# Patient Record
Sex: FEMALE | Race: WHITE | Employment: OTHER | URBAN - METROPOLITAN AREA
[De-identification: names, ages, dates, MRNs, and addresses within clinical notes are randomized per-mention and may not be internally consistent; named-entity substitution may affect disease eponyms.]

---

## 2021-11-15 ENCOUNTER — HOSPITAL ENCOUNTER (INPATIENT)
Age: 86
LOS: 22 days | Discharge: NURSING, REHAB OR LONG-TERM CARE | DRG: 853 | End: 2021-12-07
Attending: EMERGENCY MEDICINE | Admitting: SURGERY
Payer: MEDICARE

## 2021-11-15 ENCOUNTER — APPOINTMENT (OUTPATIENT)
Dept: CT IMAGING | Age: 86
DRG: 853 | End: 2021-11-15
Attending: EMERGENCY MEDICINE
Payer: MEDICARE

## 2021-11-15 ENCOUNTER — ANESTHESIA (OUTPATIENT)
Dept: SURGERY | Age: 86
DRG: 853 | End: 2021-11-15
Payer: MEDICARE

## 2021-11-15 ENCOUNTER — ANESTHESIA EVENT (OUTPATIENT)
Dept: SURGERY | Age: 86
DRG: 853 | End: 2021-11-15
Payer: MEDICARE

## 2021-11-15 DIAGNOSIS — Z71.89 GOALS OF CARE, COUNSELING/DISCUSSION: ICD-10-CM

## 2021-11-15 DIAGNOSIS — R73.9 TRANSIENT HYPERGLYCEMIA POST PROCEDURE: ICD-10-CM

## 2021-11-15 DIAGNOSIS — K63.1 BOWEL PERFORATION (HCC): Primary | ICD-10-CM

## 2021-11-15 DIAGNOSIS — K55.9 SMALL BOWEL ISCHEMIA (HCC): ICD-10-CM

## 2021-11-15 DIAGNOSIS — R53.83 LETHARGY: ICD-10-CM

## 2021-11-15 LAB
ALBUMIN SERPL-MCNC: 1.9 G/DL (ref 3.5–5)
ALBUMIN/GLOB SERPL: 0.5 {RATIO} (ref 1.1–2.2)
ALP SERPL-CCNC: 95 U/L (ref 45–117)
ALT SERPL-CCNC: 8 U/L (ref 12–78)
ANION GAP SERPL CALC-SCNC: 19 MMOL/L (ref 5–15)
AST SERPL-CCNC: 10 U/L (ref 15–37)
BASOPHILS # BLD: 0.1 K/UL (ref 0–0.1)
BASOPHILS NFR BLD: 1 % (ref 0–1)
BILIRUB SERPL-MCNC: 0.6 MG/DL (ref 0.2–1)
BUN SERPL-MCNC: 69 MG/DL (ref 6–20)
BUN/CREAT SERPL: 39 (ref 12–20)
CALCIUM SERPL-MCNC: 8 MG/DL (ref 8.5–10.1)
CHLORIDE SERPL-SCNC: 103 MMOL/L (ref 97–108)
CO2 SERPL-SCNC: 15 MMOL/L (ref 21–32)
COVID-19 RAPID TEST, COVR: NOT DETECTED
CREAT SERPL-MCNC: 1.79 MG/DL (ref 0.55–1.02)
DIFFERENTIAL METHOD BLD: ABNORMAL
EOSINOPHIL # BLD: 0 K/UL (ref 0–0.4)
EOSINOPHIL NFR BLD: 0 % (ref 0–7)
ERYTHROCYTE [DISTWIDTH] IN BLOOD BY AUTOMATED COUNT: 14.3 % (ref 11.5–14.5)
GLOBULIN SER CALC-MCNC: 3.8 G/DL (ref 2–4)
GLUCOSE SERPL-MCNC: 126 MG/DL (ref 65–100)
HCT VFR BLD AUTO: 31.9 % (ref 35–47)
HGB BLD-MCNC: 10.3 G/DL (ref 11.5–16)
IMM GRANULOCYTES # BLD AUTO: 0.3 K/UL
IMM GRANULOCYTES NFR BLD AUTO: 2 %
LACTATE SERPL-SCNC: 2.1 MMOL/L (ref 0.4–2)
LACTATE SERPL-SCNC: 2.3 MMOL/L (ref 0.4–2)
LIPASE SERPL-CCNC: 16 U/L (ref 73–393)
LYMPHOCYTES # BLD: 0.9 K/UL (ref 0.8–3.5)
LYMPHOCYTES NFR BLD: 7 % (ref 12–49)
MCH RBC QN AUTO: 29.3 PG (ref 26–34)
MCHC RBC AUTO-ENTMCNC: 32.3 G/DL (ref 30–36.5)
MCV RBC AUTO: 90.9 FL (ref 80–99)
METAMYELOCYTES NFR BLD MANUAL: 3 %
MONOCYTES # BLD: 0.4 K/UL (ref 0–1)
MONOCYTES NFR BLD: 3 % (ref 5–13)
NEUTS BAND NFR BLD MANUAL: 30 % (ref 0–6)
NEUTS SEG # BLD: 11.3 K/UL (ref 1.8–8)
NEUTS SEG NFR BLD: 54 % (ref 32–75)
NRBC # BLD: 0 K/UL (ref 0–0.01)
NRBC BLD-RTO: 0 PER 100 WBC
PLATELET # BLD AUTO: 618 K/UL (ref 150–400)
PLATELET COMMENTS,PCOM: ABNORMAL
PMV BLD AUTO: 10.1 FL (ref 8.9–12.9)
POTASSIUM SERPL-SCNC: 3.8 MMOL/L (ref 3.5–5.1)
PROT SERPL-MCNC: 5.7 G/DL (ref 6.4–8.2)
RBC # BLD AUTO: 3.51 M/UL (ref 3.8–5.2)
RBC MORPH BLD: ABNORMAL
SODIUM SERPL-SCNC: 137 MMOL/L (ref 136–145)
SOURCE, COVRS: NORMAL
WBC # BLD AUTO: 13.5 K/UL (ref 3.6–11)
WBC MORPH BLD: ABNORMAL

## 2021-11-15 PROCEDURE — 77030008608 HC TRCR ENDOSC SMTH AMR -B: Performed by: SURGERY

## 2021-11-15 PROCEDURE — 0DT80ZZ RESECTION OF SMALL INTESTINE, OPEN APPROACH: ICD-10-PCS | Performed by: SURGERY

## 2021-11-15 PROCEDURE — 77030012770 HC TRCR OPT FX AMR -B: Performed by: SURGERY

## 2021-11-15 PROCEDURE — 77030002966 HC SUT PDS J&J -A: Performed by: SURGERY

## 2021-11-15 PROCEDURE — 77030002895 HC DEV VASC CLOSR COVD -B: Performed by: SURGERY

## 2021-11-15 PROCEDURE — 74011000250 HC RX REV CODE- 250: Performed by: NURSE ANESTHETIST, CERTIFIED REGISTERED

## 2021-11-15 PROCEDURE — 83690 ASSAY OF LIPASE: CPT

## 2021-11-15 PROCEDURE — 77030002933 HC SUT MCRYL J&J -A: Performed by: SURGERY

## 2021-11-15 PROCEDURE — 77030011278 HC ELECTRD LIG IMPT COVD -F: Performed by: SURGERY

## 2021-11-15 PROCEDURE — 80053 COMPREHEN METABOLIC PANEL: CPT

## 2021-11-15 PROCEDURE — 77030016154: Performed by: SURGERY

## 2021-11-15 PROCEDURE — 0DJD4ZZ INSPECTION OF LOWER INTESTINAL TRACT, PERCUTANEOUS ENDOSCOPIC APPROACH: ICD-10-PCS | Performed by: SURGERY

## 2021-11-15 PROCEDURE — 96361 HYDRATE IV INFUSION ADD-ON: CPT

## 2021-11-15 PROCEDURE — 83605 ASSAY OF LACTIC ACID: CPT

## 2021-11-15 PROCEDURE — 77030040504 HC DRN WND MDII -B: Performed by: SURGERY

## 2021-11-15 PROCEDURE — 77030011264 HC ELECTRD BLD EXT COVD -A: Performed by: SURGERY

## 2021-11-15 PROCEDURE — 76010000132 HC OR TIME 2.5 TO 3 HR: Performed by: SURGERY

## 2021-11-15 PROCEDURE — 2709999900 HC NON-CHARGEABLE SUPPLY: Performed by: SURGERY

## 2021-11-15 PROCEDURE — 77030031139 HC SUT VCRL2 J&J -A: Performed by: SURGERY

## 2021-11-15 PROCEDURE — 76060000037 HC ANESTHESIA 3 TO 3.5 HR: Performed by: SURGERY

## 2021-11-15 PROCEDURE — 77030008462 HC STPLR SKN PROX J&J -A: Performed by: SURGERY

## 2021-11-15 PROCEDURE — 77030009965 HC RELD STPLR ENDOS COVD -D: Performed by: SURGERY

## 2021-11-15 PROCEDURE — 77030013079 HC BLNKT BAIR HGGR 3M -A: Performed by: ANESTHESIOLOGY

## 2021-11-15 PROCEDURE — 77030026438 HC STYL ET INTUB CARD -A: Performed by: NURSE ANESTHETIST, CERTIFIED REGISTERED

## 2021-11-15 PROCEDURE — 76010000172 HC OR TIME 2.5 TO 3 HR INTENSV-TIER 1: Performed by: SURGERY

## 2021-11-15 PROCEDURE — 85025 COMPLETE CBC W/AUTO DIFF WBC: CPT

## 2021-11-15 PROCEDURE — 74011250636 HC RX REV CODE- 250/636: Performed by: EMERGENCY MEDICINE

## 2021-11-15 PROCEDURE — 77030040830 HC CATH URETH FOL MDII -A: Performed by: SURGERY

## 2021-11-15 PROCEDURE — 96365 THER/PROPH/DIAG IV INF INIT: CPT

## 2021-11-15 PROCEDURE — 74011250636 HC RX REV CODE- 250/636: Performed by: NURSE ANESTHETIST, CERTIFIED REGISTERED

## 2021-11-15 PROCEDURE — 74176 CT ABD & PELVIS W/O CONTRAST: CPT

## 2021-11-15 PROCEDURE — 77030040361 HC SLV COMPR DVT MDII -B: Performed by: SURGERY

## 2021-11-15 PROCEDURE — 76210000006 HC OR PH I REC 0.5 TO 1 HR: Performed by: SURGERY

## 2021-11-15 PROCEDURE — 36415 COLL VENOUS BLD VENIPUNCTURE: CPT

## 2021-11-15 PROCEDURE — 77030010507 HC ADH SKN DERMBND J&J -B: Performed by: SURGERY

## 2021-11-15 PROCEDURE — 99285 EMERGENCY DEPT VISIT HI MDM: CPT

## 2021-11-15 PROCEDURE — 87635 SARS-COV-2 COVID-19 AMP PRB: CPT

## 2021-11-15 PROCEDURE — 77030020829: Performed by: SURGERY

## 2021-11-15 PROCEDURE — 77030008756 HC TU IRR SUC STRY -B: Performed by: SURGERY

## 2021-11-15 PROCEDURE — 74011000258 HC RX REV CODE- 258: Performed by: EMERGENCY MEDICINE

## 2021-11-15 PROCEDURE — 77030008684 HC TU ET CUF COVD -B: Performed by: NURSE ANESTHETIST, CERTIFIED REGISTERED

## 2021-11-15 PROCEDURE — 77030002996 HC SUT SLK J&J -A: Performed by: SURGERY

## 2021-11-15 PROCEDURE — 0DTF0ZZ RESECTION OF RIGHT LARGE INTESTINE, OPEN APPROACH: ICD-10-PCS | Performed by: SURGERY

## 2021-11-15 PROCEDURE — 0DQB0ZZ REPAIR ILEUM, OPEN APPROACH: ICD-10-PCS | Performed by: SURGERY

## 2021-11-15 PROCEDURE — 93005 ELECTROCARDIOGRAM TRACING: CPT

## 2021-11-15 PROCEDURE — 99221 1ST HOSP IP/OBS SF/LOW 40: CPT | Performed by: SURGERY

## 2021-11-15 PROCEDURE — 77030038157 HC DEV PWR CNTR DISP SIGNIA COVD -C: Performed by: SURGERY

## 2021-11-15 PROCEDURE — 77030040506 HC DRN WND MDII -A: Performed by: SURGERY

## 2021-11-15 PROCEDURE — 96375 TX/PRO/DX INJ NEW DRUG ADDON: CPT

## 2021-11-15 RX ORDER — CEPHALEXIN 500 MG/1
500 CAPSULE ORAL 4 TIMES DAILY
COMMUNITY
End: 2022-01-10

## 2021-11-15 RX ORDER — CEFOXITIN 2 G/1
INJECTION, POWDER, FOR SOLUTION INTRAVENOUS AS NEEDED
Status: DISCONTINUED | OUTPATIENT
Start: 2021-11-15 | End: 2021-11-16 | Stop reason: HOSPADM

## 2021-11-15 RX ORDER — LISINOPRIL 20 MG/1
20 TABLET ORAL DAILY
COMMUNITY
End: 2022-02-06

## 2021-11-15 RX ORDER — MELATONIN
1000 DAILY
COMMUNITY

## 2021-11-15 RX ORDER — CHOLECALCIFEROL (VITAMIN D3) 125 MCG
5 CAPSULE ORAL
COMMUNITY

## 2021-11-15 RX ORDER — ONDANSETRON 2 MG/ML
4 INJECTION INTRAMUSCULAR; INTRAVENOUS
Status: COMPLETED | OUTPATIENT
Start: 2021-11-15 | End: 2021-11-15

## 2021-11-15 RX ORDER — SODIUM CHLORIDE, SODIUM LACTATE, POTASSIUM CHLORIDE, CALCIUM CHLORIDE 600; 310; 30; 20 MG/100ML; MG/100ML; MG/100ML; MG/100ML
INJECTION, SOLUTION INTRAVENOUS
Status: DISCONTINUED | OUTPATIENT
Start: 2021-11-15 | End: 2021-11-16 | Stop reason: HOSPADM

## 2021-11-15 RX ORDER — ACETAMINOPHEN 325 MG/1
325 TABLET ORAL
COMMUNITY
End: 2022-01-10

## 2021-11-15 RX ORDER — L.ACID,CASEI/B.ANIMAL/S.THERMO 16B CELL
1 CAPSULE ORAL DAILY
COMMUNITY
End: 2022-01-10

## 2021-11-15 RX ORDER — POTASSIUM CHLORIDE 1.5 G/1.77G
20 POWDER, FOR SOLUTION ORAL DAILY
COMMUNITY
End: 2022-03-01

## 2021-11-15 RX ORDER — LEVOTHYROXINE SODIUM 25 UG/1
25 TABLET ORAL
COMMUNITY
End: 2022-02-06

## 2021-11-15 RX ORDER — LANOLIN ALCOHOL/MO/W.PET/CERES
400 CREAM (GRAM) TOPICAL DAILY
COMMUNITY
End: 2022-02-06 | Stop reason: DRUGHIGH

## 2021-11-15 RX ORDER — CARVEDILOL 12.5 MG/1
12.5 TABLET ORAL 2 TIMES DAILY WITH MEALS
Status: ON HOLD | COMMUNITY
End: 2022-02-10 | Stop reason: SDUPTHER

## 2021-11-15 RX ORDER — SUCCINYLCHOLINE CHLORIDE 20 MG/ML
INJECTION INTRAMUSCULAR; INTRAVENOUS AS NEEDED
Status: DISCONTINUED | OUTPATIENT
Start: 2021-11-15 | End: 2021-11-16 | Stop reason: HOSPADM

## 2021-11-15 RX ORDER — CEFOXITIN 2 G/1
INJECTION, POWDER, FOR SOLUTION INTRAVENOUS
Status: DISPENSED
Start: 2021-11-15 | End: 2021-11-16

## 2021-11-15 RX ORDER — PROPOFOL 10 MG/ML
INJECTION, EMULSION INTRAVENOUS AS NEEDED
Status: DISCONTINUED | OUTPATIENT
Start: 2021-11-15 | End: 2021-11-16 | Stop reason: HOSPADM

## 2021-11-15 RX ORDER — GUAIFENESIN 100 MG/5ML
81 LIQUID (ML) ORAL DAILY
COMMUNITY

## 2021-11-15 RX ORDER — FUROSEMIDE 40 MG/1
40 TABLET ORAL DAILY
COMMUNITY
End: 2022-01-10

## 2021-11-15 RX ORDER — DEXAMETHASONE SODIUM PHOSPHATE 4 MG/ML
INJECTION, SOLUTION INTRA-ARTICULAR; INTRALESIONAL; INTRAMUSCULAR; INTRAVENOUS; SOFT TISSUE AS NEEDED
Status: DISCONTINUED | OUTPATIENT
Start: 2021-11-15 | End: 2021-11-16 | Stop reason: HOSPADM

## 2021-11-15 RX ORDER — ROCURONIUM BROMIDE 10 MG/ML
INJECTION, SOLUTION INTRAVENOUS AS NEEDED
Status: DISCONTINUED | OUTPATIENT
Start: 2021-11-15 | End: 2021-11-16 | Stop reason: HOSPADM

## 2021-11-15 RX ORDER — MORPHINE SULFATE 2 MG/ML
2 INJECTION, SOLUTION INTRAMUSCULAR; INTRAVENOUS ONCE
Status: COMPLETED | OUTPATIENT
Start: 2021-11-15 | End: 2021-11-15

## 2021-11-15 RX ORDER — PHENYLEPHRINE HCL IN 0.9% NACL 0.4MG/10ML
SYRINGE (ML) INTRAVENOUS AS NEEDED
Status: DISCONTINUED | OUTPATIENT
Start: 2021-11-15 | End: 2021-11-16 | Stop reason: HOSPADM

## 2021-11-15 RX ORDER — ONDANSETRON 2 MG/ML
INJECTION INTRAMUSCULAR; INTRAVENOUS AS NEEDED
Status: DISCONTINUED | OUTPATIENT
Start: 2021-11-15 | End: 2021-11-16 | Stop reason: HOSPADM

## 2021-11-15 RX ORDER — LIDOCAINE HYDROCHLORIDE 20 MG/ML
INJECTION, SOLUTION EPIDURAL; INFILTRATION; INTRACAUDAL; PERINEURAL AS NEEDED
Status: DISCONTINUED | OUTPATIENT
Start: 2021-11-15 | End: 2021-11-16 | Stop reason: HOSPADM

## 2021-11-15 RX ORDER — SERTRALINE HYDROCHLORIDE 50 MG/1
TABLET, FILM COATED ORAL DAILY
COMMUNITY
End: 2022-01-10

## 2021-11-15 RX ORDER — FENTANYL CITRATE 50 UG/ML
INJECTION, SOLUTION INTRAMUSCULAR; INTRAVENOUS AS NEEDED
Status: DISCONTINUED | OUTPATIENT
Start: 2021-11-15 | End: 2021-11-16 | Stop reason: HOSPADM

## 2021-11-15 RX ADMIN — ONDANSETRON HYDROCHLORIDE 4 MG: 2 SOLUTION INTRAMUSCULAR; INTRAVENOUS at 19:54

## 2021-11-15 RX ADMIN — SODIUM CHLORIDE 1000 ML: 9 INJECTION, SOLUTION INTRAVENOUS at 19:15

## 2021-11-15 RX ADMIN — ROCURONIUM BROMIDE 5 MG: 10 SOLUTION INTRAVENOUS at 23:30

## 2021-11-15 RX ADMIN — DEXAMETHASONE SODIUM PHOSPHATE 4 MG: 4 INJECTION, SOLUTION INTRAMUSCULAR; INTRAVENOUS at 23:39

## 2021-11-15 RX ADMIN — MORPHINE SULFATE 2 MG: 2 INJECTION, SOLUTION INTRAMUSCULAR; INTRAVENOUS at 20:24

## 2021-11-15 RX ADMIN — CEFOXITIN SODIUM 2 G: 2 POWDER, FOR SOLUTION INTRAVENOUS at 23:52

## 2021-11-15 RX ADMIN — ONDANSETRON HYDROCHLORIDE 4 MG: 2 INJECTION, SOLUTION INTRAMUSCULAR; INTRAVENOUS at 23:39

## 2021-11-15 RX ADMIN — PIPERACILLIN SODIUM AND TAZOBACTAM SODIUM 3.38 G: 3; .375 INJECTION, POWDER, LYOPHILIZED, FOR SOLUTION INTRAVENOUS at 20:48

## 2021-11-15 RX ADMIN — PROPOFOL 80 MG: 10 INJECTION, EMULSION INTRAVENOUS at 23:30

## 2021-11-15 RX ADMIN — SODIUM CHLORIDE 1000 ML: 9 INJECTION, SOLUTION INTRAVENOUS at 20:25

## 2021-11-15 RX ADMIN — FENTANYL CITRATE 50 MCG: 50 INJECTION, SOLUTION INTRAMUSCULAR; INTRAVENOUS at 23:50

## 2021-11-15 RX ADMIN — SUCCINYLCHOLINE CHLORIDE 100 MG: 20 INJECTION, SOLUTION INTRAMUSCULAR; INTRAVENOUS at 23:30

## 2021-11-15 RX ADMIN — ROCURONIUM BROMIDE 25 MG: 10 SOLUTION INTRAVENOUS at 23:35

## 2021-11-15 RX ADMIN — SODIUM CHLORIDE, POTASSIUM CHLORIDE, SODIUM LACTATE AND CALCIUM CHLORIDE: 600; 310; 30; 20 INJECTION, SOLUTION INTRAVENOUS at 23:10

## 2021-11-15 RX ADMIN — FENTANYL CITRATE 25 MCG: 50 INJECTION, SOLUTION INTRAMUSCULAR; INTRAVENOUS at 23:30

## 2021-11-15 RX ADMIN — Medication 40 MCG: at 23:30

## 2021-11-15 RX ADMIN — PHENYLEPHRINE HYDROCHLORIDE 60 MCG/MIN: 10 INJECTION INTRAVENOUS at 23:30

## 2021-11-15 RX ADMIN — LIDOCAINE HYDROCHLORIDE 20 MG: 20 INJECTION, SOLUTION EPIDURAL; INFILTRATION; INTRACAUDAL; PERINEURAL at 23:30

## 2021-11-15 RX ADMIN — ROCURONIUM BROMIDE 10 MG: 10 SOLUTION INTRAVENOUS at 23:47

## 2021-11-16 PROBLEM — K55.9 SMALL BOWEL ISCHEMIA (HCC): Status: ACTIVE | Noted: 2021-11-16

## 2021-11-16 LAB
ALBUMIN SERPL-MCNC: 1.8 G/DL (ref 3.5–5)
ALBUMIN/GLOB SERPL: 0.6 {RATIO} (ref 1.1–2.2)
ALP SERPL-CCNC: 67 U/L (ref 45–117)
ALT SERPL-CCNC: 15 U/L (ref 12–78)
ANION GAP SERPL CALC-SCNC: 10 MMOL/L (ref 5–15)
ANION GAP SERPL CALC-SCNC: 12 MMOL/L (ref 5–15)
AST SERPL-CCNC: 24 U/L (ref 15–37)
BILIRUB SERPL-MCNC: 0.7 MG/DL (ref 0.2–1)
BUN SERPL-MCNC: 58 MG/DL (ref 6–20)
BUN SERPL-MCNC: 62 MG/DL (ref 6–20)
BUN/CREAT SERPL: 25 (ref 12–20)
BUN/CREAT SERPL: 33 (ref 12–20)
CALCIUM SERPL-MCNC: 7.2 MG/DL (ref 8.5–10.1)
CALCIUM SERPL-MCNC: 7.6 MG/DL (ref 8.5–10.1)
CHLORIDE SERPL-SCNC: 114 MMOL/L (ref 97–108)
CHLORIDE SERPL-SCNC: 115 MMOL/L (ref 97–108)
CO2 SERPL-SCNC: 12 MMOL/L (ref 21–32)
CO2 SERPL-SCNC: 16 MMOL/L (ref 21–32)
CREAT SERPL-MCNC: 1.75 MG/DL (ref 0.55–1.02)
CREAT SERPL-MCNC: 2.51 MG/DL (ref 0.55–1.02)
ERYTHROCYTE [DISTWIDTH] IN BLOOD BY AUTOMATED COUNT: 14.3 % (ref 11.5–14.5)
GLOBULIN SER CALC-MCNC: 3.1 G/DL (ref 2–4)
GLUCOSE SERPL-MCNC: 101 MG/DL (ref 65–100)
GLUCOSE SERPL-MCNC: 95 MG/DL (ref 65–100)
HCT VFR BLD AUTO: 28.5 % (ref 35–47)
HGB BLD-MCNC: 9 G/DL (ref 11.5–16)
LACTATE SERPL-SCNC: 1.2 MMOL/L (ref 0.4–2)
LACTATE SERPL-SCNC: 3.6 MMOL/L (ref 0.4–2)
MAGNESIUM SERPL-MCNC: 1.3 MG/DL (ref 1.6–2.4)
MCH RBC QN AUTO: 29.8 PG (ref 26–34)
MCHC RBC AUTO-ENTMCNC: 31.6 G/DL (ref 30–36.5)
MCV RBC AUTO: 94.4 FL (ref 80–99)
NRBC # BLD: 0 K/UL (ref 0–0.01)
NRBC BLD-RTO: 0 PER 100 WBC
PHOSPHATE SERPL-MCNC: 3.9 MG/DL (ref 2.6–4.7)
PLATELET # BLD AUTO: 417 K/UL (ref 150–400)
PMV BLD AUTO: 10.1 FL (ref 8.9–12.9)
POTASSIUM SERPL-SCNC: 3.9 MMOL/L (ref 3.5–5.1)
POTASSIUM SERPL-SCNC: 4 MMOL/L (ref 3.5–5.1)
PROT SERPL-MCNC: 4.9 G/DL (ref 6.4–8.2)
RBC # BLD AUTO: 3.02 M/UL (ref 3.8–5.2)
SODIUM SERPL-SCNC: 139 MMOL/L (ref 136–145)
SODIUM SERPL-SCNC: 140 MMOL/L (ref 136–145)
WBC # BLD AUTO: 20.1 K/UL (ref 3.6–11)

## 2021-11-16 PROCEDURE — 83605 ASSAY OF LACTIC ACID: CPT

## 2021-11-16 PROCEDURE — 88307 TISSUE EXAM BY PATHOLOGIST: CPT

## 2021-11-16 PROCEDURE — 65270000032 HC RM SEMIPRIVATE

## 2021-11-16 PROCEDURE — 74011000250 HC RX REV CODE- 250: Performed by: SURGERY

## 2021-11-16 PROCEDURE — 74011000250 HC RX REV CODE- 250: Performed by: NURSE ANESTHETIST, CERTIFIED REGISTERED

## 2021-11-16 PROCEDURE — 74011250636 HC RX REV CODE- 250/636: Performed by: SURGERY

## 2021-11-16 PROCEDURE — 80053 COMPREHEN METABOLIC PANEL: CPT

## 2021-11-16 PROCEDURE — 85027 COMPLETE CBC AUTOMATED: CPT

## 2021-11-16 PROCEDURE — P9045 ALBUMIN (HUMAN), 5%, 250 ML: HCPCS | Performed by: NURSE ANESTHETIST, CERTIFIED REGISTERED

## 2021-11-16 PROCEDURE — 44160 REMOVAL OF COLON: CPT | Performed by: SURGERY

## 2021-11-16 PROCEDURE — 74011250636 HC RX REV CODE- 250/636: Performed by: NURSE ANESTHETIST, CERTIFIED REGISTERED

## 2021-11-16 PROCEDURE — 36415 COLL VENOUS BLD VENIPUNCTURE: CPT

## 2021-11-16 PROCEDURE — 84100 ASSAY OF PHOSPHORUS: CPT

## 2021-11-16 PROCEDURE — 74011250636 HC RX REV CODE- 250/636: Performed by: NURSE PRACTITIONER

## 2021-11-16 PROCEDURE — 74011250636 HC RX REV CODE- 250/636: Performed by: ANESTHESIOLOGY

## 2021-11-16 PROCEDURE — 83735 ASSAY OF MAGNESIUM: CPT

## 2021-11-16 RX ORDER — LIDOCAINE HYDROCHLORIDE 10 MG/ML
0.1 INJECTION, SOLUTION EPIDURAL; INFILTRATION; INTRACAUDAL; PERINEURAL AS NEEDED
Status: DISCONTINUED | OUTPATIENT
Start: 2021-11-16 | End: 2021-11-16 | Stop reason: HOSPADM

## 2021-11-16 RX ORDER — HYDROMORPHONE HYDROCHLORIDE 1 MG/ML
0.2 INJECTION, SOLUTION INTRAMUSCULAR; INTRAVENOUS; SUBCUTANEOUS
Status: DISCONTINUED | OUTPATIENT
Start: 2021-11-16 | End: 2021-11-16 | Stop reason: HOSPADM

## 2021-11-16 RX ORDER — SODIUM CHLORIDE 0.9 % (FLUSH) 0.9 %
5-40 SYRINGE (ML) INJECTION AS NEEDED
Status: DISCONTINUED | OUTPATIENT
Start: 2021-11-16 | End: 2021-11-16 | Stop reason: HOSPADM

## 2021-11-16 RX ORDER — ALBUMIN HUMAN 50 G/1000ML
SOLUTION INTRAVENOUS AS NEEDED
Status: DISCONTINUED | OUTPATIENT
Start: 2021-11-16 | End: 2021-11-16 | Stop reason: HOSPADM

## 2021-11-16 RX ORDER — DIPHENHYDRAMINE HYDROCHLORIDE 50 MG/ML
12.5 INJECTION, SOLUTION INTRAMUSCULAR; INTRAVENOUS
Status: ACTIVE | OUTPATIENT
Start: 2021-11-16 | End: 2021-11-17

## 2021-11-16 RX ORDER — MIDAZOLAM HYDROCHLORIDE 1 MG/ML
1 INJECTION, SOLUTION INTRAMUSCULAR; INTRAVENOUS AS NEEDED
Status: DISCONTINUED | OUTPATIENT
Start: 2021-11-16 | End: 2021-11-16 | Stop reason: HOSPADM

## 2021-11-16 RX ORDER — LIDOCAINE HYDROCHLORIDE AND EPINEPHRINE 10; 10 MG/ML; UG/ML
INJECTION, SOLUTION INFILTRATION; PERINEURAL AS NEEDED
Status: DISCONTINUED | OUTPATIENT
Start: 2021-11-16 | End: 2021-11-16 | Stop reason: HOSPADM

## 2021-11-16 RX ORDER — SODIUM CHLORIDE 0.9 % (FLUSH) 0.9 %
5-40 SYRINGE (ML) INJECTION EVERY 8 HOURS
Status: DISCONTINUED | OUTPATIENT
Start: 2021-11-16 | End: 2021-11-16 | Stop reason: HOSPADM

## 2021-11-16 RX ORDER — NALOXONE HYDROCHLORIDE 0.4 MG/ML
0.4 INJECTION, SOLUTION INTRAMUSCULAR; INTRAVENOUS; SUBCUTANEOUS AS NEEDED
Status: DISCONTINUED | OUTPATIENT
Start: 2021-11-16 | End: 2021-12-07 | Stop reason: HOSPADM

## 2021-11-16 RX ORDER — METRONIDAZOLE 500 MG/100ML
500 INJECTION, SOLUTION INTRAVENOUS EVERY 8 HOURS
Status: DISCONTINUED | OUTPATIENT
Start: 2021-11-16 | End: 2021-11-17

## 2021-11-16 RX ORDER — SODIUM CHLORIDE, SODIUM LACTATE, POTASSIUM CHLORIDE, CALCIUM CHLORIDE 600; 310; 30; 20 MG/100ML; MG/100ML; MG/100ML; MG/100ML
100 INJECTION, SOLUTION INTRAVENOUS CONTINUOUS
Status: DISCONTINUED | OUTPATIENT
Start: 2021-11-16 | End: 2021-11-16

## 2021-11-16 RX ORDER — ENOXAPARIN SODIUM 100 MG/ML
30 INJECTION SUBCUTANEOUS EVERY 24 HOURS
Status: DISCONTINUED | OUTPATIENT
Start: 2021-11-16 | End: 2021-11-19

## 2021-11-16 RX ORDER — LEVOFLOXACIN 5 MG/ML
750 INJECTION, SOLUTION INTRAVENOUS ONCE
Status: COMPLETED | OUTPATIENT
Start: 2021-11-16 | End: 2021-11-16

## 2021-11-16 RX ORDER — HYDROMORPHONE HYDROCHLORIDE 1 MG/ML
1 INJECTION, SOLUTION INTRAMUSCULAR; INTRAVENOUS; SUBCUTANEOUS
Status: DISCONTINUED | OUTPATIENT
Start: 2021-11-16 | End: 2021-12-07 | Stop reason: HOSPADM

## 2021-11-16 RX ORDER — ONDANSETRON 2 MG/ML
4 INJECTION INTRAMUSCULAR; INTRAVENOUS
Status: DISCONTINUED | OUTPATIENT
Start: 2021-11-16 | End: 2021-12-07 | Stop reason: HOSPADM

## 2021-11-16 RX ORDER — SODIUM CHLORIDE 0.9 % (FLUSH) 0.9 %
5-40 SYRINGE (ML) INJECTION AS NEEDED
Status: DISCONTINUED | OUTPATIENT
Start: 2021-11-16 | End: 2021-12-07 | Stop reason: HOSPADM

## 2021-11-16 RX ORDER — EPHEDRINE SULFATE/0.9% NACL/PF 50 MG/5 ML
SYRINGE (ML) INTRAVENOUS AS NEEDED
Status: DISCONTINUED | OUTPATIENT
Start: 2021-11-16 | End: 2021-11-16 | Stop reason: HOSPADM

## 2021-11-16 RX ORDER — LEVOFLOXACIN 5 MG/ML
500 INJECTION, SOLUTION INTRAVENOUS
Status: DISCONTINUED | OUTPATIENT
Start: 2021-11-18 | End: 2021-11-17

## 2021-11-16 RX ORDER — MAGNESIUM SULFATE HEPTAHYDRATE 40 MG/ML
2 INJECTION, SOLUTION INTRAVENOUS ONCE
Status: COMPLETED | OUTPATIENT
Start: 2021-11-16 | End: 2021-11-16

## 2021-11-16 RX ORDER — FENTANYL CITRATE 50 UG/ML
25 INJECTION, SOLUTION INTRAMUSCULAR; INTRAVENOUS
Status: COMPLETED | OUTPATIENT
Start: 2021-11-16 | End: 2021-11-16

## 2021-11-16 RX ORDER — ACETAMINOPHEN 325 MG/1
650 TABLET ORAL ONCE
Status: ACTIVE | OUTPATIENT
Start: 2021-11-16 | End: 2021-11-16

## 2021-11-16 RX ORDER — FENTANYL CITRATE 50 UG/ML
50 INJECTION, SOLUTION INTRAMUSCULAR; INTRAVENOUS AS NEEDED
Status: COMPLETED | OUTPATIENT
Start: 2021-11-16 | End: 2021-11-16

## 2021-11-16 RX ORDER — SODIUM CHLORIDE, SODIUM LACTATE, POTASSIUM CHLORIDE, CALCIUM CHLORIDE 600; 310; 30; 20 MG/100ML; MG/100ML; MG/100ML; MG/100ML
125 INJECTION, SOLUTION INTRAVENOUS CONTINUOUS
Status: DISCONTINUED | OUTPATIENT
Start: 2021-11-16 | End: 2021-11-17

## 2021-11-16 RX ORDER — SODIUM CHLORIDE 0.9 % (FLUSH) 0.9 %
5-40 SYRINGE (ML) INJECTION EVERY 8 HOURS
Status: DISCONTINUED | OUTPATIENT
Start: 2021-11-16 | End: 2021-12-07 | Stop reason: HOSPADM

## 2021-11-16 RX ORDER — ONDANSETRON 2 MG/ML
4 INJECTION INTRAMUSCULAR; INTRAVENOUS AS NEEDED
Status: DISCONTINUED | OUTPATIENT
Start: 2021-11-16 | End: 2021-11-16 | Stop reason: HOSPADM

## 2021-11-16 RX ADMIN — FENTANYL CITRATE 25 MCG: 50 INJECTION INTRAMUSCULAR; INTRAVENOUS at 06:00

## 2021-11-16 RX ADMIN — SODIUM CHLORIDE, POTASSIUM CHLORIDE, SODIUM LACTATE AND CALCIUM CHLORIDE: 600; 310; 30; 20 INJECTION, SOLUTION INTRAVENOUS at 01:38

## 2021-11-16 RX ADMIN — Medication 80 MCG: at 01:13

## 2021-11-16 RX ADMIN — FENTANYL CITRATE 50 MCG: 50 INJECTION INTRAMUSCULAR; INTRAVENOUS at 08:32

## 2021-11-16 RX ADMIN — Medication 10 ML: at 22:13

## 2021-11-16 RX ADMIN — HYDROMORPHONE HYDROCHLORIDE 1 MG: 1 INJECTION, SOLUTION INTRAMUSCULAR; INTRAVENOUS; SUBCUTANEOUS at 22:12

## 2021-11-16 RX ADMIN — Medication 5 MG: at 01:20

## 2021-11-16 RX ADMIN — FENTANYL CITRATE 25 MCG: 50 INJECTION INTRAMUSCULAR; INTRAVENOUS at 06:05

## 2021-11-16 RX ADMIN — FENTANYL CITRATE 25 MCG: 50 INJECTION INTRAMUSCULAR; INTRAVENOUS at 06:10

## 2021-11-16 RX ADMIN — ALBUMIN (HUMAN) 250 ML: 12.5 INJECTION, SOLUTION INTRAVENOUS at 01:43

## 2021-11-16 RX ADMIN — SODIUM CHLORIDE, POTASSIUM CHLORIDE, SODIUM LACTATE AND CALCIUM CHLORIDE 125 ML/HR: 600; 310; 30; 20 INJECTION, SOLUTION INTRAVENOUS at 17:40

## 2021-11-16 RX ADMIN — MAGNESIUM SULFATE HEPTAHYDRATE 2 G: 40 INJECTION, SOLUTION INTRAVENOUS at 08:35

## 2021-11-16 RX ADMIN — METRONIDAZOLE 500 MG: 500 INJECTION, SOLUTION INTRAVENOUS at 03:00

## 2021-11-16 RX ADMIN — METRONIDAZOLE 500 MG: 500 INJECTION, SOLUTION INTRAVENOUS at 11:04

## 2021-11-16 RX ADMIN — SODIUM CHLORIDE, POTASSIUM CHLORIDE, SODIUM LACTATE AND CALCIUM CHLORIDE 1000 ML: 600; 310; 30; 20 INJECTION, SOLUTION INTRAVENOUS at 04:25

## 2021-11-16 RX ADMIN — FENTANYL CITRATE 25 MCG: 50 INJECTION INTRAMUSCULAR; INTRAVENOUS at 06:15

## 2021-11-16 RX ADMIN — LEVOFLOXACIN 750 MG: 750 INJECTION, SOLUTION INTRAVENOUS at 04:30

## 2021-11-16 RX ADMIN — SUGAMMADEX 200 MG: 100 INJECTION, SOLUTION INTRAVENOUS at 02:00

## 2021-11-16 RX ADMIN — ENOXAPARIN SODIUM 30 MG: 100 INJECTION SUBCUTANEOUS at 05:00

## 2021-11-16 RX ADMIN — FENTANYL CITRATE 50 MCG: 50 INJECTION INTRAMUSCULAR; INTRAVENOUS at 10:48

## 2021-11-16 RX ADMIN — ALBUMIN (HUMAN) 250 ML: 12.5 INJECTION, SOLUTION INTRAVENOUS at 00:03

## 2021-11-16 RX ADMIN — CEFOXITIN SODIUM 2 G: 2 POWDER, FOR SOLUTION INTRAVENOUS at 01:40

## 2021-11-16 RX ADMIN — SODIUM CHLORIDE, POTASSIUM CHLORIDE, SODIUM LACTATE AND CALCIUM CHLORIDE 100 ML/HR: 600; 310; 30; 20 INJECTION, SOLUTION INTRAVENOUS at 05:54

## 2021-11-16 RX ADMIN — METRONIDAZOLE 500 MG: 500 INJECTION, SOLUTION INTRAVENOUS at 19:00

## 2021-11-16 NOTE — ED TRIAGE NOTES
Pt arrived from [facility] via EMS. Pt reports RLQ abdominal pain that started today and n/v hat started yesterday. EMS denies fevers or vomiting in route.

## 2021-11-16 NOTE — ROUTINE PROCESS
TRANSFER - OUT REPORT:    Verbal report given to [address] on April Reyes  being transferred to OR for ordered procedure       Report consisted of patients Situation, Background, Assessment and   Recommendations(SBAR). Information from the following report(s) SBAR, Kardex, [facility] and Recent Results was reviewed with the receiving nurse. Lines:   Peripheral IV 11/15/21 Right Antecubital (Active)   Site Assessment Clean, dry, & intact 11/15/21 1915   Phlebitis Assessment 0 11/15/21 1915   Infiltration Assessment 0 11/15/21 1915   Dressing Status Clean, dry, & intact 11/15/21 1915   Hub Color/Line Status Pink 11/15/21 1915   Action Taken Blood drawn 11/15/21 1915   Alcohol Cap Used Yes 11/15/21 1915        Opportunity for questions and clarification was provided.       Patient transported with:   Sure Chill

## 2021-11-16 NOTE — ED NOTES
Daughter updated on results by Dr. Belkis Chambers, agreeable to transport to [facility]

## 2021-11-16 NOTE — ED NOTES
TRANSFER - OUT REPORT:    Verbal report given to Meg Esquivel RN (name) on Dimas Cronin  being transferred to  [facility] ER(unit) for urgent transfer       Report consisted of patients Situation, Background, Assessment and   Recommendations(SBAR). Information from the following report(s) SBAR, ED Summary, MAR, Recent Results and Cardiac Rhythm sinus was reviewed with the receiving nurse. Lines:   Peripheral IV 11/15/21 Right Antecubital (Active)   Site Assessment Clean, dry, & intact 11/15/21 1915   Phlebitis Assessment 0 11/15/21 1915   Infiltration Assessment 0 11/15/21 1915   Dressing Status Clean, dry, & intact 11/15/21 1915   Hub Color/Line Status Pink 11/15/21 1915   Action Taken Blood drawn 11/15/21 1915   Alcohol Cap Used Yes 11/15/21 1915        Opportunity for questions and clarification was provided.       Patient transported with:   Monitor

## 2021-11-16 NOTE — ED NOTES
80-year-old female arrives as a transfer from [facility] with pneumoperitoneum. She has been examined by the surgeon and is being taken to the operating room. She states that her pain is a 4 5 out of 10. She arrived to [facility] from [place] [address] with right lower quadrant pain that started today. She has had nausea and vomiting that started yesterday. Review of systems otherwise negative except for as documented in HPI. Physical exam: Ill-appearing, no scleral icterus, tachycardia, abdomen diffusely tender greatest in the right lower quadrant    Assessment/plan: Pneumoperitoneum. Patient will be taken to the operating room by general surgery.

## 2021-11-16 NOTE — PERIOP NOTES
TRANSFER - OUT REPORT:    Verbal report given to [address] [place] [address] RN(name) on Jesusita Montalvo  being transferred to [facility](unit) for routine post - op       Report consisted of patients Situation, Background, Assessment and   Recommendations(SBAR). Time Pre op antibiotic given:see mar  Anesthesia Stop time: 26  Pearson Present on Transfer to floor:na  Order for Pearson on Chart:na  Discharge Prescriptions with Chart:na    Information from the following report(s) SBAR, Kardex, Intake/Output and MAR was reviewed with the receiving nurse. Opportunity for questions and clarification was provided. Is the patient on 02? YES       L/Min 2       Other na    Is the patient on a monitor? no    Is the nurse transporting with the patient? NO    Surgical Waiting Area notified of patient's transfer from PACU? YES         DAUGHTER NOTIFIED VIA PHONE  The following personal items collected during your admission accompanied patient upon transfer:   Dental Appliance: Dental Appliances: None  Vision: Visual Aid: Glasses, At home  Hearing Aid:    Jewelry: Jewelry: None  Clothing: Clothing: Other (comment) (returned in pacu)  Other Valuables:  Other Valuables: None  Valuables sent to safe: Personal Items Sent to Safe: na

## 2021-11-16 NOTE — ED PROVIDER NOTES
Patient is an 51-year-old female with past medical history significant for atrial fibrillation, hypertension, hypothyroidism, UTI on prophylactic antibiotics with Keflex who presents emergency department via EMS from [facility] for evaluation of nausea vomiting abdominal pain. She states that the nausea vomiting started yesterday and then she started to have significant right lower quadrant abdominal pain today. She states that she has been having chills but denies any fever. She states that she has been vomiting extensively but denies any hematemesis. Denies seeing any blood in the stools. She denies any shortness of breath or chest pain. She states she has a cardiologist that follows her. Patient states that she has had a lot of surgeries but cannot remember which. Past Medical History:   Diagnosis Date    Atrial fibrillation (HCC)     Atrial flutter (HCC)     Edema     Hypertension     Hypokalemia     Hypothyroidism     Insomnia     Major depressive disorder     Pain     UTI (urinary tract infection)     Vitamin D deficiency        History reviewed. No pertinent surgical history. History reviewed. No pertinent family history.     Social History     Socioeconomic History    Marital status:      Spouse name: Not on file    Number of children: Not on file    Years of education: Not on file    Highest education level: Not on file   Occupational History    Not on file   Tobacco Use    Smoking status: Never Smoker    Smokeless tobacco: Never Used   Substance and Sexual Activity    Alcohol use: Never    Drug use: Never    Sexual activity: Not on file   Other Topics Concern    Not on file   Social History Narrative    Not on file     Social Determinants of Health     Financial Resource Strain:     Difficulty of Paying Living Expenses: Not on file   Food Insecurity:     Worried About [address] in the Last Year: Not on file    [address] in the Last Year: Not on file   Transportation Needs:     Lack of Transportation (Medical): Not on file    Lack of Transportation (Non-Medical): Not on file   Physical Activity:     Days of Exercise per Week: Not on file    Minutes of Exercise per Session: Not on file   Stress:     Feeling of Stress : Not on file   Social Connections:     Frequency of Communication with Friends and Family: Not on file    Frequency of Social Gatherings with Friends and Family: Not on file    Attends Muslim Services: Not on file    Active Member of [address] [place] [address] or Organizations: Not on file    Attends Club or Organization Meetings: Not on file    Marital Status: Not on file   Intimate Partner Violence:     Fear of Current or Ex-Partner: Not on file    Emotionally Abused: Not on file    Physically Abused: Not on file    Sexually Abused: Not on file   Housing Stability:     Unable to Pay for Housing in the Last Year: Not on file    Number of [place] in the Last Year: Not on file    Unstable Housing in the Last Year: Not on file         ALLERGIES: Codeine, Gluten, and Sulfa (sulfonamide antibiotics)    Review of Systems   Constitutional: Negative for diaphoresis and fever. HENT: Negative for drooling. Eyes: Negative for photophobia. Respiratory: Negative for shortness of breath. Cardiovascular: Negative for chest pain. Gastrointestinal: Positive for abdominal pain, nausea and vomiting. Musculoskeletal: Negative for neck pain. Skin: Negative for rash. Neurological: Negative for seizures and syncope. Psychiatric/Behavioral: Negative. Vitals:    11/15/21 2100 11/15/21 2123 11/15/21 2159 11/15/21 2219   BP: (!) 139/39 (!) 132/49 (!) 132/49 (!) 125/47   Pulse:   90 (!) 113   Resp:   16 23   Temp:   98 °F (36.7 °C)    SpO2: 94% 96% 95% 96%   Weight:       Height:                Physical Exam  Vitals and nursing note reviewed. Constitutional:       Appearance: She is not toxic-appearing or diaphoretic.    HENT: Head: Normocephalic and atraumatic. Cardiovascular:      Rate and Rhythm: Normal rate. Pulmonary:      Effort: Pulmonary effort is normal.      Breath sounds: Normal breath sounds. Abdominal:      Palpations: Abdomen is soft. Tenderness: There is abdominal tenderness. There is guarding and rebound. Skin:     General: Skin is warm and dry. Neurological:      Mental Status: She is alert and oriented to person, place, and time. Psychiatric:         Mood and Affect: Mood normal.         Behavior: Behavior normal.          MDM  Number of Diagnoses or Management Options  Bowel perforation [facility]): new and requires workup  Diagnosis management comments: Patient's daughter called and advised of her CT findings and need for emergent transfer to [facility] for definitive care for bowel perforation. She states that normally she seen at [place] [address] which is apparently where she was was to be taken today however she is okay with transfer to [facility] noting that it would likely take much longer to get her over to [place] [facility] and time certainly of the essence with acute bowel perforation. Amount and/or Complexity of Data Reviewed  Clinical lab tests: reviewed and ordered  Tests in the radiology section of CPT®: reviewed and ordered  Obtain history from someone other than the patient: yes  Discuss the patient with other providers: yes  Independent visualization of images, tracings, or specimens: yes    Risk of Complications, Morbidity, and/or Mortality  Presenting problems: high  Diagnostic procedures: high  Management options: high    Patient Progress  Patient progress: improved    ED Course as of 11/16/21 0021   Mon Nov 15, 2021   1957 ECG obtained at 80 showing sinus tachycardia, rate 101, normal intervals, nonspecific T wave flattening, no prior EKG available for comparison.  [JE]      ED Course User Index  [JE] Leslie Ponce MD       Critical Care  Performed by: Leslie Ponce MD  Authorized by: Hamilton Ricks MD     Critical care provider statement:     Critical care time (minutes):  45    Critical care time was exclusive of:  Separately billable procedures and treating other patients    Critical care was necessary to treat or prevent imminent or life-threatening deterioration of the following conditions:  Dehydration (Perforated viscus)    Critical care was time spent personally by me on the following activities:  Ordering and performing treatments and interventions, ordering and review of laboratory studies, ordering and review of radiographic studies, pulse oximetry, re-evaluation of patient's condition, development of treatment plan with patient or surrogate, discussions with consultants, evaluation of patient's response to treatment, obtaining history from patient or surrogate and examination of patient    I assumed direction of critical care for this patient from another provider in my specialty: no

## 2021-11-16 NOTE — ANESTHESIA PREPROCEDURE EVALUATION
Relevant Problems   No relevant active problems       Anesthetic History   No history of anesthetic complications            Review of Systems / Medical History  Patient summary reviewed, nursing notes reviewed and pertinent labs reviewed    Pulmonary  Within defined limits                 Neuro/Psych         Psychiatric history     Cardiovascular    Hypertension        Dysrhythmias : atrial fibrillation      Exercise tolerance: <4 METS     GI/Hepatic/Renal  Within defined limits              Endo/Other      Hypothyroidism       Other Findings   Comments:  Bowel perforation           Physical Exam    Airway  Mallampati: II  TM Distance: 4 - 6 cm  Neck ROM: normal range of motion   Mouth opening: Normal     Cardiovascular    Rhythm: regular  Rate: normal         Dental    Dentition: Poor dentition     Pulmonary  Breath sounds clear to auscultation               Abdominal  Abdominal exam normal       Other Findings            Anesthetic Plan    ASA: 3, emergent  Anesthesia type: general          Induction: Intravenous and RSI  Anesthetic plan and risks discussed with: Patient

## 2021-11-16 NOTE — ED TRIAGE NOTES
Pt arrives via EMS as a transfer from [place] for a perforated bowel. Pt alert & oriented x4 on arrival in ED.  No nausea or vomiting noted on arrival.

## 2021-11-16 NOTE — PROGRESS NOTES
Brief critical care follow-up:  -Overall patient improving, did not require pressors. Maintaining map above 60.  -Lactic acid trending down, urine output about 30 cc an hour which giving her weight will be 0.5 cc/kg an hour. Associated with improvement and creatinine while on IV fluid.  -It may be reasonable to consider downgrading the patient from the ICU. She remains in PACU due to unavailability of beds.  -I will reach out to her primary surgeon in that regard.

## 2021-11-16 NOTE — BRIEF OP NOTE
Brief Postoperative Note    Patient: Tali Plaza  YOB: 1933  MRN: [id number]    Date of Procedure: 11/15/2021     Pre-Op Diagnosis: Pneumoperitoneum    Post-Op Diagnosis: ischemic small bowel and R colon      Procedure(s):  Laparoscopy Converted to Open, Right Colectomy, Extended Small Bowel Resection 150cm (separate from R colectomy)    Surgeon(s):  Sandoval Cordova MD    Surgical Assistant: Surg Asst-1: Judi Clark    Anesthesia: General     Estimated Blood Loss (mL): less than 50     Complications: None    Specimens:   ID Type Source Tests Collected by Time Destination   1 : Right Wu Colectomy and Extended Small Bowel Resection Fresh   Sandoval Cordova MD 11/16/2021 0148 Pathology        Implants: * No implants in log *    Drains:   Nasogastric Tube 11/16/21 (Active)       Dorann Chimera #1 11/16/21 Anterior; Left Abdomen (Active)   Site Assessment Clean, dry, & intact 11/16/21 0100   Dressing Status Clean, dry, & intact 11/16/21 0100   Drainage Description Sanguinous 11/16/21 0100   Status Patent;  Charged 11/16/21 0100       Findings: 150cm of ischemic ileum with perforation in the distal terminal ileum, partial ischemia of the cecum and R colon, resected 150cm of ileum all the way to the jejunum and R colon, primary anastomosis to the proximal transverse colon, 260cm of small bowel left to the ligament of Trietz, purulent contamination of the abdominal cavity washed out, no holes in the stomach, duodenum or sigmoid colon (all were normal), 19Fr drain in RUQ R gutter    Electronically Signed by Rick Mejia MD on 11/16/2021 at 2:02 AM

## 2021-11-16 NOTE — ANESTHESIA POSTPROCEDURE EVALUATION
Post-Anesthesia Evaluation and Assessment    Patient: Prema Sanders MRN: [id number]  SSN: [id number]    YOB: 1933  Age: 80 y.o. Sex: female      I have evaluated the patient and they are stable and ready for discharge from the PACU. Cardiovascular Function/Vital Signs  Visit Vitals  BP (!) 105/42   Pulse 94   Temp 36.7 °C (98 °F)   Resp 18   Ht 5' 4\" (1.626 m)   Wt 61.7 kg (136 lb 0.4 oz)   SpO2 97%   BMI 23.35 kg/m²       Patient is status post General anesthesia for Procedure(s):  Laparoscopy Converted to Open, Right Colectomy, Extended Small Bowel Resection. Nausea/Vomiting: None    Postoperative hydration reviewed and adequate. Pain:  Pain Scale 1: Numeric (0 - 10) (11/16/21 0228)  Pain Intensity 1: 0 (11/16/21 0228)   Managed    Neurological Status:   Neuro (WDL): Exceptions to WDL (DROWSY) (11/16/21 0228)  Neuro  LUE Motor Response: Purposeful; Weak (11/16/21 0228)  LLE Motor Response: Purposeful; Weak (11/16/21 0228)  RUE Motor Response: Purposeful; Weak (11/16/21 0228)  RLE Motor Response: Purposeful; Weak (11/16/21 0228)   At baseline    Mental Status, Level of Consciousness: Alert and  oriented to person, place, and time    Pulmonary Status:   O2 Device: Nasal cannula (11/16/21 0228)   Adequate oxygenation and airway patent    Complications related to anesthesia: None    Post-anesthesia assessment completed.  No concerns    Signed By: Lady Fide MD     November 16, 2021

## 2021-11-16 NOTE — H&P
[employer] [facility] at [facility] Surgery History and Physical    History of Present Illness:      Yashira Prescott is a 80 y.o. female who started having abdominal pain this morning. The pain was more in the right lower quadrant and the pain has become more diffuse. She is also been having some nausea and vomiting over the last day or so. She has a history about 3 months ago having a hip fracture repaired at her regular [facility] hospital. She has not had pain like this before. She was seen at [facility] ER and found to have free air and then transferred to [facility] emergently. She lives in [facility] but is alert and oriented. Past Medical History:   Diagnosis Date    Atrial fibrillation (HCC)     Atrial flutter (HCC)     Edema     Hypertension     Hypokalemia     Hypothyroidism     Insomnia     Major depressive disorder     Pain     UTI (urinary tract infection)     Vitamin D deficiency        Past surgical history, laparoscopic cholecystectomy, left hip fracture      No current facility-administered medications for this encounter. Current Outpatient Medications:     acetaminophen (TYLENOL) 325 mg tablet, Take 325 mg by mouth two (2) times daily as needed for Pain., Disp: , Rfl:     aspirin 81 mg chewable tablet, Take 81 mg by mouth daily. , Disp: , Rfl:     carvediloL (COREG) 12.5 mg tablet, Take  by mouth two (2) times daily (with meals). , Disp: , Rfl:     cephALEXin (KEFLEX) 500 mg capsule, Take 500 mg by mouth four (4) times daily. , Disp: , Rfl:     furosemide (LASIX) 40 mg tablet, Take 40 mg by mouth daily. , Disp: , Rfl:     melatonin 5 mg tablet, Take  by mouth nightly., Disp: , Rfl:     levothyroxine (SYNTHROID) 25 mcg tablet, Take 25 mcg by mouth Daily (before breakfast). , Disp: , Rfl:     lisinopriL (PRINIVIL, ZESTRIL) 20 mg tablet, Take 20 mg by mouth daily. , Disp: , Rfl:     magnesium oxide (MAG-OX) 400 mg tablet, Take 400 mg by mouth daily. , Disp: , Rfl:   potassium chloride (KLOR-CON) 20 mEq pack, Take 20 mEq by mouth two (2) times daily (with meals). , Disp: , Rfl:     sertraline (ZOLOFT) 50 mg tablet, Take  by mouth daily. , Disp: , Rfl:     L.acidoph & parac-S.therm-Bifido (RisaQuad-2) 16 billion cell cap cap, Take 1 Capsule by mouth daily. , Disp: , Rfl:     cholecalciferol (Vitamin D3) (1000 Units /25 mcg) tablet, Take  by mouth daily. , Disp: , Rfl:     Hydrocolloid Dressing 4 X 5 \" bndg, by Apply Externally route., Disp: , Rfl:     Allergies   Allergen Reactions    Codeine Unknown (comments)    Gluten Unknown (comments)    Sulfa (Sulfonamide Antibiotics) Unknown (comments)       Social History     Socioeconomic History    Marital status:      Spouse name: Not on file    Number of children: Not on file    Years of education: Not on file    Highest education level: Not on file   Occupational History    Not on file   Tobacco Use    Smoking status: Never Smoker    Smokeless tobacco: Never Used   Substance and Sexual Activity    Alcohol use: Never    Drug use: Never    Sexual activity: Not on file   Other Topics Concern    Not on file   Social History Narrative    Not on file     Social Determinants of Health     Financial Resource Strain:     Difficulty of Paying Living Expenses: Not on file   Food Insecurity:     Worried About Running Out of Food in the Last Year: Not on file    Florentino of Food in the Last Year: Not on file   Transportation Needs:     Lack of Transportation (Medical): Not on file    Lack of Transportation (Non-Medical):  Not on file   Physical Activity:     Days of Exercise per Week: Not on file    Minutes of Exercise per Session: Not on file   Stress:     Feeling of Stress : Not on file   Social Connections:     Frequency of Communication with Friends and Family: Not on file    Frequency of Social Gatherings with Friends and Family: Not on file    Attends Gnosticist Services: Not on file   [employer] of Clubs or Organizations: Not on file    Attends Club or Organization Meetings: Not on file    Marital Status: Not on file   Intimate Partner Violence:     Fear of Current or Ex-Partner: Not on file    Emotionally Abused: Not on file    Physically Abused: Not on file    Sexually Abused: Not on file   Housing Stability:     Unable to Pay for Housing in the Last Year: Not on file    Number of [place] in the Last Year: Not on file    Unstable Housing in the Last Year: Not on file       History reviewed. No pertinent family history.     ROS   Constitutional: negative  Ears, Nose, Mouth, Throat, and Face: negative  Respiratory: negative  Cardiovascular: negative  Gastrointestinal: positive for nausea, vomiting and abdominal pain  Genitourinary:negative  Integument/Breast: negative  Hematologic/Lymphatic: negative  Behavioral/Psychiatric: negative  Allergic/Immunologic: negative      Physical Exam:     Visit Vitals  BP (!) 125/47 (BP 1 Location: Left upper arm)   Pulse (!) 113   Temp 98 °F (36.7 °C)   Resp 23   Ht 5' 4\" (1.626 m)   Wt 136 lb 0.4 oz (61.7 kg)   SpO2 96%   BMI 23.35 kg/m²       General - alert and oriented, no apparent distress  HEENT - no jaundice, no hearing imparement  Pulm - CTAB, no C/W/R  CV - RRR, no M/R/G  Abd -soft, nondistended, bowel sounds present, tender to palpation diffusely, a little more tender in the lower abdomen, mild guarding  Ext - pulses intact in UE and LE bilaterally, no edema  Skin - supple, no rashes  Psychiatric - normal affect, good mood    Labs  Recent Results (from the past 12 hour(s))   CBC WITH AUTOMATED DIFF    Collection Time: 11/15/21  7:14 PM   Result Value Ref Range    WBC 13.5 (H) 3.6 - 11.0 K/uL    RBC 3.51 (L) 3.80 - 5.20 M/uL    HGB 10.3 (L) 11.5 - 16.0 g/dL    HCT 31.9 (L) 35.0 - 47.0 %    MCV 90.9 80.0 - 99.0 FL    MCH 29.3 26.0 - 34.0 PG    MCHC 32.3 30.0 - 36.5 g/dL    RDW 14.3 11.5 - 14.5 %    PLATELET 054 (H) 087 - 400 K/uL    MPV 10.1 8.9 - 12.9 FL NRBC 0.0 0  WBC    ABSOLUTE NRBC 0.00 0.00 - 0.01 K/uL    NEUTROPHILS 54 32 - 75 %    BAND NEUTROPHILS 30 (H) 0 - 6 %    LYMPHOCYTES 7 (L) 12 - 49 %    MONOCYTES 3 (L) 5 - 13 %    EOSINOPHILS 0 0 - 7 %    BASOPHILS 1 0 - 1 %    METAMYELOCYTES 3 (H) 0 %    IMMATURE GRANULOCYTES 2 %    ABS. NEUTROPHILS 11.3 (H) 1.8 - 8.0 K/UL    ABS. LYMPHOCYTES 0.9 0.8 - 3.5 K/UL    ABS. MONOCYTES 0.4 0.0 - 1.0 K/UL    ABS. EOSINOPHILS 0.0 0.0 - 0.4 K/UL    ABS. BASOPHILS 0.1 0.0 - 0.1 K/UL    ABS. IMM. GRANS. 0.3 K/UL    DF MANUAL      PLATELET COMMENTS Large Platelets      RBC COMMENTS NORMOCYTIC, NORMOCHROMIC      WBC COMMENTS VACUOLATED POLYS     LIPASE    Collection Time: 11/15/21  7:14 PM   Result Value Ref Range    Lipase 16 (L) 73 - 587 U/L   METABOLIC PANEL, COMPREHENSIVE    Collection Time: 11/15/21  7:14 PM   Result Value Ref Range    Sodium 137 136 - 145 mmol/L    Potassium 3.8 3.5 - 5.1 mmol/L    Chloride 103 97 - 108 mmol/L    CO2 15 (LL) 21 - 32 mmol/L    Anion gap 19 (H) 5 - 15 mmol/L    Glucose 126 (H) 65 - 100 mg/dL    BUN 69 (H) 6 - 20 MG/DL    Creatinine 1.79 (H) 0.55 - 1.02 MG/DL    BUN/Creatinine ratio 39 (H) 12 - 20      GFR est AA 32 (L) >60 ml/min/1.73m2    GFR est non-AA 27 (L) >60 ml/min/1.73m2    Calcium 8.0 (L) 8.5 - 10.1 MG/DL    Bilirubin, total 0.6 0.2 - 1.0 MG/DL    ALT (SGPT) 8 (L) 12 - 78 U/L    AST (SGOT) 10 (L) 15 - 37 U/L    Alk.  phosphatase 95 45 - 117 U/L    Protein, total 5.7 (L) 6.4 - 8.2 g/dL    Albumin 1.9 (L) 3.5 - 5.0 g/dL    Globulin 3.8 2.0 - 4.0 g/dL    A-G Ratio 0.5 (L) 1.1 - 2.2     EKG, 12 LEAD, INITIAL    Collection Time: 11/15/21  7:45 PM   Result Value Ref Range    Ventricular Rate 101 BPM    Atrial Rate 101 BPM    P-R Interval 144 ms    QRS Duration 80 ms    Q-T Interval 308 ms    QTC Calculation (Bezet) 399 ms    Calculated P Axis 34 degrees    Calculated R Axis 0 degrees    Calculated T Axis 88 degrees    Diagnosis       Sinus tachycardia  Nonspecific T wave abnormality  Abnormal ECG  No previous ECGs available     LACTIC ACID    Collection Time: 11/15/21  7:46 PM   Result Value Ref Range    Lactic acid 2.3 (HH) 0.4 - 2.0 MMOL/L   COVID-19 RAPID TEST    Collection Time: 11/15/21  9:14 PM   Result Value Ref Range    Specimen source Nasopharyngeal      COVID-19 rapid test Not detected NOTD     LACTIC ACID    Collection Time: 11/15/21  9:17 PM   Result Value Ref Range    Lactic acid 2.1 (HH) 0.4 - 2.0 MMOL/L         Imaging  CT abd -   FINDINGS:   LUNG BASES: No abnormality. LIVER: No mass or biliary dilatation. GALLBLADDER: Surgically absent. SPLEEN: No enlargement or lesion. PANCREAS: No mass or ductal dilatation. ADRENALS: No mass. KIDNEYS: No nephrolithiasis or hydronephrosis. 1.9 cm exophytic cyst lower pole  left kidney. GI TRACT:  Mildly dilated slightly thickened small bowel loops in the mid and  lower abdomen. Moderate-sized hiatal hernia. Possible wall thickening of the  colon. PERITONEUM: Scattered pneumoperitoneum, surrounding the stomach within the  hiatal hernia as well as in the upper abdomen anteriorly as well as near the  dilated small bowel loops in the mid and lower abdomen. Trace free fluid. APPENDIX: Normal.  RETROPERITONEUM: No aortic aneurysm. LYMPH NODES:  None enlarged. ADDITIONAL COMMENTS: N/A.     URINARY BLADDER: Unremarkable. REPRODUCTIVE ORGANS: Unremarkable. LYMPH NODES:  None enlarged. FREE FLUID:  None. BONES: No destructive bone lesion. Moderate chronic compression deformity T12. Mild to moderate chronic supratentorial deformity L5. Artifact from postsurgical  changes both hips. ADDITIONAL COMMENTS: N/A.     IMPRESSION     1. Pneumoperitoneum, possibly related to perforation of dilated slightly  thickened small bowel loops. 2. Mild colonic wall thickening may be due to underdistention.   3. Moderate-sized hiatal hernia.     I have reviewed and agree with all of the pertinent images    Assessment:     Tali Garcia is a 80 y.o. female with pneumoperitoneum    Recommendations:     1. She does appear to have acute pneumoperitoneum. The cause of the perforation is not quite clear from the CT scan. It could either be stomach small bowel or colon. She does be appear to be a little more tender in the lower abdomen so I suspect more of a colon involvement possibly. I have talked to the patient and her daughter and she is consented for surgery. I will perform a diagnostic laparoscopy possible open laparotomy possible bowel resection possible colostomy. I will take a look in laparoscopically and then go from there and repair what is needed. Fortunately she is not on any anticoagulation. She just had a recent hip surgery and appeared to do well. She appears to be stable for surgery. I have discussed the above procedure with the patient in detail. We reviewed the benefits and possible complications of the surgery which include bleeding, infection, damage to adjacent organs, venous thromboembolism, need for repeat surgery, death and other unforseen complications. The patient agreed to proceed with the surgery. Doyle Burns MD    Greater than half of the time: 45 minutes was used in counciling the patient about diagnosis and treatment plan    Ms. Larissa Machuca has a reminder for a \"due or due soon\" health maintenance. I have asked that she contact her primary care provider for follow-up on this health maintenance.

## 2021-11-16 NOTE — CONSULTS
[facility]    ICU TEAM Progress Note    Name: Chilo Casanova   : 3/13/1933   MRN: [id number]   Date: 2021      Assessment/Plan:     1. Hypotension  a. Likely related to metabolic acidosis, hypovolemia, and sedation  b. Continue IVFs  c. Vasopressor to maintain MAP > 65  d. Consider starting midodrine  e. Continue antibiotics  2. Bowel ischemia  a. General surgery consulted. Patient now s/p small bowel resection and right colectomy  b. Continue antibiotics   3. AGMA due to elevated lactate  a. In the setting of bowel ischemia and CURT  b. Continue volume resuscitation  c. Recheck labs post-op  4. CURT  a. Likely due to hypovolemia  b. Continue volume resuscitation  c. Recheck labs           F - Feeding:  No NPO  A - Analgesia: Fentanyl  S - Sedation: None  T - DVT Prophylaxis: SCD's or Sequential Compression Device   H - Head of Bed: > 30 Degrees  U - Ulcer Prophylaxis: Not at this time   G - Glycemic Control: Insulin  S - Spontaneous Breathing Trial: N/A  B - Bowel Regimen: None needed at this time  I - Indwelling Catheter:   Tubes: None  Lines: Peripheral IV and Arterial Line  Drains: None  D - De-escalation of Antibiotics: Flagyl  Levofloxacin    Subjective:   Progress Note: 2021      Reason for ICU Admission: hypotension    HPI: Patient is a 80year old female with a past medical history of a fib, hypertension, hypothyriodism, depression, chronic UTIs on prophylactic Keflex who presented with abdominal pain. History obtained from chart. Reportedly pain started in RLQ and had become more diffuse. She was also have nausea and vomiting for the day prior to admission. At [place] ED, CT A/P obtained which revealed pneumoperitoneum. Patient transferred to [facility] for surgical evaluation. Patient taken to OR and had 150cm of small bowel resected and R colectomy. Following surgery, patient remained on vasopressors and was transferred to ICU.     Overnight Events:   2021  - admitted to ICU    POD:  1 Day Post-Op    S/P:   Procedure(s):  Laparoscopy Converted to Open, Right Colectomy, Extended Small Bowel Resection      Objective:   Vital Signs:  Visit Vitals  BP (!) 99/44   Pulse 99   Temp 98 °F (36.7 °C)   Resp 17   Ht 5' 4\" (1.626 m)   Wt 61.7 kg (136 lb 0.4 oz)   SpO2 98%   BMI 23.35 kg/m²      O2 Device: Nasal cannula Temp (24hrs), Av.9 °F (36.6 °C), Min:97.8 °F (36.6 °C), Max:98 °F (36.7 °C)           Intake/Output:     Intake/Output Summary (Last 24 hours) at 2021 0236  Last data filed at 2021 0214  Gross per 24 hour   Intake 1300 ml   Output 235 ml   Net 1065 ml       Physical Exam:    General:  Lethargic, NAD   Eyes:  Sclera anicteric. Pupils equally round and reactive to light. Mouth/Throat: Mucous membranes normal, oral pharynx clear   Neck: Supple   Lungs:   Clear to auscultation bilaterally, good effort   CV:  Irregular rhythm, normal rate,no murmur, click, rub or gallop   Abdomen:   Soft, tender to deep palpation, non-distended. URSULA drain with serosanguinous output. Bowel sounds hypoactive. Extremities: No cyanosis or edema   Skin: Skin color, texture, turgor normal. no acute rash or lesions   Lymph nodes: Cervical and supraclavicular normal   Musculoskeletal: No swelling or deformity   Lines/Devices:  Intact, no erythema, drainage or tenderness   Neuro/Psych: Lethargic, oriented, Cindy       LABS AND  DATA: Personally reviewed  Recent Labs     11/15/21  1914   WBC 13.5*   HGB 10.3*   HCT 31.9*   *     Recent Labs     11/15/21  1914      K 3.8      CO2 15*   BUN 69*   CREA 1.79*   *   CA 8.0*     Recent Labs     11/15/21  1914   AP 95   TP 5.7*   ALB 1.9*   GLOB 3.8   LPSE 16*     No results for input(s): INR, PTP, APTT, INREXT in the last 72 hours. No results for input(s): PHI, PCO2I, PO2I, FIO2I in the last 72 hours. No results for input(s): CPK, CKMB, TROIQ, BNPP in the last 72 hours.     MEDS: Reviewed    Chest X-Ray:  CXR Results (Last 48 hours)    None        CT Results  (Last 48 hours)               11/15/21 2013  CT ABD PELV WO CONT Final result    Impression:      1. Pneumoperitoneum, possibly related to perforation of dilated slightly   thickened small bowel loops. 2. Mild colonic wall thickening may be due to underdistention. 3. Moderate-sized hiatal hernia. The findings were called to Dr. Zakiya Mclean on 11/15/2021 at 2100 by myself.       789. Narrative:  INDICATION: rlq pain       COMPARISON: None       TECHNIQUE:    Noncontrast thin axial images were obtained through the abdomen and pelvis. Coronal and sagittal reconstructions were generated. CT dose reduction was   achieved through use of a standardized protocol tailored for this examination   and automatic exposure control for dose modulation. FINDINGS:    LUNG BASES: No abnormality. LIVER: No mass or biliary dilatation. GALLBLADDER: Surgically absent. SPLEEN: No enlargement or lesion. PANCREAS: No mass or ductal dilatation. ADRENALS: No mass. KIDNEYS: No nephrolithiasis or hydronephrosis. 1.9 cm exophytic cyst lower pole   left kidney. GI TRACT:  Mildly dilated slightly thickened small bowel loops in the mid and   lower abdomen. Moderate-sized hiatal hernia. Possible wall thickening of the   colon. PERITONEUM: Scattered pneumoperitoneum, surrounding the stomach within the   hiatal hernia as well as in the upper abdomen anteriorly as well as near the   dilated small bowel loops in the mid and lower abdomen. Trace free fluid. APPENDIX: Normal.   RETROPERITONEUM: No aortic aneurysm. LYMPH NODES:  None enlarged. ADDITIONAL COMMENTS: N/A.       URINARY BLADDER: Unremarkable. REPRODUCTIVE ORGANS: Unremarkable. LYMPH NODES:  None enlarged. FREE FLUID:  None. BONES: No destructive bone lesion. Moderate chronic compression deformity T12. Mild to moderate chronic supratentorial deformity L5.  Artifact from postsurgical   changes both hips. ADDITIONAL COMMENTS: N/A. Multidisciplinary Rounds Completed:  Pending    ABCDEF Bundle/Checklist Completed:  Yes    SPECIAL EQUIPMENT  None    DISPOSITION  Stay in ICU    CRITICAL CARE CONSULTANT NOTE  I had a face to face encounter with the patient, reviewed and interpreted patient data including clinical events, labs, images, vital signs, I/O's, and examined patient. I have discussed the case and the plan and management of the patient's care with the consulting services, the bedside nurses and the respiratory therapist.      NOTE OF PERSONAL INVOLVEMENT IN CARE   This patient has a high probability of imminent, clinically significant deterioration, which requires the highest level of preparedness to intervene urgently. I participated in the decision-making and personally managed or directed the management of the following life and organ supporting interventions that required my frequent assessment to treat or prevent imminent deterioration. I personally spent 30 minutes of critical care time. This is time spent at this critically ill patient's bedside actively involved in patient care as well as the coordination of care and discussions with the patient's family. This does not include any procedural time which has been billed separately.     DINORAH Koo  [facility] Critical Care  11/16/2021

## 2021-11-16 NOTE — OP NOTES
[address] [facility]  OPERATIVE REPORT    Name:  Lynne Cole  MR#:  [id number]  :  1933  ACCOUNT #:  [de-identified]  DATE OF SERVICE:  11/15/2021    PREOPERATIVE DIAGNOSIS:  Pneumoperitoneum. POSTOPERATIVE DIAGNOSIS:  Ischemic small bowel and right colon. PROCEDURE PERFORMED:  Laparoscopy converted to open right colectomy with extended small bowel resection of 150 cm separate from right colectomy. SURGEON:  Serena Carrasquillo MD    ASSISTANT:  Nina Hagen SA    ANESTHESIA:  General.    COMPLICATIONS:  None. SPECIMENS REMOVED:  Right hemicolectomy and extended small bowel resection. IMPLANTS:  None. ESTIMATED BLOOD LOSS:  Less than 50 mL. DRAINS:  19-Upper sorbian Jacky drain and nasogastric tube which was placed into the stomach and palpated. FINDINGS:  150 cm of ischemic ileum with perforation in the distal terminal ileum, partial ischemia of the cecum and right colon, resected 150 cm of the ileum all the way to the jejunum and right colon area, primary anastomosis to the proximal transverse colon, 260 cm of small bowel left to the ligament of Treitz, purulent contamination of the abdominal cavity was washed out. No holes in the stomach, duodenum, or sigmoid colon; all were normal.  19-Upper sorbian round Jacky drain was placed into the right paracolic gutter. INDICATIONS FOR OPERATION:  The patient is an 80-year-old female who comes in with free air coming from [facility] with unknown source and needing exploration in the operating room. PROCEDURE:  The patient was met in the preoperative holding area. The H and P was updated. Consent was signed. All risks and benefits were explained to the patient prior to the start of the operation. She was taken back to the operating room. She was lying in a supine position. The abdomen was prepped and draped in standard sterile fashion. Time-out was called. Antibiotics were given. SCDs were on lower extremities.   Pearson catheter was inserted. We started the operation by making a 5 mm incision to the left upper quadrant, inserting a VisiPort trocar into the intra-abdominal cavity, insufflating to 15 mmHg. We then placed a 5 mm trocar in the left lateral abdomen and another one in the left lower quadrant. All these 5's were placed under direct visualization. We then could see into the abdominal cavity. We could see that there was purulence in the abdominal cavity. There was no succus or stool or bile seen in the abdominal cavity. We could see the stomach and the duodenum. All this appeared to be normal.  There was no sign of any perforation of the stomach or duodenum. We could see that there was some obvious small bowel ischemia. This was likely our source, but we also explored down to the pelvis looking at the sigmoid colon, the left colon, and the right colon as well, finding some of the right colon to be partially ischemic, finding the rest of the sigmoid colon, left colon, and transverse to be completely normal.  We then started running the bowel from the ligament of Treitz down to the area where the distal small bowel appeared to be ischemic all the way to the terminal ileum and the right colon. We then knew that we would need to do a large small bowel resection, so we then converted to an open procedure, made a vertical midline laparotomy incision with the scalpel blade through the skin and subcutaneous tissue, using the Bovie to cut through the subcutaneous tissue and the fascia and then bluntly dissecting into the abdominal cavity, releasing the pneumoperitoneum, placing a wound protector in through the midline and opened that up about 10-15 cm. Once the incision was opened up, we brought up the ischemic small bowel into the operative field. Before measured the small bowel, we inspected the amount of ischemic small bowel.   We could see that almost all of the ileum was completely or partially ischemic all the way down to the terminal ileum where it meets the cecum. The cecum also looked partially ischemic as well, finding a transition point there in that cecum area and finding the area of the hepatic flexure and proximal transverse colon to be more normal appearing. We then inspected the rest of the small bowel. We measured about 150 cm of distal small bowel that we would need to resect all the way up to the jejunum. We then measured the remaining small bowel which would be intact. We measured that to be about 260 cm. There was also a small hole where the perforation site was in the ischemic terminal ileum area which was closed with a 2-0 silk suture. We then divided the proximal end of our resection of the small bowel about 260 cm from the ligament of Treitz with a tan load Signia stapler. We then would divide our right hepatic flexure and sigmoid colon area with a tan load Signia stapler, mobilizing the hepatic flexure and taking down the white line of Toldt, mobilizing the right colon up into the operative field more. Once we had done that, we then started taking the mesentery of this with the LigaSure Impact device taking down all of the mesentery for the 150 cm of small bowel and then also all of the right colectomy specimen. It was all in one specimen and sent off as right hemicolectomy and extended small bowel resection and that was all passed off the field. With that all passed off the field, we then would create our side-to-side stapled anastomosis with a tan load Signia stapler with the end of the jejunum to the proximal terminal ileum. We then created a  side-to-side stapled anastomosis with a tan load 60 mm Signia stapler and closed the common enterotomy between the two with a 4-0 PDS suture in a running fashion, closing the common enterotomy and then using a double layer closure with 3-0 silk Lembert sutures. We then closed the mesenteric defect between the two with a running 2-0 silk suture.   The anastomosis was widely patent to both ends of the small bowel. All appeared to be normal and viable. The rest of the small bowel also looked good as well. We had passed our specimen off the field for pathology and then we washed out the abdominal cavity thoroughly with about 5 liters of saline irrigation and the effluent came back clear. After everything was suctioned out, we then placed a 19-Rwandan round Jacky drain into the left upper quadrant through one of the 5 mm trocar sites and placed that into the operative field near the area of the anastomosis. We also had the omentum laying over top of the anastomosis to cover that area up in the midline as well. We then closed our midline suture with a running #1 single stranded PDS suture in a running fashion with multiple interrupted retention sutures as well in the subcutaneous tissue that were absorbable #1 PDS sutures. We then had the fascia closed. We then closed the skin with staples loosely at all the sites and then used skin nicolette in the midline wound and then a dry sterile dressing over top, the 5 mm two port sites were closed with staples as well. We then had completed the operation. Dr. Radha Mayers was present and scrubbed during the entire operation. The counts were correct.       Bonnie Frederick MD      [id number]  D:  11/16/2021 2:37  T:  11/16/2021 14:00  JOB #:  [id number]

## 2021-11-17 ENCOUNTER — APPOINTMENT (OUTPATIENT)
Dept: GENERAL RADIOLOGY | Age: 86
DRG: 853 | End: 2021-11-17
Attending: SURGERY
Payer: MEDICARE

## 2021-11-17 LAB
ALBUMIN SERPL-MCNC: 1.5 G/DL (ref 3.5–5)
ALBUMIN/GLOB SERPL: 0.5 {RATIO} (ref 1.1–2.2)
ALP SERPL-CCNC: 71 U/L (ref 45–117)
ALT SERPL-CCNC: 12 U/L (ref 12–78)
ANION GAP SERPL CALC-SCNC: 13 MMOL/L (ref 5–15)
ANION GAP SERPL CALC-SCNC: 17 MMOL/L (ref 5–15)
AST SERPL-CCNC: 14 U/L (ref 15–37)
BASOPHILS # BLD: 0 K/UL (ref 0–0.1)
BASOPHILS NFR BLD: 0 % (ref 0–1)
BILIRUB SERPL-MCNC: 0.5 MG/DL (ref 0.2–1)
BUN SERPL-MCNC: 57 MG/DL (ref 6–20)
BUN SERPL-MCNC: 57 MG/DL (ref 6–20)
BUN/CREAT SERPL: 39 (ref 12–20)
BUN/CREAT SERPL: 45 (ref 12–20)
CALCIUM SERPL-MCNC: 8.1 MG/DL (ref 8.5–10.1)
CALCIUM SERPL-MCNC: 8.5 MG/DL (ref 8.5–10.1)
CHLORIDE SERPL-SCNC: 114 MMOL/L (ref 97–108)
CHLORIDE SERPL-SCNC: 116 MMOL/L (ref 97–108)
CO2 SERPL-SCNC: 11 MMOL/L (ref 21–32)
CO2 SERPL-SCNC: 15 MMOL/L (ref 21–32)
COMMENT, HOLDF: NORMAL
CREAT SERPL-MCNC: 1.27 MG/DL (ref 0.55–1.02)
CREAT SERPL-MCNC: 1.48 MG/DL (ref 0.55–1.02)
DIFFERENTIAL METHOD BLD: ABNORMAL
EOSINOPHIL # BLD: 0 K/UL (ref 0–0.4)
EOSINOPHIL NFR BLD: 0 % (ref 0–7)
ERYTHROCYTE [DISTWIDTH] IN BLOOD BY AUTOMATED COUNT: 14.8 % (ref 11.5–14.5)
ERYTHROCYTE [DISTWIDTH] IN BLOOD BY AUTOMATED COUNT: 15 % (ref 11.5–14.5)
GLOBULIN SER CALC-MCNC: 2.9 G/DL (ref 2–4)
GLUCOSE SERPL-MCNC: 106 MG/DL (ref 65–100)
GLUCOSE SERPL-MCNC: 134 MG/DL (ref 65–100)
HCT VFR BLD AUTO: 29.6 % (ref 35–47)
HCT VFR BLD AUTO: 29.8 % (ref 35–47)
HGB BLD-MCNC: 9.2 G/DL (ref 11.5–16)
HGB BLD-MCNC: 9.3 G/DL (ref 11.5–16)
IMM GRANULOCYTES # BLD AUTO: 0 K/UL
IMM GRANULOCYTES NFR BLD AUTO: 0 %
LACTATE SERPL-SCNC: 0.9 MMOL/L (ref 0.4–2)
LYMPHOCYTES # BLD: 0.8 K/UL (ref 0.8–3.5)
LYMPHOCYTES NFR BLD: 3 % (ref 12–49)
MCH RBC QN AUTO: 29.8 PG (ref 26–34)
MCH RBC QN AUTO: 29.9 PG (ref 26–34)
MCHC RBC AUTO-ENTMCNC: 31.1 G/DL (ref 30–36.5)
MCHC RBC AUTO-ENTMCNC: 31.2 G/DL (ref 30–36.5)
MCV RBC AUTO: 95.8 FL (ref 80–99)
MCV RBC AUTO: 95.8 FL (ref 80–99)
METAMYELOCYTES NFR BLD MANUAL: 1 %
MONOCYTES # BLD: 1.1 K/UL (ref 0–1)
MONOCYTES NFR BLD: 4 % (ref 5–13)
MYELOCYTES NFR BLD MANUAL: 2 %
NEUTS SEG # BLD: 25.4 K/UL (ref 1.8–8)
NEUTS SEG NFR BLD: 90 % (ref 32–75)
NRBC # BLD: 0 K/UL (ref 0–0.01)
NRBC # BLD: 0 K/UL (ref 0–0.01)
NRBC BLD-RTO: 0 PER 100 WBC
NRBC BLD-RTO: 0 PER 100 WBC
PLATELET # BLD AUTO: 351 K/UL (ref 150–400)
PLATELET # BLD AUTO: 382 K/UL (ref 150–400)
PMV BLD AUTO: 10.4 FL (ref 8.9–12.9)
PMV BLD AUTO: 10.4 FL (ref 8.9–12.9)
POTASSIUM SERPL-SCNC: 4.1 MMOL/L (ref 3.5–5.1)
POTASSIUM SERPL-SCNC: 4.2 MMOL/L (ref 3.5–5.1)
PROT SERPL-MCNC: 4.4 G/DL (ref 6.4–8.2)
RBC # BLD AUTO: 3.09 M/UL (ref 3.8–5.2)
RBC # BLD AUTO: 3.11 M/UL (ref 3.8–5.2)
RBC MORPH BLD: ABNORMAL
SAMPLES BEING HELD,HOLD: NORMAL
SODIUM SERPL-SCNC: 142 MMOL/L (ref 136–145)
SODIUM SERPL-SCNC: 144 MMOL/L (ref 136–145)
TROPONIN-HIGH SENSITIVITY: 48 NG/L (ref 0–51)
TSH SERPL DL<=0.05 MIU/L-ACNC: 0.42 UIU/ML (ref 0.36–3.74)
WBC # BLD AUTO: 28.2 K/UL (ref 3.6–11)
WBC # BLD AUTO: 30.7 K/UL (ref 3.6–11)

## 2021-11-17 PROCEDURE — 85025 COMPLETE CBC W/AUTO DIFF WBC: CPT

## 2021-11-17 PROCEDURE — 77010033678 HC OXYGEN DAILY

## 2021-11-17 PROCEDURE — 83605 ASSAY OF LACTIC ACID: CPT

## 2021-11-17 PROCEDURE — 74011250636 HC RX REV CODE- 250/636: Performed by: SURGERY

## 2021-11-17 PROCEDURE — 93005 ELECTROCARDIOGRAM TRACING: CPT

## 2021-11-17 PROCEDURE — 80053 COMPREHEN METABOLIC PANEL: CPT

## 2021-11-17 PROCEDURE — 94760 N-INVAS EAR/PLS OXIMETRY 1: CPT

## 2021-11-17 PROCEDURE — 71045 X-RAY EXAM CHEST 1 VIEW: CPT

## 2021-11-17 PROCEDURE — 74011000250 HC RX REV CODE- 250: Performed by: HOSPITALIST

## 2021-11-17 PROCEDURE — 74011250636 HC RX REV CODE- 250/636: Performed by: HOSPITALIST

## 2021-11-17 PROCEDURE — 84484 ASSAY OF TROPONIN QUANT: CPT

## 2021-11-17 PROCEDURE — 74011000258 HC RX REV CODE- 258: Performed by: HOSPITALIST

## 2021-11-17 PROCEDURE — 85027 COMPLETE CBC AUTOMATED: CPT

## 2021-11-17 PROCEDURE — 84443 ASSAY THYROID STIM HORMONE: CPT

## 2021-11-17 PROCEDURE — 36415 COLL VENOUS BLD VENIPUNCTURE: CPT

## 2021-11-17 PROCEDURE — 65660000001 HC RM ICU INTERMED STEPDOWN

## 2021-11-17 RX ORDER — DILTIAZEM HYDROCHLORIDE 5 MG/ML
10 INJECTION INTRAVENOUS ONCE
Status: COMPLETED | OUTPATIENT
Start: 2021-11-17 | End: 2021-11-17

## 2021-11-17 RX ADMIN — DILTIAZEM HYDROCHLORIDE 10 MG: 5 INJECTION INTRAVENOUS at 21:25

## 2021-11-17 RX ADMIN — METRONIDAZOLE 500 MG: 500 INJECTION, SOLUTION INTRAVENOUS at 03:50

## 2021-11-17 RX ADMIN — DILTIAZEM HYDROCHLORIDE 2.5 MG/HR: 5 INJECTION, SOLUTION INTRAVENOUS at 21:36

## 2021-11-17 RX ADMIN — PIPERACILLIN SODIUM AND TAZOBACTAM SODIUM 3.38 G: 3; .375 INJECTION, POWDER, LYOPHILIZED, FOR SOLUTION INTRAVENOUS at 23:06

## 2021-11-17 RX ADMIN — METRONIDAZOLE 500 MG: 500 INJECTION, SOLUTION INTRAVENOUS at 11:31

## 2021-11-17 RX ADMIN — SODIUM BICARBONATE: 84 INJECTION, SOLUTION INTRAVENOUS at 23:27

## 2021-11-17 RX ADMIN — Medication 10 ML: at 22:46

## 2021-11-17 RX ADMIN — HYDROMORPHONE HYDROCHLORIDE 1 MG: 1 INJECTION, SOLUTION INTRAMUSCULAR; INTRAVENOUS; SUBCUTANEOUS at 11:31

## 2021-11-17 RX ADMIN — HYDROMORPHONE HYDROCHLORIDE 1 MG: 1 INJECTION, SOLUTION INTRAMUSCULAR; INTRAVENOUS; SUBCUTANEOUS at 03:50

## 2021-11-17 RX ADMIN — ENOXAPARIN SODIUM 30 MG: 100 INJECTION SUBCUTANEOUS at 03:49

## 2021-11-17 RX ADMIN — Medication 10 ML: at 06:00

## 2021-11-17 NOTE — PROGRESS NOTES
Spiritual Care Partner Volunteer visited patient in [address] [place] [address] on 11/17/2021. Documented by:  Chaplain Amaral MDiv, [address] PRAY ([id number])

## 2021-11-17 NOTE — PROGRESS NOTES
Pt was seen on PM rounds. She has been noted to be tachycardic over the day and has not resolved. She appears to be in some pain and less conversant. She is NPO with NGT in place. The URSULA drain is serous, no signs of leak on the drain. Cr and UOP have been ok today. BP has been stable. I fear she may possibly have more or progressing bowel ischemia. The bowel that was left in place after surgery looked well vascularized so there was no concern for more evolving ischemia at the time. She only has about 250cm of small bowel left. There is no more to take without this being nonsurvivable. I will move her to the stepdown unit to watch closer. I will also bolus 1 L of LR and get labs, EKG, CXR. I will discuss the situation with her daughter. Aleksander Singh    EKG shows afib and will need to be moved. Cardiology and hospitalists consulted.  Likely will need to start on danni Armijo Bolds

## 2021-11-17 NOTE — PROGRESS NOTES
TEREZA PLAN:  RUR-16%  Disposition-TBD. TALIA vs SNF  Transportation-Family vs BLS  F/U with PCP/Specialist    Patient is a resident at the [facility] on [facility]. CM to call MARILY Gray or Asst. MARILY Harris prior to discharge to evaluate if appropriate to return to [facility]  ([phone])    If needs Home Care, [facility] only needs an order. They have their own preferred Home Care. Patient could benefit from PT/OT evaluations to determine appropriate disposition    Medicare pt has received, reviewed, and signed 2nd IM letter informing them of their right to appeal the discharge. Signed copy has been placed on pt bedside chart. Reason for Admission: Pneumoperitoneum, Ischemic small bowel and right colon. S/p Laparoscopy converted to open right colectomy with extended small bowel resection of 150 cm separate from right colectomy                 RUR Score:   16%               PCP: First and Last name:   Madalyn Alexis MD     Name of Practice:    Are you a current patient: Yes/No: Yes   Approximate date of last visit:    Can you participate in a virtual visit if needed: No    Do you (patient/family) have any concerns for transition/discharge? No               Plan for utilizing home health:   TBD    Current Advanced Directive/Advance Care Plan:  No Order. No ACP in the chart      Healthcare Decision Maker: Zoya Cooper-daughter  Click here to [address] including selection of the Healthcare Decision Maker Relationship (ie \"Primary\")              Transition of Care Plan:  CM attempted to meet with patient but was sleepy and difficult to understand, CM called patient's daughter Akilah Saleem. Per daughter, patient lives at the [facility] and requires assistance with all  her ADLs and IADLs. She said patient had been to [address] in the past but was not pleased with them.  Daughter is open to rehab if necessary but feels that patient would need to be convinced that it is in best interest to go. Patient's daughter was in [place] to take her brother home from the hospital when CM called her. CM will continue to follow for discharge needs that may arise. Ridge. YANELY, RN, CRM. Care Management Interventions  PCP Verified by CM: Yes  Palliative Care Criteria Met (RRAT>21 & CHF Dx)?: No  Mode of Transport at Discharge:  Other (see comment)  Transition of Care Consult (CM Consult): Discharge Planning, Assisted Living  MyChart Signup: No  Discharge Durable Medical Equipment: No  Health Maintenance Reviewed: Yes  Physical Therapy Consult: No  Occupational Therapy Consult: No  Speech Therapy Consult: No  Support Systems: Assisted Living, Other Family Member(s)  Discharge Location  Discharge Placement: Unable to determine at this time

## 2021-11-17 NOTE — PROGRESS NOTES
[address] [facility] at [facility]    POD #1    Subjective   A little less conversant today, n.p.o., NG tube with bilious output, no bowel movements or flatus yet      Objective     Patient Vitals for the past 24 hrs:   Temp Pulse Resp BP SpO2   11/17/21 0756 97.3 °F (36.3 °C) (!) 146 16 135/67 95 %   11/17/21 0344 97.9 °F (36.6 °C) (!) 103 17 136/66 94 %   11/17/21 0006 97.5 °F (36.4 °C) (!) 110 17 139/61 94 %   11/16/21 2011 99.1 °F (37.3 °C) (!) 105 17 (!) 140/56 94 %   11/16/21 1904 97.6 °F (36.4 °C) (!) 104 17 (!) 137/55 94 %   11/16/21 1807 -- (!) 104 17 -- 97 %   11/16/21 1737 -- (!) 101 19 -- 96 %   11/16/21 1715 -- (!) 101 19 (!) 130/50 97 %   11/16/21 1707 -- (!) 101 17 -- 96 %   11/16/21 1700 -- (!) 102 20 (!) 119/47 95 %   11/16/21 1645 -- (!) 102 21 (!) 119/56 97 %   11/16/21 1637 -- 99 15 -- 97 %   11/16/21 1630 -- 97 15 (!) 118/43 96 %   11/16/21 1615 -- 98 17 (!) 114/51 96 %   11/16/21 1600 -- 98 16 (!) 115/45 96 %   11/16/21 1545 -- 94 15 (!) 118/49 96 %   11/16/21 1530 -- 97 15 (!) 107/45 96 %   11/16/21 1515 -- 96 14 (!) 117/46 97 %   11/16/21 1500 -- 94 15 (!) 116/47 96 %   11/16/21 1445 -- 97 15 (!) 115/45 96 %   11/16/21 1430 -- 97 19 (!) 110/44 97 %   11/16/21 1415 -- 95 14 (!) 111/41 97 %   11/16/21 1400 -- 95 14 (!) 111/46 97 %   11/16/21 1345 -- 94 18 (!) 113/43 97 %   11/16/21 1330 -- 97 16 (!) 114/45 97 %   11/16/21 1315 -- 94 14 (!) 110/46 97 %   11/16/21 1300 -- 93 14 (!) 108/42 96 %   11/16/21 1245 -- 97 17 (!) 115/44 97 %   11/16/21 1230 -- 94 15 (!) 102/41 97 %   11/16/21 1215 -- 95 17 (!) 100/37 97 %   11/16/21 1200 -- 96 19 (!) 105/34 97 %   11/16/21 1145 -- 97 14 (!) 101/39 97 %   11/16/21 1015 -- 96 16 (!) 107/46 97 %   11/16/21 1000 -- (!) 103 18 (!) 113/47 97 %   11/16/21 0945 -- (!) 102 17 (!) 113/48 97 %   11/16/21 0930 -- 98 15 (!) 109/49 96 %       Date 11/16/21 0700 - 11/17/21 0659 11/17/21 0700 - 11/18/21 0659   Shift 4537-52791859 1900-0659 24 Hour Total 2927-1891 2486-2325 24 Hour Total   INTAKE   Shift Total(mL/kg)         OUTPUT   Urine(mL/kg/hr) 350(0.5)  350(0.2)        Urine Output (mL) (Urinary Catheter 11/15/21 2- way; Pearson) 350  350      Emesis/NG output 275  275        Output (ml) (Nasogastric Tube 11/16/21) 275  275      Drains 110 100 210        Output (ml) ([place] #1 11/16/21 Anterior; Left Abdomen) 110 100 210      Shift Total(mL/kg) 735(11.9) 100(1.6) 835(13.5)      NET -735 -100 -835      Weight (kg) 61.7 61.7 61.7 61.7 61.7 61.7       PE  Pulm - CTAB  CV - RRR  Abd -soft, nondistended, incisions clean dry and intact, URSULA with serous output    Labs  Lab Results   Component Value Date/Time    Sodium 142 11/17/2021 04:08 AM    Potassium 4.1 11/17/2021 04:08 AM    Chloride 114 (H) 11/17/2021 04:08 AM    CO2 15 (LL) 11/17/2021 04:08 AM    Anion gap 13 11/17/2021 04:08 AM    Glucose 106 (H) 11/17/2021 04:08 AM    BUN 57 (H) 11/17/2021 04:08 AM    Creatinine 1.48 (H) 11/17/2021 04:08 AM    BUN/Creatinine ratio 39 (H) 11/17/2021 04:08 AM    GFR est AA 40 (L) 11/17/2021 04:08 AM    GFR est non-AA 33 (L) 11/17/2021 04:08 AM    Calcium 8.1 (L) 11/17/2021 04:08 AM    Bilirubin, total 0.5 11/17/2021 04:08 AM    Alk. phosphatase 71 11/17/2021 04:08 AM    Protein, total 4.4 (L) 11/17/2021 04:08 AM    Albumin 1.5 (L) 11/17/2021 04:08 AM    Globulin 2.9 11/17/2021 04:08 AM    A-G Ratio 0.5 (L) 11/17/2021 04:08 AM    ALT (SGPT) 12 11/17/2021 04:08 AM    AST (SGOT) 14 (L) 11/17/2021 04:08 AM     Lab Results   Component Value Date/Time    WBC 20.1 (H) 11/16/2021 04:41 AM    HGB 9.0 (L) 11/16/2021 04:41 AM    HCT 28.5 (L) 11/16/2021 04:41 AM    PLATELET 304 (H) 24/24/5023 04:41 AM    MCV 94.4 11/16/2021 04:41 AM     CBC for today pending    Assessment     Manjeet Lane is a 80 y. o.yr old female status post right colectomy and extended small bowel resection for small bowel ischemia      Plan     Last 24 hours she had improved considerably  She will remain n.p.o. with NG tube in place  Pearson catheter stay in place  CBC today is pending  Continue IV antibiotics  As needed pain medication  Last recorded heart rate was elevated all other vital signs have been normal previous we will need to keep track of this    Kitty Parks MD

## 2021-11-18 ENCOUNTER — APPOINTMENT (OUTPATIENT)
Dept: NON INVASIVE DIAGNOSTICS | Age: 86
DRG: 853 | End: 2021-11-18
Attending: HOSPITALIST
Payer: MEDICARE

## 2021-11-18 LAB
ANION GAP SERPL CALC-SCNC: 13 MMOL/L (ref 5–15)
ATRIAL RATE: 101 BPM
BUN SERPL-MCNC: 58 MG/DL (ref 6–20)
BUN/CREAT SERPL: 46 (ref 12–20)
CALCIUM SERPL-MCNC: 8.7 MG/DL (ref 8.5–10.1)
CALCULATED P AXIS, ECG09: 34 DEGREES
CALCULATED R AXIS, ECG10: 0 DEGREES
CALCULATED T AXIS, ECG11: 88 DEGREES
CHLORIDE SERPL-SCNC: 116 MMOL/L (ref 97–108)
CO2 SERPL-SCNC: 14 MMOL/L (ref 21–32)
CREAT SERPL-MCNC: 1.27 MG/DL (ref 0.55–1.02)
DIAGNOSIS, 93000: NORMAL
ERYTHROCYTE [DISTWIDTH] IN BLOOD BY AUTOMATED COUNT: 15.1 % (ref 11.5–14.5)
GLUCOSE SERPL-MCNC: 147 MG/DL (ref 65–100)
HCT VFR BLD AUTO: 27 % (ref 35–47)
HGB BLD-MCNC: 8.4 G/DL (ref 11.5–16)
LACTATE SERPL-SCNC: 0.8 MMOL/L (ref 0.4–2)
MAGNESIUM SERPL-MCNC: 1.7 MG/DL (ref 1.6–2.4)
MCH RBC QN AUTO: 29.8 PG (ref 26–34)
MCHC RBC AUTO-ENTMCNC: 31.1 G/DL (ref 30–36.5)
MCV RBC AUTO: 95.7 FL (ref 80–99)
NRBC # BLD: 0 K/UL (ref 0–0.01)
NRBC BLD-RTO: 0 PER 100 WBC
P-R INTERVAL, ECG05: 144 MS
PHOSPHATE SERPL-MCNC: 2.9 MG/DL (ref 2.6–4.7)
PLATELET # BLD AUTO: 308 K/UL (ref 150–400)
PMV BLD AUTO: 10.5 FL (ref 8.9–12.9)
POTASSIUM SERPL-SCNC: 3.9 MMOL/L (ref 3.5–5.1)
Q-T INTERVAL, ECG07: 308 MS
QRS DURATION, ECG06: 80 MS
QTC CALCULATION (BEZET), ECG08: 399 MS
RBC # BLD AUTO: 2.82 M/UL (ref 3.8–5.2)
SODIUM SERPL-SCNC: 143 MMOL/L (ref 136–145)
VENTRICULAR RATE, ECG03: 101 BPM
WBC # BLD AUTO: 24.3 K/UL (ref 3.6–11)

## 2021-11-18 PROCEDURE — 36573 INSJ PICC RS&I 5 YR+: CPT | Performed by: SURGERY

## 2021-11-18 PROCEDURE — 83605 ASSAY OF LACTIC ACID: CPT

## 2021-11-18 PROCEDURE — 74011250636 HC RX REV CODE- 250/636: Performed by: SURGERY

## 2021-11-18 PROCEDURE — 74011000250 HC RX REV CODE- 250: Performed by: HOSPITALIST

## 2021-11-18 PROCEDURE — 83735 ASSAY OF MAGNESIUM: CPT

## 2021-11-18 PROCEDURE — 85027 COMPLETE CBC AUTOMATED: CPT

## 2021-11-18 PROCEDURE — 80048 BASIC METABOLIC PNL TOTAL CA: CPT

## 2021-11-18 PROCEDURE — 87040 BLOOD CULTURE FOR BACTERIA: CPT

## 2021-11-18 PROCEDURE — 36415 COLL VENOUS BLD VENIPUNCTURE: CPT

## 2021-11-18 PROCEDURE — 74011250636 HC RX REV CODE- 250/636: Performed by: HOSPITALIST

## 2021-11-18 PROCEDURE — 84100 ASSAY OF PHOSPHORUS: CPT

## 2021-11-18 PROCEDURE — 65660000001 HC RM ICU INTERMED STEPDOWN

## 2021-11-18 PROCEDURE — 74011000258 HC RX REV CODE- 258: Performed by: HOSPITALIST

## 2021-11-18 RX ADMIN — SODIUM BICARBONATE: 84 INJECTION, SOLUTION INTRAVENOUS at 18:49

## 2021-11-18 RX ADMIN — PIPERACILLIN SODIUM AND TAZOBACTAM SODIUM 3.38 G: 3; .375 INJECTION, POWDER, LYOPHILIZED, FOR SOLUTION INTRAVENOUS at 14:19

## 2021-11-18 RX ADMIN — Medication 10 ML: at 14:19

## 2021-11-18 RX ADMIN — SODIUM BICARBONATE: 84 INJECTION, SOLUTION INTRAVENOUS at 09:38

## 2021-11-18 RX ADMIN — DILTIAZEM HYDROCHLORIDE 15 MG/HR: 5 INJECTION, SOLUTION INTRAVENOUS at 09:38

## 2021-11-18 RX ADMIN — DILTIAZEM HYDROCHLORIDE 12.5 MG/HR: 5 INJECTION, SOLUTION INTRAVENOUS at 18:09

## 2021-11-18 RX ADMIN — HYDROMORPHONE HYDROCHLORIDE 1 MG: 1 INJECTION, SOLUTION INTRAMUSCULAR; INTRAVENOUS; SUBCUTANEOUS at 23:54

## 2021-11-18 RX ADMIN — HYDROMORPHONE HYDROCHLORIDE 1 MG: 1 INJECTION, SOLUTION INTRAMUSCULAR; INTRAVENOUS; SUBCUTANEOUS at 02:19

## 2021-11-18 RX ADMIN — PIPERACILLIN SODIUM AND TAZOBACTAM SODIUM 3.38 G: 3; .375 INJECTION, POWDER, LYOPHILIZED, FOR SOLUTION INTRAVENOUS at 04:54

## 2021-11-18 RX ADMIN — PIPERACILLIN SODIUM AND TAZOBACTAM SODIUM 3.38 G: 3; .375 INJECTION, POWDER, LYOPHILIZED, FOR SOLUTION INTRAVENOUS at 21:00

## 2021-11-18 RX ADMIN — Medication 10 ML: at 05:01

## 2021-11-18 RX ADMIN — ENOXAPARIN SODIUM 30 MG: 100 INJECTION SUBCUTANEOUS at 04:54

## 2021-11-18 NOTE — PROGRESS NOTES
[facility] Surgical Specialists at [facility]    POD #3    Subjective     NPO, NGT, not conversant, febrile last night    Objective     Patient Vitals for the past 24 hrs:   Temp Pulse Resp BP SpO2   11/18/21 1416 -- (!) 111 -- -- --   11/18/21 1301 -- (!) 103 -- -- --   11/18/21 1226 100.1 °F (37.8 °C) (!) 106 13 (!) 124/41 96 %   11/18/21 1210 (!) 100.7 °F (38.2 °C) 100 12 (!) 122/52 97 %   11/18/21 0836 (!) 100.5 °F (38.1 °C) 100 12 (!) 121/47 96 %   11/18/21 0641 -- (!) 104 -- (!) 111/43 --   11/18/21 0618 -- (!) 101 -- -- --   11/18/21 0539 -- (!) 106 -- (!) 118/33 --   11/18/21 0419 -- (!) 107 -- -- --   11/18/21 0410 99.8 °F (37.7 °C) (!) 109 8 (!) 114/59 97 %   11/18/21 0224 -- (!) 111 -- -- --   11/18/21 0220 -- (!) 138 -- -- --   11/18/21 0145 -- (!) 133 -- (!) 125/40 --   11/18/21 0110 -- (!) 125 -- (!) 124/51 --   11/18/21 0023 -- (!) 136 -- -- --   11/18/21 0019 100.3 °F (37.9 °C) (!) 132 18 132/68 97 %   11/17/21 2207 -- (!) 138 -- -- --   11/17/21 2125 -- (!) 146 -- 130/68 --   11/17/21 2116 -- (!) 136 -- -- --   11/17/21 1958 98.6 °F (37 °C) (!) 132 16 121/69 94 %   11/17/21 1517 98.5 °F (36.9 °C) (!) 147 16 134/73 96 %       Date 11/17/21 0700 - 11/18/21 0659 11/18/21 0700 - 11/19/21 0659   Shift 6133-1391 3712-9019 24 Hour Total 9195-7642 5006-7821 24 Hour Total   INTAKE   Shift Total(mL/kg)         OUTPUT   Urine(mL/kg/hr)  1025(1.2) 1025(0.6) 175  175     Urine Output (mL) (Urinary Catheter 11/15/21 2- way; Pearson)  1025 1025 175  175   Emesis/NG output  250 250        Output (ml) (Nasogastric Tube 11/16/21)  250 250      Drains  100 100 110  110     Output (ml) (Dulcy Bucco #1 11/16/21 Anterior;  Left Abdomen)  100 100 110  110   Shift Total(mL/kg)  1375(19.8) 1375(19.8) 285(4.1)  285(4.1)   NET  -1375 -1375 -285  -285   Weight (kg) 61.7 69.6 69.6 69.6 69.6 69.6       PE  Pulm - CTAB  CV - RRR  Abd - soft, mild distention, BS hypo, incision c/d/i, URSULA serous    Labs  Lab Results   Component Value Date/Time    Sodium 143 11/18/2021 02:12 AM    Potassium 3.9 11/18/2021 02:12 AM    Chloride 116 (H) 11/18/2021 02:12 AM    CO2 14 (LL) 11/18/2021 02:12 AM    Anion gap 13 11/18/2021 02:12 AM    Glucose 147 (H) 11/18/2021 02:12 AM    BUN 58 (H) 11/18/2021 02:12 AM    Creatinine 1.27 (H) 11/18/2021 02:12 AM    BUN/Creatinine ratio 46 (H) 11/18/2021 02:12 AM    GFR est AA 48 (L) 11/18/2021 02:12 AM    GFR est non-AA 40 (L) 11/18/2021 02:12 AM    Calcium 8.7 11/18/2021 02:12 AM    Bilirubin, total 0.5 11/17/2021 04:08 AM    Alk. phosphatase 71 11/17/2021 04:08 AM    Protein, total 4.4 (L) 11/17/2021 04:08 AM    Albumin 1.5 (L) 11/17/2021 04:08 AM    Globulin 2.9 11/17/2021 04:08 AM    A-G Ratio 0.5 (L) 11/17/2021 04:08 AM    ALT (SGPT) 12 11/17/2021 04:08 AM    AST (SGOT) 14 (L) 11/17/2021 04:08 AM     Lab Results   Component Value Date/Time    WBC 24.3 (H) 11/18/2021 02:12 AM    HGB 8.4 (L) 11/18/2021 02:12 AM    HCT 27.0 (L) 11/18/2021 02:12 AM    PLATELET 406 45/17/8669 02:12 AM    MCV 95.7 11/18/2021 02:12 AM         Assessment     Vannessa Branham is a 80 y. o.yr old female s/p right colectomy and extended small bowel resection for small bowel ischemia      Plan     Pt moved to [facility] unit, new onset afib  On cardizem drip and continue per hospitalist and cardiology, consults greatly appreciated  On bicarb drip  Tracy Coleman NPO with NGT in place, bilious output still  I will order a PICC line   She will need to start on TPN and will order for tomorrow  Pt still very sick after surgery, I talked to her daughter yesterday and updated her on the move to the tele unit  Not signs of bowel ischemia based on normalized lactic acid and WBC coming down now  Continue IV abx  Continue Lincoln Espinosa MD

## 2021-11-18 NOTE — CONSULTS
Cardiology Consult Note    CC: Tachycardia  Reason for consult:  PAF with RVR  Requesting MD:  Dr. Geeta Benitez     Subjective:      Date of  Admission: 11/15/2021  7:03 PM     Admission type:Emergency    Rizwana Rizvi is a 80 y.o. female admitted for Small bowel ischemia ([facility] [address] [K55.9]. Patient complains of nausea/vomiting on POA and was found to have small bowel ischemia with SBO. Her rhythm on POA was sinus tachycardia and some evidence of dehydration but overnight has developed Afib with RVR. Pt has h/o PAF and HTN. Denies any CP or SOB. HR is better since IV Cardizem started. She is followed by a cardiologist locally but no cardiac diagnosis according to pt's daughter. Patient Active Problem List    Diagnosis Date Noted    Small bowel ischemia ([facility] [address] 11/16/2021      Arvind Matos MD  Past Medical History:   Diagnosis Date    Atrial fibrillation ([facility] [address]     Atrial flutter (HCC)     Edema     Hypertension     Hypokalemia     Hypothyroidism     Insomnia     Major depressive disorder     Pain     UTI (urinary tract infection)     Vitamin D deficiency       History reviewed. No pertinent surgical history. Allergies   Allergen Reactions    Codeine Unknown (comments)    Gluten Unknown (comments)    Sulfa (Sulfonamide Antibiotics) Unknown (comments)      History reviewed. No pertinent family history.    Current Facility-Administered Medications   Medication Dose Route Frequency    dilTIAZem (CARDIZEM) 125 mg in dextrose 5% 125 mL infusion  0-15 mg/hr IntraVENous TITRATE    piperacillin-tazobactam (ZOSYN) 3.375 g in 0.9% sodium chloride (MBP/ADV) 100 mL MBP  3.375 g IntraVENous Q8H    sodium bicarbonate (8.4%) 150 mEq in sterile water 1,000 mL infusion   IntraVENous CONTINUOUS    sodium chloride (NS) flush 5-40 mL  5-40 mL IntraVENous Q8H    sodium chloride (NS) flush 5-40 mL  5-40 mL IntraVENous PRN    HYDROmorphone (DILAUDID) injection 1 mg  1 mg IntraVENous Q4H PRN    naloxone (NARCAN) injection 0.4 mg 0.4 mg IntraVENous PRN    ondansetron (ZOFRAN) injection 4 mg  4 mg IntraVENous Q4H PRN    enoxaparin (LOVENOX) injection 30 mg  30 mg SubCUTAneous Q24H        Prior to Admission Medications:  Prior to Admission medications    Medication Sig Start Date End Date Taking? Authorizing Provider   acetaminophen (TYLENOL) 325 mg tablet Take 325 mg by mouth two (2) times daily as needed for Pain. Yes Other, MD Helene   aspirin 81 mg chewable tablet Take 81 mg by mouth daily. Yes Gio, MD Helene   carvediloL (COREG) 12.5 mg tablet Take  by mouth two (2) times daily (with meals). Yes Gio, MD Helene   cephALEXin (KEFLEX) 500 mg capsule Take 500 mg by mouth four (4) times daily. Yes Helene Powers MD   furosemide (LASIX) 40 mg tablet Take 40 mg by mouth daily. Yes Helene Powers MD   melatonin 5 mg tablet Take  by mouth nightly. Yes Gio, MD Helene   levothyroxine (SYNTHROID) 25 mcg tablet Take 25 mcg by mouth Daily (before breakfast). Yes Gio, MD Helene   lisinopriL (PRINIVIL, ZESTRIL) 20 mg tablet Take 20 mg by mouth daily. Yes Helene Powers MD   magnesium oxide (MAG-OX) 400 mg tablet Take 400 mg by mouth daily. Yes Gio, MD Helene   potassium chloride (KLOR-CON) 20 mEq pack Take 20 mEq by mouth two (2) times daily (with meals). Yes Gio, MD Helene   sertraline (ZOLOFT) 50 mg tablet Take  by mouth daily. Yes Gio, MD ZENOBIA Narayanacidoph & parac-S.therm-Bifido (RisaQuad-2) 16 billion cell cap cap Take 1 Capsule by mouth daily. Yes Gio, MD Helene   cholecalciferol (Vitamin D3) (1000 Units /25 mcg) tablet Take  by mouth daily. Yes Gio, MD Helene   Hydrocolloid Dressing 4 X 5 \" bndg by Apply Externally route.    Yes Other, MD Helene        Review of Symptoms:  Except as noted in HPI, patient denies recent fever or chills, nausea, vomiting, diarrhea, hemoptysis, hematemesis, dysuria, myalgias, focal neurologic symptoms, ecchymosis, angioedema, odynophagia, dysphagia, sore throat, earache,rash, melena, hematochezia, depression, GERD, cold intolerance, petechia, bleeding gums, or significant weight loss. A comprehensive review of systems was negative except for that written in the HPI. Subjective:    24 hr VS reviewed, overall VSSAF  Temp (24hrs), Av.8 °F (37.7 °C), Min:98.5 °F (36.9 °C), Max:100.7 °F (38.2 °C)    Patient Vitals for the past 8 hrs:   Pulse   21 1416 (!) 111   21 1301 (!) 103   21 1226 (!) 106   21 1210 100   21 0836 100   21 0641 (!) 104    Patient Vitals for the past 8 hrs:   Resp   21 1226 13   21 1210 12   21 0836 12    Patient Vitals for the past 8 hrs:   BP   21 1226 (!) 124/41   21 1210 (!) 122/52   21 0836 (!) 121/47   21 0641 (!) 111/43          Intake/Output Summary (Last 24 hours) at 2021 1427  Last data filed at 2021 1215  Gross per 24 hour   Intake --   Output 1465 ml   Net -1465 ml         Physical Exam (complete single organ system exam)      Visit Vitals  BP (!) 124/41 (BP 1 Location: Left upper arm, BP Patient Position: At rest)   Pulse (!) 111   Temp 100.1 °F (37.8 °C)   Resp 13   Ht 5' 4\" (1.626 m)   Wt 153 lb 6.4 oz (69.6 kg)   SpO2 96%   BMI 26.33 kg/m²     General Appearance:  Well developed, well nourished,alert and oriented x 3, and individual in no acute distress. Ears/Nose/Mouth/Throat:   Hearing grossly normal.         Neck: Supple. Chest:   Lungs clear to auscultation bilaterally. Cardiovascular:  irregular rate and rhythm, S1, S2 normal, no murmur. Abdomen:   Soft, non-tender, bowel sounds are active. Extremities: No edema bilaterally. Skin: Warm and dry.                Cardiographics    Telemetry: AFIB  ECG: atrial fibrillation, rate 110  Echocardiogram: Not done    Labs:   Recent Results (from the past 24 hour(s))   EKG, 12 LEAD, INITIAL    Collection Time: 21  6:41 PM   Result Value Ref Range    Ventricular Rate 136 BPM    Atrial Rate 84 BPM    QRS Duration 86 ms    Q-T Interval 308 ms    QTC Calculation (Bezet) 463 ms    Calculated R Axis -14 degrees    Calculated T Axis 114 degrees    Diagnosis       Atrial fibrillation  Nonspecific T wave abnormality , probably digitalis effect  Abnormal ECG  When compared with ECG of 15-NOV-2021 19:45,  Atrial fibrillation has replaced Sinus rhythm     LACTIC ACID    Collection Time: 11/17/21  7:15 PM   Result Value Ref Range    Lactic acid 0.9 0.4 - 2.0 MMOL/L   CBC WITH AUTOMATED DIFF    Collection Time: 11/17/21  7:15 PM   Result Value Ref Range    WBC 28.2 (H) 3.6 - 11.0 K/uL    RBC 3.09 (L) 3.80 - 5.20 M/uL    HGB 9.2 (L) 11.5 - 16.0 g/dL    HCT 29.6 (L) 35.0 - 47.0 %    MCV 95.8 80.0 - 99.0 FL    MCH 29.8 26.0 - 34.0 PG    MCHC 31.1 30.0 - 36.5 g/dL    RDW 15.0 (H) 11.5 - 14.5 %    PLATELET 525 676 - 662 K/uL    MPV 10.4 8.9 - 12.9 FL    NRBC 0.0 0  WBC    ABSOLUTE NRBC 0.00 0.00 - 0.01 K/uL    NEUTROPHILS 90 (H) 32 - 75 %    LYMPHOCYTES 3 (L) 12 - 49 %    MONOCYTES 4 (L) 5 - 13 %    EOSINOPHILS 0 0 - 7 %    BASOPHILS 0 0 - 1 %    METAMYELOCYTES 1 (H) 0 %    MYELOCYTES 2 (H) 0 %    IMMATURE GRANULOCYTES 0 %    ABS. NEUTROPHILS 25.4 (H) 1.8 - 8.0 K/UL    ABS. LYMPHOCYTES 0.8 0.8 - 3.5 K/UL    ABS. MONOCYTES 1.1 (H) 0.0 - 1.0 K/UL    ABS. EOSINOPHILS 0.0 0.0 - 0.4 K/UL    ABS. BASOPHILS 0.0 0.0 - 0.1 K/UL    ABS. IMM.  GRANS. 0.0 K/UL    DF MANUAL      RBC COMMENTS ANISOCYTOSIS  1+       METABOLIC PANEL, BASIC    Collection Time: 11/17/21  7:15 PM   Result Value Ref Range    Sodium 144 136 - 145 mmol/L    Potassium 4.2 3.5 - 5.1 mmol/L    Chloride 116 (H) 97 - 108 mmol/L    CO2 11 (LL) 21 - 32 mmol/L    Anion gap 17 (H) 5 - 15 mmol/L    Glucose 134 (H) 65 - 100 mg/dL    BUN 57 (H) 6 - 20 MG/DL    Creatinine 1.27 (H) 0.55 - 1.02 MG/DL    BUN/Creatinine ratio 45 (H) 12 - 20      GFR est AA 48 (L) >60 ml/min/1.73m2    GFR est non-AA 40 (L) >60 ml/min/1.73m2    Calcium 8.5 8.5 - 10.1 MG/DL   TROPONIN-HIGH SENSITIVITY Collection Time: 11/17/21  7:15 PM   Result Value Ref Range    Troponin-High Sensitivity 48 0 - 51 ng/L   TSH 3RD GENERATION    Collection Time: 11/17/21  7:15 PM   Result Value Ref Range    TSH 0.42 0.36 - 2.03 uIU/mL   METABOLIC PANEL, BASIC    Collection Time: 11/18/21  2:12 AM   Result Value Ref Range    Sodium 143 136 - 145 mmol/L    Potassium 3.9 3.5 - 5.1 mmol/L    Chloride 116 (H) 97 - 108 mmol/L    CO2 14 (LL) 21 - 32 mmol/L    Anion gap 13 5 - 15 mmol/L    Glucose 147 (H) 65 - 100 mg/dL    BUN 58 (H) 6 - 20 MG/DL    Creatinine 1.27 (H) 0.55 - 1.02 MG/DL    BUN/Creatinine ratio 46 (H) 12 - 20      GFR est AA 48 (L) >60 ml/min/1.73m2    GFR est non-AA 40 (L) >60 ml/min/1.73m2    Calcium 8.7 8.5 - 10.1 MG/DL   MAGNESIUM    Collection Time: 11/18/21  2:12 AM   Result Value Ref Range    Magnesium 1.7 1.6 - 2.4 mg/dL   PHOSPHORUS    Collection Time: 11/18/21  2:12 AM   Result Value Ref Range    Phosphorus 2.9 2.6 - 4.7 MG/DL   CBC W/O DIFF    Collection Time: 11/18/21  2:12 AM   Result Value Ref Range    WBC 24.3 (H) 3.6 - 11.0 K/uL    RBC 2.82 (L) 3.80 - 5.20 M/uL    HGB 8.4 (L) 11.5 - 16.0 g/dL    HCT 27.0 (L) 35.0 - 47.0 %    MCV 95.7 80.0 - 99.0 FL    MCH 29.8 26.0 - 34.0 PG    MCHC 31.1 30.0 - 36.5 g/dL    RDW 15.1 (H) 11.5 - 14.5 %    PLATELET 896 616 - 529 K/uL    MPV 10.5 8.9 - 12.9 FL    NRBC 0.0 0  WBC    ABSOLUTE NRBC 0.00 0.00 - 0.01 K/uL   CULTURE, BLOOD, PAIRED    Collection Time: 11/18/21  2:14 AM    Specimen: Blood   Result Value Ref Range    Special Requests: NO SPECIAL REQUESTS      Culture result: NO GROWTH AFTER 2 HOURS     LACTIC ACID    Collection Time: 11/18/21  2:14 AM   Result Value Ref Range    Lactic acid 0.8 0.4 - 2.0 MMOL/L        Assessment:     Assessment:   PAF; recurrent in setting or dehyration  HTN; stable  CURT/CKD; dehydration   Anemia  SBO/small bowel ischemia  Metabolic acidosis; improving      Plan:   Tele  Agree with BRENT Zarate MD

## 2021-11-18 NOTE — PROGRESS NOTES
Bedside and Verbal shift change report given to Bearl Kehr (oncoming nurse) by Jemima Palmer (offgoing nurse). Report included the following information SBAR, Kardex, MAR, Accordion, Recent Results and Cardiac Rhythm A-fib.

## 2021-11-18 NOTE — PROGRESS NOTES
[facility]  Hospitalist [facility]                                                                                          Hospitalist Progress Note  Maria Antonia Alexandra MD  Answering service: [phone] OR [phone] from in house phone        Date of Service:  2021  NAME:  Kala Devlin  :  3/13/1933  MRN:  [id number]      Admission Summary:   80 y.o lady with afib/flutter, HTN, hypothyroidism, who is s.o R colectomy and extended small bowel resection for small bowel ischemia. Interval history / Subjective:     HR much better controlled on cardizem gtt. Pt sleeping and difficult to arouse. Appears comfortable. Assessment & Plan:     Afib w/ rvr: likely driven by her bowel ischemia. Pt has also been off her carvedilol and is unable to take it due to NG with suction  -Continue IV diltiazem   -anticoagulate when safe from a surgical standpoint  -echocardiogram pending   -Cardiology following  -Will need to continue IV medications until able to take PO     Bowel ischemia s/p resection: possible ongoing ischemia per surgery. Leukocytosis  - continue zosyn  -FU blood cultures  - She may need TPN if she has to remain NPO will defer to surgery. - Would also consider palliative consult given age and high risk for decline.      Anion-gap metabolic acidosis: normal serum lactate  -continue bicarb, check BMP in am      Renal insufficiency: no baseline available but this is improving     Hypothyroidism:  -resume levothyroxine  -check [facility]         Hospital Problems  Never Reviewed          Codes Class Noted POA    Small bowel ischemia ([facility] [address] ICD-10-CM: K55.9  ICD-9-CM: 557.9  2021 Unknown                Review of Systems:   A comprehensive review of systems was negative except for that written in the HPI. Vital Signs:    Last 24hrs VS reviewed since prior progress note. Most recent are:  Visit Vitals  BP (!) (P) 143/34 (BP 1 Location: Left upper arm, BP Patient Position:  At rest)   Pulse (!) 120   Temp 99.7 °F (37.6 °C)   Resp 16   Ht 5' 4\" (1.626 m)   Wt 69.6 kg (153 lb 6.4 oz)   SpO2 96%   BMI 26.33 kg/m²         Intake/Output Summary (Last 24 hours) at 11/18/2021 1639  Last data filed at 11/18/2021 1442  Gross per 24 hour   Intake --   Output 1660 ml   Net -1660 ml        Physical Examination:     I had a face to face encounter with this patient and independently examined them on 11/18/2021 as outlined below:          Constitutional:  No acute distress, asleep, elderly    ENT:  Oral mucosa dry, NGT in place with bilious output   Resp:  CTA bilaterally. No wheezing/rhonchi/rales. No accessory muscle use   CV:  irregular, normal rate,  no murmurs, gallops, rubs    GI:  Hypoactive bowel sounds, incisions c/d/i, + mild distention, pain to palpation with groaning, no rebound or guarding     Musculoskeletal:  No edema, warm, 2+ pulses throughout    Neurologic:  Moves all extremities. Asleep, not answering questions for me today             Data Review:    Review and/or order of clinical lab test  Review and/or order of tests in the radiology section of CPT  Review and/or order of tests in the medicine section of CPT      Labs:     Recent Labs     11/18/21 0212 11/17/21 1915   WBC 24.3* 28.2*   HGB 8.4* 9.2*   HCT 27.0* 29.6*    351     Recent Labs     11/18/21 0212 11/17/21 1915 11/17/21  0408 11/16/21  1240 11/16/21  0441    144 142   < > 139   K 3.9 4.2 4.1   < > 4.0   * 116* 114*   < > 115*   CO2 14* 11* 15*   < > 12*   BUN 58* 57* 57*   < > 62*   CREA 1.27* 1.27* 1.48*   < > 2.51*   * 134* 106*   < > 95   CA 8.7 8.5 8.1*   < > 7.6*   MG 1.7  --   --   --  1.3*   PHOS 2.9  --   --   --  3.9    < > = values in this interval not displayed.      Recent Labs     11/17/21  0408 11/16/21  0441 11/15/21  1914   ALT 12 15 8*   AP 71 67 95   TBILI 0.5 0.7 0.6   TP 4.4* 4.9* 5.7*   ALB 1.5* 1.8* 1.9*   GLOB 2.9 3.1 3.8   LPSE  --   --  16*     No results for input(s): INR, PTP, APTT, INREXT in the last 72 hours. No results for input(s): FE, TIBC, PSAT, FERR in the last 72 hours. No results found for: FOL, RBCF   No results for input(s): PH, PCO2, PO2 in the last 72 hours. No results for input(s): CPK, CKNDX, TROIQ in the last 72 hours.     No lab exists for component: CPKMB  No results found for: CHOL, CHOLX, CHLST, CHOLV, HDL, HDLP, LDL, LDLC, DLDLP, TGLX, TRIGL, TRIGP, CHHD, CHHDX  No results found for: GLUCPOC  No results found for: COLOR, APPRN, SPGRU, REFSG, ANA, PROTU, GLUCU, KETU, BILU, UROU, MANUELA, LEUKU, GLUKE, EPSU, BACTU, WBCU, RBCU, CASTS, UCRY      Medications Reviewed:     Current Facility-Administered Medications   Medication Dose Route Frequency    dilTIAZem (CARDIZEM) 125 mg in dextrose 5% 125 mL infusion  0-15 mg/hr IntraVENous TITRATE    piperacillin-tazobactam (ZOSYN) 3.375 g in 0.9% sodium chloride (MBP/ADV) 100 mL MBP  3.375 g IntraVENous Q8H    sodium bicarbonate (8.4%) 150 mEq in sterile water 1,000 mL infusion   IntraVENous CONTINUOUS    sodium chloride (NS) flush 5-40 mL  5-40 mL IntraVENous Q8H    sodium chloride (NS) flush 5-40 mL  5-40 mL IntraVENous PRN    HYDROmorphone (DILAUDID) injection 1 mg  1 mg IntraVENous Q4H PRN    naloxone (NARCAN) injection 0.4 mg  0.4 mg IntraVENous PRN    ondansetron (ZOFRAN) injection 4 mg  4 mg IntraVENous Q4H PRN    enoxaparin (LOVENOX) injection 30 mg  30 mg SubCUTAneous Q24H     ______________________________________________________________________  EXPECTED LENGTH OF STAY: 10d 7h  ACTUAL LENGTH OF STAY:          3                 Rodney Jett MD

## 2021-11-18 NOTE — PROGRESS NOTES
Pt sleeping this am and when awaken, stating her mouth has been very dry. toothete used and pt sucked on the water and wiped gums and lips. Medicated for pain x 2. CBC drawn per orders this am. Vital signs ok except 's. NGT draining dark green liquid. Pearson catheter draining tea color urine. Seen this evening by Dr. Yolanda Stevens. Orders for transfer to Telemetry made. Labs, EKG, pCXR done. Dr. Raza Kevin, hospitalist notified for consult. Pt seen by cardiologist and medication changed. Did not give Flagyl per Cardiologist.   Report called to Lacey Daniel at 7304 and prior to pt transfer to room 439 at 2100, lab called with critical CO2 - 11. Informed Tele nurse when pt taken to 4th floor. Pt taken in bed with this nurse and PCT.

## 2021-11-18 NOTE — CONSULTS
MEDICAL CONSULTATION      Reason for consult: afib with rvr  Consulting provider: Dr. Dimitrios Bass      CC: atrial fibrillation      HPI: 80 y.o lady with afib/flutter, HTN, hypothyroidism, who is s.o R colectomy and extended small bowel resection for small bowel ischemia. She is sleeping and doesn't offer any history when awakened. She has an NGT with active suctioning. She was noted to be tachycardic today and an EGK showed afib with rvr. PMH/PSH:  Past Medical History:   Diagnosis Date    Atrial fibrillation (HCC)     Atrial flutter (HCC)     Edema     Hypertension     Hypokalemia     Hypothyroidism     Insomnia     Major depressive disorder     Pain     UTI (urinary tract infection)     Vitamin D deficiency      History reviewed. No pertinent surgical history. Home meds:   Prior to Admission medications    Medication Sig Start Date End Date Taking? Authorizing Provider   acetaminophen (TYLENOL) 325 mg tablet Take 325 mg by mouth two (2) times daily as needed for Pain. Yes Gio, MD Helene   aspirin 81 mg chewable tablet Take 81 mg by mouth daily. Yes Gio, MD Helene   carvediloL (COREG) 12.5 mg tablet Take  by mouth two (2) times daily (with meals). Yes Helene Powers MD   cephALEXin (KEFLEX) 500 mg capsule Take 500 mg by mouth four (4) times daily. Yes Helene Powers MD   furosemide (LASIX) 40 mg tablet Take 40 mg by mouth daily. Yes Helene Powers MD   melatonin 5 mg tablet Take  by mouth nightly. Yes Helene Powers MD   levothyroxine (SYNTHROID) 25 mcg tablet Take 25 mcg by mouth Daily (before breakfast). Yes Helene Powers MD   lisinopriL (PRINIVIL, ZESTRIL) 20 mg tablet Take 20 mg by mouth daily. Yes Helene Powers MD   magnesium oxide (MAG-OX) 400 mg tablet Take 400 mg by mouth daily. Yes Helene Powers MD   potassium chloride (KLOR-CON) 20 mEq pack Take 20 mEq by mouth two (2) times daily (with meals). Yes Helene Powers MD   sertraline (ZOLOFT) 50 mg tablet Take  by mouth daily.    Yes Other, MD Helene   LCelestinoacidoph & parac-S.therm-Bifido (RisaQuad-2) 16 billion cell cap cap Take 1 Capsule by mouth daily. Yes Other, MD Helene   cholecalciferol (Vitamin D3) (1000 Units /25 mcg) tablet Take  by mouth daily. Yes Other, MD Helene   Hydrocolloid Dressing 4 X 5 \" bndg by Apply Externally route. Yes Other, MD Helene       Allergies: Allergies   Allergen Reactions    Codeine Unknown (comments)    Gluten Unknown (comments)    Sulfa (Sulfonamide Antibiotics) Unknown (comments)       FH:  History reviewed. No pertinent family history. SH:  Social History     Tobacco Use    Smoking status: Never Smoker    Smokeless tobacco: Never Used   Substance Use Topics    Alcohol use: Never       ROS: Review of systems not obtained due to patient factors.       PHYSICAL EXAM:  Visit Vitals  /73   Pulse (!) 147   Temp 98.5 °F (36.9 °C)   Resp 16   Ht 5' 4\" (1.626 m)   Wt 61.7 kg (136 lb 0.4 oz)   SpO2 96%   BMI 23.35 kg/m²       Gen: NAD, non-toxic  HEENT: anicteric sclerae, normal conjunctiva, NGT with bilious output  Neck: supple  Heart: irreg irreg, tachycardic, no MRG, no JVD, no peripheral edema  Lungs: CTA b/l, non-labored respirations  Abd: soft, dressed, ND, BS absent  Extr: warm  Skin: dry, no rash  Neuro: CN II-XII grossly intact, normal speech, moves all extremities  Psych: normal mood, appropriate affect      Labs/Imaging:  Recent Results (from the past 24 hour(s))   METABOLIC PANEL, COMPREHENSIVE    Collection Time: 11/17/21  4:08 AM   Result Value Ref Range    Sodium 142 136 - 145 mmol/L    Potassium 4.1 3.5 - 5.1 mmol/L    Chloride 114 (H) 97 - 108 mmol/L    CO2 15 (LL) 21 - 32 mmol/L    Anion gap 13 5 - 15 mmol/L    Glucose 106 (H) 65 - 100 mg/dL    BUN 57 (H) 6 - 20 MG/DL    Creatinine 1.48 (H) 0.55 - 1.02 MG/DL    BUN/Creatinine ratio 39 (H) 12 - 20      GFR est AA 40 (L) >60 ml/min/1.73m2    GFR est non-AA 33 (L) >60 ml/min/1.73m2    Calcium 8.1 (L) 8.5 - 10.1 MG/DL    Bilirubin, total 0.5 0.2 - 1.0 MG/DL    ALT (SGPT) 12 12 - 78 U/L    AST (SGOT) 14 (L) 15 - 37 U/L    Alk. phosphatase 71 45 - 117 U/L    Protein, total 4.4 (L) 6.4 - 8.2 g/dL    Albumin 1.5 (L) 3.5 - 5.0 g/dL    Globulin 2.9 2.0 - 4.0 g/dL    A-G Ratio 0.5 (L) 1.1 - 2.2     SAMPLES BEING HELD    Collection Time: 11/17/21  4:08 AM   Result Value Ref Range    SAMPLES BEING HELD 1lav     COMMENT        Add-on orders for these samples will be processed based on acceptable specimen integrity and analyte stability, which may vary by analyte.    CBC W/O DIFF    Collection Time: 11/17/21  2:20 PM   Result Value Ref Range    WBC 30.7 (H) 3.6 - 11.0 K/uL    RBC 3.11 (L) 3.80 - 5.20 M/uL    HGB 9.3 (L) 11.5 - 16.0 g/dL    HCT 29.8 (L) 35.0 - 47.0 %    MCV 95.8 80.0 - 99.0 FL    MCH 29.9 26.0 - 34.0 PG    MCHC 31.2 30.0 - 36.5 g/dL    RDW 14.8 (H) 11.5 - 14.5 %    PLATELET 211 220 - 126 K/uL    MPV 10.4 8.9 - 12.9 FL    NRBC 0.0 0  WBC    ABSOLUTE NRBC 0.00 0.00 - 0.01 K/uL   EKG, 12 LEAD, INITIAL    Collection Time: 11/17/21  6:41 PM   Result Value Ref Range    Ventricular Rate 136 BPM    Atrial Rate 84 BPM    QRS Duration 86 ms    Q-T Interval 308 ms    QTC Calculation (Bezet) 463 ms    Calculated R Axis -14 degrees    Calculated T Axis 114 degrees    Diagnosis       Atrial fibrillation  Nonspecific T wave abnormality , probably digitalis effect  Abnormal ECG  When compared with ECG of 15-NOV-2021 19:45,  Atrial fibrillation has replaced Sinus rhythm         Recent Labs     11/17/21  1420 11/16/21  0441   WBC 30.7* 20.1*   HGB 9.3* 9.0*   HCT 29.8* 28.5*    417*     Recent Labs     11/17/21  0408 11/16/21  1240 11/16/21 0441    140 139   K 4.1 3.9 4.0   * 114* 115*   CO2 15* 16* 12*   BUN 57* 58* 62*   CREA 1.48* 1.75* 2.51*   * 101* 95   CA 8.1* 7.2* 7.6*   MG  --   --  1.3*   PHOS  --   --  3.9     Recent Labs     11/17/21  0408 11/16/21  0441 11/15/21  1914   ALT 12 15 8*   AP 71 67 95   TBILI 0.5 0.7 0.6 TP 4.4* 4.9* 5.7*   ALB 1.5* 1.8* 1.9*   GLOB 2.9 3.1 3.8   LPSE  --   --  16*       No results for input(s): CPK, CKNDX, TROIQ in the last 72 hours. No lab exists for component: CPKMB    No results for input(s): INR, PTP, APTT, INREXT in the last 72 hours. No results for input(s): PH, PCO2, PO2 in the last 72 hours. CT ABD PELV WO CONT    Result Date: 11/15/2021  1. Pneumoperitoneum, possibly related to perforation of dilated slightly thickened small bowel loops. 2. Mild colonic wall thickening may be due to underdistention. 3. Moderate-sized hiatal hernia. The findings were called to Dr. Di Kelley on 11/15/2021 at 2100 by myself. 789. XR CHEST PORT    Result Date: 11/17/2021  No acute findings. Assessment & Plan:     Afib w/ rvr: likely driven by her bowel ischemia. Pt has also been off her carvedilol and is unable to take it due to NG with suction  -IV diltiazem push and start drip  -anticoagulate when safe from a surgical standpoint  -echocardiogram tomorrow  -agree with cardiology consultation for further management    Bowel ischemia s/p resection: possible ongoing ischemia per surgery. Due to her worsening leukocytosis I changed her antibiotics to IV Zosyn. Obtain blood cultures. She may need TPN if she has to remain NPO will defer to surgery.     Anion-gap metabolic acidosis: normal serum lactate  -change fluids to bicarbonate infusion    Renal insufficiency: no baseline available but this is improving    Hypothyroidism:  -resume levothyroxine  -check TSH    Signed By: Scar Kendrick MD     November 17, 2021 no

## 2021-11-19 ENCOUNTER — APPOINTMENT (OUTPATIENT)
Dept: GENERAL RADIOLOGY | Age: 86
DRG: 853 | End: 2021-11-19
Attending: SURGERY
Payer: MEDICARE

## 2021-11-19 ENCOUNTER — APPOINTMENT (OUTPATIENT)
Dept: NON INVASIVE DIAGNOSTICS | Age: 86
DRG: 853 | End: 2021-11-19
Attending: HOSPITALIST
Payer: MEDICARE

## 2021-11-19 LAB
ANION GAP SERPL CALC-SCNC: 6 MMOL/L (ref 5–15)
BASOPHILS # BLD: 0.2 K/UL (ref 0–0.1)
BASOPHILS NFR BLD: 1 % (ref 0–1)
BUN SERPL-MCNC: 45 MG/DL (ref 6–20)
BUN/CREAT SERPL: 41 (ref 12–20)
CALCIUM SERPL-MCNC: 8.1 MG/DL (ref 8.5–10.1)
CHLORIDE SERPL-SCNC: 112 MMOL/L (ref 97–108)
CO2 SERPL-SCNC: 28 MMOL/L (ref 21–32)
CREAT SERPL-MCNC: 1.11 MG/DL (ref 0.55–1.02)
DIFFERENTIAL METHOD BLD: ABNORMAL
ECHO AO ROOT DIAM: 2.98 CM
ECHO AV AREA PEAK VELOCITY: 2.36 CM2
ECHO AV AREA/BSA PEAK VELOCITY: 1.3 CM2/M2
ECHO AV PEAK GRADIENT: 11.06 MMHG
ECHO AV PEAK VELOCITY: 166.29 CM/S
ECHO EST RA PRESSURE: 5 MMHG
ECHO LA AREA 4C: 21.06 CM2
ECHO LA VOL 2C: 64.98 ML (ref 22–52)
ECHO LA VOL 4C: 63.14 ML (ref 22–52)
ECHO LA VOL BP: 71.16 ML (ref 22–52)
ECHO LA VOL/BSA BIPLANE: 39.98 ML/M2 (ref 16–28)
ECHO LA VOLUME INDEX A2C: 36.51 ML/M2 (ref 16–28)
ECHO LA VOLUME INDEX A4C: 35.47 ML/M2 (ref 16–28)
ECHO LV INTERNAL DIMENSION DIASTOLIC: 4.43 CM (ref 3.9–5.3)
ECHO LV INTERNAL DIMENSION SYSTOLIC: 2.88 CM
ECHO LV IVSD: 0.8 CM (ref 0.6–0.9)
ECHO LV MASS 2D: 120.9 G (ref 67–162)
ECHO LV MASS INDEX 2D: 67.9 G/M2 (ref 43–95)
ECHO LV POSTERIOR WALL DIASTOLIC: 0.91 CM (ref 0.6–0.9)
ECHO LVOT DIAM: 1.99 CM
ECHO LVOT PEAK GRADIENT: 6.35 MMHG
ECHO LVOT PEAK VELOCITY: 126.03 CM/S
ECHO LVOT SV: 60.9 ML
ECHO LVOT VTI: 19.57 CM
ECHO MV E VELOCITY: 117.35 CM/S
ECHO PV PEAK INSTANTANEOUS GRADIENT SYSTOLIC: 6.19 MMHG
ECHO RIGHT VENTRICULAR SYSTOLIC PRESSURE (RVSP): 42.81 MMHG
ECHO RV TAPSE: 2.1 CM (ref 1.5–2)
ECHO TV REGURGITANT MAX VELOCITY: 307.47 CM/S
ECHO TV REGURGITANT PEAK GRADIENT: 37.81 MMHG
EOSINOPHIL # BLD: 0 K/UL (ref 0–0.4)
EOSINOPHIL NFR BLD: 0 % (ref 0–7)
ERYTHROCYTE [DISTWIDTH] IN BLOOD BY AUTOMATED COUNT: 14.6 % (ref 11.5–14.5)
GLUCOSE SERPL-MCNC: 190 MG/DL (ref 65–100)
HCT VFR BLD AUTO: 25.9 % (ref 35–47)
HGB BLD-MCNC: 8.5 G/DL (ref 11.5–16)
IMM GRANULOCYTES # BLD AUTO: 0 K/UL
IMM GRANULOCYTES NFR BLD AUTO: 0 %
LYMPHOCYTES # BLD: 1.2 K/UL (ref 0.8–3.5)
LYMPHOCYTES NFR BLD: 8 % (ref 12–49)
MAGNESIUM SERPL-MCNC: 1.6 MG/DL (ref 1.6–2.4)
MCH RBC QN AUTO: 30 PG (ref 26–34)
MCHC RBC AUTO-ENTMCNC: 32.8 G/DL (ref 30–36.5)
MCV RBC AUTO: 91.5 FL (ref 80–99)
MONOCYTES # BLD: 1.4 K/UL (ref 0–1)
MONOCYTES NFR BLD: 9 % (ref 5–13)
MYELOCYTES NFR BLD MANUAL: 2 %
NEUTS BAND NFR BLD MANUAL: 10 % (ref 0–6)
NEUTS SEG # BLD: 12.5 K/UL (ref 1.8–8)
NEUTS SEG NFR BLD: 70 % (ref 32–75)
NRBC # BLD: 0 K/UL (ref 0–0.01)
NRBC BLD-RTO: 0 PER 100 WBC
PHOSPHATE SERPL-MCNC: 2.3 MG/DL (ref 2.6–4.7)
PLATELET # BLD AUTO: 241 K/UL (ref 150–400)
PMV BLD AUTO: 10.7 FL (ref 8.9–12.9)
POTASSIUM SERPL-SCNC: 3.4 MMOL/L (ref 3.5–5.1)
RBC # BLD AUTO: 2.83 M/UL (ref 3.8–5.2)
RBC MORPH BLD: ABNORMAL
SODIUM SERPL-SCNC: 146 MMOL/L (ref 136–145)
WBC # BLD AUTO: 15.6 K/UL (ref 3.6–11)

## 2021-11-19 PROCEDURE — 74011000258 HC RX REV CODE- 258: Performed by: HOSPITALIST

## 2021-11-19 PROCEDURE — 74011250636 HC RX REV CODE- 250/636: Performed by: HOSPITALIST

## 2021-11-19 PROCEDURE — 84100 ASSAY OF PHOSPHORUS: CPT

## 2021-11-19 PROCEDURE — 99222 1ST HOSP IP/OBS MODERATE 55: CPT | Performed by: PHYSICAL MEDICINE & REHABILITATION

## 2021-11-19 PROCEDURE — 76937 US GUIDE VASCULAR ACCESS: CPT

## 2021-11-19 PROCEDURE — 93306 TTE W/DOPPLER COMPLETE: CPT

## 2021-11-19 PROCEDURE — 77030020365 HC SOL INJ SOD CL 0.9% 50ML

## 2021-11-19 PROCEDURE — 65660000001 HC RM ICU INTERMED STEPDOWN

## 2021-11-19 PROCEDURE — 93306 TTE W/DOPPLER COMPLETE: CPT | Performed by: SPECIALIST

## 2021-11-19 PROCEDURE — 36415 COLL VENOUS BLD VENIPUNCTURE: CPT

## 2021-11-19 PROCEDURE — 71045 X-RAY EXAM CHEST 1 VIEW: CPT

## 2021-11-19 PROCEDURE — 85025 COMPLETE CBC W/AUTO DIFF WBC: CPT

## 2021-11-19 PROCEDURE — 74011000250 HC RX REV CODE- 250: Performed by: INTERNAL MEDICINE

## 2021-11-19 PROCEDURE — 74011000250 HC RX REV CODE- 250: Performed by: HOSPITALIST

## 2021-11-19 PROCEDURE — 80048 BASIC METABOLIC PNL TOTAL CA: CPT

## 2021-11-19 PROCEDURE — C1751 CATH, INF, PER/CENT/MIDLINE: HCPCS

## 2021-11-19 PROCEDURE — 74011000250 HC RX REV CODE- 250: Performed by: SURGERY

## 2021-11-19 PROCEDURE — 3E0336Z INTRODUCTION OF NUTRITIONAL SUBSTANCE INTO PERIPHERAL VEIN, PERCUTANEOUS APPROACH: ICD-10-PCS | Performed by: SURGERY

## 2021-11-19 PROCEDURE — 83735 ASSAY OF MAGNESIUM: CPT

## 2021-11-19 PROCEDURE — 74011250636 HC RX REV CODE- 250/636: Performed by: SURGERY

## 2021-11-19 PROCEDURE — 74011250636 HC RX REV CODE- 250/636: Performed by: INTERNAL MEDICINE

## 2021-11-19 PROCEDURE — 02HV33Z INSERTION OF INFUSION DEVICE INTO SUPERIOR VENA CAVA, PERCUTANEOUS APPROACH: ICD-10-PCS | Performed by: SURGERY

## 2021-11-19 PROCEDURE — 74011000258 HC RX REV CODE- 258: Performed by: SURGERY

## 2021-11-19 RX ORDER — DEXTROSE MONOHYDRATE AND SODIUM CHLORIDE 5; .45 G/100ML; G/100ML
100 INJECTION, SOLUTION INTRAVENOUS CONTINUOUS
Status: DISCONTINUED | OUTPATIENT
Start: 2021-11-19 | End: 2021-11-20

## 2021-11-19 RX ORDER — MAGNESIUM SULFATE HEPTAHYDRATE 40 MG/ML
2 INJECTION, SOLUTION INTRAVENOUS ONCE
Status: COMPLETED | OUTPATIENT
Start: 2021-11-19 | End: 2021-11-19

## 2021-11-19 RX ORDER — METOPROLOL TARTRATE 5 MG/5ML
5 INJECTION INTRAVENOUS 3 TIMES DAILY
Status: DISCONTINUED | OUTPATIENT
Start: 2021-11-19 | End: 2021-11-29

## 2021-11-19 RX ORDER — ENOXAPARIN SODIUM 100 MG/ML
40 INJECTION SUBCUTANEOUS EVERY 24 HOURS
Status: DISCONTINUED | OUTPATIENT
Start: 2021-11-20 | End: 2021-11-22

## 2021-11-19 RX ORDER — ACETAMINOPHEN 650 MG/1
650 SUPPOSITORY RECTAL
Status: DISCONTINUED | OUTPATIENT
Start: 2021-11-19 | End: 2021-12-07 | Stop reason: HOSPADM

## 2021-11-19 RX ADMIN — DEXTROSE AND SODIUM CHLORIDE 100 ML/HR: 5; 450 INJECTION, SOLUTION INTRAVENOUS at 21:48

## 2021-11-19 RX ADMIN — HYDROMORPHONE HYDROCHLORIDE 1 MG: 1 INJECTION, SOLUTION INTRAMUSCULAR; INTRAVENOUS; SUBCUTANEOUS at 23:59

## 2021-11-19 RX ADMIN — DEXTROSE AND SODIUM CHLORIDE 100 ML/HR: 5; 450 INJECTION, SOLUTION INTRAVENOUS at 08:35

## 2021-11-19 RX ADMIN — PIPERACILLIN SODIUM AND TAZOBACTAM SODIUM 3.38 G: 3; .375 INJECTION, POWDER, LYOPHILIZED, FOR SOLUTION INTRAVENOUS at 04:40

## 2021-11-19 RX ADMIN — POTASSIUM PHOSPHATE, MONOBASIC POTASSIUM PHOSPHATE, DIBASIC: 224; 236 INJECTION, SOLUTION, CONCENTRATE INTRAVENOUS at 08:36

## 2021-11-19 RX ADMIN — Medication 10 ML: at 14:52

## 2021-11-19 RX ADMIN — Medication 10 ML: at 21:50

## 2021-11-19 RX ADMIN — SODIUM BICARBONATE: 84 INJECTION, SOLUTION INTRAVENOUS at 04:40

## 2021-11-19 RX ADMIN — METOPROLOL TARTRATE 5 MG: 5 INJECTION INTRAVENOUS at 13:52

## 2021-11-19 RX ADMIN — MAGNESIUM SULFATE HEPTAHYDRATE 2 G: 40 INJECTION, SOLUTION INTRAVENOUS at 08:35

## 2021-11-19 RX ADMIN — METOPROLOL TARTRATE 5 MG: 5 INJECTION INTRAVENOUS at 16:48

## 2021-11-19 RX ADMIN — DILTIAZEM HYDROCHLORIDE 12.5 MG/HR: 5 INJECTION, SOLUTION INTRAVENOUS at 17:44

## 2021-11-19 RX ADMIN — METOPROLOL TARTRATE 5 MG: 5 INJECTION INTRAVENOUS at 21:47

## 2021-11-19 RX ADMIN — PIPERACILLIN SODIUM AND TAZOBACTAM SODIUM 3.38 G: 3; .375 INJECTION, POWDER, LYOPHILIZED, FOR SOLUTION INTRAVENOUS at 13:52

## 2021-11-19 RX ADMIN — PIPERACILLIN SODIUM AND TAZOBACTAM SODIUM 3.38 G: 3; .375 INJECTION, POWDER, LYOPHILIZED, FOR SOLUTION INTRAVENOUS at 21:48

## 2021-11-19 RX ADMIN — HYDROMORPHONE HYDROCHLORIDE 1 MG: 1 INJECTION, SOLUTION INTRAMUSCULAR; INTRAVENOUS; SUBCUTANEOUS at 10:08

## 2021-11-19 RX ADMIN — DILTIAZEM HYDROCHLORIDE 10 MG/HR: 5 INJECTION, SOLUTION INTRAVENOUS at 03:33

## 2021-11-19 RX ADMIN — CALCIUM GLUCONATE: 94 INJECTION, SOLUTION INTRAVENOUS at 19:48

## 2021-11-19 RX ADMIN — ENOXAPARIN SODIUM 30 MG: 100 INJECTION SUBCUTANEOUS at 04:04

## 2021-11-19 NOTE — PROGRESS NOTES
[facility]  Hospitalist Group                                                                                          Hospitalist Progress Note  Lubna Cosme MD  Answering service: [phone] OR [phone] from in house phone        Date of Service:  2021  NAME:  Jesusita Montalvo  :  3/13/1933  MRN:  [id number]      Admission Summary:   80 y.o lady with afib/flutter, HTN, hypothyroidism, who is s.o R colectomy and extended small bowel resection for small bowel ischemia. Interval history / Subjective:   HR remains stable. Pt still not responding much. Assessment & Plan:     Afib w/ rvr: likely driven by her bowel ischemia. Pt has also been off her carvedilol and is unable to take it due to NG with suction  -Continue IV diltiazem, add IV metoprolol per Dr. Mindi Olszewski   -anticoagulate when safe from a surgical standpoint  -echocardiogram pending     -Cardiology following  -Will need to continue IV medications until able to take PO     Bowel ischemia s/p resection: possible ongoing ischemia per surgery. Leukocytosis  - continue zosyn  -FU blood cultures  - She may need TPN if she has to remain NPO will defer to surgery. - Would also consider palliative consult given age and high risk for decline.      Anion-gap metabolic acidosis: normal serum lactate  -DC bicarb gtt, and switch to D5 1/2 normal     CURT stage 1 - Cr on admit 1.7, now improved to 1.1. Pre-renal and sepsis likely. Continue IVF fow now      Hypothyroidism:  -resume levothyroxine  -check TSH    Internal medicine hospitalist team will now sign off. Obtain daily BMP, and adjust fluids as needed. Please do not hesitate to call us back if needed. Hospital Problems  Never Reviewed          Codes Class Noted POA    Small bowel ischemia [facility]) ICD-10-CM: K55.9  ICD-9-CM: 557.9  2021 Unknown                Review of Systems:   A comprehensive review of systems was negative except for that written in the HPI. Vital Signs:    Last 24hrs VS reviewed since prior progress note. Most recent are:  Visit Vitals  BP (!) 142/55 (BP 1 Location: Left upper arm, BP Patient Position: At rest)   Pulse (!) 107   Temp 98.1 °F (36.7 °C)   Resp 9   Ht 5' 4\" (1.626 m)   Wt 72.6 kg (160 lb)   SpO2 98%   BMI 27.46 kg/m²         Intake/Output Summary (Last 24 hours) at 11/19/2021 1441  Last data filed at 11/19/2021 0908  Gross per 24 hour   Intake --   Output 1820 ml   Net -1820 ml        Physical Examination:     I had a face to face encounter with this patient and independently examined them on 11/19/2021 as outlined below:          Constitutional:  No acute distress, asleep, elderly    ENT:  Oral mucosa dry, NGT in place with bilious output   Resp:  CTA bilaterally. No wheezing/rhonchi/rales. No accessory muscle use   CV:  irregular, normal rate,  no murmurs, gallops, rubs    GI:  Hypoactive bowel sounds, incisions c/d/i, + mild distention, pain to palpation with groaning, no rebound or guarding     Musculoskeletal:  No edema, warm, 2+ pulses throughout    Neurologic:  Moves all extremities. Opens eyes, not answering questions for me today             Data Review:    Review and/or order of clinical lab test  Review and/or order of tests in the radiology section of CPT  Review and/or order of tests in the medicine section of CPT      Labs:     Recent Labs     11/19/21  0403 11/18/21  0212   WBC 15.6* 24.3*   HGB 8.5* 8.4*   HCT 25.9* 27.0*    308     Recent Labs     11/19/21  0403 11/18/21 0212 11/17/21  1915   * 143 144   K 3.4* 3.9 4.2   * 116* 116*   CO2 28 14* 11*   BUN 45* 58* 57*   CREA 1.11* 1.27* 1.27*   * 147* 134*   CA 8.1* 8.7 8.5   MG 1.6 1.7  --    PHOS 2.3* 2.9  --      Recent Labs     11/17/21  0408   ALT 12   AP 71   TBILI 0.5   TP 4.4*   ALB 1.5*   GLOB 2.9     No results for input(s): INR, PTP, APTT, INREXT, INREXT in the last 72 hours.    No results for input(s): FE, TIBC, PSAT, FERR in the last 72 hours. No results found for: FOL, RBCF   No results for input(s): PH, PCO2, PO2 in the last 72 hours. No results for input(s): CPK, CKNDX, TROIQ in the last 72 hours.     No lab exists for component: CPKMB  No results found for: CHOL, CHOLX, CHLST, CHOLV, HDL, HDLP, LDL, LDLC, DLDLP, TGLX, TRIGL, TRIGP, CHHD, CHHDX  No results found for: GLUCPOC  No results found for: COLOR, APPRN, SPGRU, REFSG, ANA, PROTU, GLUCU, KETU, BILU, UROU, MANUELA, LEUKU, GLUKE, EPSU, BACTU, WBCU, RBCU, CASTS, UCRY      Medications Reviewed:     Current Facility-Administered Medications   Medication Dose Route Frequency    dextrose 5 % - 0.45% NaCl infusion  100 mL/hr IntraVENous CONTINUOUS    metoprolol (LOPRESSOR) injection 5 mg  5 mg IntraVENous TID    TPN ADULT - CENTRAL   IntraVENous CONTINUOUS    dilTIAZem (CARDIZEM) 125 mg in dextrose 5% 125 mL infusion  0-15 mg/hr IntraVENous TITRATE    piperacillin-tazobactam (ZOSYN) 3.375 g in 0.9% sodium chloride (MBP/ADV) 100 mL MBP  3.375 g IntraVENous Q8H    sodium chloride (NS) flush 5-40 mL  5-40 mL IntraVENous Q8H    sodium chloride (NS) flush 5-40 mL  5-40 mL IntraVENous PRN    HYDROmorphone (DILAUDID) injection 1 mg  1 mg IntraVENous Q4H PRN    naloxone (NARCAN) injection 0.4 mg  0.4 mg IntraVENous PRN    ondansetron (ZOFRAN) injection 4 mg  4 mg IntraVENous Q4H PRN    enoxaparin (LOVENOX) injection 30 mg  30 mg SubCUTAneous Q24H     ______________________________________________________________________  EXPECTED LENGTH OF STAY: 10d 7h  ACTUAL LENGTH OF STAY:          4                 Stephy Raphael MD

## 2021-11-19 NOTE — PROGRESS NOTES
Lovenox Monitoring  Indication: DVT Prophylaxis  Recent Labs     11/19/21  0403 11/18/21  0212 11/17/21  1915   HGB 8.5* 8.4* 9.2*    308 351   CREA 1.11* 1.27* 1.27*     Current Weight: 72.6 kg  Est. CrCl = 34 ml/min  Current Dose: 30 mg subcutaneously every 24 hours. Plan: Change to 40 mg SC q24 hrs for crcl > 30 per protocol.

## 2021-11-19 NOTE — PROCEDURES
PICC Placement Note    PRE-PROCEDURE VERIFICATION  Correct Procedure: yes  Correct Site:  yes  Temperature: Temp: 98.1 °F (36.7 °C), Temperature Source: Temp Source: Oral  Recent Labs     11/19/21  0403   BUN 45*   CREA 1.11*      WBC 15.6*       Allergies: Codeine, Gluten, and Sulfa (sulfonamide antibiotics)    Education materials, including PICC Booklet and written information regarding central catheter related bloodstream infection and prevention given to patient. See Patient Education activity for further details. PROCEDURE DETAIL  A triple lumen power injectable PICC line was started for TPN. The following documentation is in addition to the PICC properties in the lines/airways flowsheet :  Lot #: [id number]  Xylocaine 1% used intradermally: yes  Total Catheter Length:  34 (cm)  External Catheter Length: 0 (cm)  Circumference: 27 (cm)  Vein Selection for PICC: right basilic  Central Line Bundle followed: yes  Complication Related to Insertion: none    The placement was verified by X-ray. The PICC is on the right side and the tip overlies the \"cavoatrial junction. \"    Simeon Alvarado is okay to use. Report to Abril.     Gillian Washington, EILEENN, RN, VA-BC   Vascular Access Team

## 2021-11-19 NOTE — PROGRESS NOTES
Participated in Palliative Care IDT for this pt in 439/2. CH will remain available to provide pastoral support through compassionate presence in the hope to gain rapport and assess any emotional or spiritual support needs that may arise for this pt and/or pt's family. Daniella Schmid MDiv.  Staff   Request  Support/Spiritual Care Services via [facility]

## 2021-11-19 NOTE — CONSULTS
Palliative Medicine Consult  [place]: [phone] ([phone])    Patient Name: Aric Hickman  YOB: 1933    Date of Initial Consult: 11/19/21  Reason for Consult: Care decisions   Requesting Provider: Renny Eisenmenger  Primary Care Physician: Silvia Evangelista MD     SUMMARY:   Aric Hickman is a 80 y.o. with a past history of a fib, HTN, hip fracture 3 months ago s/p repair who was admitted on 11/15/2021 from [address] [place] [address]  with RLQ pain. CT A/P showing pneumoperitoneum. Underwent R colectomy and small bowel resection on 11/15/21 w/ Dr Leandra Matthew after finding ischemic colon and small bowel. Initially on pressors, NGT in place, on Cardizem for a fib w/ RVR. Upon admission alert and oriented, however today sleepy and difficult to arouse, agitated. To be started on TPN. Current medical issues leading to Palliative Medicine involvement include: care decisions. Social: Pt in correction, was alert and oriented upon admission. Has 3 children in chart, no AMD on file. PALLIATIVE DIAGNOSES:   1. Lethargy, arousable but has been confused and not interacting much   2. Feeding difficulties to start TPN  3. Goals of care       PLAN:   1. Along w/ Geena Rouse LCSW meet w/ pt who will say yes/no to nursing but very lethargic. 2. In demographics, used the phone numbers there to call dtr Zoya twice (left message, voicemail says her name) and the sons (one is a [address] phone, the other has a voicemailbox not set up). 3. I will let team know if I hear more, but as it stands pt remains full code/full measures until we can talk to family. 4. Initial consult note routed to primary continuity provider and/or primary health care team members  5. Communicated plan of care with: Palliative Ivan [facility] Team incl Judy MUNOZ     GOALS OF CARE / TREATMENT PREFERENCES:     GOALS OF CARE:  Patient/Health Care Proxy Stated Goals:  Other (comment) (To be determined)    TREATMENT PREFERENCES:   Code Status: No Order      Advance Care Planning:  [x] [facility] Interdisciplinary Team has updated the ACP Navigator with Health Care Decision Maker and Patient Capacity      Primary Decision Maker: Danielle Ervin - Daughter - [phone]    Secondary Decision Maker: Sancho Lester - Son - [phone]    Supplemental (Other) Decision Maker: Santos Brice - Son - [phone]  No flowsheet data found. Medical Interventions: Full interventions       Other:    As far as possible, the palliative care team has discussed with patient / health care proxy about goals of care / treatment preferences for patient. HISTORY:     History obtained from: chart, staff    CHIEF COMPLAINT: Cannot obtain due to patient factors      HPI/SUBJECTIVE:    The patient is:   [] Verbal and participatory  [x] Non-participatory due to: medical condition     Opens eyes, not talking to me. Clinical Pain Assessment (nonverbal scale for severity on nonverbal patients):   Clinical Pain Assessment  Severity: 0          Duration: for how long has pt been experiencing pain (e.g., 2 days, 1 month, years)  Frequency: how often pain is an issue (e.g., several times per day, once every few days, constant)     FUNCTIONAL ASSESSMENT:     Palliative Performance Scale (PPS):  PPS: 30       PSYCHOSOCIAL/SPIRITUAL SCREENING:     Palliative IDT has assessed this patient for cultural preferences / practices and a referral made as appropriate to needs (Cultural Services, Patient Advocacy, Ethics, etc.)    Any spiritual / Episcopal concerns:  [] Yes /  [x] No    Caregiver Burnout:  [] Yes /  [x] No /  [] No Caregiver Present      Anticipatory grief assessment:   [x] Normal  / [] Maladaptive       ESAS Anxiety:      ESAS Depression:       Cannot obtain due to patient factors     REVIEW OF SYSTEMS:     Positive and pertinent negative findings in ROS are noted above in HPI.   The following systems were [x] reviewed / [] unable to be reviewed as noted in HPI  Other findings are noted below. Systems: constitutional, ears/nose/mouth/throat, respiratory, gastrointestinal, genitourinary, musculoskeletal, integumentary, neurologic, psychiatric, endocrine. Positive findings noted below. Modified ESAS Completed by: provider   Fatigue: 10 Drowsiness: 8     Pain: 0           Dyspnea: 0           Stool Occurrence(s): 1        PHYSICAL EXAM:     From RN flowsheet:  Wt Readings from Last 3 Encounters:   11/19/21 160 lb (72.6 kg)     Blood pressure (!) 142/55, pulse (!) 107, temperature 98.1 °F (36.7 °C), resp. rate 9, height 5' 4\" (1.626 m), weight 160 lb (72.6 kg), SpO2 98 %. Pain Scale 1: Visual  Pain Intensity 1: 6  Pain Onset 1: post op  Pain Location 1: Abdomen  Pain Orientation 1: Right, Lower  Pain Description 1: Sore  Pain Intervention(s) 1: Medication (see MAR)  Last bowel movement, if known:     Constitutional: lethargic, thin, ill appearing   Eyes: pupils equal, anicteric  ENMT: no nasal discharge, moist mucous membranes  Respiratory: breathing not labored  Skin: warm, dry         HISTORY:     Active Problems:    Small bowel ischemia (HCC) (11/16/2021)      Past Medical History:   Diagnosis Date    Atrial fibrillation (HCC)     Atrial flutter (HCC)     Edema     Hypertension     Hypokalemia     Hypothyroidism     Insomnia     Major depressive disorder     Pain     UTI (urinary tract infection)     Vitamin D deficiency       History reviewed. No pertinent surgical history. History reviewed. No pertinent family history. History reviewed, no pertinent family history.   Social History     Tobacco Use    Smoking status: Never Smoker    Smokeless tobacco: Never Used   Substance Use Topics    Alcohol use: Never     Allergies   Allergen Reactions    Codeine Unknown (comments)    Gluten Unknown (comments)    Sulfa (Sulfonamide Antibiotics) Unknown (comments)      Current Facility-Administered Medications   Medication Dose Route Frequency    dextrose 5 % - 0.45% NaCl infusion  100 mL/hr IntraVENous CONTINUOUS    metoprolol (LOPRESSOR) injection 5 mg  5 mg IntraVENous TID    TPN ADULT - CENTRAL   IntraVENous CONTINUOUS    [START ON 11/20/2021] enoxaparin (LOVENOX) injection 40 mg  40 mg SubCUTAneous Q24H    acetaminophen (TYLENOL) suppository 650 mg  650 mg Rectal Q6H PRN    dilTIAZem (CARDIZEM) 125 mg in dextrose 5% 125 mL infusion  0-15 mg/hr IntraVENous TITRATE    piperacillin-tazobactam (ZOSYN) 3.375 g in 0.9% sodium chloride (MBP/ADV) 100 mL MBP  3.375 g IntraVENous Q8H    sodium chloride (NS) flush 5-40 mL  5-40 mL IntraVENous Q8H    sodium chloride (NS) flush 5-40 mL  5-40 mL IntraVENous PRN    HYDROmorphone (DILAUDID) injection 1 mg  1 mg IntraVENous Q4H PRN    naloxone (NARCAN) injection 0.4 mg  0.4 mg IntraVENous PRN    ondansetron (ZOFRAN) injection 4 mg  4 mg IntraVENous Q4H PRN          LAB AND IMAGING FINDINGS:     Lab Results   Component Value Date/Time    WBC 15.6 (H) 11/19/2021 04:03 AM    HGB 8.5 (L) 11/19/2021 04:03 AM    PLATELET 879 63/43/9429 04:03 AM     Lab Results   Component Value Date/Time    Sodium 146 (H) 11/19/2021 04:03 AM    Potassium 3.4 (L) 11/19/2021 04:03 AM    Chloride 112 (H) 11/19/2021 04:03 AM    CO2 28 11/19/2021 04:03 AM    BUN 45 (H) 11/19/2021 04:03 AM    Creatinine 1.11 (H) 11/19/2021 04:03 AM    Calcium 8.1 (L) 11/19/2021 04:03 AM    Magnesium 1.6 11/19/2021 04:03 AM    Phosphorus 2.3 (L) 11/19/2021 04:03 AM      Lab Results   Component Value Date/Time    Alk.  phosphatase 71 11/17/2021 04:08 AM    Protein, total 4.4 (L) 11/17/2021 04:08 AM    Albumin 1.5 (L) 11/17/2021 04:08 AM    Globulin 2.9 11/17/2021 04:08 AM     No results found for: INR, PTMR, PTP, PT1, PT2, APTT, INREXT, INREXT   No results found for: IRON, FE, TIBC, IBCT, PSAT, FERR   No results found for: PH, PCO2, PO2  No components found for: GLPOC   No results found for: CPK, CKMB             Total time: 50 min   Counseling / coordination time, spent as noted above: 35 min   > 50% counseling / coordination?: yes    Prolonged service was provided for  []30 min   []75 min in face to face time in the presence of the patient, spent as noted above. Time Start:   Time End:   Note: this can only be billed with 87653 (initial) or 23515 (follow up). If multiple start / stop times, list each separately.

## 2021-11-19 NOTE — PROGRESS NOTES
Cardiology Progress Note                                        Admit Date: 11/15/2021    Assessment/Plan:     PAF; rate is acceptable but still in Afib; will add IV BB to attempt to convert  Small bowel ischemia    Tali Garcia is a 80 y.o. female with     PROBLEM LIST:  Patient Active Problem List    Diagnosis Date Noted    Small bowel ischemia ([address] 11/16/2021         Subjective:     Tali Garcia reports none. Visit Vitals  /81   Pulse (!) 113   Temp 99.9 °F (37.7 °C)   Resp 17   Ht 5' 4\" (1.626 m)   Wt 160 lb (72.6 kg)   SpO2 95%   BMI 27.46 kg/m²       Intake/Output Summary (Last 24 hours) at 11/19/2021 0912  Last data filed at 11/19/2021 0418  Gross per 24 hour   Intake --   Output 1915 ml   Net -1915 ml       Objective:      Physical Exam:  HEENT: Perrla, EOMI, mucosa is still dry  Neck: No JVD,  No thyroidmegaly  Resp: CTA bilaterally;  No wheezes or rales  CV: irregular s1s2 No murmur no s3  Abd:Soft, Nontender  Ext; upper extremities with edema  Neuro: Alert and oriented; Nonfocal  Skin: Warm, Dry, Intact  Pulses: 2+ DP/PT/Rad      Telemetry: Afib    Current Facility-Administered Medications   Medication Dose Route Frequency    dextrose 5 % - 0.45% NaCl infusion  100 mL/hr IntraVENous CONTINUOUS    magnesium sulfate 2 g/50 ml IVPB (premix or compounded)  2 g IntraVENous ONCE    potassium phosphate 30 mmol in 0.9% sodium chloride 250 mL infusion   IntraVENous ONCE    dilTIAZem (CARDIZEM) 125 mg in dextrose 5% 125 mL infusion  0-15 mg/hr IntraVENous TITRATE    piperacillin-tazobactam (ZOSYN) 3.375 g in 0.9% sodium chloride (MBP/ADV) 100 mL MBP  3.375 g IntraVENous Q8H    sodium chloride (NS) flush 5-40 mL  5-40 mL IntraVENous Q8H    sodium chloride (NS) flush 5-40 mL  5-40 mL IntraVENous PRN    HYDROmorphone (DILAUDID) injection 1 mg  1 mg IntraVENous Q4H PRN    naloxone (NARCAN) injection 0.4 mg  0.4 mg IntraVENous PRN    ondansetron (ZOFRAN) injection 4 mg  4 mg IntraVENous Q4H PRN    enoxaparin (LOVENOX) injection 30 mg  30 mg SubCUTAneous Q24H         Data Review:   Labs:    Recent Results (from the past 24 hour(s))   METABOLIC PANEL, BASIC    Collection Time: 11/19/21  4:03 AM   Result Value Ref Range    Sodium 146 (H) 136 - 145 mmol/L    Potassium 3.4 (L) 3.5 - 5.1 mmol/L    Chloride 112 (H) 97 - 108 mmol/L    CO2 28 21 - 32 mmol/L    Anion gap 6 5 - 15 mmol/L    Glucose 190 (H) 65 - 100 mg/dL    BUN 45 (H) 6 - 20 MG/DL    Creatinine 1.11 (H) 0.55 - 1.02 MG/DL    BUN/Creatinine ratio 41 (H) 12 - 20      GFR est AA 56 (L) >60 ml/min/1.73m2    GFR est non-AA 46 (L) >60 ml/min/1.73m2    Calcium 8.1 (L) 8.5 - 10.1 MG/DL   MAGNESIUM    Collection Time: 11/19/21  4:03 AM   Result Value Ref Range    Magnesium 1.6 1.6 - 2.4 mg/dL   PHOSPHORUS    Collection Time: 11/19/21  4:03 AM   Result Value Ref Range    Phosphorus 2.3 (L) 2.6 - 4.7 MG/DL   CBC WITH AUTOMATED DIFF    Collection Time: 11/19/21  4:03 AM   Result Value Ref Range    WBC 15.6 (H) 3.6 - 11.0 K/uL    RBC 2.83 (L) 3.80 - 5.20 M/uL    HGB 8.5 (L) 11.5 - 16.0 g/dL    HCT 25.9 (L) 35.0 - 47.0 %    MCV 91.5 80.0 - 99.0 FL    MCH 30.0 26.0 - 34.0 PG    MCHC 32.8 30.0 - 36.5 g/dL    RDW 14.6 (H) 11.5 - 14.5 %    PLATELET 605 673 - 491 K/uL    MPV 10.7 8.9 - 12.9 FL    NRBC 0.0 0  WBC    ABSOLUTE NRBC 0.00 0.00 - 0.01 K/uL    NEUTROPHILS 70 32 - 75 %    BAND NEUTROPHILS 10 (H) 0 - 6 %    LYMPHOCYTES 8 (L) 12 - 49 %    MONOCYTES 9 5 - 13 %    EOSINOPHILS 0 0 - 7 %    BASOPHILS 1 0 - 1 %    MYELOCYTES 2 (H) 0 %    IMMATURE GRANULOCYTES 0 %    ABS. NEUTROPHILS 12.5 (H) 1.8 - 8.0 K/UL    ABS. LYMPHOCYTES 1.2 0.8 - 3.5 K/UL    ABS. MONOCYTES 1.4 (H) 0.0 - 1.0 K/UL    ABS. EOSINOPHILS 0.0 0.0 - 0.4 K/UL    ABS. BASOPHILS 0.2 (H) 0.0 - 0.1 K/UL    ABS. IMM.  GRANS. 0.0 K/UL    DF MANUAL      RBC COMMENTS ANISOCYTOSIS  1+       ECHO ADULT COMPLETE    Collection Time: 11/19/21  9:09 AM   Result Value Ref Range    IVSd 0.80 0.6 - 0.9 cm LVIDd 4.43 3.9 - 5.3 cm    LVIDs 2.88 cm    LVOT d 1.99 cm    LVPWd 0.91 (A) 0.6 - 0.9 cm    LVOT Peak Gradient 6.35 mmHg    LVOT SV 60.9 mL    LVOT Peak Velocity 126.03 cm/s    LVOT VTI 19.57 cm    RVSP 42.81 mmHg    LA Volume 71.16 22 - 52 mL    LA Area 4C 21.06 cm2    LA Vol 2C 64.98 (A) 22 - 52 mL    LA Vol 4C 63.14 (A) 22 - 52 mL    Est. RA Pressure 5.00 mmHg    Aortic Valve Area by Continuity of Peak Velocity 2.36 cm2    AoV PG 11.06 mmHg    Aortic Valve Systolic Peak Velocity 139.14 cm/s    MV E Satish 117.35 cm/s    Pulmonic Valve Systolic Peak Instantaneous Gradient 6.19 mmHg    Tapse 2.10 (A) 1.5 - 2.0 cm    Triscuspid Valve Regurgitation Peak Gradient 37.81 mmHg    TR Max Velocity 307.47 cm/s    Ao Root D 2.98 cm    LV Mass .9 67 - 162 g    LV Mass AL Index 67.9 43 - 95 g/m2    LA Vol Index 39.98 16 - 28 ml/m2    LA Vol Index 36.51 16 - 28 ml/m2    LA Vol Index 35.47 16 - 28 ml/m2    AARON/BSA Pk Satish 1.3 cm2/m2

## 2021-11-19 NOTE — ROUTINE PROCESS
During morning rounds, the patient was sleepy and somewhat difficult to arouse. I performed my assessment, hung all her meds and took her vitals. Then next time I walked into the room, the patient had taken off all her leads, her gown, and pulled out her URSULA drain. Surgery has been notified.

## 2021-11-19 NOTE — PROGRESS NOTES
TEREZA: Anticipate discharge back to [facility] vs SNF pending medical progress. Transportation likely BLS. RUR: 15%    Disposition: Patient admitted on 11/15 for small bowel ischemia. Had colectomy and small bowel resection. Palliative and cardiovascular surgery consulted. TPN to start. Palliative attempting to reach family. CM will continue to follow for discharge needs.     [facility] contact: Kosta Solares, Director of Nursing, [address] [place] [address], [address] [place] [address],[address]  [phone]

## 2021-11-19 NOTE — PROGRESS NOTES
[address] [facility] at [facility]    POD #4    Subjective     NPO, NGT, scant out of NGT, no BM, not responding much, pulled out URSULA drain    Objective     Patient Vitals for the past 24 hrs:   Temp Pulse Resp BP SpO2   11/19/21 1007 -- (!) 112 -- (!) 165/65 --   11/19/21 0908 -- -- -- 103/81 --   11/19/21 0833 99.9 °F (37.7 °C) (!) 113 17 103/81 95 %   11/19/21 0603 -- 99 -- -- --   11/19/21 0531 -- (!) 110 -- (!) 140/53 --   11/19/21 0413 -- (!) 109 -- -- --   11/19/21 0334 99.5 °F (37.5 °C) (!) 103 14 (!) 150/52 96 %   11/19/21 0245 -- -- -- 138/74 --   11/19/21 0209 -- 100 -- -- --   11/19/21 0130 -- -- -- (!) 137/46 --   11/19/21 0102 -- -- -- (!) 124/39 --   11/19/21 0042 -- -- -- (!) 133/35 --   11/18/21 2322 99.7 °F (37.6 °C) (!) 115 20 (!) 155/57 96 %   11/18/21 2159 -- (!) 117 -- -- --   11/18/21 2029 99.3 °F (37.4 °C) (!) 103 19 (!) 140/49 96 %   11/18/21 2018 -- (!) 109 -- -- --   11/18/21 1814 -- (!) 103 -- -- --   11/18/21 1633 99.7 °F (37.6 °C) (!) 120 16 (!) 143/34 96 %   11/18/21 1416 -- (!) 111 -- -- --   11/18/21 1301 -- (!) 103 -- -- --   11/18/21 1226 100.1 °F (37.8 °C) (!) 106 13 (!) 124/41 96 %   11/18/21 1210 (!) 100.7 °F (38.2 °C) 100 12 (!) 122/52 97 %       Date 11/18/21 0700 - 11/19/21 0659 11/19/21 0700 - 11/20/21 0659   Shift 1952-6747 0736-2765 24 Hour Total 7883-1428 5579-6949 24 Hour Total   INTAKE   Shift Total(mL/kg)         OUTPUT   Urine(mL/kg/hr) 175(0.2) 1500(1.7) 1675(1)        Urine Voided  800 800        Urine Output (mL) (Urinary Catheter 11/15/21 2- way; [facility]) 175 700 875      Emesis/NG output  50 50        Output (ml) (Nasogastric Tube 11/16/21)  50 50      Drains 110 130 240        Output (ml) (Viva Life #1 11/16/21 Anterior;  Left Abdomen) 110 130 240      Stool           Stool Occurrence(s) 1 x  1 x      Shift Total(mL/kg) 285(4.1) 1680(23.1) 0688(54)      NET -331 -6230 -3367      Weight (kg) 69.6 72.7 72.7 72.6 72.6 72.6       PE  Pulm - CTAB  CV - RRR  Abd - soft, ND, BS present, incision clean    Labs  Recent Results (from the past 12 hour(s))   METABOLIC PANEL, BASIC    Collection Time: 11/19/21  4:03 AM   Result Value Ref Range    Sodium 146 (H) 136 - 145 mmol/L    Potassium 3.4 (L) 3.5 - 5.1 mmol/L    Chloride 112 (H) 97 - 108 mmol/L    CO2 28 21 - 32 mmol/L    Anion gap 6 5 - 15 mmol/L    Glucose 190 (H) 65 - 100 mg/dL    BUN 45 (H) 6 - 20 MG/DL    Creatinine 1.11 (H) 0.55 - 1.02 MG/DL    BUN/Creatinine ratio 41 (H) 12 - 20      GFR est AA 56 (L) >60 ml/min/1.73m2    GFR est non-AA 46 (L) >60 ml/min/1.73m2    Calcium 8.1 (L) 8.5 - 10.1 MG/DL   MAGNESIUM    Collection Time: 11/19/21  4:03 AM   Result Value Ref Range    Magnesium 1.6 1.6 - 2.4 mg/dL   PHOSPHORUS    Collection Time: 11/19/21  4:03 AM   Result Value Ref Range    Phosphorus 2.3 (L) 2.6 - 4.7 MG/DL   CBC WITH AUTOMATED DIFF    Collection Time: 11/19/21  4:03 AM   Result Value Ref Range    WBC 15.6 (H) 3.6 - 11.0 K/uL    RBC 2.83 (L) 3.80 - 5.20 M/uL    HGB 8.5 (L) 11.5 - 16.0 g/dL    HCT 25.9 (L) 35.0 - 47.0 %    MCV 91.5 80.0 - 99.0 FL    MCH 30.0 26.0 - 34.0 PG    MCHC 32.8 30.0 - 36.5 g/dL    RDW 14.6 (H) 11.5 - 14.5 %    PLATELET 651 814 - 210 K/uL    MPV 10.7 8.9 - 12.9 FL    NRBC 0.0 0  WBC    ABSOLUTE NRBC 0.00 0.00 - 0.01 K/uL    NEUTROPHILS 70 32 - 75 %    BAND NEUTROPHILS 10 (H) 0 - 6 %    LYMPHOCYTES 8 (L) 12 - 49 %    MONOCYTES 9 5 - 13 %    EOSINOPHILS 0 0 - 7 %    BASOPHILS 1 0 - 1 %    MYELOCYTES 2 (H) 0 %    IMMATURE GRANULOCYTES 0 %    ABS. NEUTROPHILS 12.5 (H) 1.8 - 8.0 K/UL    ABS. LYMPHOCYTES 1.2 0.8 - 3.5 K/UL    ABS. MONOCYTES 1.4 (H) 0.0 - 1.0 K/UL    ABS. EOSINOPHILS 0.0 0.0 - 0.4 K/UL    ABS. BASOPHILS 0.2 (H) 0.0 - 0.1 K/UL    ABS. IMM.  GRANS. 0.0 K/UL    DF MANUAL      RBC COMMENTS ANISOCYTOSIS  1+       ECHO ADULT COMPLETE    Collection Time: 11/19/21  9:09 AM   Result Value Ref Range    IVSd 0.80 0.6 - 0.9 cm    LVIDd 4.43 3.9 - 5.3 cm    LVIDs 2.88 cm    LVOT d 1.99 cm    LVPWd 0.91 (A) 0.6 - 0.9 cm    LVOT Peak Gradient 6.35 mmHg    LVOT SV 60.9 mL    LVOT Peak Velocity 126.03 cm/s    LVOT VTI 19.57 cm    RVSP 42.81 mmHg    LA Volume 71.16 22 - 52 mL    LA Area 4C 21.06 cm2    LA Vol 2C 64.98 (A) 22 - 52 mL    LA Vol 4C 63.14 (A) 22 - 52 mL    Est. RA Pressure 5.00 mmHg    Aortic Valve Area by Continuity of Peak Velocity 2.36 cm2    AoV PG 11.06 mmHg    Aortic Valve Systolic Peak Velocity 624.53 cm/s    MV E Satish 117.35 cm/s    Pulmonic Valve Systolic Peak Instantaneous Gradient 6.19 mmHg    Tapse 2.10 (A) 1.5 - 2.0 cm    Triscuspid Valve Regurgitation Peak Gradient 37.81 mmHg    TR Max Velocity 307.47 cm/s    Ao Root D 2.98 cm    LV Mass .9 67 - 162 g    LV Mass AL Index 67.9 43 - 95 g/m2    LA Vol Index 39.98 16 - 28 ml/m2    LA Vol Index 36.51 16 - 28 ml/m2    LA Vol Index 35.47 16 - 28 ml/m2    AARON/BSA Pk Satish 1.3 cm2/m2         Assessment     Yashira Prescott is a 80 y. o.yr old female s/p open R colectomy and extended small bowel resection    Plan     PT still not responding much, she has improved some post op   I will order a palliative care consult  PICC line to be placed today, ordering TPN  Appreciate cardiology and hospitalists consults  Continue cardizem drip  Wound packing to be removed today  Keeping vasquez catheter  Overall outlook not seeming great due to minimal mental improvement    German Gómez MD

## 2021-11-19 NOTE — ROUTINE PROCESS
Bedside and Verbal shift change report given to Jose Nieves (oncoming nurse) by Alina Aldrich (offgoing nurse). Report included the following information SBAR, Kardex, ED Summary, Intake/Output, MAR, Recent Results and Cardiac Rhythm Afib.

## 2021-11-19 NOTE — PROGRESS NOTES
Comprehensive Nutrition Assessment    Type and Reason for Visit: Initial    Nutrition Recommendations/Plan:    1. Monitor electrolytes (K, Phos trending low this AM) and replete as indicated. 2. Adjust TPN to better meet pt nutrition needs:   D18%, 5% AA's @ 63 ml/hr + 250 ml 20% lipids 3x week. 3. Wean and discontinue D5 with initiation of goal PN. Nutrition Assessment:    Past Medical History:   Diagnosis Date    Atrial fibrillation (HCC)     Atrial flutter (HCC)     Edema     Hypertension     Hypokalemia     Hypothyroidism     Insomnia     Major depressive disorder     Pain     UTI (urinary tract infection)     Vitamin D deficiency    Pt admitted with pneumoperitoneum, now s/p R open colectomy and extended small bowel resection. Pt lethargic post-op and has started TPN given lethargy and post-op complication. Palliative care has been consulted as well. Nutrition review completed d/t pt NPO x 3 days, noted to be starting on TPN. PICC line placed today. As presently ordered, TPN providing 716 kcal, 50 g protein -meeting 43% kcal needs, and 70% low-end protein needs. D5 contributing additional 400 dextrose kcals. Unable to complete full NFPE with inability to ascertain wt loss hx/UBW, given muscle/fat wasting and anasarca - meets criteria for severe malnutrition. TPN as recommended above to provide average of 1440 kcal, 76 g protein, with GIR 2.6 mg/kg/min and 272g CHO, total volume 0911-1389 ml.     Malnutrition Assessment:  Malnutrition Status:  Severe malnutrition    Context:  Acute illness     Findings of the 6 clinical characteristics of malnutrition:   Energy Intake:  7 - 50% or less of est energy requirements for 5 or more days  Weight Loss:  Unable to assess     Body Fat Loss:  7 - Moderate body fat loss, Buccal region   Muscle Mass Loss:  7 - Moderate muscle mass loss, Clavicles (pectoralis & deltoids), Temples (temporalis)  Fluid Accumulation:  7 - Moderate to severe, Generalized   Strength:  Not performed     Nutritionally Significant Medications: KPhos, Mg Sulfate, IV Zosyn, Na bicarb; IVFs - D5 + 1/2 NaCl @ 100 ml/hr, IV Dilaudid     Estimated Daily Nutrient Needs:  Energy (kcal): 4999-5675 (MSJ x 1.1 x 1.2); Weight Used for Energy Requirements: Current  Protein (g): 70-80 (1-1.1 g/kg); Weight Used for Protein Requirements: Current  Fluid (ml/day): ~1700; Method Used for Fluid Requirements: 1 ml/kcal    Nutrition Related Findings:       BM: +BM 11/18  Edema: Bilateral UE and LE 2+ edema, +anasarca  Wounds:  Surgical incision     Recent Labs     11/19/21  0403 11/18/21  0212 11/17/21  1915 11/17/21  0408   * 147* 134* 106*   BUN 45* 58* 57* 57*   CREA 1.11* 1.27* 1.27* 1.48*   * 143 144 142   K 3.4* 3.9 4.2 4.1   * 116* 116* 114*   CO2 28 14* 11* 15*   CA 8.1* 8.7 8.5 8.1*   PHOS 2.3* 2.9  --   --    MG 1.6 1.7  --   --        Current Nutrition Therapies:   Diet: NPO  Nutrition Support: TPN - D15%, 5% AA's @ 42 ml/hr, no lipids ordered  Supplements: None ordered  Additional Caloric Sources: D5    Meal intake: No data found. Supplement Intake: No data found. Anthropometric Measures:  · Height:  5' 4\" (162.6 cm)  · Current Body Wt:  72.6 kg (160 lb 0.9 oz)   · Ideal Body Wt:  120:  133.4 %   · BMI Categories:  Overweight (BMI 25.0-29. 9)     Wt Readings from Last 10 Encounters:   11/19/21 72.6 kg (160 lb)       Nutrition Diagnosis:   · Inadequate oral intake related to altered GI structure, cognitive or neurological impairment as evidenced by NPO or clear liquid status due to medical condition      Nutrition Interventions:   Food and/or Nutrient Delivery: Continue NPO, Modify parenteral nutrition  Nutrition Education and Counseling: No recommendations at this time  Coordination of Nutrition Care: No recommendation at this time    Goals:  PN to meet at least 90% kcal/protein needs over next 5-7 days       Nutrition Monitoring and Evaluation: Behavioral-Environmental Outcomes: None identified  Food/Nutrient Intake Outcomes: Parenteral nutrition intake/tolerance  Physical Signs/Symptoms Outcomes: Biochemical data, GI status    Discharge Planning:     Too soon to determine     Chiraa Angulo RD     Contact via [facility]

## 2021-11-19 NOTE — PROGRESS NOTES
Bedside shift change report given to Lorenza Espinoza RN (oncoming nurse) by TAMMY Bermudez (offgoing nurse). Report included the following information SBAR, Kardex, Intake/Output, MAR, Accordion and Cardiac Rhythm AFIB.

## 2021-11-20 LAB
ANION GAP SERPL CALC-SCNC: 3 MMOL/L (ref 5–15)
ATRIAL RATE: 84 BPM
BUN SERPL-MCNC: 37 MG/DL (ref 6–20)
BUN/CREAT SERPL: 37 (ref 12–20)
CALCIUM SERPL-MCNC: 7.9 MG/DL (ref 8.5–10.1)
CALCULATED R AXIS, ECG10: -14 DEGREES
CALCULATED T AXIS, ECG11: 114 DEGREES
CHLORIDE SERPL-SCNC: 111 MMOL/L (ref 97–108)
CO2 SERPL-SCNC: 33 MMOL/L (ref 21–32)
CREAT SERPL-MCNC: 1 MG/DL (ref 0.55–1.02)
DIAGNOSIS, 93000: NORMAL
ERYTHROCYTE [DISTWIDTH] IN BLOOD BY AUTOMATED COUNT: 14.4 % (ref 11.5–14.5)
EST. AVERAGE GLUCOSE BLD GHB EST-MCNC: 111 MG/DL
GLUCOSE BLD STRIP.AUTO-MCNC: 273 MG/DL (ref 65–117)
GLUCOSE BLD STRIP.AUTO-MCNC: 290 MG/DL (ref 65–117)
GLUCOSE BLD STRIP.AUTO-MCNC: 314 MG/DL (ref 65–117)
GLUCOSE BLD STRIP.AUTO-MCNC: 348 MG/DL (ref 65–117)
GLUCOSE SERPL-MCNC: 328 MG/DL (ref 65–100)
HBA1C MFR BLD: 5.5 % (ref 4–5.6)
HCT VFR BLD AUTO: 26.3 % (ref 35–47)
HGB BLD-MCNC: 8.3 G/DL (ref 11.5–16)
MAGNESIUM SERPL-MCNC: 1.9 MG/DL (ref 1.6–2.4)
MCH RBC QN AUTO: 29.9 PG (ref 26–34)
MCHC RBC AUTO-ENTMCNC: 31.6 G/DL (ref 30–36.5)
MCV RBC AUTO: 94.6 FL (ref 80–99)
NRBC # BLD: 0 K/UL (ref 0–0.01)
NRBC BLD-RTO: 0 PER 100 WBC
PHOSPHATE SERPL-MCNC: 2.4 MG/DL (ref 2.6–4.7)
PLATELET # BLD AUTO: 209 K/UL (ref 150–400)
PMV BLD AUTO: 10.7 FL (ref 8.9–12.9)
POTASSIUM SERPL-SCNC: 3 MMOL/L (ref 3.5–5.1)
Q-T INTERVAL, ECG07: 308 MS
QRS DURATION, ECG06: 86 MS
QTC CALCULATION (BEZET), ECG08: 463 MS
RBC # BLD AUTO: 2.78 M/UL (ref 3.8–5.2)
SERVICE CMNT-IMP: ABNORMAL
SODIUM SERPL-SCNC: 147 MMOL/L (ref 136–145)
VENTRICULAR RATE, ECG03: 136 BPM
WBC # BLD AUTO: 17 K/UL (ref 3.6–11)

## 2021-11-20 PROCEDURE — 82962 GLUCOSE BLOOD TEST: CPT

## 2021-11-20 PROCEDURE — 74011000258 HC RX REV CODE- 258: Performed by: HOSPITALIST

## 2021-11-20 PROCEDURE — 74011000250 HC RX REV CODE- 250: Performed by: INTERNAL MEDICINE

## 2021-11-20 PROCEDURE — 74011000258 HC RX REV CODE- 258: Performed by: SURGERY

## 2021-11-20 PROCEDURE — 36415 COLL VENOUS BLD VENIPUNCTURE: CPT

## 2021-11-20 PROCEDURE — 77010033678 HC OXYGEN DAILY

## 2021-11-20 PROCEDURE — 74011250636 HC RX REV CODE- 250/636: Performed by: HOSPITALIST

## 2021-11-20 PROCEDURE — 65660000001 HC RM ICU INTERMED STEPDOWN

## 2021-11-20 PROCEDURE — 74011000250 HC RX REV CODE- 250: Performed by: SURGERY

## 2021-11-20 PROCEDURE — 74011250636 HC RX REV CODE- 250/636: Performed by: SURGERY

## 2021-11-20 PROCEDURE — 83036 HEMOGLOBIN GLYCOSYLATED A1C: CPT

## 2021-11-20 PROCEDURE — 85027 COMPLETE CBC AUTOMATED: CPT

## 2021-11-20 PROCEDURE — 80048 BASIC METABOLIC PNL TOTAL CA: CPT

## 2021-11-20 PROCEDURE — 84100 ASSAY OF PHOSPHORUS: CPT

## 2021-11-20 PROCEDURE — 99024 POSTOP FOLLOW-UP VISIT: CPT | Performed by: SURGERY

## 2021-11-20 PROCEDURE — 74011636637 HC RX REV CODE- 636/637: Performed by: SURGERY

## 2021-11-20 PROCEDURE — 83735 ASSAY OF MAGNESIUM: CPT

## 2021-11-20 PROCEDURE — 74011000250 HC RX REV CODE- 250: Performed by: HOSPITALIST

## 2021-11-20 RX ORDER — DEXTROSE 50 % IN WATER (D50W) INTRAVENOUS SYRINGE
25-50 AS NEEDED
Status: DISCONTINUED | OUTPATIENT
Start: 2021-11-20 | End: 2021-12-07 | Stop reason: HOSPADM

## 2021-11-20 RX ORDER — SODIUM CHLORIDE, SODIUM LACTATE, POTASSIUM CHLORIDE, CALCIUM CHLORIDE 600; 310; 30; 20 MG/100ML; MG/100ML; MG/100ML; MG/100ML
25 INJECTION, SOLUTION INTRAVENOUS CONTINUOUS
Status: DISCONTINUED | OUTPATIENT
Start: 2021-11-20 | End: 2021-11-30

## 2021-11-20 RX ORDER — DEXTROSE 50 % IN WATER (D50W) INTRAVENOUS SYRINGE
25-50 AS NEEDED
Status: DISCONTINUED | OUTPATIENT
Start: 2021-11-20 | End: 2021-11-20 | Stop reason: SDUPTHER

## 2021-11-20 RX ORDER — POTASSIUM CHLORIDE 7.45 MG/ML
10 INJECTION INTRAVENOUS
Status: COMPLETED | OUTPATIENT
Start: 2021-11-20 | End: 2021-11-20

## 2021-11-20 RX ORDER — INSULIN LISPRO 100 [IU]/ML
INJECTION, SOLUTION INTRAVENOUS; SUBCUTANEOUS EVERY 6 HOURS
Status: DISCONTINUED | OUTPATIENT
Start: 2021-11-20 | End: 2021-11-20

## 2021-11-20 RX ORDER — MAGNESIUM SULFATE 100 %
4 CRYSTALS MISCELLANEOUS AS NEEDED
Status: DISCONTINUED | OUTPATIENT
Start: 2021-11-20 | End: 2021-12-07 | Stop reason: HOSPADM

## 2021-11-20 RX ORDER — MAGNESIUM SULFATE 100 %
4 CRYSTALS MISCELLANEOUS AS NEEDED
Status: DISCONTINUED | OUTPATIENT
Start: 2021-11-20 | End: 2021-11-20 | Stop reason: SDUPTHER

## 2021-11-20 RX ORDER — INSULIN LISPRO 100 [IU]/ML
INJECTION, SOLUTION INTRAVENOUS; SUBCUTANEOUS EVERY 6 HOURS
Status: DISCONTINUED | OUTPATIENT
Start: 2021-11-20 | End: 2021-11-22

## 2021-11-20 RX ADMIN — CALCIUM GLUCONATE: 94 INJECTION, SOLUTION INTRAVENOUS at 20:29

## 2021-11-20 RX ADMIN — DILTIAZEM HYDROCHLORIDE 12.5 MG/HR: 5 INJECTION, SOLUTION INTRAVENOUS at 13:19

## 2021-11-20 RX ADMIN — HUMAN INSULIN 4 UNITS: 100 INJECTION, SOLUTION SUBCUTANEOUS at 07:42

## 2021-11-20 RX ADMIN — INSULIN LISPRO 5 UNITS: 100 INJECTION, SOLUTION INTRAVENOUS; SUBCUTANEOUS at 17:40

## 2021-11-20 RX ADMIN — METOPROLOL TARTRATE 5 MG: 5 INJECTION INTRAVENOUS at 22:36

## 2021-11-20 RX ADMIN — METOPROLOL TARTRATE 5 MG: 5 INJECTION INTRAVENOUS at 17:41

## 2021-11-20 RX ADMIN — DILTIAZEM HYDROCHLORIDE 12.5 MG/HR: 5 INJECTION, SOLUTION INTRAVENOUS at 02:15

## 2021-11-20 RX ADMIN — CALCIUM GLUCONATE: 94 INJECTION, SOLUTION INTRAVENOUS at 19:07

## 2021-11-20 RX ADMIN — PIPERACILLIN SODIUM AND TAZOBACTAM SODIUM 3.38 G: 3; .375 INJECTION, POWDER, LYOPHILIZED, FOR SOLUTION INTRAVENOUS at 22:36

## 2021-11-20 RX ADMIN — HYDROMORPHONE HYDROCHLORIDE 1 MG: 1 INJECTION, SOLUTION INTRAMUSCULAR; INTRAVENOUS; SUBCUTANEOUS at 14:32

## 2021-11-20 RX ADMIN — INSULIN LISPRO 5 UNITS: 100 INJECTION, SOLUTION INTRAVENOUS; SUBCUTANEOUS at 12:34

## 2021-11-20 RX ADMIN — ENOXAPARIN SODIUM 40 MG: 100 INJECTION SUBCUTANEOUS at 06:00

## 2021-11-20 RX ADMIN — POTASSIUM CHLORIDE 10 MEQ: 7.46 INJECTION, SOLUTION INTRAVENOUS at 12:00

## 2021-11-20 RX ADMIN — SODIUM CHLORIDE, POTASSIUM CHLORIDE, SODIUM LACTATE AND CALCIUM CHLORIDE 75 ML/HR: 600; 310; 30; 20 INJECTION, SOLUTION INTRAVENOUS at 10:40

## 2021-11-20 RX ADMIN — Medication 10 ML: at 06:03

## 2021-11-20 RX ADMIN — I.V. FAT EMULSION 250 ML: 20 EMULSION INTRAVENOUS at 19:46

## 2021-11-20 RX ADMIN — Medication 10 ML: at 22:36

## 2021-11-20 RX ADMIN — POTASSIUM CHLORIDE 10 MEQ: 7.46 INJECTION, SOLUTION INTRAVENOUS at 12:23

## 2021-11-20 RX ADMIN — PIPERACILLIN SODIUM AND TAZOBACTAM SODIUM 3.38 G: 3; .375 INJECTION, POWDER, LYOPHILIZED, FOR SOLUTION INTRAVENOUS at 14:45

## 2021-11-20 RX ADMIN — POTASSIUM CHLORIDE 10 MEQ: 7.46 INJECTION, SOLUTION INTRAVENOUS at 10:40

## 2021-11-20 RX ADMIN — PIPERACILLIN SODIUM AND TAZOBACTAM SODIUM 3.38 G: 3; .375 INJECTION, POWDER, LYOPHILIZED, FOR SOLUTION INTRAVENOUS at 06:00

## 2021-11-20 RX ADMIN — Medication 10 ML: at 17:42

## 2021-11-20 RX ADMIN — METOPROLOL TARTRATE 5 MG: 5 INJECTION INTRAVENOUS at 08:18

## 2021-11-20 RX ADMIN — INSULIN LISPRO 7 UNITS: 100 INJECTION, SOLUTION INTRAVENOUS; SUBCUTANEOUS at 22:51

## 2021-11-20 RX ADMIN — POTASSIUM CHLORIDE 10 MEQ: 7.46 INJECTION, SOLUTION INTRAVENOUS at 13:42

## 2021-11-20 RX ADMIN — DILTIAZEM HYDROCHLORIDE 12.5 MG/HR: 5 INJECTION, SOLUTION INTRAVENOUS at 23:16

## 2021-11-20 NOTE — PROGRESS NOTES
[facility] Cardiology Progress Note    Date of service: 11/20/2021    Subjective:   Ms Ama Anderson remains quite lethargic. Not very interactive. Objective:    Visit Vitals  BP (!) 138/58 (BP 1 Location: Left upper arm, BP Patient Position: At rest)   Pulse (!) 104   Temp 98.8 °F (37.1 °C)   Resp 12   Ht 5' 4\" (1.626 m)   Wt 72.3 kg (159 lb 4.8 oz)   SpO2 97%   BMI 27.34 kg/m²       Physical Exam  Constitutional:       Appearance: She is normal weight. HENT:      Head: Normocephalic and atraumatic. Eyes:      General: No scleral icterus. Conjunctiva/sclera: Conjunctivae normal.   Neck:      Vascular: No JVD. Cardiovascular:      Rate and Rhythm: Normal rate. Rhythm irregular. Heart sounds: Normal heart sounds. No murmur heard. No gallop. Pulmonary:      Effort: Pulmonary effort is normal. No respiratory distress. Breath sounds: Normal breath sounds. No stridor. No wheezing or rales. Abdominal:      General: There is no distension. Palpations: Abdomen is soft. Musculoskeletal:         General: No deformity. Skin:     General: Skin is warm and dry. Neurological:      Mental Status: She is oriented to person, place, and time. She is lethargic.    Psychiatric:         Behavior: Behavior normal.         Data reviewed:  Recent Results (from the past 12 hour(s))   METABOLIC PANEL, BASIC    Collection Time: 11/20/21  4:05 AM   Result Value Ref Range    Sodium 147 (H) 136 - 145 mmol/L    Potassium 3.0 (L) 3.5 - 5.1 mmol/L    Chloride 111 (H) 97 - 108 mmol/L    CO2 33 (H) 21 - 32 mmol/L    Anion gap 3 (L) 5 - 15 mmol/L    Glucose 328 (H) 65 - 100 mg/dL    BUN 37 (H) 6 - 20 MG/DL    Creatinine 1.00 0.55 - 1.02 MG/DL    BUN/Creatinine ratio 37 (H) 12 - 20      GFR est AA >60 >60 ml/min/1.73m2    GFR est non-AA 52 (L) >60 ml/min/1.73m2    Calcium 7.9 (L) 8.5 - 10.1 MG/DL   MAGNESIUM    Collection Time: 11/20/21  4:05 AM   Result Value Ref Range    Magnesium 1.9 1.6 - 2.4 mg/dL   PHOSPHORUS Collection Time: 11/20/21  4:05 AM   Result Value Ref Range    Phosphorus 2.4 (L) 2.6 - 4.7 MG/DL   CBC W/O DIFF    Collection Time: 11/20/21  4:05 AM   Result Value Ref Range    WBC 17.0 (H) 3.6 - 11.0 K/uL    RBC 2.78 (L) 3.80 - 5.20 M/uL    HGB 8.3 (L) 11.5 - 16.0 g/dL    HCT 26.3 (L) 35.0 - 47.0 %    MCV 94.6 80.0 - 99.0 FL    MCH 29.9 26.0 - 34.0 PG    MCHC 31.6 30.0 - 36.5 g/dL    RDW 14.4 11.5 - 14.5 %    PLATELET 749 177 - 256 K/uL    MPV 10.7 8.9 - 12.9 FL    NRBC 0.0 0  WBC    ABSOLUTE NRBC 0.00 0.00 - 0.01 K/uL   GLUCOSE, POC    Collection Time: 11/20/21  6:50 AM   Result Value Ref Range    Glucose (POC) 348 (H) 65 - 117 mg/dL    Performed by Igor Son      Telemetry: a.fib, rate  at rest    11/15/21    ECHO ADULT COMPLETE 11/19/2021 11/19/2021    Interpretation Summary  · LV: Estimated LVEF is 55 - 60%. Normal cavity size, wall thickness and systolic function (ejection fraction normal). Wall motion: normal.  · MV: Mitral valve non-specific thickening. Mild mitral annular calcification. Mild mitral valve regurgitation is present. · TV: Mild tricuspid valve regurgitation is present. Signed by: Garrett Farnsworth MD on 11/19/2021 11:42 AM        Assessment:    1. Small bowel ischemia / obstruction  S/p colectomy and small bowel resection with findings of ischemic small bowel and colon. 2. Acute renal failure, likely from volume depletion  Improving  3. Other persistent atrial fibrillation - duration unclear  Rate acceptable currently    Plan:    Continue IV diltiazem for now for rate control  Note that she is on TPN and palliative care involved for goals of care discussion. Not on anticoagulation, which seems reasonable with all that is going on and high risk for bleeding. Rate control strategy may be best for her a.fib. Signed:  Rickey Sanz MD  Interventional Cardiology  11/20/2021

## 2021-11-20 NOTE — PROGRESS NOTES
Spiritual Care Assessment/Progress Note  [facility]      NAME: Jesusita Montalvo      MRN: [id number]  AGE: 80 y.o. SEX: female  Yazdanism Affiliation: Unknown   Language: Unknown     11/20/2021     Total Time (in minutes): 5     [facility] through conversation with:         []Patient        [] Family    [] Friend(s)        Reason for Consult: Palliative Care, Initial/Spiritual Assessment     Spiritual beliefs: (Please include comment if needed)     [] Identifies with a roderick tradition:         [] Supported by a roderick community:            [] Claims no spiritual orientation:           [] Seeking spiritual identity:                [] Adheres to an individual form of spirituality:           [x] Not able to assess:                           Identified resources for coping:      [] Prayer                               [] Music                  [] Guided Imagery     [] Family/friends                 [] Pet visits     [] Devotional reading                         [x] Unknown     [] Other:                                               Interventions offered during this visit: (See comments for more details)    Patient Interventions: Spiritual care volunteer support           Plan of Care:     [] Support spiritual and/or cultural needs    [] Support AMD and/or advance care planning process      [] Support grieving process   [] Coordinate Rites and/or Rituals    [] Coordination with community clergy   [] No spiritual needs identified at this time   [] Detailed Plan of Care below (See Comments)  [] Make referral to Music Therapy  [] Make referral to Pet Therapy     [] Make referral to Addiction services  [] Make referral to [facility]  [] Make referral to Spiritual Care Partner  [] No future visits requested        [] Contact Spiritual Care for further referrals     Comments: Attempted Palliative Initial Spiritual Assessment for this pt in [facility] 439/2.  Reviewed pt's chart prior to this visit to augment any observed or expressed support needs this pt may have. Pt was sleeping soundly and therefore was unable to be assessed at this time. No family/friends present at time of visit. Contact Spiritual Care Services for any spiritual or emotional support needs. Carmen White MDiv.  Staff   Request  Support/Spiritual Care Services via [address] [place] [address]

## 2021-11-20 NOTE — PROGRESS NOTES
Progress Note    Patient: Leigh Salgado MRN: [id number]  SSN: [id number]    YOB: 1933  Age: 80 y.o. Sex: female      Admit Date: 11/15/2021    5 Days Post-Op    Procedure:  Procedure(s):  Laparoscopy Converted to Open, Right Colectomy, Extended Small Bowel Resection    Subjective:     Patient arousable    Objective:     Visit Vitals  BP (!) 146/63 (BP 1 Location: Left upper arm, BP Patient Position: At rest;Lying)   Pulse 97   Temp 97.5 °F (36.4 °C)   Resp 15   Ht 5' 4\" (1.626 m)   Wt 159 lb 4.8 oz (72.3 kg)   SpO2 96%   BMI 27.34 kg/m²       Temp (24hrs), Av.9 °F (37.2 °C), Min:97.5 °F (36.4 °C), Max:101.2 °F (38.4 °C)      Physical Exam:    General-lethargic arousable  Lungs- CTA  H-Irregular  Abd- Soft incision intact. Data Review: images and reports reviewed    Lab Review: All lab results for the last 24 hours reviewed.   Recent Results (from the past 24 hour(s))   METABOLIC PANEL, BASIC    Collection Time: 21  4:05 AM   Result Value Ref Range    Sodium 147 (H) 136 - 145 mmol/L    Potassium 3.0 (L) 3.5 - 5.1 mmol/L    Chloride 111 (H) 97 - 108 mmol/L    CO2 33 (H) 21 - 32 mmol/L    Anion gap 3 (L) 5 - 15 mmol/L    Glucose 328 (H) 65 - 100 mg/dL    BUN 37 (H) 6 - 20 MG/DL    Creatinine 1.00 0.55 - 1.02 MG/DL    BUN/Creatinine ratio 37 (H) 12 - 20      GFR est AA >60 >60 ml/min/1.73m2    GFR est non-AA 52 (L) >60 ml/min/1.73m2    Calcium 7.9 (L) 8.5 - 10.1 MG/DL   MAGNESIUM    Collection Time: 21  4:05 AM   Result Value Ref Range    Magnesium 1.9 1.6 - 2.4 mg/dL   PHOSPHORUS    Collection Time: 21  4:05 AM   Result Value Ref Range    Phosphorus 2.4 (L) 2.6 - 4.7 MG/DL   CBC W/O DIFF    Collection Time: 21  4:05 AM   Result Value Ref Range    WBC 17.0 (H) 3.6 - 11.0 K/uL    RBC 2.78 (L) 3.80 - 5.20 M/uL    HGB 8.3 (L) 11.5 - 16.0 g/dL    HCT 26.3 (L) 35.0 - 47.0 %    MCV 94.6 80.0 - 99.0 FL    MCH 29.9 26.0 - 34.0 PG    MCHC 31.6 30.0 - 36.5 g/dL    RDW 14.4 11.5 - 14.5 %    PLATELET 712 693 - 301 K/uL    MPV 10.7 8.9 - 12.9 FL    NRBC 0.0 0  WBC    ABSOLUTE NRBC 0.00 0.00 - 0.01 K/uL   GLUCOSE, POC    Collection Time: 11/20/21  6:50 AM   Result Value Ref Range    Glucose (POC) 348 (H) 65 - 117 mg/dL    Performed by Saranya Patel        Assessment:     Hospital Problems  Never Reviewed          Codes Class Noted POA    Small bowel ischemia [facility]) ICD-10-CM: K55.9  ICD-9-CM: 557.9  11/16/2021 Unknown              Plan/Recommendations/Medical Decision Making:   Prognosis guarded  Appreciate cardiology input - continue IV meds  Continue TPN   Does not seem alert enough yet to start po. WBC up a little- Will monitor.    Continue Pearson   Palliative

## 2021-11-21 LAB
ANION GAP SERPL CALC-SCNC: 6 MMOL/L (ref 5–15)
BUN SERPL-MCNC: 36 MG/DL (ref 6–20)
BUN/CREAT SERPL: 40 (ref 12–20)
CALCIUM SERPL-MCNC: 8 MG/DL (ref 8.5–10.1)
CHLORIDE SERPL-SCNC: 111 MMOL/L (ref 97–108)
CO2 SERPL-SCNC: 31 MMOL/L (ref 21–32)
CREAT SERPL-MCNC: 0.91 MG/DL (ref 0.55–1.02)
ERYTHROCYTE [DISTWIDTH] IN BLOOD BY AUTOMATED COUNT: 14.3 % (ref 11.5–14.5)
GLUCOSE BLD STRIP.AUTO-MCNC: 311 MG/DL (ref 65–117)
GLUCOSE BLD STRIP.AUTO-MCNC: 356 MG/DL (ref 65–117)
GLUCOSE BLD STRIP.AUTO-MCNC: 377 MG/DL (ref 65–117)
GLUCOSE BLD STRIP.AUTO-MCNC: 379 MG/DL (ref 65–117)
GLUCOSE SERPL-MCNC: 337 MG/DL (ref 65–100)
HCT VFR BLD AUTO: 26.4 % (ref 35–47)
HGB BLD-MCNC: 8.2 G/DL (ref 11.5–16)
MAGNESIUM SERPL-MCNC: 1.6 MG/DL (ref 1.6–2.4)
MCH RBC QN AUTO: 29.9 PG (ref 26–34)
MCHC RBC AUTO-ENTMCNC: 31.1 G/DL (ref 30–36.5)
MCV RBC AUTO: 96.4 FL (ref 80–99)
NRBC # BLD: 0 K/UL (ref 0–0.01)
NRBC BLD-RTO: 0 PER 100 WBC
PHOSPHATE SERPL-MCNC: 2 MG/DL (ref 2.6–4.7)
PLATELET # BLD AUTO: 159 K/UL (ref 150–400)
PMV BLD AUTO: 11.1 FL (ref 8.9–12.9)
POTASSIUM SERPL-SCNC: 3.1 MMOL/L (ref 3.5–5.1)
RBC # BLD AUTO: 2.74 M/UL (ref 3.8–5.2)
SERVICE CMNT-IMP: ABNORMAL
SODIUM SERPL-SCNC: 148 MMOL/L (ref 136–145)
WBC # BLD AUTO: 14.2 K/UL (ref 3.6–11)

## 2021-11-21 PROCEDURE — 74011000250 HC RX REV CODE- 250: Performed by: HOSPITALIST

## 2021-11-21 PROCEDURE — 74011000258 HC RX REV CODE- 258: Performed by: HOSPITALIST

## 2021-11-21 PROCEDURE — 74011636637 HC RX REV CODE- 636/637: Performed by: INTERNAL MEDICINE

## 2021-11-21 PROCEDURE — 83735 ASSAY OF MAGNESIUM: CPT

## 2021-11-21 PROCEDURE — 84100 ASSAY OF PHOSPHORUS: CPT

## 2021-11-21 PROCEDURE — 74011000250 HC RX REV CODE- 250: Performed by: SURGERY

## 2021-11-21 PROCEDURE — 74011250636 HC RX REV CODE- 250/636: Performed by: HOSPITALIST

## 2021-11-21 PROCEDURE — 85027 COMPLETE CBC AUTOMATED: CPT

## 2021-11-21 PROCEDURE — 74011000250 HC RX REV CODE- 250: Performed by: INTERNAL MEDICINE

## 2021-11-21 PROCEDURE — 36415 COLL VENOUS BLD VENIPUNCTURE: CPT

## 2021-11-21 PROCEDURE — 82962 GLUCOSE BLOOD TEST: CPT

## 2021-11-21 PROCEDURE — 74011250636 HC RX REV CODE- 250/636: Performed by: SURGERY

## 2021-11-21 PROCEDURE — 74011000258 HC RX REV CODE- 258: Performed by: SURGERY

## 2021-11-21 PROCEDURE — 94760 N-INVAS EAR/PLS OXIMETRY 1: CPT

## 2021-11-21 PROCEDURE — 99024 POSTOP FOLLOW-UP VISIT: CPT | Performed by: SURGERY

## 2021-11-21 PROCEDURE — 80048 BASIC METABOLIC PNL TOTAL CA: CPT

## 2021-11-21 PROCEDURE — 65660000001 HC RM ICU INTERMED STEPDOWN

## 2021-11-21 PROCEDURE — 74011636637 HC RX REV CODE- 636/637: Performed by: SURGERY

## 2021-11-21 RX ORDER — INSULIN GLARGINE 100 [IU]/ML
15 INJECTION, SOLUTION SUBCUTANEOUS ONCE
Status: DISCONTINUED | OUTPATIENT
Start: 2021-11-21 | End: 2021-11-21 | Stop reason: SDUPTHER

## 2021-11-21 RX ORDER — INSULIN GLARGINE 100 [IU]/ML
30 INJECTION, SOLUTION SUBCUTANEOUS EVERY 24 HOURS
Status: DISCONTINUED | OUTPATIENT
Start: 2021-11-21 | End: 2021-11-21

## 2021-11-21 RX ORDER — INSULIN GLARGINE 100 [IU]/ML
15 INJECTION, SOLUTION SUBCUTANEOUS EVERY 24 HOURS
Status: DISCONTINUED | OUTPATIENT
Start: 2021-11-22 | End: 2021-11-21

## 2021-11-21 RX ORDER — POTASSIUM CHLORIDE 7.45 MG/ML
10 INJECTION INTRAVENOUS
Status: COMPLETED | OUTPATIENT
Start: 2021-11-21 | End: 2021-11-21

## 2021-11-21 RX ORDER — INSULIN LISPRO 100 [IU]/ML
8 INJECTION, SOLUTION INTRAVENOUS; SUBCUTANEOUS ONCE
Status: COMPLETED | OUTPATIENT
Start: 2021-11-21 | End: 2021-11-21

## 2021-11-21 RX ORDER — INSULIN GLARGINE 100 [IU]/ML
15 INJECTION, SOLUTION SUBCUTANEOUS ONCE
Status: COMPLETED | OUTPATIENT
Start: 2021-11-21 | End: 2021-11-21

## 2021-11-21 RX ORDER — INSULIN GLARGINE 100 [IU]/ML
25 INJECTION, SOLUTION SUBCUTANEOUS EVERY 24 HOURS
Status: DISCONTINUED | OUTPATIENT
Start: 2021-11-22 | End: 2021-11-22

## 2021-11-21 RX ORDER — INSULIN GLARGINE 100 [IU]/ML
10 INJECTION, SOLUTION SUBCUTANEOUS EVERY 24 HOURS
Status: DISCONTINUED | OUTPATIENT
Start: 2021-11-21 | End: 2021-11-21

## 2021-11-21 RX ORDER — INSULIN LISPRO 100 [IU]/ML
9 INJECTION, SOLUTION INTRAVENOUS; SUBCUTANEOUS ONCE
Status: COMPLETED | OUTPATIENT
Start: 2021-11-21 | End: 2021-11-21

## 2021-11-21 RX ADMIN — INSULIN LISPRO 8 UNITS: 100 INJECTION, SOLUTION INTRAVENOUS; SUBCUTANEOUS at 12:10

## 2021-11-21 RX ADMIN — PIPERACILLIN SODIUM AND TAZOBACTAM SODIUM 3.38 G: 3; .375 INJECTION, POWDER, LYOPHILIZED, FOR SOLUTION INTRAVENOUS at 05:52

## 2021-11-21 RX ADMIN — Medication 10 ML: at 21:36

## 2021-11-21 RX ADMIN — SODIUM CHLORIDE, POTASSIUM CHLORIDE, SODIUM LACTATE AND CALCIUM CHLORIDE 75 ML/HR: 600; 310; 30; 20 INJECTION, SOLUTION INTRAVENOUS at 10:57

## 2021-11-21 RX ADMIN — POTASSIUM CHLORIDE 10 MEQ: 7.46 INJECTION, SOLUTION INTRAVENOUS at 13:10

## 2021-11-21 RX ADMIN — CALCIUM GLUCONATE: 94 INJECTION, SOLUTION INTRAVENOUS at 18:00

## 2021-11-21 RX ADMIN — DILTIAZEM HYDROCHLORIDE 12.5 MG/HR: 5 INJECTION, SOLUTION INTRAVENOUS at 08:14

## 2021-11-21 RX ADMIN — INSULIN LISPRO 9 UNITS: 100 INJECTION, SOLUTION INTRAVENOUS; SUBCUTANEOUS at 16:55

## 2021-11-21 RX ADMIN — PIPERACILLIN SODIUM AND TAZOBACTAM SODIUM 3.38 G: 3; .375 INJECTION, POWDER, LYOPHILIZED, FOR SOLUTION INTRAVENOUS at 21:29

## 2021-11-21 RX ADMIN — INSULIN LISPRO 4 UNITS: 100 INJECTION, SOLUTION INTRAVENOUS; SUBCUTANEOUS at 23:54

## 2021-11-21 RX ADMIN — METOPROLOL TARTRATE 5 MG: 5 INJECTION INTRAVENOUS at 08:16

## 2021-11-21 RX ADMIN — INSULIN GLARGINE 10 UNITS: 100 INJECTION, SOLUTION SUBCUTANEOUS at 09:57

## 2021-11-21 RX ADMIN — POTASSIUM CHLORIDE 10 MEQ: 7.46 INJECTION, SOLUTION INTRAVENOUS at 09:57

## 2021-11-21 RX ADMIN — METOPROLOL TARTRATE 5 MG: 5 INJECTION INTRAVENOUS at 16:00

## 2021-11-21 RX ADMIN — INSULIN LISPRO 8 UNITS: 100 INJECTION, SOLUTION INTRAVENOUS; SUBCUTANEOUS at 07:08

## 2021-11-21 RX ADMIN — CALCIUM GLUCONATE: 94 INJECTION, SOLUTION INTRAVENOUS at 19:00

## 2021-11-21 RX ADMIN — DILTIAZEM HYDROCHLORIDE 12.5 MG/HR: 5 INJECTION, SOLUTION INTRAVENOUS at 08:09

## 2021-11-21 RX ADMIN — POTASSIUM CHLORIDE 10 MEQ: 7.46 INJECTION, SOLUTION INTRAVENOUS at 12:10

## 2021-11-21 RX ADMIN — DILTIAZEM HYDROCHLORIDE 12.5 MG/HR: 5 INJECTION, SOLUTION INTRAVENOUS at 17:54

## 2021-11-21 RX ADMIN — METOPROLOL TARTRATE 5 MG: 5 INJECTION INTRAVENOUS at 21:29

## 2021-11-21 RX ADMIN — INSULIN GLARGINE 15 UNITS: 100 INJECTION, SOLUTION SUBCUTANEOUS at 16:56

## 2021-11-21 RX ADMIN — CALCIUM GLUCONATE: 94 INJECTION, SOLUTION INTRAVENOUS at 08:09

## 2021-11-21 RX ADMIN — PIPERACILLIN SODIUM AND TAZOBACTAM SODIUM 3.38 G: 3; .375 INJECTION, POWDER, LYOPHILIZED, FOR SOLUTION INTRAVENOUS at 12:10

## 2021-11-21 RX ADMIN — POTASSIUM CHLORIDE 10 MEQ: 7.46 INJECTION, SOLUTION INTRAVENOUS at 11:00

## 2021-11-21 RX ADMIN — ENOXAPARIN SODIUM 40 MG: 100 INJECTION SUBCUTANEOUS at 06:08

## 2021-11-21 NOTE — PROGRESS NOTES
Progress Note    Patient: Christell Ahumada MRN: [id number]  SSN: [id number]    YOB: 1933  Age: 80 y.o. Sex: female      Admit Date: 11/15/2021    6 Days Post-Op    Procedure:  Procedure(s):  Laparoscopy Converted to Open, Right Colectomy, Extended Small Bowel Resection    Subjective:     Patient much more alert today asking for water. Objective:     Visit Vitals  BP (!) 141/57 (BP 1 Location: Right upper arm, BP Patient Position: At rest)   Pulse 93   Temp 98 °F (36.7 °C)   Resp 20   Ht 5' 4\" (1.626 m)   Wt 159 lb 4.8 oz (72.3 kg)   SpO2 100%   BMI 27.34 kg/m²       Temp (24hrs), Av.5 °F (36.9 °C), Min:97.5 °F (36.4 °C), Max:99.5 °F (37.5 °C)      Physical Exam:    General alert in no acute distress answering questions  Lungs clear bilaterally  Heart regular rate and rhythm  Abdomen soft nontender nondistended  Incisions healing well    Data Review: images and reports reviewed    Lab Review: All lab results for the last 24 hours reviewed. Recent Results (from the past 24 hour(s))   GLUCOSE, POC    Collection Time: 21  5:04 PM   Result Value Ref Range    Glucose (POC) 273 (H) 65 - 117 mg/dL    [facility] POC    Collection Time: 21 10:43 PM   Result Value Ref Range    Glucose (POC) 314 (H) 65 - 117 mg/dL    Performed by Dwaine Kelly (CON)    METABOLIC PANEL, BASIC    Collection Time: 21  1:28 AM   Result Value Ref Range    Sodium 148 (H) 136 - 145 mmol/L    Potassium 3.1 (L) 3.5 - 5.1 mmol/L    Chloride 111 (H) 97 - 108 mmol/L    CO2 31 21 - 32 mmol/L    Anion gap 6 5 - 15 mmol/L    Glucose 337 (H) 65 - 100 mg/dL    BUN 36 (H) 6 - 20 MG/DL    Creatinine 0.91 0.55 - 1.02 MG/DL    BUN/Creatinine ratio 40 (H) 12 - 20      GFR est AA >60 >60 ml/min/1.73m2    GFR est non-AA 58 (L) >60 ml/min/1.73m2    Calcium 8.0 (L) 8.5 - 10.1 MG/DL   MAGNESIUM    Collection Time: 21  1:28 AM   Result Value Ref Range    Magnesium 1.6 1.6 - 2.4 mg/dL PHOSPHORUS    Collection Time: 11/21/21  1:28 AM   Result Value Ref Range    Phosphorus 2.0 (L) 2.6 - 4.7 MG/DL   CBC W/O DIFF    Collection Time: 11/21/21  1:28 AM   Result Value Ref Range    WBC 14.2 (H) 3.6 - 11.0 K/uL    RBC 2.74 (L) 3.80 - 5.20 M/uL    HGB 8.2 (L) 11.5 - 16.0 g/dL    HCT 26.4 (L) 35.0 - 47.0 %    MCV 96.4 80.0 - 99.0 FL    MCH 29.9 26.0 - 34.0 PG    MCHC 31.1 30.0 - 36.5 g/dL    RDW 14.3 11.5 - 14.5 %    PLATELET 074 256 - 769 K/uL    MPV 11.1 8.9 - 12.9 FL    NRBC 0.0 0  WBC    ABSOLUTE NRBC 0.00 0.00 - 0.01 K/uL   GLUCOSE, POC    Collection Time: 11/21/21  6:11 AM   Result Value Ref Range    Glucose (POC) 379 (H) 65 - 117 mg/dL    Performed by Nathaniel Burnett. Robin (CON)    GLUCOSE, POC    Collection Time: 11/21/21 11:02 AM   Result Value Ref Range    Glucose (POC) 377 (H) 65 - 117 mg/dL    Performed by Dwaine Pulse        Assessment:     Hospital Problems  Never Reviewed          Codes Class Noted POA    Small bowel ischemia [facility]) ICD-10-CM: K55.9  ICD-9-CM: 557.9  11/16/2021 Unknown              Plan/Recommendations/Medical Decision Making:   Seems to be improving overall. Will need to wait until cleared by speech before starting her on any p.o. intake. May take swabs.   Has been hyperglycemic likely due to the TPN have asked medicine to come back and make recommendations for her diabetes  WBC is improving  Continue TPN

## 2021-11-21 NOTE — CONSULTS
[facility] Adult  Hospitalist Group    Hospitalist Consult  Primary Care Provider: Jeralene Aase, MD  Consult requested by: Dr. Iván Thomas     Subjective:     Bonny Pimentel is a 80 y.o. female with pmh of Afib/flutter, HTN, hypothyroidism who presents with bowel ischemia now s/p resection by Dr. Sonia Grubbs. Patient is well known to me, as I was previously following for Afib with RVR, CURT, metabolic acidosis. Since she was last seen on 11/19, she does appear to be more awake and alert today. She is able to answer simple questions, and tell me her full name. She was started on TPN due to inability to tolerate PO secondary to somnolence. Since started TPN patient's blood sugars have been elevated in the 300 range. A1c checked yesterday was 5.5%. Patient denies a diagnosis of diabetes. Review of Systems:    A comprehensive review of systems was negative except for that written in the History of Present Illness. Past Medical History:   Diagnosis Date    Atrial fibrillation (HCC)     Atrial flutter (HCC)     Edema     Hypertension     Hypokalemia     Hypothyroidism     Insomnia     Major depressive disorder     Pain     UTI (urinary tract infection)     Vitamin D deficiency       History reviewed. No pertinent surgical history. Prior to Admission medications    Medication Sig Start Date End Date Taking? Authorizing Provider   acetaminophen (TYLENOL) 325 mg tablet Take 325 mg by mouth two (2) times daily as needed for Pain. Yes Other, MD Helene   aspirin 81 mg chewable tablet Take 81 mg by mouth daily. Yes Other, MD Helene   carvediloL (COREG) 12.5 mg tablet Take  by mouth two (2) times daily (with meals). Yes Gio, MD Helene   cephALEXin (KEFLEX) 500 mg capsule Take 500 mg by mouth four (4) times daily. Yes Gio, MD Helene   furosemide (LASIX) 40 mg tablet Take 40 mg by mouth daily. Yes Helene Powers MD   melatonin 5 mg tablet Take  by mouth nightly.    Yes Helene Powers MD   levothyroxine (SYNTHROID) 25 mcg tablet Take 25 mcg by mouth Daily (before breakfast). Yes Other, MD Helene   lisinopriL (PRINIVIL, ZESTRIL) 20 mg tablet Take 20 mg by mouth daily. Yes Other, MD Helene   magnesium oxide (MAG-OX) 400 mg tablet Take 400 mg by mouth daily. Yes Other, MD Helene   potassium chloride (KLOR-CON) 20 mEq pack Take 20 mEq by mouth two (2) times daily (with meals). Yes Other, MD Helene   sertraline (ZOLOFT) 50 mg tablet Take  by mouth daily. Yes Other, MD ZENOBIA Narayanacidoph & parac-S.therm-Bifido (RisaQuad-2) 16 billion cell cap cap Take 1 Capsule by mouth daily. Yes Other, MD Helene   cholecalciferol (Vitamin D3) (1000 Units /25 mcg) tablet Take  by mouth daily. Yes Other, MD Helene   Hydrocolloid Dressing 4 X 5 \" bndg by Apply Externally route. Yes Other, MD Helene     Allergies   Allergen Reactions    Codeine Unknown (comments)    Gluten Unknown (comments)    Sulfa (Sulfonamide Antibiotics) Unknown (comments)      FH: unable to obtain due to mental status    SOCIAL HISTORY:  Patient resides at [facility] Dr. Ruy Sa[facility]  Smoking history: unknown, unable to obtain due to mental status  Alcohol history: unknown, unable to obtain due to mental status    Objective:       Physical Exam:   Visit Vitals  BP (!) 141/57 (BP 1 Location: Right upper arm, BP Patient Position: At rest)   Pulse 85   Temp 98 °F (36.7 °C)   Resp 20   Ht 5' 4\" (1.626 m)   Wt 72.3 kg (159 lb 4.8 oz)   SpO2 100%   BMI 27.34 kg/m²     General appearance: alert, cooperative, no distress, appears stated age  Head: Normocephalic, without obvious abnormality, atraumatic  Eyes: conjunctivae/corneas clear. PERRL, EOM's intact. Throat: Dry mucus membranes  Lungs: clear to auscultation bilaterally  Heart: regular rate and rhythm, S1, S2 normal, no murmur, click, rub or gallop  Abdomen: soft, non-tender.  Bowel sounds normal. Colostomy incision c/d/i   Extremities: extremities normal, atraumatic, no cyanosis or edema  Pulses: 2+ and symmetric  Skin: Skin color, texture, turgor normal. No rashes or lesions  Neurologic: Oriented x 1, no focal neurologic deficits. Data Review: All diagnostic labs and studies have been reviewed. CT abdomen 11/15  IMPRESSION     1. Pneumoperitoneum, possibly related to perforation of dilated slightly  thickened small bowel loops. 2. Mild colonic wall thickening may be due to underdistention. 3. Moderate-sized hiatal hernia. Assessment:   Alee Guillen is a 80 y.o. female who presents with perforation secondary to ischemic colitis now s/p resection. We are asked to see the patient in consult to assist in managing TPN induced hyperglycemia     Active Problems:    Small bowel ischemia ([address] (11/16/2021)        Plan:     TPN induced hyperglycemia   - Has used 17U of insulin in last 24 hours, will add 15U glargine for now  - Likely need to add insulin to TPN bag, but will first determine total insulin dose. - Patient does not have diabetes, and BS will normalize when off TPN, and infection resolves. - Contineu SSI, POCT glucose checks and hypoglycemia protocol per hospital policy  - Wean TPN as tolerated, per primary team     Afib with RVR - HR much improved. - Remains on IV diltiazem and IV metoprolol  - Cardiology following    Ischemic colitis with perforation s/p resection  - Seems to be improved since piror visit  - Management per general surgery     No code status on file. Would suggest discussion with mPOA, palliative care consult is pending. Thank you for allowing us to participate in the care of this patient, we will continue to follow.        Signed By: Antwan Colon MD     Date of Service:  11/21/2021

## 2021-11-21 NOTE — PROGRESS NOTES
[facility] Cardiology Progress Note    Date of service: 2021    Subjective:   Ms Sergey Lee remains quite lethargic, but is a bit more interactive this morning. Denies any active symptoms - no abdominal pain. Objective:    Visit Vitals  BP (!) 153/45   Pulse 86   Temp 99.5 °F (37.5 °C)   Resp 20   Ht 5' 4\" (1.626 m)   Wt 72.3 kg (159 lb 4.8 oz)   SpO2 100%   BMI 27.34 kg/m²       Physical Exam  Constitutional:       Appearance: She is normal weight. HENT:      Head: Normocephalic and atraumatic. Eyes:      General: No scleral icterus. Conjunctiva/sclera: Conjunctivae normal.   Neck:      Vascular: No JVD. Cardiovascular:      Rate and Rhythm: Normal rate. Rhythm irregular. Heart sounds: Normal heart sounds. No murmur heard. No gallop. Pulmonary:      Effort: Pulmonary effort is normal. No respiratory distress. Breath sounds: Normal breath sounds. No stridor. No wheezing or rales. Abdominal:      General: There is no distension. Palpations: Abdomen is soft. Musculoskeletal:         General: No deformity. Skin:     General: Skin is warm and dry. Neurological:      Mental Status: She is oriented to person, place, and time. She is lethargic. Psychiatric:         Behavior: Behavior normal.         Data reviewed:  Recent Results (from the past 12 hour(s))   GLUCOSE, POC    Collection Time: 21 10:43 PM   Result Value Ref Range    Glucose (POC) 314 (H) 65 - 117 mg/dL    Performed by Katharina Kelly (KAILASH)    METABOLIC PANEL, BASIC    Collection Time: 21  1:28 AM   Result Value Ref Range    Sodium 148 (H) 136 - 145 mmol/L    Potassium 3.1 (L) 3.5 - 5.1 mmol/L    Chloride 111 (H) 97 - 108 mmol/L    CO2 31 21 - 32 mmol/L    Anion gap 6 5 - 15 mmol/L    Glucose 337 (H) 65 - 100 mg/dL    BUN 36 (H) 6 - 20 MG/DL    Creatinine 0.91 0.55 - 1.02 MG/DL    BUN/Creatinine ratio 40 (H) 12 - 20      GFR est AA >60 >60 ml/min/1.73m2    GFR est non-AA 58 (L) >60 ml/min/1.73m2    Calcium 8.0 (L) 8.5 - 10.1 MG/DL   MAGNESIUM    Collection Time: 11/21/21  1:28 AM   Result Value Ref Range    Magnesium 1.6 1.6 - 2.4 mg/dL   PHOSPHORUS    Collection Time: 11/21/21  1:28 AM   Result Value Ref Range    Phosphorus 2.0 (L) 2.6 - 4.7 MG/DL   CBC W/O DIFF    Collection Time: 11/21/21  1:28 AM   Result Value Ref Range    WBC 14.2 (H) 3.6 - 11.0 K/uL    RBC 2.74 (L) 3.80 - 5.20 M/uL    HGB 8.2 (L) 11.5 - 16.0 g/dL    HCT 26.4 (L) 35.0 - 47.0 %    MCV 96.4 80.0 - 99.0 FL    MCH 29.9 26.0 - 34.0 PG    MCHC 31.1 30.0 - 36.5 g/dL    RDW 14.3 11.5 - 14.5 %    PLATELET 082 731 - 817 K/uL    MPV 11.1 8.9 - 12.9 FL    NRBC 0.0 0  WBC    ABSOLUTE NRBC 0.00 0.00 - 0.01 K/uL   GLUCOSE, POC    Collection Time: 11/21/21  6:11 AM   Result Value Ref Range    Glucose (POC) 379 (H) 65 - 117 mg/dL    Performed by Meg Kelly (CON)      Telemetry: a.fib, rate 90s    11/15/21    ECHO ADULT COMPLETE 11/19/2021 11/19/2021    Interpretation Summary  · LV: Estimated LVEF is 55 - 60%. Normal cavity size, wall thickness and systolic function (ejection fraction normal). Wall motion: normal.  · MV: Mitral valve non-specific thickening. Mild mitral annular calcification. Mild mitral valve regurgitation is present. · TV: Mild tricuspid valve regurgitation is present. Signed by: Fitz Borges MD on 11/19/2021 11:42 AM        Assessment:    1. Small bowel ischemia / obstruction  S/p colectomy and small bowel resection with findings of ischemic small bowel and colon. 2. Acute renal failure, likely from volume depletion  Improving  3. Other persistent atrial fibrillation - duration unclear  Rate acceptable currently    Plan:    Continue IV diltiazem for now for rate control  Note that she is on TPN and palliative care involved for goals of care discussion. Not on anticoagulation, which seems reasonable with all that is going on and high risk for bleeding. Rate control strategy may be best for her a.fib.     Signed:  Connor Irizarry Lily Aldana MD  Interventional Cardiology  11/21/2021

## 2021-11-21 NOTE — PROGRESS NOTES
11:27 RN notified of Pt BG of 377. Per [facility] RN is to notify MD of BG >350. MD notified. Awaiting orders. 11:53 Awaiting orders from MD for BG. RN reached out to general surgery MD for orders.

## 2021-11-22 LAB
ADMINISTERED INITIALS, ADMINIT: NORMAL
ANION GAP SERPL CALC-SCNC: 5 MMOL/L (ref 5–15)
BUN SERPL-MCNC: 35 MG/DL (ref 6–20)
BUN/CREAT SERPL: 41 (ref 12–20)
CALCIUM SERPL-MCNC: 8.2 MG/DL (ref 8.5–10.1)
CHLORIDE SERPL-SCNC: 112 MMOL/L (ref 97–108)
CO2 SERPL-SCNC: 27 MMOL/L (ref 21–32)
CREAT SERPL-MCNC: 0.86 MG/DL (ref 0.55–1.02)
D50 ADMINISTERED, D50ADM: 0 ML
D50 ORDER, D50ORD: 0 ML
ERYTHROCYTE [DISTWIDTH] IN BLOOD BY AUTOMATED COUNT: 14.2 % (ref 11.5–14.5)
GLSCOM COMMENTS: NORMAL
GLUCOSE BLD STRIP.AUTO-MCNC: 334 MG/DL (ref 65–117)
GLUCOSE BLD STRIP.AUTO-MCNC: 336 MG/DL (ref 65–117)
GLUCOSE BLD STRIP.AUTO-MCNC: 368 MG/DL (ref 65–117)
GLUCOSE BLD STRIP.AUTO-MCNC: 373 MG/DL (ref 65–117)
GLUCOSE BLD STRIP.AUTO-MCNC: 390 MG/DL (ref 65–117)
GLUCOSE BLD STRIP.AUTO-MCNC: 402 MG/DL (ref 65–117)
GLUCOSE BLD STRIP.AUTO-MCNC: 442 MG/DL (ref 65–117)
GLUCOSE BLD STRIP.AUTO-MCNC: 468 MG/DL (ref 65–117)
GLUCOSE SERPL-MCNC: 279 MG/DL (ref 65–100)
GLUCOSE, GLC: 368 MG/DL
GLUCOSE, GLC: 373 MG/DL
GLUCOSE, GLC: 390 MG/DL
GLUCOSE, GLC: 442 MG/DL
GLUCOSE, GLC: 468 MG/DL
HCT VFR BLD AUTO: 29.6 % (ref 35–47)
HGB BLD-MCNC: 9.5 G/DL (ref 11.5–16)
HIGH TARGET, HITG: 180 MG/DL
INSULIN ADMINSTERED, INSADM: 12.2 UNITS/HOUR
INSULIN ADMINSTERED, INSADM: 13.2 UNITS/HOUR
INSULIN ADMINSTERED, INSADM: 15.4 UNITS/HOUR
INSULIN ADMINSTERED, INSADM: 18.8 UNITS/HOUR
INSULIN ADMINSTERED, INSADM: 7.6 UNITS/HOUR
INSULIN ORDER, INSORD: 12.2 UNITS/HOUR
INSULIN ORDER, INSORD: 13.2 UNITS/HOUR
INSULIN ORDER, INSORD: 15.4 UNITS/HOUR
INSULIN ORDER, INSORD: 18.8 UNITS/HOUR
INSULIN ORDER, INSORD: 7.6 UNITS/HOUR
LOW TARGET, LOT: 130 MG/DL
MAGNESIUM SERPL-MCNC: 1.4 MG/DL (ref 1.6–2.4)
MCH RBC QN AUTO: 30.1 PG (ref 26–34)
MCHC RBC AUTO-ENTMCNC: 32.1 G/DL (ref 30–36.5)
MCV RBC AUTO: 93.7 FL (ref 80–99)
MINUTES UNTIL NEXT BG, NBG: 60 MIN
MULTIPLIER, MUL: 0.02
MULTIPLIER, MUL: 0.03
MULTIPLIER, MUL: 0.04
MULTIPLIER, MUL: 0.05
MULTIPLIER, MUL: 0.06
NRBC # BLD: 0 K/UL (ref 0–0.01)
NRBC BLD-RTO: 0 PER 100 WBC
ORDER INITIALS, ORDINIT: NORMAL
PHOSPHATE SERPL-MCNC: 2.2 MG/DL (ref 2.6–4.7)
PLATELET # BLD AUTO: 160 K/UL (ref 150–400)
PMV BLD AUTO: 12.2 FL (ref 8.9–12.9)
POTASSIUM SERPL-SCNC: 3.3 MMOL/L (ref 3.5–5.1)
RBC # BLD AUTO: 3.16 M/UL (ref 3.8–5.2)
SERVICE CMNT-IMP: ABNORMAL
SODIUM SERPL-SCNC: 144 MMOL/L (ref 136–145)
WBC # BLD AUTO: 17.6 K/UL (ref 3.6–11)

## 2021-11-22 PROCEDURE — 84100 ASSAY OF PHOSPHORUS: CPT

## 2021-11-22 PROCEDURE — 74011250636 HC RX REV CODE- 250/636: Performed by: INTERNAL MEDICINE

## 2021-11-22 PROCEDURE — 92612 ENDOSCOPY SWALLOW (FEES) VID: CPT

## 2021-11-22 PROCEDURE — 65660000001 HC RM ICU INTERMED STEPDOWN

## 2021-11-22 PROCEDURE — 36415 COLL VENOUS BLD VENIPUNCTURE: CPT

## 2021-11-22 PROCEDURE — 74011250636 HC RX REV CODE- 250/636: Performed by: SURGERY

## 2021-11-22 PROCEDURE — 74011000258 HC RX REV CODE- 258: Performed by: INTERNAL MEDICINE

## 2021-11-22 PROCEDURE — 74011636637 HC RX REV CODE- 636/637: Performed by: INTERNAL MEDICINE

## 2021-11-22 PROCEDURE — 83735 ASSAY OF MAGNESIUM: CPT

## 2021-11-22 PROCEDURE — 85027 COMPLETE CBC AUTOMATED: CPT

## 2021-11-22 PROCEDURE — 74011000250 HC RX REV CODE- 250: Performed by: SURGERY

## 2021-11-22 PROCEDURE — 92610 EVALUATE SWALLOWING FUNCTION: CPT

## 2021-11-22 PROCEDURE — 74011000258 HC RX REV CODE- 258: Performed by: HOSPITALIST

## 2021-11-22 PROCEDURE — 74011000258 HC RX REV CODE- 258: Performed by: SURGERY

## 2021-11-22 PROCEDURE — 99024 POSTOP FOLLOW-UP VISIT: CPT | Performed by: NURSE PRACTITIONER

## 2021-11-22 PROCEDURE — 74011000250 HC RX REV CODE- 250: Performed by: HOSPITALIST

## 2021-11-22 PROCEDURE — 82962 GLUCOSE BLOOD TEST: CPT

## 2021-11-22 PROCEDURE — 80048 BASIC METABOLIC PNL TOTAL CA: CPT

## 2021-11-22 PROCEDURE — 74011250636 HC RX REV CODE- 250/636: Performed by: HOSPITALIST

## 2021-11-22 PROCEDURE — 74011000250 HC RX REV CODE- 250: Performed by: INTERNAL MEDICINE

## 2021-11-22 RX ORDER — INSULIN LISPRO 100 [IU]/ML
INJECTION, SOLUTION INTRAVENOUS; SUBCUTANEOUS
Status: DISCONTINUED | OUTPATIENT
Start: 2021-11-22 | End: 2021-11-22

## 2021-11-22 RX ORDER — ENOXAPARIN SODIUM 100 MG/ML
40 INJECTION SUBCUTANEOUS
Status: COMPLETED | OUTPATIENT
Start: 2021-11-22 | End: 2021-11-22

## 2021-11-22 RX ORDER — INSULIN GLARGINE 100 [IU]/ML
35 INJECTION, SOLUTION SUBCUTANEOUS EVERY 24 HOURS
Status: DISCONTINUED | OUTPATIENT
Start: 2021-11-23 | End: 2021-11-22

## 2021-11-22 RX ORDER — DEXTROSE 50 % IN WATER (D50W) INTRAVENOUS SYRINGE
25-50 AS NEEDED
Status: DISCONTINUED | OUTPATIENT
Start: 2021-11-22 | End: 2021-11-22 | Stop reason: SDUPTHER

## 2021-11-22 RX ORDER — MAGNESIUM SULFATE 100 %
4 CRYSTALS MISCELLANEOUS AS NEEDED
Status: DISCONTINUED | OUTPATIENT
Start: 2021-11-22 | End: 2021-11-22 | Stop reason: SDUPTHER

## 2021-11-22 RX ORDER — ENOXAPARIN SODIUM 100 MG/ML
80 INJECTION SUBCUTANEOUS EVERY 12 HOURS
Status: DISCONTINUED | OUTPATIENT
Start: 2021-11-22 | End: 2021-11-25

## 2021-11-22 RX ORDER — ENOXAPARIN SODIUM 100 MG/ML
1 INJECTION SUBCUTANEOUS EVERY 24 HOURS
Status: DISCONTINUED | OUTPATIENT
Start: 2021-11-23 | End: 2021-11-22

## 2021-11-22 RX ADMIN — INSULIN LISPRO 7 UNITS: 100 INJECTION, SOLUTION INTRAVENOUS; SUBCUTANEOUS at 06:42

## 2021-11-22 RX ADMIN — ASCORBIC ACID: 500 INJECTION, SOLUTION INTRAMUSCULAR; INTRAVENOUS; SUBCUTANEOUS at 19:11

## 2021-11-22 RX ADMIN — I.V. FAT EMULSION 250 ML: 20 EMULSION INTRAVENOUS at 19:09

## 2021-11-22 RX ADMIN — DILTIAZEM HYDROCHLORIDE 12.5 MG/HR: 5 INJECTION, SOLUTION INTRAVENOUS at 15:00

## 2021-11-22 RX ADMIN — SODIUM CHLORIDE 7.6 UNITS/HR: 9 INJECTION, SOLUTION INTRAVENOUS at 19:22

## 2021-11-22 RX ADMIN — ENOXAPARIN SODIUM 80 MG: 80 INJECTION SUBCUTANEOUS at 19:09

## 2021-11-22 RX ADMIN — Medication 10 ML: at 23:33

## 2021-11-22 RX ADMIN — ENOXAPARIN SODIUM 40 MG: 100 INJECTION SUBCUTANEOUS at 08:56

## 2021-11-22 RX ADMIN — INSULIN GLARGINE 25 UNITS: 100 INJECTION, SOLUTION SUBCUTANEOUS at 09:01

## 2021-11-22 RX ADMIN — INSULIN LISPRO 7 UNITS: 100 INJECTION, SOLUTION INTRAVENOUS; SUBCUTANEOUS at 13:14

## 2021-11-22 RX ADMIN — PIPERACILLIN SODIUM AND TAZOBACTAM SODIUM 3.38 G: 3; .375 INJECTION, POWDER, LYOPHILIZED, FOR SOLUTION INTRAVENOUS at 21:20

## 2021-11-22 RX ADMIN — METOPROLOL TARTRATE 5 MG: 5 INJECTION INTRAVENOUS at 08:57

## 2021-11-22 RX ADMIN — ENOXAPARIN SODIUM 40 MG: 100 INJECTION SUBCUTANEOUS at 06:42

## 2021-11-22 RX ADMIN — DILTIAZEM HYDROCHLORIDE 10 MG/HR: 5 INJECTION, SOLUTION INTRAVENOUS at 23:30

## 2021-11-22 RX ADMIN — DILTIAZEM HYDROCHLORIDE 12.5 MG/HR: 5 INJECTION, SOLUTION INTRAVENOUS at 04:20

## 2021-11-22 RX ADMIN — METOPROLOL TARTRATE 5 MG: 5 INJECTION INTRAVENOUS at 21:20

## 2021-11-22 RX ADMIN — PIPERACILLIN SODIUM AND TAZOBACTAM SODIUM 3.38 G: 3; .375 INJECTION, POWDER, LYOPHILIZED, FOR SOLUTION INTRAVENOUS at 04:27

## 2021-11-22 RX ADMIN — PIPERACILLIN SODIUM AND TAZOBACTAM SODIUM 3.38 G: 3; .375 INJECTION, POWDER, LYOPHILIZED, FOR SOLUTION INTRAVENOUS at 13:14

## 2021-11-22 NOTE — PROGRESS NOTES
Cardiology Progress Note                                        Admit Date: 11/15/2021    Assessment/Plan:     PAF; still in Afib but rate is much improved; will try to fully anticoagulate as her condition is improving for possible DCCV or IV Corvert to attempt for conversion to sinus; continue current regimen      Lorrie Hardin is a 80 y.o. female with     PROBLEM LIST:  Patient Active Problem List    Diagnosis Date Noted    Small bowel ischemia ([address] 11/16/2021         Subjective:     Lorrie Hardin reports none. Visit Vitals  BP (!) 146/61 (BP 1 Location: Left upper arm, BP Patient Position: At rest)   Pulse 81   Temp 97.7 °F (36.5 °C)   Resp 22   Ht 5' 4\" (1.626 m)   Wt 167 lb 11.2 oz (76.1 kg)   SpO2 98%   BMI 28.79 kg/m²       Intake/Output Summary (Last 24 hours) at 11/22/2021 0705  Last data filed at 11/21/2021 1705  Gross per 24 hour   Intake --   Output 1000 ml   Net -1000 ml       Objective:      Physical Exam:  HEENT: Perrla, EOMI  Neck: No JVD,  No thyroidmegaly  Resp: CTA bilaterally;  No wheezes or rales  CV: irregular s1s2 No murmur no s3  Abd:Soft, Nontender  Ext: No edema  Neuro: Alert and oriented; Nonfocal  Skin: Warm, Dry, Intact  Pulses: 2+ DP/PT/Rad      Telemetry: AFIB    Current Facility-Administered Medications   Medication Dose Route Frequency    TPN ADULT - CENTRAL   IntraVENous CONTINUOUS    insulin glargine (LANTUS) injection 25 Units  25 Units SubCUTAneous Q24H    glucose chewable tablet 16 g  4 Tablet Oral PRN    dextrose (D50W) injection syrg 12.5-25 g  25-50 mL IntraVENous PRN    glucagon (GLUCAGEN) injection 1 mg  1 mg IntraMUSCular PRN    insulin lispro (HUMALOG) injection   SubCUTAneous Q6H    lactated Ringers infusion  25 mL/hr IntraVENous CONTINUOUS    fat emulsion 20% (LIPOSYN, INTRAlipid) infusion 250 mL  250 mL IntraVENous Q MON, THU & SAT    metoprolol (LOPRESSOR) injection 5 mg  5 mg IntraVENous TID    enoxaparin (LOVENOX) injection 40 mg  40 mg SubCUTAneous Q24H    acetaminophen (TYLENOL) suppository 650 mg  650 mg Rectal Q6H PRN    dilTIAZem (CARDIZEM) 125 mg in dextrose 5% 125 mL infusion  0-15 mg/hr IntraVENous TITRATE    piperacillin-tazobactam (ZOSYN) 3.375 g in 0.9% sodium chloride (MBP/ADV) 100 mL MBP  3.375 g IntraVENous Q8H    sodium chloride (NS) flush 5-40 mL  5-40 mL IntraVENous Q8H    sodium chloride (NS) flush 5-40 mL  5-40 mL IntraVENous PRN    HYDROmorphone (DILAUDID) injection 1 mg  1 mg IntraVENous Q4H PRN    naloxone (NARCAN) injection 0.4 mg  0.4 mg IntraVENous PRN    ondansetron (ZOFRAN) injection 4 mg  4 mg IntraVENous Q4H PRN         Data Review:   Labs:    Recent Results (from the past 24 hour(s))   GLUCOSE, POC    Collection Time: 11/21/21 11:02 AM   Result Value Ref Range    Glucose (POC) 377 (H) 65 - 117 mg/dL    Performed by Mechelara Galindo    GLUCOSE, POC    Collection Time: 11/21/21  4:14 PM   Result Value Ref Range    Glucose (POC) 356 (H) 65 - 117 mg/dL    Performed by Nogacomer    GLUCOSE, POC    Collection Time: 11/21/21 10:12 PM   Result Value Ref Range    Glucose (POC) 311 (H) 65 - 117 mg/dL    Performed by Adriane Velez    MAGNESIUM    Collection Time: 11/22/21  5:54 AM   Result Value Ref Range    Magnesium 1.4 (L) 1.6 - 2.4 mg/dL   PHOSPHORUS    Collection Time: 11/22/21  5:54 AM   Result Value Ref Range    Phosphorus 2.2 (L) 2.6 - 4.7 MG/DL   CBC W/O DIFF    Collection Time: 11/22/21  5:54 AM   Result Value Ref Range    WBC 17.6 (H) 3.6 - 11.0 K/uL    RBC 3.16 (L) 3.80 - 5.20 M/uL    HGB 9.5 (L) 11.5 - 16.0 g/dL    HCT 29.6 (L) 35.0 - 47.0 %    MCV 93.7 80.0 - 99.0 FL    MCH 30.1 26.0 - 34.0 PG    MCHC 32.1 30.0 - 36.5 g/dL    RDW 14.2 11.5 - 14.5 %    PLATELET 770 791 - 622 K/uL    MPV 12.2 8.9 - 12.9 FL    NRBC 0.0 0  WBC    ABSOLUTE NRBC 0.00 0.00 - 4.52 K/uL   METABOLIC PANEL, BASIC    Collection Time: 11/22/21  5:54 AM   Result Value Ref Range    Sodium 144 136 - 145 mmol/L    Potassium 3.3 (L) 3.5 - 5.1 mmol/L    Chloride 112 (H) 97 - 108 mmol/L    CO2 27 21 - 32 mmol/L    Anion gap 5 5 - 15 mmol/L    Glucose 279 (H) 65 - 100 mg/dL    BUN 35 (H) 6 - 20 MG/DL    Creatinine 0.86 0.55 - 1.02 MG/DL    BUN/Creatinine ratio 41 (H) 12 - 20      GFR est AA >60 >60 ml/min/1.73m2    GFR est non-AA >60 >60 ml/min/1.73m2    Calcium 8.2 (L) 8.5 - 10.1 MG/DL   GLUCOSE, POC    Collection Time: 11/22/21  6:35 AM   Result Value Ref Range    Glucose (POC) 336 (H) 65 - 117 mg/dL    Performed by [facility]

## 2021-11-22 NOTE — PROGRESS NOTES
Problem: Dysphagia (Adult)  Goal: *Acute Goals and Plan of Care (Insert Text)  Description: Speech Therapy Goals  Initiated 11/22/2021    1. Patient will participate in FEES to objectively assess safety of swallow within 7 days. Outcome: Progressing Towards Goal     SPEECH LANGUAGE PATHOLOGY BEDSIDE SWALLOW EVALUATION  Patient: Dana Thomson (14 y.o. female)  Date: 11/22/2021  Primary Diagnosis: Small bowel ischemia (HCC) [K55.9]  Procedure(s) (LRB):  Laparoscopy Converted to Open, Right Colectomy, Extended Small Bowel Resection (N/A) 7 Days Post-Op   Precautions:        ASSESSMENT :  Based on the objective data described below, the patient presents with dubious bedside presentation given intermittent cough noted with PO trials on this date, usually in conjunction with multiple swallows. Given dubious bedside presentation, will plan to complete a Fiberoptic Endoscopic Evaluation of Swallow (FEES) to objectively assess safety of swallow physiology. Recommendations to follow. Please allow ice chips and teaspoons of water in the interim. Patient will benefit from skilled intervention to address the above impairments. Patients rehabilitation potential is considered to be Fair     PLAN :  Recommendations and Planned Interventions:  --FEES this afternoon to assess swallow  --Ice chips and water teaspoons after oral care    Frequency/Duration: Patient will be followed by speech-language pathology 3 times a week to address goals. Discharge Recommendations: To Be Determined     SUBJECTIVE:   Patient stated, \"That sounds fine. OBJECTIVE:     Past Medical History:   Diagnosis Date    Atrial fibrillation ([address]     Atrial flutter (HCC)     Edema     Hypertension     Hypokalemia     Hypothyroidism     Insomnia     Major depressive disorder     Pain     UTI (urinary tract infection)     Vitamin D deficiency    History reviewed. No pertinent surgical history.   Prior Level of Function/Home Situation:   Home Situation  Support Systems: Assisted Living, Other Family Member(s)  Diet prior to admission: regular diet/thin liquids  Current Diet:  NPO  Cognitive and Communication Status:  Neurologic State: Alert  Orientation Level: Oriented to place, Oriented to person, Disoriented to situation, Disoriented to time  Cognition: Follows commands           Oral Assessment:  Oral Assessment  Labial: No impairment  Dentition: Limited; Natural  Oral Hygiene: oral mucosa dry  Lingual: No impairment  Velum: No impairment  Mandible: No impairment  P.O. Trials:  Patient Position: upright in bed  Vocal quality prior to P.O.: Hoarse  Consistency Presented: Thin liquid; Puree  How Presented: SLP-fed/presented; Self-fed/presented; Cup/sip; Spoon; Straw     Bolus Acceptance: No impairment  Bolus Formation/Control: No impairment     Propulsion: No impairment  Oral Residue: None  Initiation of Swallow: No impairment  Laryngeal Elevation: Functional  Aspiration Signs/Symptoms: Weak cough  Pharyngeal Phase Characteristics: Multiple swallows             Oral Phase Severity: No impairment  Pharyngeal Phase Severity :  (dubious presentation)    NOMS:   The NOMS functional outcome measure was used to quantify this patient's level of swallowing impairment. Based on the NOMS, the patient was determined to be at level 2 for swallow function       NOMS Swallowing Levels:  Level 1 (CN): NPO  Level 2 (CM): NPO but takes consistency in therapy  Level 3 (CL): Takes less than 50% of nutrition p.o. and continues with nonoral feedings; and/or safe with mod cues; and/or max diet restriction  Level 4 (CK): Safe swallow but needs mod cues; and/or mod diet restriction; and/or still requires some nonoral feeding/supplements  Level 5 (CJ): Safe swallow with min diet restriction; and/or needs min cues  Level 6 (CI): Independent with p.o.; rare cues; usually self cues; may need to avoid some foods or needs extra time  Level 7 [facility]): Independent for all p.o.  GARDENIA. (2003). National Outcomes Measurement System (NOMS): Adult Speech-Language Pathology User's Guide. Pain:  Pain Scale 1: Numeric (0 - 10)  Pain Intensity 1: 0       After treatment:   Call bell within reach and Nursing notified    COMMUNICATION/EDUCATION:     The patient's plan of care including recommendations, planned interventions, and recommended diet changes were discussed with: Registered nurse. Patient/family have participated as able in goal setting and plan of care.     Thank you for this referral.  KATJA Bland  Time Calculation: 15 mins

## 2021-11-22 NOTE — PROGRESS NOTES
Bedside shift change report given to Nico Pickens (oncoming nurse) by Shara Whiting (offgoing nurse). Report included the following information SBAR, Intake/Output, MAR, Recent Results and Cardiac Rhythm afib.

## 2021-11-22 NOTE — PROGRESS NOTES
Speech Language Pathology  Fiberoptic Endoscopic Evaluation of Swallowing-FEES with Discharge  Patient: Zelda Smith (67 y.o. female)  Date: 11/22/2021  Primary Diagnosis: Small bowel ischemia (HCC) [K55.9]  Procedure(s) (LRB):  Laparoscopy Converted to Open, Right Colectomy, Extended Small Bowel Resection (N/A) 7 Days Post-Op   Precautions:        ASSESSMENT :  Based on the objective data described below, the [facility] with timely initiation at the vallecula, good pharyngeal squeeze, and no pharyngeal residue. Patient with backflow from esophagus with thin liquids after the swallow resulting in aspiration that patient cleared with spontaneous cough and additional swallows. No backflow and aspiration observed with mildly thick liquids, however patient reported she would prefer thin liquids. Given she clears aspiration events, this seems reasonable. Further skilled acute therapy provided by a speech-language pathologist is not indicated at this time. PLAN :  Recommendations and Planned Interventions:  -Clear liquid diet per surgery. Dorothy Cason from an oral/pharyngeal swallow standpoint to advance as tolerated, therefore defer to surgery for diet advancement  -Small/single sips, straws ok  -Patient coughs while drinking due to backflow and aspiration which she clears independently  -Defer to surgery for additional workup of esophageal swallow and backflow if desired  Discharge Recommendations: None     SUBJECTIVE:   Patient stated That was so good re: drinking. OBJECTIVE:     Past Medical History:   Diagnosis Date    Atrial fibrillation ([address]     Atrial flutter (HCC)     Edema     Hypertension     Hypokalemia     Hypothyroidism     Insomnia     Major depressive disorder     Pain     UTI (urinary tract infection)     Vitamin D deficiency    History reviewed. No pertinent surgical history.   Prior Level of Function/Home Situation:   Home Situation  Support Systems: Assisted Living, Other Family Member(s)  Diet prior to admission: suspect regular/thin  Current Diet:  NPO     Patient Position: upright in bed  Scope used: Ambu disposable scope  Respiratory status: 1L nasal cannula     Part I:   Anatomic-Physiologic Assessment:  Oral Assessment  Labial: No impairment  Dentition: Limited, Natural  Oral Hygiene: oral mucosa dry  Lingual: No impairment  Velum: No impairment  Mandible: No impairment     Movement:  WFL  Base of Tongue Movement:  WFL  Secretions:  WFL  Appearance of Secretions:  n/a  Effectiveness of Swallow in Clearing Secretions:  n/a  VF Symmetry and Appearance: WFL  Phonation:  WFL  Other Structural Remarks:  n/a    Trial 1:   Consistency Presented: Ice chips; Thin liquid; Puree; Solid   How Presented: Other (comment); Straw; Successive swallows; Spoon (RN-fed)       Bolus Acceptance: No impairment   Bolus Formation/Control: No impairment:     Propulsion: No impairment   Oral Residue: None   Initiation of Swallow: Triggered at vallecula   Timing: No impairment   Penetration: After swallow; Other (comment) (from esophageal backflow)   Aspiration/Timing: After; Other (comment) (from esophageal backflow)   Pharyngeal Clearance: No residue   Attempted Modifications: Double swallow; Small sips and bites   Effective Modifications: Double swallow   Cues for Modifications: None           Decreased Tongue Base Retraction?: No  Laryngeal Elevation: WFL (within functional limits)  Aspiration/Penetration Score: 6 (Aspiration-Contrast passes below the cords/glottis, and is cleared)   Pharyngeal Symmetry: Symmetrical  Pharyngeal-Esophageal Segment: Esophageal reflux; Suspected esophageal dysphagia  Pharyngeal Dysfunction: None    Oral Phase Severity: No impairment  Pharyngeal Phase Severity: N/A  NOMS:   The NOMS functional outcome measure was used to quantify this patient's level of swallowing impairment.   Based on the NOMS, the patient was determined to be at level 7 for swallow function         NOMS Swallowing Levels:  Level 1 (CN): NPO  Level 2 (CM): NPO but takes consistency in therapy  Level 3 (CL): Takes less than 50% of nutrition p.o. and continues with nonoral feedings; and/or safe with mod cues; and/or max diet restriction  Level 4 (CK): Safe swallow but needs mod cues; and/or mod diet restriction; and/or still requires some nonoral feeding/supplements  Level 5 (CJ): Safe swallow with min diet restriction; and/or needs min cues  Level 6 (CI): Independent with p.o.; rare cues; usually self cues; may need to avoid some foods or needs extra time  Level 7 ([address] [place] [address]): Independent for all p.o.  GARDENIA. (2003). National Outcomes Measurement System (NOMS): Adult Speech-Language Pathology User's Guide. COMMUNICATION/EDUCATION:   Patient was educated regarding Her deficit(s) of WFL swallow as this relates to Her diagnosis. She demonstrated Good understanding as evidenced by verbalizing understanding. The patient's plan of care including findings from FEES, recommendations, planned interventions, and recommended diet changes were discussed with: Registered nurse. Patient/family have participated as able in goal setting and plan of care. Patient/family agree to work toward stated goals and plan of care. Thank you for this referral.  KATJA Cheung  Time Calculation: 20 mins    Was present with SLP for the entirety of FEES in supervisory capacity. Agree with assessment and recommendations as stated above.     Thank you,  FIDEL AbarcaEd, [id number] [facility]  Speech-Language Pathologist

## 2021-11-22 NOTE — PROGRESS NOTES
NUTRITION brief  Recommendations:   1. Consider adjusting TPN to include insulin. 20 units/Liter based on current total dextrose of 272 g CHO in D18%, 5% AA's @ 63 ml/hr = 1510 ml (1.5 L)    2. Continue K/Phos repletion. 3. Diet per SLP recommendations; hopeful for diet initiation and transition to oral feeding. Diet: NPO  Nutrition Support: D18%, 5% AA's @ 63 ml/hr + 250 ml 20% lipids 3x/week  Nutrition-related meds: Lantus daily, Humalog, IV Zosyn, KCl repletion    Received msg regarding dextrose adjustment in TPN. Pt BG levels ranging upper 200's-300's, with pt remaining NPO post-op. She is pending FEES with SLP, slow improvements noted by surgery team.  TPN providing 1440 kcal, 76 g protein, with GIR 2.6 mg/kg/min and 272g CHO, total volume 1615-0152 ml. See full RD assessment from  for additional details, goals, and monitoring/evaluation.    Estimated Nutrition Needs:   Energy: 8694-2590 (MSJ x 1.1 x 1.2)  Wt used: Current  Protein: 70-80 (1-1.1 g/kg)  Wt used: Current   Fluid: ~1700     Boaz Altamirano RD      Contact via [employer]

## 2021-11-22 NOTE — CONSULTS
[facility]  Hospitalist Group    Hospitalist Consult  Primary Care Provider: Rudy Mathias MD  Consult requested by: Dr. Velia Alcantara     Subjective:   Manish Barrett is a 80 y.o. female with pmh of Afib/flutter, HTN, hypothyroidism who presents with bowel ischemia now s/p resection by Dr. Micah Lloyd. Patient is well known to me, as I was previously following for Afib with RVR, CURT, metabolic acidosis. Since she was last seen on 11/19, she does appear to be more awake and alert today. She is able to answer simple questions, and tell me her full name. She was started on TPN due to inability to tolerate PO secondary to somnolence. Since started TPN patient's blood sugars have been elevated in the 300 range. A1c checked yesterday was 5.5%. Patient denies a diagnosis of diabetes. \"    Review of Systems:    A comprehensive review of systems was negative except for that written in the History of Present Illness. Past Medical History:   Diagnosis Date    Atrial fibrillation (HCC)     Atrial flutter (HCC)     Edema     Hypertension     Hypokalemia     Hypothyroidism     Insomnia     Major depressive disorder     Pain     UTI (urinary tract infection)     Vitamin D deficiency       History reviewed. No pertinent surgical history. Prior to Admission medications    Medication Sig Start Date End Date Taking? Authorizing Provider   acetaminophen (TYLENOL) 325 mg tablet Take 325 mg by mouth two (2) times daily as needed for Pain. Yes Other, MD Helene   aspirin 81 mg chewable tablet Take 81 mg by mouth daily. Yes Gio, MD Helene   carvediloL (COREG) 12.5 mg tablet Take  by mouth two (2) times daily (with meals). Yes Gio, MD Helene   cephALEXin (KEFLEX) 500 mg capsule Take 500 mg by mouth four (4) times daily. Yes Gio, MD Helene   furosemide (LASIX) 40 mg tablet Take 40 mg by mouth daily. Yes Helene Powers MD   melatonin 5 mg tablet Take  by mouth nightly.    Yes Helene Powers MD   levothyroxine (SYNTHROID) 25 mcg tablet Take 25 mcg by mouth Daily (before breakfast). Yes Other, MD Helene   lisinopriL (PRINIVIL, ZESTRIL) 20 mg tablet Take 20 mg by mouth daily. Yes Other, MD Helene   magnesium oxide (MAG-OX) 400 mg tablet Take 400 mg by mouth daily. Yes Other, MD Helene   potassium chloride (KLOR-CON) 20 mEq pack Take 20 mEq by mouth two (2) times daily (with meals). Yes Other, MD Helene   sertraline (ZOLOFT) 50 mg tablet Take  by mouth daily. Yes Other, MD ZENOBIA Narayanacidoph & parac-S.therm-Bifido (RisaQuad-2) 16 billion cell cap cap Take 1 Capsule by mouth daily. Yes Other, MD Helene   cholecalciferol (Vitamin D3) (1000 Units /25 mcg) tablet Take  by mouth daily. Yes Other, MD Helene   Hydrocolloid Dressing 4 X 5 \" bndg by Apply Externally route. Yes Other, MD Helene     Allergies   Allergen Reactions    Codeine Unknown (comments)    Gluten Unknown (comments)    Sulfa (Sulfonamide Antibiotics) Unknown (comments)      FH: unable to obtain due to mental status    SOCIAL HISTORY:  Patient resides at [facility]  Smoking history: unknown, unable to obtain due to mental status  Alcohol history: unknown, unable to obtain due to mental status    Objective:       Physical Exam:   Visit Vitals  BP (!) 159/50 (BP 1 Location: Left upper arm, BP Patient Position: Lying)   Pulse 84   Temp 98.2 °F (36.8 °C)   Resp 20   Ht 5' 4\" (1.626 m)   Wt 76.1 kg (167 lb 11.2 oz)   SpO2 100%   BMI 28.79 kg/m²     General appearance: alert, cooperative, no distress, appears stated age  Head: Normocephalic, without obvious abnormality, atraumatic  Eyes: conjunctivae/corneas clear. PERRL, EOM's intact. Throat: Dry mucus membranes  Lungs: clear to auscultation bilaterally  Heart: regular rate and rhythm, S1, S2 normal, no murmur, click, rub or gallop  Abdomen: soft, non-tender.  Bowel sounds normal. Colostomy incision c/d/i   Extremities: extremities normal, atraumatic, no cyanosis or edema  Pulses: 2+ and symmetric  Skin: Skin color, texture, turgor normal. No rashes or lesions  Neurologic: Oriented x 1, no focal neurologic deficits. Data Review: All diagnostic labs and studies have been reviewed. CT abdomen 11/15  IMPRESSION     1. Pneumoperitoneum, possibly related to perforation of dilated slightly  thickened small bowel loops. 2. Mild colonic wall thickening may be due to underdistention. 3. Moderate-sized hiatal hernia. Assessment:   Alee Guillen is a 80 y.o. female who presents with perforation secondary to ischemic colitis now s/p resection. We are asked to see the patient in consult to assist in managing TPN induced hyperglycemia     11/22/2021. No acute events overnight, saw patient this morning, awake alert and oriented, conversive patient still very confused and does not know where she is or the reason why she is in the hospital, does not appear to be in any apparent distress, she is currently on TPN and managed by surgical team.  Hospitalist been consulted for hyperglycemia. Plan:     Hyperglycemia:   TPN induced hyperglycemia comlicated by underlying acute illness. Not optimised, BGL still running between 200-300. No recorded history of diabetes, this is likely due to TPN and acute illness, will likely normalize once acute resolves. Will get hemoglobin A1c to rule out diabetes. Continue basal abs sliding scale insulin, hypoglycemic protocol frequent Accu-Chek. Wean TPN as tolerated, per primary team     Afib with RVR:  Rate controlled. Normotensive. Remains on IV diltiazem and IV metoprolol  Cardiology following    Ischemic colitis with perforation s/p resection:  Seems to be improved since piror visit  Management per general surgery     No code status on file. Would suggest discussion with mPOA, palliative care consult is pending. Thank you for allowing us to participate in the care of this patient, we will continue to follow.        Signed By: Yolanda Dale MD Date of Service:  11/22/2021

## 2021-11-22 NOTE — PROGRESS NOTES
General Surgery Daily Progress Note    Admit Date: 11/15/2021  Post-Operative Day: 7 Days Post-Op from Procedure(s):  Laparoscopy Converted to Open, Right Colectomy, Extended Small Bowel Resection     Subjective:     Last 24 hrs: pt is awake and alert; says she is thirsty. Has vasquez. On tpn, npo w/  Ice chips  BS still up. On cardizem drip (w/ D5) for afib. WBC up today    Objective:     Blood pressure (!) 148/70, pulse 90, temperature 97.7 °F (36.5 °C), resp. rate 21, height 5' 4\" (1.626 m), weight 167 lb 11.2 oz (76.1 kg), SpO2 97 %. Temp (24hrs), Av °F (36.7 °C), Min:97.6 °F (36.4 °C), Max:98.9 °F (37.2 °C)      _____________________  Physical Exam:     Alert, in no acute distress. Cardiovascular: irreg. Rate controlled afib,  peripheral edema  Abdomen: soft, +BS, incision is c/d/i w/ staples      Assessment:   Active Problems:    Small bowel ischemia ([address] (2021)            Plan:     Speech to assess swallow today  D/c vasquez and place a pure wick  Cont zosyn  Will ask dietician about cutting D5 back in tpn. A1c is 5.5.   cardizem per cardiology  Daily labs    Data Review:    Recent Labs     21  0554 21  0128 21  0405   WBC 17.6* 14.2* 17.0*   HGB 9.5* 8.2* 8.3*   HCT 29.6* 26.4* 26.3*    159 209     Recent Labs     21  0554 21  0128 21  0405    148* 147*   K 3.3* 3.1* 3.0*   * 111* 111*   CO2 27 31 33*   * 337* 328*   BUN 35* 36* 37*   CREA 0.86 0.91 1.00   CA 8.2* 8.0* 7.9*   MG 1.4* 1.6 1.9   PHOS 2.2* 2.0* 2.4*     No results for input(s): AML, LPSE in the last 72 hours.         ______________________  Medications:    Current Facility-Administered Medications   Medication Dose Route Frequency    enoxaparin (LOVENOX) injection 80 mg  80 mg SubCUTAneous Q12H    TPN ADULT - CENTRAL   IntraVENous CONTINUOUS    insulin glargine (LANTUS) injection 25 Units  25 Units SubCUTAneous Q24H    glucose chewable tablet 16 g  4 Tablet Oral PRN  dextrose (D50W) injection syrg 12.5-25 g  25-50 mL IntraVENous PRN    glucagon (GLUCAGEN) injection 1 mg  1 mg IntraMUSCular PRN    insulin lispro (HUMALOG) injection   SubCUTAneous Q6H    lactated Ringers infusion  25 mL/hr IntraVENous CONTINUOUS    fat emulsion 20% (LIPOSYN, INTRAlipid) infusion 250 mL  250 mL IntraVENous Q MON, THU & SAT    metoprolol (LOPRESSOR) injection 5 mg  5 mg IntraVENous TID    acetaminophen (TYLENOL) suppository 650 mg  650 mg Rectal Q6H PRN    dilTIAZem (CARDIZEM) 125 mg in dextrose 5% 125 mL infusion  0-15 mg/hr IntraVENous TITRATE    piperacillin-tazobactam (ZOSYN) 3.375 g in 0.9% sodium chloride (MBP/ADV) 100 mL MBP  3.375 g IntraVENous Q8H    sodium chloride (NS) flush 5-40 mL  5-40 mL IntraVENous Q8H    sodium chloride (NS) flush 5-40 mL  5-40 mL IntraVENous PRN    HYDROmorphone (DILAUDID) injection 1 mg  1 mg IntraVENous Q4H PRN    naloxone (NARCAN) injection 0.4 mg  0.4 mg IntraVENous PRN    ondansetron (ZOFRAN) injection 4 mg  4 mg IntraVENous Q4H PRN       Christofer Hightower NP  11/22/2021    I have independently examined the patient and have reviewed the chart. I agree with the above plan. Doing much better, awake and responsive, no N/V, had a BM, pain minimal.  Awaiting speech assessment to start PO. I will start clears when she is cleared. TPN renewed. Incision looks good.   Making slow improvements    Bart Horner MD

## 2021-11-22 NOTE — PROGRESS NOTES
Physician Progress Note      PATIENT:               Suellen Grubbs  CSN #:                  [id number]  :                       3/13/1933  ADMIT DATE:       11/15/2021 7:03 PM  [address] [place] DATE:  RESPONDING  PROVIDER #:        Alvaro Christopher MD          QUERY TEXT:    Dear attending physician,    Noted documentation of severe malnutrition per dietary on 21. If possible, please document in progress notes and discharge summary if you are evaluating and/or treating any of the following: The medical record reflects the following:  Risk Factors: Pt admitted with pneumoperitoneum, now s/p R open colectomy and extended small bowel resection  Clinical indicators:  -Per dietary- Malnutrition Status:  Severe malnutrition  Context:  Acute illness  Findings of the 6 clinical characteristics of malnutrition:  Energy Intake:  7 - 50% or less of est energy requirements for 5 or more days  Weight Loss:  Unable to assess  Body Fat Loss:  7 - Moderate body fat loss, Buccal region  Muscle Mass Loss:  7 - Moderate muscle mass loss, Clavicles (pectoralis & deltoids), Temples (temporalis)  Fluid Accumulation:  7 - Moderate to severe, Generalized   Strength:  Not performed  Nutrition Diagnosis:  Inadequate oral intake related to altered GI structure, cognitive or neurological impairment as evidenced by NPO or clear liquid status due to medical condition  Treatment: Dietary consult, I&O, monitor weight, labwork, TPN    Thank you,    Hitesh Cole RN, BSN  Clinical documentation Improvement  [phone]  Options provided:  -- severe malnutrition  -- Malnutrition, please specify degree.   -- Other - I will add my own diagnosis  -- Disagree - Not applicable / Not valid  -- Disagree - Clinically unable to determine / Unknown  -- Refer to Clinical Documentation Reviewer    PROVIDER RESPONSE TEXT:    This patient has malnutrition    Query created by: Cesilia Joe on 2021 1:11 PM      Electronically signed by:  Alycia Mo Blnaca Cavazos MD 11/22/2021 2:53 PM

## 2021-11-22 NOTE — PROGRESS NOTES
SLP Contact Note    SLP evaluation complete. Pt presents with dubious bedside presentation with intermittent throat clear/cough with PO. Unclear etiology and thus will plan to complete a Fiberoptic Endoscopic Evaluation of Swallow (FEES) at bedside to objectively assess safety of swallow physiology. Full note to follow. Please allow patient ice chips and sips of water in the interim.       Thank you,  ADAM Mckeon.Ed, [address] [facility]  Speech-Language Pathologist

## 2021-11-23 ENCOUNTER — APPOINTMENT (OUTPATIENT)
Dept: CT IMAGING | Age: 86
DRG: 853 | End: 2021-11-23
Attending: SURGERY
Payer: MEDICARE

## 2021-11-23 LAB
ADMINISTERED INITIALS, ADMINIT: NORMAL
ANION GAP SERPL CALC-SCNC: 5 MMOL/L (ref 5–15)
ANION GAP SERPL CALC-SCNC: 7 MMOL/L (ref 5–15)
APPEARANCE UR: CLEAR
BACTERIA SPEC CULT: NORMAL
BACTERIA URNS QL MICRO: NEGATIVE /HPF
BILIRUB UR QL: NEGATIVE
BUN SERPL-MCNC: 32 MG/DL (ref 6–20)
BUN SERPL-MCNC: 34 MG/DL (ref 6–20)
BUN/CREAT SERPL: 44 (ref 12–20)
BUN/CREAT SERPL: 44 (ref 12–20)
CALCIUM SERPL-MCNC: 7.9 MG/DL (ref 8.5–10.1)
CALCIUM SERPL-MCNC: 8 MG/DL (ref 8.5–10.1)
CHLORIDE SERPL-SCNC: 111 MMOL/L (ref 97–108)
CHLORIDE SERPL-SCNC: 113 MMOL/L (ref 97–108)
CO2 SERPL-SCNC: 21 MMOL/L (ref 21–32)
CO2 SERPL-SCNC: 26 MMOL/L (ref 21–32)
COLOR UR: ABNORMAL
CREAT SERPL-MCNC: 0.73 MG/DL (ref 0.55–1.02)
CREAT SERPL-MCNC: 0.77 MG/DL (ref 0.55–1.02)
D50 ADMINISTERED, D50ADM: 0 ML
D50 ORDER, D50ORD: 0 ML
EPITH CASTS URNS QL MICRO: ABNORMAL /LPF
ERYTHROCYTE [DISTWIDTH] IN BLOOD BY AUTOMATED COUNT: 14.6 % (ref 11.5–14.5)
GLSCOM COMMENTS: NORMAL
GLUCOSE BLD STRIP.AUTO-MCNC: 107 MG/DL (ref 65–117)
GLUCOSE BLD STRIP.AUTO-MCNC: 123 MG/DL (ref 65–117)
GLUCOSE BLD STRIP.AUTO-MCNC: 127 MG/DL (ref 65–117)
GLUCOSE BLD STRIP.AUTO-MCNC: 137 MG/DL (ref 65–117)
GLUCOSE BLD STRIP.AUTO-MCNC: 137 MG/DL (ref 65–117)
GLUCOSE BLD STRIP.AUTO-MCNC: 147 MG/DL (ref 65–117)
GLUCOSE BLD STRIP.AUTO-MCNC: 147 MG/DL (ref 65–117)
GLUCOSE BLD STRIP.AUTO-MCNC: 158 MG/DL (ref 65–117)
GLUCOSE BLD STRIP.AUTO-MCNC: 169 MG/DL (ref 65–117)
GLUCOSE BLD STRIP.AUTO-MCNC: 176 MG/DL (ref 65–117)
GLUCOSE BLD STRIP.AUTO-MCNC: 177 MG/DL (ref 65–117)
GLUCOSE BLD STRIP.AUTO-MCNC: 178 MG/DL (ref 65–117)
GLUCOSE BLD STRIP.AUTO-MCNC: 178 MG/DL (ref 65–117)
GLUCOSE BLD STRIP.AUTO-MCNC: 192 MG/DL (ref 65–117)
GLUCOSE BLD STRIP.AUTO-MCNC: 208 MG/DL (ref 65–117)
GLUCOSE BLD STRIP.AUTO-MCNC: 248 MG/DL (ref 65–117)
GLUCOSE BLD STRIP.AUTO-MCNC: 300 MG/DL (ref 65–117)
GLUCOSE BLD STRIP.AUTO-MCNC: 324 MG/DL (ref 65–117)
GLUCOSE SERPL-MCNC: 128 MG/DL (ref 65–100)
GLUCOSE SERPL-MCNC: 145 MG/DL (ref 65–100)
GLUCOSE UR STRIP.AUTO-MCNC: 250 MG/DL
GLUCOSE, GLC: 107 MG/DL
GLUCOSE, GLC: 123 MG/DL
GLUCOSE, GLC: 137 MG/DL
GLUCOSE, GLC: 137 MG/DL
GLUCOSE, GLC: 147 MG/DL
GLUCOSE, GLC: 147 MG/DL
GLUCOSE, GLC: 158 MG/DL
GLUCOSE, GLC: 169 MG/DL
GLUCOSE, GLC: 176 MG/DL
GLUCOSE, GLC: 177 MG/DL
GLUCOSE, GLC: 178 MG/DL
GLUCOSE, GLC: 178 MG/DL
GLUCOSE, GLC: 192 MG/DL
GLUCOSE, GLC: 208 MG/DL
GLUCOSE, GLC: 248 MG/DL
GLUCOSE, GLC: 300 MG/DL
GLUCOSE, GLC: 324 MG/DL
HCT VFR BLD AUTO: 29.2 % (ref 35–47)
HGB BLD-MCNC: 8.9 G/DL (ref 11.5–16)
HGB UR QL STRIP: NEGATIVE
HIGH TARGET, HITG: 180 MG/DL
INSULIN ADMINSTERED, INSADM: 0.6 UNITS/HOUR
INSULIN ADMINSTERED, INSADM: 0.8 UNITS/HOUR
INSULIN ADMINSTERED, INSADM: 0.9 UNITS/HOUR
INSULIN ADMINSTERED, INSADM: 1 UNITS/HOUR
INSULIN ADMINSTERED, INSADM: 1.1 UNITS/HOUR
INSULIN ADMINSTERED, INSADM: 1.7 UNITS/HOUR
INSULIN ADMINSTERED, INSADM: 1.7 UNITS/HOUR
INSULIN ADMINSTERED, INSADM: 18.5 UNITS/HOUR
INSULIN ADMINSTERED, INSADM: 19.2 UNITS/HOUR
INSULIN ADMINSTERED, INSADM: 2.3 UNITS/HOUR
INSULIN ADMINSTERED, INSADM: 2.6 UNITS/HOUR
INSULIN ADMINSTERED, INSADM: 3.5 UNITS/HOUR
INSULIN ADMINSTERED, INSADM: 4.4 UNITS/HOUR
INSULIN ADMINSTERED, INSADM: 5.6 UNITS/HOUR
INSULIN ORDER, INSORD: 0.6 UNITS/HOUR
INSULIN ORDER, INSORD: 0.8 UNITS/HOUR
INSULIN ORDER, INSORD: 0.9 UNITS/HOUR
INSULIN ORDER, INSORD: 1 UNITS/HOUR
INSULIN ORDER, INSORD: 1.1 UNITS/HOUR
INSULIN ORDER, INSORD: 1.7 UNITS/HOUR
INSULIN ORDER, INSORD: 1.7 UNITS/HOUR
INSULIN ORDER, INSORD: 18.5 UNITS/HOUR
INSULIN ORDER, INSORD: 19.2 UNITS/HOUR
INSULIN ORDER, INSORD: 2.3 UNITS/HOUR
INSULIN ORDER, INSORD: 2.6 UNITS/HOUR
INSULIN ORDER, INSORD: 3.5 UNITS/HOUR
INSULIN ORDER, INSORD: 4.4 UNITS/HOUR
INSULIN ORDER, INSORD: 5.6 UNITS/HOUR
KETONES UR QL STRIP.AUTO: NEGATIVE MG/DL
LEUKOCYTE ESTERASE UR QL STRIP.AUTO: NEGATIVE
LOW TARGET, LOT: 130 MG/DL
MAGNESIUM SERPL-MCNC: 1.2 MG/DL (ref 1.6–2.4)
MAGNESIUM SERPL-MCNC: 1.4 MG/DL (ref 1.6–2.4)
MCH RBC QN AUTO: 29.9 PG (ref 26–34)
MCHC RBC AUTO-ENTMCNC: 30.5 G/DL (ref 30–36.5)
MCV RBC AUTO: 98 FL (ref 80–99)
MINUTES UNTIL NEXT BG, NBG: 60 MIN
MULTIPLIER, MUL: 0.01
MULTIPLIER, MUL: 0.02
MULTIPLIER, MUL: 0.03
MULTIPLIER, MUL: 0.07
MULTIPLIER, MUL: 0.08
NITRITE UR QL STRIP.AUTO: NEGATIVE
NRBC # BLD: 0 K/UL (ref 0–0.01)
NRBC BLD-RTO: 0 PER 100 WBC
ORDER INITIALS, ORDINIT: NORMAL
PH UR STRIP: 7 [PH] (ref 5–8)
PHOSPHATE SERPL-MCNC: 2.9 MG/DL (ref 2.6–4.7)
PLATELET # BLD AUTO: 209 K/UL (ref 150–400)
PMV BLD AUTO: 12.7 FL (ref 8.9–12.9)
POTASSIUM SERPL-SCNC: 3.5 MMOL/L (ref 3.5–5.1)
POTASSIUM SERPL-SCNC: 4 MMOL/L (ref 3.5–5.1)
PROT UR STRIP-MCNC: 30 MG/DL
RBC # BLD AUTO: 2.98 M/UL (ref 3.8–5.2)
RBC #/AREA URNS HPF: ABNORMAL /HPF (ref 0–5)
SERVICE CMNT-IMP: ABNORMAL
SERVICE CMNT-IMP: NORMAL
SERVICE CMNT-IMP: NORMAL
SODIUM SERPL-SCNC: 141 MMOL/L (ref 136–145)
SODIUM SERPL-SCNC: 142 MMOL/L (ref 136–145)
SP GR UR REFRACTOMETRY: 1.01 (ref 1–1.03)
UROBILINOGEN UR QL STRIP.AUTO: 0.2 EU/DL (ref 0.2–1)
WBC # BLD AUTO: 26.3 K/UL (ref 3.6–11)
WBC URNS QL MICRO: ABNORMAL /HPF (ref 0–4)

## 2021-11-23 PROCEDURE — 74011250636 HC RX REV CODE- 250/636: Performed by: HOSPITALIST

## 2021-11-23 PROCEDURE — 99024 POSTOP FOLLOW-UP VISIT: CPT | Performed by: NURSE PRACTITIONER

## 2021-11-23 PROCEDURE — 74177 CT ABD & PELVIS W/CONTRAST: CPT

## 2021-11-23 PROCEDURE — 74011000636 HC RX REV CODE- 636: Performed by: STUDENT IN AN ORGANIZED HEALTH CARE EDUCATION/TRAINING PROGRAM

## 2021-11-23 PROCEDURE — 74011000258 HC RX REV CODE- 258: Performed by: NURSE PRACTITIONER

## 2021-11-23 PROCEDURE — 83735 ASSAY OF MAGNESIUM: CPT

## 2021-11-23 PROCEDURE — 74011636637 HC RX REV CODE- 636/637: Performed by: INTERNAL MEDICINE

## 2021-11-23 PROCEDURE — 99233 SBSQ HOSP IP/OBS HIGH 50: CPT | Performed by: CLINICAL NURSE SPECIALIST

## 2021-11-23 PROCEDURE — 74011000258 HC RX REV CODE- 258: Performed by: HOSPITALIST

## 2021-11-23 PROCEDURE — 74011000250 HC RX REV CODE- 250: Performed by: HOSPITALIST

## 2021-11-23 PROCEDURE — 81001 URINALYSIS AUTO W/SCOPE: CPT

## 2021-11-23 PROCEDURE — 74011250636 HC RX REV CODE- 250/636: Performed by: INTERNAL MEDICINE

## 2021-11-23 PROCEDURE — 65660000001 HC RM ICU INTERMED STEPDOWN

## 2021-11-23 PROCEDURE — 74011250636 HC RX REV CODE- 250/636: Performed by: NURSE PRACTITIONER

## 2021-11-23 PROCEDURE — 74011000250 HC RX REV CODE- 250: Performed by: NURSE PRACTITIONER

## 2021-11-23 PROCEDURE — 85027 COMPLETE CBC AUTOMATED: CPT

## 2021-11-23 PROCEDURE — 74011636637 HC RX REV CODE- 636/637: Performed by: NURSE PRACTITIONER

## 2021-11-23 PROCEDURE — 80048 BASIC METABOLIC PNL TOTAL CA: CPT

## 2021-11-23 PROCEDURE — 74011000258 HC RX REV CODE- 258: Performed by: INTERNAL MEDICINE

## 2021-11-23 PROCEDURE — 82962 GLUCOSE BLOOD TEST: CPT

## 2021-11-23 PROCEDURE — 74011000250 HC RX REV CODE- 250: Performed by: INTERNAL MEDICINE

## 2021-11-23 PROCEDURE — 84100 ASSAY OF PHOSPHORUS: CPT

## 2021-11-23 PROCEDURE — 36415 COLL VENOUS BLD VENIPUNCTURE: CPT

## 2021-11-23 RX ADMIN — Medication 10 ML: at 21:48

## 2021-11-23 RX ADMIN — SODIUM CHLORIDE 3.5 UNITS/HR: 9 INJECTION, SOLUTION INTRAVENOUS at 17:17

## 2021-11-23 RX ADMIN — ASCORBIC ACID: 500 INJECTION, SOLUTION INTRAMUSCULAR; INTRAVENOUS; SUBCUTANEOUS at 19:05

## 2021-11-23 RX ADMIN — SODIUM CHLORIDE 1 UNITS/HR: 9 INJECTION, SOLUTION INTRAVENOUS at 12:17

## 2021-11-23 RX ADMIN — ENOXAPARIN SODIUM 80 MG: 80 INJECTION SUBCUTANEOUS at 06:34

## 2021-11-23 RX ADMIN — PIPERACILLIN SODIUM AND TAZOBACTAM SODIUM 3.38 G: 3; .375 INJECTION, POWDER, LYOPHILIZED, FOR SOLUTION INTRAVENOUS at 21:45

## 2021-11-23 RX ADMIN — METOPROLOL TARTRATE 5 MG: 5 INJECTION INTRAVENOUS at 21:45

## 2021-11-23 RX ADMIN — DILTIAZEM HYDROCHLORIDE 10 MG/HR: 5 INJECTION, SOLUTION INTRAVENOUS at 13:49

## 2021-11-23 RX ADMIN — SODIUM CHLORIDE 4.4 UNITS/HR: 9 INJECTION, SOLUTION INTRAVENOUS at 16:02

## 2021-11-23 RX ADMIN — SODIUM CHLORIDE 2.6 UNITS/HR: 9 INJECTION, SOLUTION INTRAVENOUS at 14:49

## 2021-11-23 RX ADMIN — SODIUM CHLORIDE 0.8 UNITS/HR: 9 INJECTION, SOLUTION INTRAVENOUS at 10:59

## 2021-11-23 RX ADMIN — METOPROLOL TARTRATE 5 MG: 5 INJECTION INTRAVENOUS at 10:03

## 2021-11-23 RX ADMIN — IOPAMIDOL 100 ML: 755 INJECTION, SOLUTION INTRAVENOUS at 10:00

## 2021-11-23 RX ADMIN — SODIUM CHLORIDE 2.3 UNITS/HR: 9 INJECTION, SOLUTION INTRAVENOUS at 05:34

## 2021-11-23 RX ADMIN — PIPERACILLIN SODIUM AND TAZOBACTAM SODIUM 3.38 G: 3; .375 INJECTION, POWDER, LYOPHILIZED, FOR SOLUTION INTRAVENOUS at 13:38

## 2021-11-23 RX ADMIN — DILTIAZEM HYDROCHLORIDE 5 MG/HR: 5 INJECTION, SOLUTION INTRAVENOUS at 18:25

## 2021-11-23 RX ADMIN — Medication 10 ML: at 13:39

## 2021-11-23 RX ADMIN — PIPERACILLIN SODIUM AND TAZOBACTAM SODIUM 3.38 G: 3; .375 INJECTION, POWDER, LYOPHILIZED, FOR SOLUTION INTRAVENOUS at 05:39

## 2021-11-23 RX ADMIN — SODIUM CHLORIDE 1.1 UNITS/HR: 9 INJECTION, SOLUTION INTRAVENOUS at 13:37

## 2021-11-23 RX ADMIN — Medication 10 ML: at 05:40

## 2021-11-23 RX ADMIN — ENOXAPARIN SODIUM 80 MG: 80 INJECTION SUBCUTANEOUS at 19:09

## 2021-11-23 RX ADMIN — METOPROLOL TARTRATE 5 MG: 5 INJECTION INTRAVENOUS at 17:25

## 2021-11-23 NOTE — PROGRESS NOTES
Cardiology Progress Note                                        Admit Date: 11/15/2021    Assessment/Plan:     PAF; still in Afib; if she can take po and then will switch to po regimen but right now continue current regimen; in my absence, call my team    Lorrie Hardin is a 80 y.o. female with     PROBLEM LIST:  Patient Active Problem List    Diagnosis Date Noted    Small bowel ischemia ([address] 11/16/2021         Subjective:     Lorrie Hardin reports none. Visit Vitals  BP (!) 132/48 (BP 1 Location: Left lower arm, BP Patient Position: Supine)   Pulse 84   Temp 98 °F (36.7 °C)   Resp 18   Ht 5' 4\" (1.626 m)   Wt 164 lb 6.4 oz (74.6 kg)   SpO2 96%   BMI 28.22 kg/m²       Intake/Output Summary (Last 24 hours) at 11/23/2021 1416  Last data filed at 11/23/2021 0330  Gross per 24 hour   Intake 3969.26 ml   Output --   Net 3969.26 ml       Objective:      Physical Exam:  HEENT: Perrla, EOMI  Neck: No JVD,  No thyroidmegaly  Resp: CTA bilaterally;  No wheezes or rales  CV: irregular s1s2 No murmur no s3  Abd:Soft, Nontender  Ext: No edema  Neuro: Alert and oriented; Nonfocal  Skin: Warm, Dry, Intact  Pulses: 2+ DP/PT/Rad      Telemetry: AFIB    Current Facility-Administered Medications   Medication Dose Route Frequency    TPN ADULT - CENTRAL   IntraVENous CONTINUOUS    enoxaparin (LOVENOX) injection 80 mg  80 mg SubCUTAneous Q12H    TPN ADULT - CENTRAL   IntraVENous CONTINUOUS    insulin regular (NOVOLIN R, HUMULIN R) 100 Units in 0.9% sodium chloride 100 mL infusion  1-10 Units/hr IntraVENous TITRATE    alteplase (CATHFLO) 2 mg in sterile water (preservative free) 2 mL injection  2 mg InterCATHeter PRN    glucose chewable tablet 16 g  4 Tablet Oral PRN    dextrose (D50W) injection syrg 12.5-25 g  25-50 mL IntraVENous PRN    glucagon (GLUCAGEN) injection 1 mg  1 mg IntraMUSCular PRN    lactated Ringers infusion  25 mL/hr IntraVENous CONTINUOUS    fat emulsion 20% (LIPOSYN, INTRAlipid) infusion 250 mL  250 mL IntraVENous Q MON, THU & SAT    metoprolol (LOPRESSOR) injection 5 mg  5 mg IntraVENous TID    acetaminophen (TYLENOL) suppository 650 mg  650 mg Rectal Q6H PRN    dilTIAZem (CARDIZEM) 125 mg in dextrose 5% 125 mL infusion  0-15 mg/hr IntraVENous TITRATE    piperacillin-tazobactam (ZOSYN) 3.375 g in 0.9% sodium chloride (MBP/ADV) 100 mL MBP  3.375 g IntraVENous Q8H    sodium chloride (NS) flush 5-40 mL  5-40 mL IntraVENous Q8H    sodium chloride (NS) flush 5-40 mL  5-40 mL IntraVENous PRN    HYDROmorphone (DILAUDID) injection 1 mg  1 mg IntraVENous Q4H PRN    naloxone (NARCAN) injection 0.4 mg  0.4 mg IntraVENous PRN    ondansetron (ZOFRAN) injection 4 mg  4 mg IntraVENous Q4H PRN         Data Review:   Labs:    Recent Results (from the past 24 hour(s))   GLUCOSE, POC    Collection Time: 11/22/21  4:31 PM   Result Value Ref Range    Glucose (POC) 402 (H) 65 - 117 mg/dL    Performed by [facility] POC    Collection Time: 11/22/21  7:15 PM   Result Value Ref Range    Glucose (POC) 442 (H) 65 - 117 mg/dL    Performed by [facility]    Collection Time: 11/22/21  7:20 PM   Result Value Ref Range    Glucose 442 mg/dL    Insulin order 7.6 units/hour    Insulin adminstered 7.6 units/hour    Multiplier 0.020     Low target 130 mg/dL    High target 180 mg/dL    D50 order 0.0 ml    D50 administered 0.00 ml    Minutes until next BG 60 min    Order initials KRM     Administered initials AN     GLSCOM Comments     GLUCOSE, POC    Collection Time: 11/22/21  8:24 PM   Result Value Ref Range    Glucose (POC) 468 (H) 65 - 117 mg/dL    Performed by [facility].  Robin (CON)    GLUCOSTABILIZER    Collection Time: 11/22/21  8:25 PM   Result Value Ref Range    Glucose 468 mg/dL    Insulin order 12.2 units/hour    Insulin adminstered 12.2 units/hour    Multiplier 0.030     Low target 130 mg/dL    High target 180 mg/dL    D50 order 0.0 ml    D50 administered 0.00 ml    Minutes until next BG 60 min    Order initials rm     Administered initials rm     GLSCOM Comments     GLUCOSE, POC    Collection Time: 11/22/21  9:21 PM   Result Value Ref Range    Glucose (POC) 390 (H) 65 - 117 mg/dL    Performed by Hannah Ascencio    Collection Time: 11/22/21  9:22 PM   Result Value Ref Range    Glucose 390 mg/dL    Insulin order 13.2 units/hour    Insulin adminstered 13.2 units/hour    Multiplier 0.040     Low target 130 mg/dL    High target 180 mg/dL    D50 order 0.0 ml    D50 administered 0.00 ml    Minutes until next BG 60 min    Order initials rm     Administered initials rm     GLSCOM Comments     GLUCOSE, POC    Collection Time: 11/22/21 10:24 PM   Result Value Ref Range    Glucose (POC) 368 (H) 65 - 117 mg/dL    Performed by Colt Kelly (CON)    GLUCOSTABILIZER    Collection Time: 11/22/21 10:24 PM   Result Value Ref Range    Glucose 368 mg/dL    Insulin order 15.4 units/hour    Insulin adminstered 15.4 units/hour    Multiplier 0.050     Low target 130 mg/dL    High target 180 mg/dL    D50 order 0.0 ml    D50 administered 0.00 ml    Minutes until next BG 60 min    Order initials rm     Administered initials rm     GLSCOM Comments     GLUCOSE, POC    Collection Time: 11/22/21 11:18 PM   Result Value Ref Range    Glucose (POC) 373 (H) 65 - 117 mg/dL    Performed by Colt Kelly (CON)    GLUCOSTABILIZER    Collection Time: 11/22/21 11:23 PM   Result Value Ref Range    Glucose 373 mg/dL    Insulin order 18.8 units/hour    Insulin adminstered 18.8 units/hour    Multiplier 0.060     Low target 130 mg/dL    High target 180 mg/dL    D50 order 0.0 ml    D50 administered 0.00 ml    Minutes until next BG 60 min    Order initials rm     Administered initials rm     GLSCOM Comments     GLUCOSE, POC    Collection Time: 11/23/21 12:21 AM   Result Value Ref Range    Glucose (POC) 324 (H) 65 - 117 mg/dL    Performed by Hannah Ascencio    Collection Time: 11/23/21 12:22 AM Result Value Ref Range    Glucose 324 mg/dL    Insulin order 18.5 units/hour    Insulin adminstered 18.5 units/hour    Multiplier 0.070     Low target 130 mg/dL    High target 180 mg/dL    D50 order 0.0 ml    D50 administered 0.00 ml    Minutes until next BG 60 min    Order initials rm     Administered initials rm     GLSCOM Comments     GLUCOSE, POC    Collection Time: 11/23/21  1:15 AM   Result Value Ref Range    Glucose (POC) 300 (H) 65 - 117 mg/dL    Performed by [employer] N Pravin     Collection Time: 11/23/21  1:15 AM   Result Value Ref Range    Glucose 300 mg/dL    Insulin order 19.2 units/hour    Insulin adminstered 19.2 units/hour    Multiplier 0.080     Low target 130 mg/dL    High target 180 mg/dL    D50 order 0.0 ml    D50 administered 0.00 ml    Minutes until next BG 60 min    Order initials rm     Administered initials rm     GLSCOM Comments     GLUCOSE, POC    Collection Time: 11/23/21  2:20 AM   Result Value Ref Range    Glucose (POC) 248 (H) 65 - 117 mg/dL    Performed by Rachel MCGOVERN Pravin     Collection Time: 11/23/21  2:20 AM   Result Value Ref Range    Glucose 248 mg/dL    Insulin order 5.6 units/hour    Insulin adminstered 5.6 units/hour    Multiplier 0.030     Low target 130 mg/dL    High target 180 mg/dL    D50 order 0.0 ml    D50 administered 0.00 ml    Minutes until next BG 60 min    Order initials rm     Administered initials rm     GLSCOM Comments     GLUCOSE, POC    Collection Time: 11/23/21  3:23 AM   Result Value Ref Range    Glucose (POC) 178 (H) 65 - 117 mg/dL    Performed by Welenmanuel HastingschildCelestino Kelly (CON)    GLUCOSTABILIZER    Collection Time: 11/23/21  3:23 AM   Result Value Ref Range    Glucose 178 mg/dL    Insulin order 3.5 units/hour    Insulin adminstered 3.5 units/hour    Multiplier 0.030     Low target 130 mg/dL    High target 180 mg/dL    D50 order 0.0 ml    D50 administered 0.00 ml    Minutes until next BG 60 min    Order initials rm     Administered initials rm     GLSCOM Comments     MAGNESIUM    Collection Time: 11/23/21  3:36 AM   Result Value Ref Range    Magnesium 1.2 (L) 1.6 - 2.4 mg/dL   PHOSPHORUS    Collection Time: 11/23/21  3:36 AM   Result Value Ref Range    Phosphorus 2.9 2.6 - 4.7 MG/DL   CBC W/O DIFF    Collection Time: 11/23/21  3:36 AM   Result Value Ref Range    WBC 26.3 (H) 3.6 - 11.0 K/uL    RBC 2.98 (L) 3.80 - 5.20 M/uL    HGB 8.9 (L) 11.5 - 16.0 g/dL    HCT 29.2 (L) 35.0 - 47.0 %    MCV 98.0 80.0 - 99.0 FL    MCH 29.9 26.0 - 34.0 PG    MCHC 30.5 30.0 - 36.5 g/dL    RDW 14.6 (H) 11.5 - 14.5 %    PLATELET 597 838 - 476 K/uL    MPV 12.7 8.9 - 12.9 FL    NRBC 0.0 0  WBC    ABSOLUTE NRBC 0.00 0.00 - 3.92 K/uL   METABOLIC PANEL, BASIC    Collection Time: 11/23/21  3:36 AM   Result Value Ref Range    Sodium 141 136 - 145 mmol/L    Potassium 4.0 3.5 - 5.1 mmol/L    Chloride 113 (H) 97 - 108 mmol/L    CO2 21 21 - 32 mmol/L    Anion gap 7 5 - 15 mmol/L    Glucose 128 (H) 65 - 100 mg/dL    BUN 32 (H) 6 - 20 MG/DL    Creatinine 0.73 0.55 - 1.02 MG/DL    BUN/Creatinine ratio 44 (H) 12 - 20      GFR est AA >60 >60 ml/min/1.73m2    GFR est non-AA >60 >60 ml/min/1.73m2    Calcium 8.0 (L) 8.5 - 10.1 MG/DL   GLUCOSE, POC    Collection Time: 11/23/21  4:26 AM   Result Value Ref Range    Glucose (POC) 177 (H) 65 - 117 mg/dL    Performed by Cholo Dyer    GLUCOSTABILIZER    Collection Time: 11/23/21  4:28 AM   Result Value Ref Range    Glucose 177 mg/dL    Insulin order 3.5 units/hour    Insulin adminstered 3.5 units/hour    Multiplier 0.030     Low target 130 mg/dL    High target 180 mg/dL    D50 order 0.0 ml    D50 administered 0.00 ml    Minutes until next BG 60 min    Order initials rm     Administered initials rm     GLSCOM Comments     GLUCOSE, POC    Collection Time: 11/23/21  5:31 AM   Result Value Ref Range    Glucose (POC) 137 (H) 65 - 117 mg/dL    Performed by Kiara Martin    Collection Time: 11/23/21  5:32 AM   Result Value Ref Range    Glucose 137 mg/dL    Insulin order 2.3 units/hour    Insulin adminstered 2.3 units/hour    Multiplier 0.030     Low target 130 mg/dL    High target 180 mg/dL    D50 order 0.0 ml    D50 administered 0.00 ml    Minutes until next BG 60 min    Order initials LF     Administered initials LF     GLSCOM Comments     GLUCOSE, POC    Collection Time: 11/23/21  6:27 AM   Result Value Ref Range    Glucose (POC) 107 65 - 117 mg/dL    Performed by Cristi Kelly (CON)    GLUCOSTABILIZER    Collection Time: 11/23/21  6:28 AM   Result Value Ref Range    Glucose 107 mg/dL    Insulin order 0.9 units/hour    Insulin adminstered 0.9 units/hour    Multiplier 0.020     Low target 130 mg/dL    High target 180 mg/dL    D50 order 0.0 ml    D50 administered 0.00 ml    Minutes until next BG 60 min    Order initials rm     Administered initials rm     GLSCOM Comments     GLUCOSE, POC    Collection Time: 11/23/21  7:24 AM   Result Value Ref Range    Glucose (POC) 147 (H) 65 - 117 mg/dL    Performed by Rory Perez    GLUCOSTABILIZER    Collection Time: 11/23/21  7:27 AM   Result Value Ref Range    Glucose 147 mg/dL    Insulin order 1.7 units/hour    Insulin adminstered 1.7 units/hour    Multiplier 0.020     Low target 130 mg/dL    High target 180 mg/dL    D50 order 0.0 ml    D50 administered 0.00 ml    Minutes until next BG 60 min    Order initials rm     Administered initials rm     GLSCOM Comments     GLUCOSE, POC    Collection Time: 11/23/21  8:31 AM   Result Value Ref Range    Glucose (POC) 147 (H) 65 - 117 mg/dL    Performed by Cristi Kelly (CON)    GLUCOSTABILIZER    Collection Time: 11/23/21  8:35 AM   Result Value Ref Range    Glucose 147 mg/dL    Insulin order 1.7 units/hour    Insulin adminstered 1.7 units/hour    Multiplier 0.020     Low target 130 mg/dL    High target 180 mg/dL    D50 order 0.0 ml    D50 administered 0.00 ml    Minutes until next BG 60 min    Order initials kj     Administered initials kj     GLSCOM Comments     GLUCOSE, POC    Collection Time: 11/23/21  9:43 AM   Result Value Ref Range    Glucose (POC) 123 (H) 65 - 117 mg/dL    Performed by Irving Almaraz    Collection Time: 11/23/21  9:45 AM   Result Value Ref Range    Glucose 123 mg/dL    Insulin order 0.6 units/hour    Insulin adminstered 0.6 units/hour    Multiplier 0.010     Low target 130 mg/dL    High target 180 mg/dL    D50 order 0.0 ml    D50 administered 0.00 ml    Minutes until next BG 60 min    Order initials kj     Administered initials kj     GLSCOM Comments     GLUCOSE, POC    Collection Time: 11/23/21 10:52 AM   Result Value Ref Range    Glucose (POC) 137 (H) 65 - 117 mg/dL    Performed by Ankita Cheney (TAMMY)    GLUCOSTABILIZER    Collection Time: 11/23/21 10:57 AM   Result Value Ref Range    Glucose 137 mg/dL    Insulin order 0.8 units/hour    Insulin adminstered 0.8 units/hour    Multiplier 0.010     Low target 130 mg/dL    High target 180 mg/dL    D50 order 0.0 ml    D50 administered 0.00 ml    Minutes until next BG 60 min    Order initials gmb     Administered initials gmb     GLSCOM Comments     GLUCOSE, POC    Collection Time: 11/23/21 12:12 PM   Result Value Ref Range    Glucose (POC) 158 (H) 65 - 117 mg/dL    Performed by Malena MARTINEZ)    GLUCOSTABILIZER    Collection Time: 11/23/21 12:16 PM   Result Value Ref Range    Glucose 158 mg/dL    Insulin order 1.0 units/hour    Insulin adminstered 1.0 units/hour    Multiplier 0.010     Low target 130 mg/dL    High target 180 mg/dL    D50 order 0.0 ml    D50 administered 0.00 ml    Minutes until next BG 60 min    Order initials TB     Administered initials TB     GLSCOM Comments     GLUCOSE, POC    Collection Time: 11/23/21  1:32 PM   Result Value Ref Range    Glucose (POC) 169 (H) 65 - 117 mg/dL    Performed by Ankita Cheney (TAMMY)    GLUCOSTABILIZER    Collection Time: 11/23/21  1:35 PM   Result Value Ref Range    Glucose 169 mg/dL    Insulin order 1.1 units/hour    Insulin adminstered 1.1 units/hour    Multiplier 0.010     Low target 130 mg/dL    High target 180 mg/dL    D50 order 0.0 ml    D50 administered 0.00 ml    Minutes until next BG 60 min    Order initials gmb     Administered initials gmb     GLSCOM Comments

## 2021-11-23 NOTE — PROGRESS NOTES
2000 Report received from [facility], [facility]. Patient alert and oriented to self, resting in bed and no needs expressed. Insulin gtt and TPN verified. Patient repositioned in the bed and call bell left within reach. Patient becoming agitated and more confused yelling and trying to get out of bed so she can go to the kitchen to make dinner. Patient reoriented several times by staff but patient still determined to get out bed. Problem: Pressure Injury - Risk of  Goal: *Prevention of pressure injury  Description: Document Brian Scale and appropriate interventions in the flowsheet. Outcome: Progressing Towards Goal  Note: Pressure Injury Interventions:  Sensory Interventions: Assess changes in LOC, Monitor skin under medical devices    Moisture Interventions: Assess need for specialty bed, Minimize layers    Activity Interventions: Increase time out of bed, Pressure redistribution bed/mattress(bed type)    Mobility Interventions: HOB 30 degrees or less, Turn and reposition approx. every two hours(pillow and wedges)    Nutrition Interventions: Document food/fluid/supplement intake    Friction and Shear Interventions: Apply protective barrier, creams and emollients, Minimize layers    Problem: Falls - Risk of  Goal: *Absence of Falls  Description: Document Carson Fall Risk and appropriate interventions in the flowsheet. Outcome: Progressing Towards Goal  Note: Fall Risk Interventions:       Mentation Interventions: Adequate sleep, hydration, pain control, Room close to nurse's station    Medication Interventions: Evaluate medications/consider consulting pharmacy, Patient to call before getting OOB    Elimination Interventions: Bed/chair exit alarm, Patient to call for help with toileting needs    History of Falls Interventions: Bed/chair exit alarm, Vital signs minimum Q4HRs X 24 hrs (comment for end date)       9328 Bedside shift change report given to Kelsie Chase RN and Meng Kaur LPN by [employer].  Report included the following information GUI, Miller, MAR, Accordion, Recent Results and Cardiac Rhythm Afib.

## 2021-11-23 NOTE — DIABETES MGMT
[address] [facility]    CLINICAL NURSE SPECIALIST CONSULT     INITIAL NOTE    Initial Presentation   Alee Guillen is a 80 y.o. female admitted 11/15/21  with abdominal pain. + N/V at admission. S/p hip fracture repair- Resides in [place] - consult received for management of hospital BG while on TPN. HX:   Past Medical History:   Diagnosis Date    Atrial fibrillation ([address]     Atrial flutter (HCC)     Edema     Hypertension     Hypokalemia     Hypothyroidism     Insomnia     Major depressive disorder     Pain     UTI (urinary tract infection)     Vitamin D deficiency         INITIAL DX:   Small bowel ischemia ([address] [K55.9]     Current Treatment     TX: 11/16 Laparoscopy Converted to Open, Right Colectomy, Extended Small Bowel Resection     Consulted by Provider for advanced diabetes nursing assessment and care for:   [x] Inpatient management strategy  Hospital Course   Clinical progress has been complicated by metabolic acidosis/ hypovolemia- required ICU level of care . 11/16: patient improved in ICU- Lactic trending down-   11/17: NGT to suction; per notes continued to improve; on PM rounds less conversant/ tachycardic/ BP stable-afib with rvr-cardiology consulted  11/18: febrile overnight- 100.7F/not conversant/ cardiazem infusion for uncontrolled afib/ECHO ordered/ PICC line to be placed for TPN  11/19: URSULA drain pulled out/ NGT in  Place -scant drainage/ noted palliative consult/ TPN started  11/20: patient arousable/ guarded prognosis per surgery/ BG rising per notes  11/21: BG still high per notes while on TPN  11/22: insulin infusion noted - BG improved overnight- will transition all insulin into patient's TPN to better manage hyperglycemia -     Diabetes History   NO DM2 history -A1C 5.5%. Diabetes Medication History  Key Antihyperglycemic Medications     Patient is on no antihyperglycemic meds.            Subjective   ANEESH in CT scan     Objective Physical exam  General Normal weight  female in no acute distress/ill-appearing. Conversant and cooperative  Neuro  Alert, oriented   Vital Signs   Visit Vitals  BP (!) 139/54   Pulse 92   Temp 98.4 °F (36.9 °C)   Resp 20   Ht 5' 4\" (1.626 m)   Wt 74.6 kg (164 lb 6.4 oz)   SpO2 96%   BMI 28.22 kg/m²     Skin  Warm and dry. Heart   Regular rate and rhythm. No murmurs, rubs or gallops  Lungs  Clear to auscultation without rales or rhonchi  Extremities No foot wounds      Laboratory  Recent Labs     11/23/21  0336 11/22/21  0554 11/21/21  0128   * 279* 337*   AGAP 7 5 6   WBC 26.3* 17.6* 14.2*   CREA 0.73 0.86 0.91   GFRNA >60 >60 58*       Factors impacting BG management  Factor Dose Comments   Nutrition:  TPN   324g dextrose/ bag      Drugs:  Vasopressor load         Required in ICU-discontinued  *     Affects insulin delivery     Pain Post op    Infection Small bowel ischemia    Other:   Kidney function  Liver function     WNL  WNL      Blood glucose pattern      Significant diabetes-related events over the past 24-72 hours  Started on TPN post op 11/19- BG started to rise response 270-450s- started on insulin infusion in effort to control hyperglycemia assoc. With TPN.    BG improved today (11/23)- will transition off infusion as not appropriate to keep her on since no diabetes  Will put insulin in TPN that will appropriately cover for dextrose in TPN  NPO    Assessment and Plan   Nursing Diagnosis Risk for unstable blood glucose pattern   Nursing Intervention Domain 5250 Decision-making Support   Nursing Interventions Examined current inpatient diabetes control   Explored factors facilitating and impeding inpatient management  Identified self-management practices impeding diabetes control  Explored corrective strategies with patient and responsible inpatient provider   Informed patient of rational for insulin strategy while hospitalized     Evaluation   This  female, NO diabetes is s/p small bowel resection and right colectomy for ischemic small bowel on 11/16/21. Progress has been complicated by post op fever/ onset of afib with rvr requiring cardiazem/ marked hyperglycemia with TPN/ cognitive decline while hospitalized. Currently on step down unit- in order to manage marked hyperglycemia in setting of TPN, [facility] was consulted to assist.  After conferring with hospitalist,  Dr. Khushboo Kevin, will stop insulin infusion today and add insulin to TPN to better manage hyperglycemia. This will also be safer as well. During this hospitalization, the patient has notachieved inpatient blood glucose target of 100-180mg/dl. Several factors have played a role in blood glucose management including:  [x]  Changing nutritive sources & needs     Blood glucose management would be improved bg  [x]  Adding insulin to override the dextrose within TPN  Recommendations   1. Total dextrose /bag= 324g; Will add insulin to TPN 1:8 dextrose coverage=40units/L  2. IF FBG >200, increase 1:7 = 46 units/L;   3. IF FBG <100, reduce insulin in TPN to 1:15= 21 units/ L    [x] Use of Subcutaneous Insulin Order set (1935)  Insulin Dosing Specific recommendation   CONTINUE Corrective                                       (Useful in adjusting insulin dosing) [x] Normal sensitivity          Billing Code(s)   70397    Before making these care recommendations, I personally reviewed the hospitalization record, including notes, laboratory & diagnostic data and current medications, and examined the patient at the bedside (circumstances permitting) before making care recommendations.      Total minutes: [address] PEREZ Norman  Diabetes Clinical Nurse Specialist  [facility]  Access via [address] [place] [address]

## 2021-11-23 NOTE — PROGRESS NOTES
Bedside shift change report given to Shereen MUNOZ (oncoming nurse) by Alicia Gallardo RN (offgoing nurse). Report included the following information SBAR, Intake/Output, MAR, Recent Results and Cardiac Rhythm afib.

## 2021-11-23 NOTE — PROGRESS NOTES
General Surgery Daily Progress Note    Admit Date: 11/15/2021  Post-Operative Day: 8 Days Post-Op from Procedure(s):  Laparoscopy Converted to Open, Right Colectomy, Extended Small Bowel Resection     Subjective:     Last 24 hrs: pt placed on insulin gtt last night for high BG. Now WnL. She is going to CT - WBC 26K, no fevers   She denies pain. Objective:     Blood pressure (!) 149/52, pulse (!) 102, temperature 98 °F (36.7 °C), resp. rate 22, height 5' 4\" (1.626 m), weight 164 lb 6.4 oz (74.6 kg), SpO2 96 %. Temp (24hrs), Av.2 °F (36.8 °C), Min:97.9 °F (36.6 °C), Max:98.5 °F (36.9 °C)      _____________________  Physical Exam:     Alert,  in no acute distress. Cardiovascular: RRR, no peripheral edema  Lungs:CTAB anteriorally  Abdomen: flat, soft, +BS, staples/incision c/d/i       Assessment:   Active Problems:    Small bowel ischemia (HCC) (2021)            Plan:     Renew tpn w/ insulin 40U  D/c insulin drip when tpn is hung this evening  Await CT results  Monitor labs daily as doing  dvt proph  Cont zosyn  Re-consult palliative - order placed previously    Data Review:    Recent Labs     21  0336 21  0554 21  0128   WBC 26.3* 17.6* 14.2*   HGB 8.9* 9.5* 8.2*   HCT 29.2* 29.6* 26.4*    160 159     Recent Labs     21  0336 21  0554 21  0128    144 148*   K 4.0 3.3* 3.1*   * 112* 111*   CO2 21 27 31   * 279* 337*   BUN 32* 35* 36*   CREA 0.73 0.86 0.91   CA 8.0* 8.2* 8.0*   MG 1.2* 1.4* 1.6   PHOS 2.9 2.2* 2.0*     No results for input(s): AML, LPSE in the last 72 hours.         ______________________  Medications:    Current Facility-Administered Medications   Medication Dose Route Frequency    iopamidoL (ISOVUE-370) 76 % injection 100 mL  100 mL IntraVENous RAD ONCE    enoxaparin (LOVENOX) injection 80 mg  80 mg SubCUTAneous Q12H    TPN ADULT - CENTRAL   IntraVENous CONTINUOUS    insulin regular (NOVOLIN R, HUMULIN R) 100 Units in 0.9% sodium chloride 100 mL infusion  1-10 Units/hr IntraVENous TITRATE    alteplase (CATHFLO) 2 mg in sterile water (preservative free) 2 mL injection  2 mg InterCATHeter PRN    glucose chewable tablet 16 g  4 Tablet Oral PRN    dextrose (D50W) injection syrg 12.5-25 g  25-50 mL IntraVENous PRN    glucagon (GLUCAGEN) injection 1 mg  1 mg IntraMUSCular PRN    lactated Ringers infusion  25 mL/hr IntraVENous CONTINUOUS    fat emulsion 20% (LIPOSYN, INTRAlipid) infusion 250 mL  250 mL IntraVENous Q MON, THU & SAT    metoprolol (LOPRESSOR) injection 5 mg  5 mg IntraVENous TID    acetaminophen (TYLENOL) suppository 650 mg  650 mg Rectal Q6H PRN    dilTIAZem (CARDIZEM) 125 mg in dextrose 5% 125 mL infusion  0-15 mg/hr IntraVENous TITRATE    piperacillin-tazobactam (ZOSYN) 3.375 g in 0.9% sodium chloride (MBP/ADV) 100 mL MBP  3.375 g IntraVENous Q8H    sodium chloride (NS) flush 5-40 mL  5-40 mL IntraVENous Q8H    sodium chloride (NS) flush 5-40 mL  5-40 mL IntraVENous PRN    HYDROmorphone (DILAUDID) injection 1 mg  1 mg IntraVENous Q4H PRN    naloxone (NARCAN) injection 0.4 mg  0.4 mg IntraVENous PRN    ondansetron (ZOFRAN) injection 4 mg  4 mg IntraVENous Q4H PRN       Sari Primrose, NP  11/23/2021        I have independently examined the patient and have reviewed the chart. I agree with the above plan. Patient's white blood cell count is up today, CT scan is done and shows no sign of any leak or abscess. Everything appears to be fine from the surgery. Her bowels are decompressed no signs of bowel obstruction. She will remain on TPN. Sent off a urinalysis to see if any urinary tract infection she did have an indwelling Pearson for a while. Continue antibiotics. No sign of pneumonia. Having palliative care come by and see her. Currently she is stable but if she had any major decline in status would want to have limits of care established. Restart her back on clear liquids today.     Blanca Kim MD

## 2021-11-23 NOTE — PROGRESS NOTES
[facility]  Hospitalist Group                                                                                          Hospitalist Progress Note  Ambreen Guerra MD  Answering service: [phone] from in house phone        Date of Service:  2021  NAME:  Aric Hickman  :  3/13/1933  MRN:  [id number]      Admission Summary:   Aric Hickman is a 80 y.o. female with pmh of Afib/flutter, HTN, hypothyroidism who presents with bowel ischemia now s/p resection by Dr. Leandra Matthew. Course complicated by Afib with RVR, and hyperglycemia     Interval history / Subjective: Insulin gtt added yesterday. Pt does not have h/o diabetes. We will plan to add insulin to TPN. Patient is feeling well. Going for CT today      Assessment & Plan:     TPN induced hyperglycemia   - DC insulin gtt at 7pm when new bag of TPN hangs  - Please add 40U of regular insulin to TPN bag. Pt does not have diabetes, and does not need tight sugar control. High risk for hypoglycemia when off TPN. Discussed with PHILIPPE Woodall   - Patient does not have diabetes, and BS will normalize when off TPN, and infection resolves. - Contineu SSI, POCT glucose checks and hypoglycemia protocol per hospital policy  - Wean TPN as tolerated, per primary team. Please ensure all long acting insulin is on hold if patient is taken off TPN.   - Noted speech has cleared, and patient on clear liquid diet.      Afib with RVR - HR much improved. - Remains on IV diltiazem and IV metoprolol  - Cardiology following     Ischemic colitis with perforation s/p resection  - Management per general surgery   - CT abdomen today        Hospital Problems  Never Reviewed          Codes Class Noted POA    Small bowel ischemia [facility]) ICD-10-CM: K55.9  ICD-9-CM: 557.9  2021 Unknown                Review of Systems:   A comprehensive review of systems was negative except for that written in the HPI.        Vital Signs:    Last 24hrs VS reviewed since prior progress note. Most recent are:  Visit Vitals  BP (!) 139/54   Pulse 92   Temp 98.4 °F (36.9 °C)   Resp 20   Ht 5' 4\" (1.626 m)   Wt 74.6 kg (164 lb 6.4 oz)   SpO2 96%   BMI 28.22 kg/m²         Intake/Output Summary (Last 24 hours) at 11/23/2021 1013  Last data filed at 11/23/2021 0330  Gross per 24 hour   Intake 3969.26 ml   Output --   Net 3969.26 ml        Physical Examination:     I had a face to face encounter with this patient and independently examined them on 11/23/2021 as outlined below:          Constitutional:  No acute distress, cooperative, pleasant, elderly female    ENT:  Oral mucosa moist, oropharynx benign. Resp:  CTA bilaterally. No wheezing/rhonchi/rales. No accessory muscle use   CV:  Regular rhythm, normal rate, no murmurs, gallops, rubs    GI:  Soft, non distended, hypoactive. no hepatosplenomegaly, incision c/d/i     Musculoskeletal:  No edema, warm, 2+ pulses throughout    Neurologic:  Moves all extremities. Answering questions appropriately, CN II-XII reviewed            Data Review:    Review and/or order of clinical lab test  Review and/or order of tests in the radiology section of CPT  Review and/or order of tests in the medicine section of CPT      Labs:     Recent Labs     11/23/21  0336 11/22/21  0554   WBC 26.3* 17.6*   HGB 8.9* 9.5*   HCT 29.2* 29.6*    160     Recent Labs     11/23/21  0336 11/22/21  0554 11/21/21  0128    144 148*   K 4.0 3.3* 3.1*   * 112* 111*   CO2 21 27 31   BUN 32* 35* 36*   CREA 0.73 0.86 0.91   * 279* 337*   CA 8.0* 8.2* 8.0*   MG 1.2* 1.4* 1.6   PHOS 2.9 2.2* 2.0*     No results for input(s): ALT, AP, TBIL, TBILI, TP, ALB, GLOB, GGT, AML, LPSE in the last 72 hours. No lab exists for component: SGOT, GPT, AMYP, HLPSE  No results for input(s): INR, PTP, APTT, INREXT in the last 72 hours. No results for input(s): FE, TIBC, PSAT, FERR in the last 72 hours.    No results found for: FOL, RBCF   No results for input(s): PH, PCO2, PO2 in the last 72 hours. No results for input(s): CPK, CKNDX, TROIQ in the last 72 hours.     No lab exists for component: CPKMB  No results found for: CHOL, CHOLX, CHLST, CHOLV, HDL, HDLP, LDL, LDLC, DLDLP, TGLX, TRIGL, TRIGP, CHHD, CHHDX  Lab Results   Component Value Date/Time    Glucose (POC) 123 (H) 11/23/2021 09:43 AM    Glucose (POC) 147 (H) 11/23/2021 08:31 AM    Glucose (POC) 147 (H) 11/23/2021 07:24 AM    Glucose (POC) 107 11/23/2021 06:27 AM    Glucose (POC) 137 (H) 11/23/2021 05:31 AM     No results found for: COLOR, APPRN, SPGRU, REFSG, ANA, PROTU, GLUCU, KETU, BILU, UROU, MANUELA, LEUKU, GLUKE, EPSU, BACTU, WBCU, RBCU, CASTS, UCRY      Medications Reviewed:     Current Facility-Administered Medications   Medication Dose Route Frequency    iopamidoL (ISOVUE-370) 76 % injection 100 mL  100 mL IntraVENous RAD ONCE    enoxaparin (LOVENOX) injection 80 mg  80 mg SubCUTAneous Q12H    TPN ADULT - CENTRAL   IntraVENous CONTINUOUS    insulin regular (NOVOLIN R, HUMULIN R) 100 Units in 0.9% sodium chloride 100 mL infusion  1-10 Units/hr IntraVENous TITRATE    alteplase (CATHFLO) 2 mg in sterile water (preservative free) 2 mL injection  2 mg InterCATHeter PRN    glucose chewable tablet 16 g  4 Tablet Oral PRN    dextrose (D50W) injection syrg 12.5-25 g  25-50 mL IntraVENous PRN    glucagon (GLUCAGEN) injection 1 mg  1 mg IntraMUSCular PRN    lactated Ringers infusion  25 mL/hr IntraVENous CONTINUOUS    fat emulsion 20% (LIPOSYN, INTRAlipid) infusion 250 mL  250 mL IntraVENous Q MON, THU & SAT    metoprolol (LOPRESSOR) injection 5 mg  5 mg IntraVENous TID    acetaminophen (TYLENOL) suppository 650 mg  650 mg Rectal Q6H PRN    dilTIAZem (CARDIZEM) 125 mg in dextrose 5% 125 mL infusion  0-15 mg/hr IntraVENous TITRATE    piperacillin-tazobactam (ZOSYN) 3.375 g in 0.9% sodium chloride (MBP/ADV) 100 mL MBP  3.375 g IntraVENous Q8H    sodium chloride (NS) flush 5-40 mL  5-40 mL IntraVENous Q8H    sodium chloride (NS) flush 5-40 mL  5-40 mL IntraVENous PRN    HYDROmorphone (DILAUDID) injection 1 mg  1 mg IntraVENous Q4H PRN    naloxone (NARCAN) injection 0.4 mg  0.4 mg IntraVENous PRN    ondansetron (ZOFRAN) injection 4 mg  4 mg IntraVENous Q4H PRN     ______________________________________________________________________  EXPECTED LENGTH OF STAY: 10d 7h  ACTUAL LENGTH OF STAY:          8                 Lucio Prince MD

## 2021-11-23 NOTE — PROGRESS NOTES
This rn took a message from lab about critical values/possible contamination. Glucose came back as 1000, and k+ 7.1 - likely contaminated and will redraw.

## 2021-11-23 NOTE — PROGRESS NOTES
Palliative Medicine  [place]: [phone]  [facility]: [phone])      Palliative Medicine following along with you- attempts previously made to reach family have been unsuccessful. KECIA spoke with bedside RN- patient is more awake/alert, however, still having periods of intermittent confusion- was taken to CT today, WBC rising. SW attempted to meet with patient at bedside- she was sleeping soundly, did not awake to voice, no family at bedside. SW called the patient's daughter Sridhar Ross, unable to reach- SW left voicemail with contact information requesting a call back. KECIA also attempted other emergency contacts- Pamela Card and [employer] [facility], unable to reach either- [employer] not set-up yet, and [employer] phone number listed is a [employer]- unable to reach. 17:10 p.m.- SW received call back from [facility], the patient's daughter- KECIA introduced the role of Palliative Medicine as an added layer of support, help in putting all of the pieces together in the patient's care, etc.     Javier Bran provided additional history- the patient has been living at [address] [place] [address] for approximately 3-4 months, s/p hip and femur fracture- Irisluan shared prior to her hip fracture, the patient was living independently, has been to rehab facilities in the past and Javier Bran shares this was a horrible experience for her mother. The patient is , her  passed away approximately 19 years ago- the patient has three children, Edu Barton in [facility], and Pamela G[place]me[place] in [facility]. Latasha shared that Pamela Gómez just recently underwent major CABG surgery, and is recovering at the [facility]- her other brother Edu Barton had to drive in from [facility] to assist in caring for her brother. Javier Bran has been juggling making trips to and from [facility] and [place], helping assist her brother as well as mother. KECIA provided support to Javier Bran.      KECIA inquired about [employer] Medical Directive- Javier Bran shares that she does have an AMD, SW asked Latasha to bring to hospital tomorrow-     Palliative Medicine made plan to meet with the patient's daughter at 11:30 a.m. to further discuss the patient's care. Zoya \"Irisy\" will bring AMD tomorrow to meeting. Thank you for including Palliative Medicine in the care of Josefa Gunderson.        Yolie Rios LCSW  [phone]

## 2021-11-24 LAB
ANION GAP SERPL CALC-SCNC: 5 MMOL/L (ref 5–15)
BASOPHILS # BLD: 0 K/UL (ref 0–0.1)
BASOPHILS NFR BLD: 0 % (ref 0–1)
BUN SERPL-MCNC: 35 MG/DL (ref 6–20)
BUN/CREAT SERPL: 47 (ref 12–20)
CALCIUM SERPL-MCNC: 7.8 MG/DL (ref 8.5–10.1)
CHLORIDE SERPL-SCNC: 112 MMOL/L (ref 97–108)
CO2 SERPL-SCNC: 25 MMOL/L (ref 21–32)
CREAT SERPL-MCNC: 0.75 MG/DL (ref 0.55–1.02)
DIFFERENTIAL METHOD BLD: ABNORMAL
EOSINOPHIL # BLD: 0.4 K/UL (ref 0–0.4)
EOSINOPHIL NFR BLD: 2 % (ref 0–7)
ERYTHROCYTE [DISTWIDTH] IN BLOOD BY AUTOMATED COUNT: 14.9 % (ref 11.5–14.5)
GLUCOSE BLD STRIP.AUTO-MCNC: 106 MG/DL (ref 65–117)
GLUCOSE BLD STRIP.AUTO-MCNC: 107 MG/DL (ref 65–117)
GLUCOSE BLD STRIP.AUTO-MCNC: 158 MG/DL (ref 65–117)
GLUCOSE SERPL-MCNC: 109 MG/DL (ref 65–100)
HCT VFR BLD AUTO: 25.2 % (ref 35–47)
HGB BLD-MCNC: 7.9 G/DL (ref 11.5–16)
IMM GRANULOCYTES # BLD AUTO: 0 K/UL
IMM GRANULOCYTES NFR BLD AUTO: 0 %
LYMPHOCYTES # BLD: 0.7 K/UL (ref 0.8–3.5)
LYMPHOCYTES NFR BLD: 4 % (ref 12–49)
MAGNESIUM SERPL-MCNC: 1.5 MG/DL (ref 1.6–2.4)
MCH RBC QN AUTO: 29.9 PG (ref 26–34)
MCHC RBC AUTO-ENTMCNC: 31.3 G/DL (ref 30–36.5)
MCV RBC AUTO: 95.5 FL (ref 80–99)
METAMYELOCYTES NFR BLD MANUAL: 2 %
MONOCYTES # BLD: 0.2 K/UL (ref 0–1)
MONOCYTES NFR BLD: 1 % (ref 5–13)
NEUTS SEG # BLD: 16.2 K/UL (ref 1.8–8)
NEUTS SEG NFR BLD: 91 % (ref 32–75)
NRBC # BLD: 0 K/UL (ref 0–0.01)
NRBC BLD-RTO: 0 PER 100 WBC
PHOSPHATE SERPL-MCNC: 5 MG/DL (ref 2.6–4.7)
PLATELET # BLD AUTO: 261 K/UL (ref 150–400)
PLATELET COMMENTS,PCOM: ABNORMAL
PMV BLD AUTO: 13 FL (ref 8.9–12.9)
POTASSIUM SERPL-SCNC: 3.5 MMOL/L (ref 3.5–5.1)
RBC # BLD AUTO: 2.64 M/UL (ref 3.8–5.2)
RBC MORPH BLD: ABNORMAL
SERVICE CMNT-IMP: ABNORMAL
SERVICE CMNT-IMP: NORMAL
SERVICE CMNT-IMP: NORMAL
SODIUM SERPL-SCNC: 142 MMOL/L (ref 136–145)
WBC # BLD AUTO: 17.8 K/UL (ref 3.6–11)

## 2021-11-24 PROCEDURE — 84100 ASSAY OF PHOSPHORUS: CPT

## 2021-11-24 PROCEDURE — 74011636637 HC RX REV CODE- 636/637: Performed by: SURGERY

## 2021-11-24 PROCEDURE — 74011250637 HC RX REV CODE- 250/637: Performed by: SURGERY

## 2021-11-24 PROCEDURE — 74011000250 HC RX REV CODE- 250: Performed by: SURGERY

## 2021-11-24 PROCEDURE — 74011000250 HC RX REV CODE- 250: Performed by: HOSPITALIST

## 2021-11-24 PROCEDURE — 80048 BASIC METABOLIC PNL TOTAL CA: CPT

## 2021-11-24 PROCEDURE — 51798 US URINE CAPACITY MEASURE: CPT

## 2021-11-24 PROCEDURE — 74011250636 HC RX REV CODE- 250/636: Performed by: SURGERY

## 2021-11-24 PROCEDURE — 74011000258 HC RX REV CODE- 258: Performed by: HOSPITALIST

## 2021-11-24 PROCEDURE — 74011250636 HC RX REV CODE- 250/636: Performed by: HOSPITALIST

## 2021-11-24 PROCEDURE — 99233 SBSQ HOSP IP/OBS HIGH 50: CPT | Performed by: NURSE PRACTITIONER

## 2021-11-24 PROCEDURE — 82962 GLUCOSE BLOOD TEST: CPT

## 2021-11-24 PROCEDURE — 74011250636 HC RX REV CODE- 250/636: Performed by: INTERNAL MEDICINE

## 2021-11-24 PROCEDURE — 74011000250 HC RX REV CODE- 250: Performed by: INTERNAL MEDICINE

## 2021-11-24 PROCEDURE — 83735 ASSAY OF MAGNESIUM: CPT

## 2021-11-24 PROCEDURE — 94760 N-INVAS EAR/PLS OXIMETRY 1: CPT

## 2021-11-24 PROCEDURE — 65660000001 HC RM ICU INTERMED STEPDOWN

## 2021-11-24 PROCEDURE — 85025 COMPLETE CBC W/AUTO DIFF WBC: CPT

## 2021-11-24 PROCEDURE — 74011000258 HC RX REV CODE- 258: Performed by: SURGERY

## 2021-11-24 PROCEDURE — 36415 COLL VENOUS BLD VENIPUNCTURE: CPT

## 2021-11-24 PROCEDURE — 99231 SBSQ HOSP IP/OBS SF/LOW 25: CPT | Performed by: CLINICAL NURSE SPECIALIST

## 2021-11-24 RX ORDER — LOPERAMIDE HYDROCHLORIDE 2 MG/1
2 CAPSULE ORAL 2 TIMES DAILY
Status: DISCONTINUED | OUTPATIENT
Start: 2021-11-24 | End: 2021-11-30

## 2021-11-24 RX ORDER — PSEUDOEPHED/ACETAMINOPHEN/CPM 30-500-2MG
2 TABLET ORAL ONCE
Status: COMPLETED | OUTPATIENT
Start: 2021-11-24 | End: 2021-11-24

## 2021-11-24 RX ADMIN — PIPERACILLIN SODIUM AND TAZOBACTAM SODIUM 3.38 G: 3; .375 INJECTION, POWDER, LYOPHILIZED, FOR SOLUTION INTRAVENOUS at 13:03

## 2021-11-24 RX ADMIN — PIPERACILLIN SODIUM AND TAZOBACTAM SODIUM 3.38 G: 3; .375 INJECTION, POWDER, LYOPHILIZED, FOR SOLUTION INTRAVENOUS at 21:06

## 2021-11-24 RX ADMIN — DILTIAZEM HYDROCHLORIDE 5 MG/HR: 5 INJECTION, SOLUTION INTRAVENOUS at 12:25

## 2021-11-24 RX ADMIN — LOPERAMIDE HCL 2 MG: 1 SOLUTION ORAL at 13:03

## 2021-11-24 RX ADMIN — PIPERACILLIN SODIUM AND TAZOBACTAM SODIUM 3.38 G: 3; .375 INJECTION, POWDER, LYOPHILIZED, FOR SOLUTION INTRAVENOUS at 05:10

## 2021-11-24 RX ADMIN — ENOXAPARIN SODIUM 80 MG: 80 INJECTION SUBCUTANEOUS at 18:39

## 2021-11-24 RX ADMIN — Medication 10 ML: at 21:07

## 2021-11-24 RX ADMIN — Medication 10 ML: at 05:10

## 2021-11-24 RX ADMIN — METOPROLOL TARTRATE 5 MG: 5 INJECTION INTRAVENOUS at 21:07

## 2021-11-24 RX ADMIN — LOPERAMIDE HYDROCHLORIDE 2 MG: 2 CAPSULE ORAL at 18:39

## 2021-11-24 RX ADMIN — METOPROLOL TARTRATE 5 MG: 5 INJECTION INTRAVENOUS at 08:44

## 2021-11-24 RX ADMIN — ASCORBIC ACID: 500 INJECTION, SOLUTION INTRAMUSCULAR; INTRAVENOUS; SUBCUTANEOUS at 18:36

## 2021-11-24 RX ADMIN — METOPROLOL TARTRATE 5 MG: 5 INJECTION INTRAVENOUS at 16:37

## 2021-11-24 RX ADMIN — ENOXAPARIN SODIUM 80 MG: 80 INJECTION SUBCUTANEOUS at 06:40

## 2021-11-24 NOTE — PROGRESS NOTES
[facility] at [facility]    POD #9    Subjective     Taking some clears, having multiple loose bowel movements, on TPN, CT scan yesterday showed no sign of any leak or abscess    Objective     Patient Vitals for the past 24 hrs:   Temp Pulse Resp BP SpO2   11/24/21 0840 98.3 °F (36.8 °C) 88 20 (!) 147/60 97 %   11/24/21 0557 -- 90 -- -- --   11/24/21 0404 -- 85 -- -- --   11/24/21 0335 98.1 °F (36.7 °C) -- -- -- --   11/24/21 0313 -- 87 16 (!) 128/48 100 %   11/24/21 0200 -- 87 -- -- --   11/23/21 2327 97.9 °F (36.6 °C) 90 14 (!) 129/48 100 %   11/23/21 2150 -- 79 -- -- --   11/23/21 2023 -- 78 -- -- --   11/23/21 2017 98.3 °F (36.8 °C) 84 18 (!) 137/46 100 %   11/23/21 1800 -- 75 -- -- --   11/23/21 1725 -- 83 -- (!) 140/52 --   11/23/21 1537 98.1 °F (36.7 °C) 81 19 136/65 96 %   11/23/21 1400 -- 85 -- -- --   11/23/21 1247 98 °F (36.7 °C) 84 18 (!) 132/48 96 %   11/23/21 1200 -- 90 -- -- --   11/23/21 1003 -- 92 -- (!) 139/54 --   11/23/21 1000 -- 92 -- -- --   11/23/21 0930 98.4 °F (36.9 °C) 94 20 (!) 139/54 96 %       Date 11/23/21 0700 - 11/24/21 0659 11/24/21 0700 - 11/25/21 0659   Shift 7473-3491 8348-4952 24 Hour Total 3892-5635 8159-5116 24 Hour Total   INTAKE   P.O. 0  0        P. O. 0  0      I. V.(mL/kg/hr) 82(0.1) 1730.1(1.9) 1812.1(1)        Cardizem Volume  193.2 193.2        Insulin Volume  34 34        Volume (lactated Ringers infusion)  592.9 592.9        Volume (piperacillin-tazobactam (ZOSYN) 3.375 g in 0.9% sodium chloride (MBP/ADV) 100 mL MBP)  300 300        Volume (fat emulsion 20% (LIPOSYN, INTRAlipid) infusion 250 mL) 82  82        Volume (TPN ADULT - CENTRAL)  610 610      Shift Total(mL/kg) 82(1.1) 1730.1(23) 1812. 1(24.1)      OUTPUT   Urine(mL/kg/hr) 300(0.3)  300(0.2)        Urine 300  300        Urine Occurrence(s) 2 x 2 x 4 x      Stool           Stool Occurrence(s) 5 x 2 x 7 x      Shift Total(mL/kg) 300(4)  300(4)      NET -218 1730.1 1512.1      Weight (kg) 74.6 75.2 75.2 75.2 75.2 75.2       PE  Pulm - CTAB  CV - RRR  Abd -soft, nondistended, bowel sounds present, incision clean dry and intact, minimal tender to palpation    Labs  Recent Results (from the past 12 hour(s))   GLUCOSE, POC    Collection Time: 11/23/21 11:25 PM   Result Value Ref Range    Glucose (POC) 127 (H) 65 - 117 mg/dL    Performed by Dagoberto Hernandez    MAGNESIUM    Collection Time: 11/24/21  2:43 AM   Result Value Ref Range    Magnesium 1.5 (L) 1.6 - 2.4 mg/dL   PHOSPHORUS    Collection Time: 11/24/21  2:43 AM   Result Value Ref Range    Phosphorus 5.0 (H) 2.6 - 4.7 MG/DL   METABOLIC PANEL, BASIC    Collection Time: 11/24/21  2:43 AM   Result Value Ref Range    Sodium 142 136 - 145 mmol/L    Potassium 3.5 3.5 - 5.1 mmol/L    Chloride 112 (H) 97 - 108 mmol/L    CO2 25 21 - 32 mmol/L    Anion gap 5 5 - 15 mmol/L    Glucose 109 (H) 65 - 100 mg/dL    BUN 35 (H) 6 - 20 MG/DL    Creatinine 0.75 0.55 - 1.02 MG/DL    BUN/Creatinine ratio 47 (H) 12 - 20      GFR est AA >60 >60 ml/min/1.73m2    GFR est non-AA >60 >60 ml/min/1.73m2    Calcium 7.8 (L) 8.5 - 10.1 MG/DL   CBC WITH AUTOMATED DIFF    Collection Time: 11/24/21  2:43 AM   Result Value Ref Range    WBC 17.8 (H) 3.6 - 11.0 K/uL    RBC 2.64 (L) 3.80 - 5.20 M/uL    HGB 7.9 (L) 11.5 - 16.0 g/dL    HCT 25.2 (L) 35.0 - 47.0 %    MCV 95.5 80.0 - 99.0 FL    MCH 29.9 26.0 - 34.0 PG    MCHC 31.3 30.0 - 36.5 g/dL    RDW 14.9 (H) 11.5 - 14.5 %    PLATELET 888 184 - 653 K/uL    MPV 13.0 (H) 8.9 - 12.9 FL    NRBC 0.0 0  WBC    ABSOLUTE NRBC 0.00 0.00 - 0.01 K/uL    NEUTROPHILS 91 (H) 32 - 75 %    LYMPHOCYTES 4 (L) 12 - 49 %    MONOCYTES 1 (L) 5 - 13 %    EOSINOPHILS 2 0 - 7 %    BASOPHILS 0 0 - 1 %    METAMYELOCYTES 2 (H) 0 %    IMMATURE GRANULOCYTES 0 %    ABS. NEUTROPHILS 16.2 (H) 1.8 - 8.0 K/UL    ABS. LYMPHOCYTES 0.7 (L) 0.8 - 3.5 K/UL    ABS. MONOCYTES 0.2 0.0 - 1.0 K/UL    ABS.  EOSINOPHILS 0.4 0.0 - 0.4 K/UL    ABS. BASOPHILS 0.0 0.0 - 0.1 K/UL    ABS. IMM. GRANS. 0.0 K/UL    DF MANUAL      PLATELET COMMENTS Large Platelets      RBC COMMENTS ANISOCYTOSIS  1+       GLUCOSE, POC    Collection Time: 11/24/21  6:02 AM   Result Value Ref Range    Glucose (POC) 106 65 - 117 mg/dL    Performed by Lorrie Stevenson is a 80 y. o.yr old female status post open right colectomy with extended small bowel resection    Plan     CT showed no sign of abscess or leak postop which is fortunate  White blood cell count down today, yesterday's value of 26 was likely artificially high  Continue IV antibiotics  We will reengage physical therapy to get her moving  Palliative care consult yesterday, recovery will be difficult but currently she is stable  Multiple loose bowel movements will start low-dose Imodium today  On Cardizem drip still and can be converted to p.o. per cardiology and hospitalist, I am not sure of the dosing I can order this  Increase diet to full liquids  Continue TPN, TPN renewed    Jerome Sloan MD

## 2021-11-24 NOTE — DIABETES MGMT
[facility]    CLINICAL NURSE SPECIALIST CONSULT     Initial Presentation   Janie Castillo is a 80 y.o. female admitted 11/15/21  with abdominal pain. + N/V at admission. S/p hip fracture repair- [place] [facility] - consult received for management of hospital BG while on TPN. HX:   Past Medical History:   Diagnosis Date    Atrial fibrillation ([address]     Atrial flutter (HCC)     Edema     Hypertension     Hypokalemia     Hypothyroidism     Insomnia     Major depressive disorder     Pain     UTI (urinary tract infection)     Vitamin D deficiency         INITIAL DX:   Small bowel ischemia ([address] [K55.9]     Current Treatment     TX: 11/16 Laparoscopy Converted to Open, Right Colectomy, Extended Small Bowel Resection     Consulted by Provider for advanced diabetes nursing assessment and care for:   [x] Inpatient management strategy  Hospital Course   Clinical progress has been complicated by metabolic acidosis/ hypovolemia- required ICU level of care . 11/16: patient improved in ICU- Lactic trending down-   11/17: NGT to suction; per notes continued to improve; on PM rounds less conversant/ tachycardic/ BP stable-afib with rvr-cardiology consulted  11/18: febrile overnight- 100.7F/not conversant/ cardiazem infusion for uncontrolled afib/ECHO ordered/ PICC line to be placed for TPN  11/19: URSULA drain pulled out/ NGT in  Place -scant drainage/ noted palliative consult/ TPN started  11/20: patient arousable/ guarded prognosis per surgery/ BG rising per notes  11/21: BG still high per notes while on TPN  11/23: insulin infusion noted - BG improved overnight- will transition all insulin into patient's TPN to better manage hyperglycemia - CT scan   11/24: CT scan showing no leaks/ WBC 17.8/ transitioned off insulin infusion when TPN started last evening. All insulin in TPN    Diabetes History   NO DM2 history -A1C 5.5%.      Diabetes Medication History  Key Antihyperglycemic Medications     Patient is on no antihyperglycemic meds. Subjective   Resting in bed at time of visit. Objective   Physical exam  General Normal weight  female in no acute distress/ill-appearing. Conversant and cooperative  Neuro  Alert, oriented   Vital Signs   Visit Vitals  BP (!) 147/60 (BP 1 Location: Left upper arm, BP Patient Position: At rest;Lying)   Pulse 81   Temp 98.3 °F (36.8 °C)   Resp 20   Ht 5' 4\" (1.626 m)   Wt 75.2 kg (165 lb 12.6 oz)   SpO2 97%   BMI 28.46 kg/m²     Skin  Warm and dry. Heart   Regular rate and rhythm. No murmurs, rubs or gallops  Lungs  Clear to auscultation without rales or rhonchi  Extremities No foot wounds      Laboratory  Recent Labs     11/24/21  0243 11/23/21  1903 11/23/21  0336 11/22/21  0554 11/22/21  0554   * 145* 128*   < > 279*   AGAP 5 5 7   < > 5   WBC 17.8*  --  26.3*  --  17.6*   CREA 0.75 0.77 0.73   < > 0.86   GFRNA >60 >60 >60   < > >60    < > = values in this interval not displayed.        Factors impacting BG management  Factor Dose Comments   Nutrition:  TPN   324g dextrose/ bag      Drugs:  Vasopressor load         Required in ICU-discontinued  *     Affects insulin delivery     Pain Post op    Infection Small bowel ischemia    Other:   Kidney function  Liver function     WNL  WNL      Blood glucose pattern      Significant diabetes-related events over the past 24-72 hours  BG in target this AM-106 after transitioned off insulin infusion- all insulin in TPN   Noted diet advancement to full liquid + remains on TPN      Assessment and Plan   Nursing Diagnosis Risk for unstable blood glucose pattern   Nursing Intervention Domain 4549 Decision-making Support   Nursing Interventions Examined current inpatient diabetes control   Explored factors facilitating and impeding inpatient management  Identified self-management practices impeding diabetes control  Explored corrective strategies with patient and responsible inpatient provider   Informed patient of rational for insulin strategy while hospitalized     Evaluation   This  female, NO diabetes is s/p small bowel resection and right colectomy for ischemic small bowel on 11/16/21. Progress has been complicated by post op fever/ onset of afib with rvr requiring cardiazem/ marked hyperglycemia with TPN/ cognitive decline while hospitalized. Currently on step down unit- in order to manage marked hyperglycemia in setting of TPN, [facility] was consulted to assist.  After conferring with hospitalist,  Dr. Dolly Edge, will stop insulin infusion today and add insulin to TPN to better manage hyperglycemia. This will also be safer as well. During this hospitalization, the patient has notachieved inpatient blood glucose target of 100-180mg/dl. Several factors have played a role in blood glucose management including:  [x]  Changing nutritive sources & needs     Blood glucose management would be improved bg  [x]  Adding insulin to override the dextrose within TPN  Recommendations   1. Total dextrose /bag= 324g; Will add insulin to TPN 1 unit insulin  :8g  dextrose coverage=40units/L  2. IF FBG >200, increase insulin in TPN to 1 unit insulin :7g dextrose = 46 units/L;   3. IF FBG <100, reduce insulin in     TPN to 1  unit insulin:15g dextrose = 21 units/ L    [x] Use of Subcutaneous Insulin Order set (1935)  Insulin Dosing Specific recommendation   CONTINUE Corrective                                       (Useful in adjusting insulin dosing) [x] Normal sensitivity          Billing Code(s)   91905    Before making these care recommendations, I personally reviewed the hospitalization record, including notes, laboratory & diagnostic data and current medications, and examined the patient at the bedside (circumstances permitting) before making care recommendations.      Total minutes: [address] [place]ort[place] [facility]  Diabetes Clinical Nurse Specialist  [facility]  Access via [employer]

## 2021-11-24 NOTE — PROGRESS NOTES
Physician Progress Note      PATIENT:               Miguel Lozada  [facility] #:                  [id number]  :                       3/13/1933  ADMIT DATE:       11/15/2021 7:03 PM  [address] [place] DATE:  RESPONDING  PROVIDER #:        Margarito Cole MD          QUERY TEXT:    Dear attending,    Patient admitted with bowel ischemia. Noted elevated wbc upon admission with lactic acid level elevated at 3.6 on . Noted documentation of sepsis in progress notes of 21. Patient with temperature max of 101, elevated WBC on that date. If possible, please document in progress notes and discharge summary if you are evaluating/treating: The medical record reflects the following:  Risk Factors: - Had colectomy with extended small bowel resection for POSTOPERATIVE DIAGNOSIS:  Ischemic small bowel and right colon. Clinical Indicators: wbc 13.5, - lactic acid 3.6, - WBC 28.2, neutrophils 90, - WBC 15.6  -Per PN- Bowel ischemia s/p resection: possible ongoing ischemia per surgery. Leukocytosis  - continue zosyn  -FU blood cultures  Anion-gap metabolic acidosis: normal serum lactate  -DC bicarb gtt, and switch to D5  normal  CURT stage 1 - Cr on admit 1.7, now improved to 1.1. Pre-renal and sepsis likely. Continue IVF fow now  - Per surgery-  5 Days Post-Op  Procedure:  Procedure(s):  Laparoscopy Converted to Open, Right Colectomy, Extended Small Bowel Resection  WBC up a little- Will monitor.   Treatment: IV Flagyl 500 mg q8 hours on , IV Zosyn 3.375 g q8 hours, Bicarbonate gtt, Monitor labwork, vital signs, imaging      Thank you,  Karsten Ga RN, BSN  Clinical documentation Improvement  [phone]  Options provided:  -- Sepsis, present on admission due to, Please document source  -- Sepsis, not present on admission due to, Please document source  -- SIRS not due to infection  -- Other - I will add my own diagnosis  -- Disagree - Not applicable / Not valid  -- Disagree - Clinically unable to determine / Unknown  -- Refer to Clinical Documentation Reviewer    PROVIDER RESPONSE TEXT:    This patient has sepsis which was present on admission due to small bowel perforation and small bowel ischemia    Query created by: Petar Clinton on 11/22/2021 1:36 PM      QUERY TEXT:      Dear attending,    Pt admitted with pneumoperitoneum and is s/p resection. This patient has confusion documented. If possible, please document in progress notes and discharge summary if you are evaluating/treating: The medical record reflects the following:  Risk Factors: s/p Laparoscopy Converted to Open, Right Colectomy, Extended Small Bowel Resection on 11/15/21  Clinical Indicators: wbc 13.5, 11/16- lactic acid 3.6, 11/17- WBC 28.2, neutrophils 90, 11/19- WBC 15.6, 11/23- WBC 26.3  Per H&P- She lives in [facility] but is alert and oriented. 11/19-per RN notes- During morning rounds, the patient was sleepy and somewhat difficult to arouse. I performed my assessment, hung all her meds and took her vitals. Then next time I walked into the room, the patient had taken off all her leads, her gown, and pulled out her URSULA drain. Surgery has been notified. 11/19-Per PN- PT still not responding much, she has improved some post op . Overall outlook not seeming great due to minimal mental improvement  11/21-Per PN- Since she was last seen on 11/19, she does appear to be more awake and alert today. She is able to answer simple questions, and tell me her full name.   11/22-Per PN- No acute events overnight, saw patient this morning, awake alert and oriented, conversive patient still very confused and does not know where she is or the reason why she is in the hospital, does not appear to be in any apparent distress  Treatment: Monitor vital signs, labwork, imaging,  neuro status      Thank you,  Reyes Mosley RN, BSN  Clinical documentation Improvement  [phone]  Options provided:  -- Drug-induced encephalopathy due to, Please specify substance. -- Encephalopathy due to, please specify cause. -- Metabolic encephalopathy  -- Septic encephalopathy  -- Delirium due to, Please specify cause. -- Delirium  -- Other - I will add my own diagnosis  -- Disagree - Not applicable / Not valid  -- Disagree - Clinically unable to determine / Unknown  -- Refer to Clinical Documentation Reviewer    PROVIDER RESPONSE TEXT:    This patient has septic encephalopathy.     Query created by: Keturah Klinefelter on 11/23/2021 7:18 AM      Electronically signed by:  Shamar Osorio MD 11/24/2021 7:05 AM

## 2021-11-24 NOTE — PROGRESS NOTES
Comprehensive Nutrition Assessment    Type and Reason for Visit: Reassess    Nutrition Recommendations/Plan:    1. Continued diet advancement per surgery. RD to add daily oral nutrition supplements BID. 2. Consider reducing Phos in TPN or reducing rate to 63 ml/hr. Continue without lipids with diet advancement. 3. Standing scale wt as able. 4. Would continue TPN (or at least do not reduce beyond 42 ml/hr) until PO intakes established and pt able to consume >65% of meals/supplements offered to her. Nutrition Assessment:    Past Medical History:   Diagnosis Date    Atrial fibrillation (HCC)     Atrial flutter (HCC)     Edema     Hypertension     Hypokalemia     Hypothyroidism     Insomnia     Major depressive disorder     Pain     UTI (urinary tract infection)     Vitamin D deficiency    Pt admitted with pneumoperitoneum, now s/p R open colectomy and extended small bowel resection. Pt lethargic post-op and has started TPN given lethargy and post-op complication. Palliative care has been consulted as well. 11/24: Visited with pt this afternoon. She started on clear liquids 11/22, advanced to fulls today. Noted for lunch she had only sips of beverages and soup. RN reports intakes have been variable, with first day she was able to have liquids, she consumed nearly everything but yesterday intakes are reported to have dropped off. Diabetes treatment team consulted for BG management as BG levels running high on TPN, insulin added to TPN and much improved. Lipids also d/c'd with diet advancement. Pt reports to me she has an appetite but feels full quickly, and is agreeable to daily Ensure to aid in overall intakes ([place] Ensure Enlive, [place] Ensure Clear). Certainly do not feel at this moment she is taking enough PO to maintain nutrition needs. Current TPN providing 1460 kcals, 90 g protein, 324 g CHO/GIR 2.99 mg/kg/min.      11/17: Nutrition review completed d/t pt NPO x 3 days, noted to be starting on TPN. PICC line placed today. As presently ordered, TPN providing 716 kcal, 50 g protein -meeting 43% kcal needs, and 70% low-end protein needs. D5 contributing additional 400 dextrose kcals. Unable to complete full NFPE with inability to ascertain wt loss hx/UBW, given muscle/fat wasting and anasarca - meets criteria for severe malnutrition. Malnutrition Assessment:  Malnutrition Status:  Severe malnutrition    Context:  Acute illness     Findings of the 6 clinical characteristics of malnutrition:   Energy Intake:  7 - 50% or less of est energy requirements for 5 or more days  Weight Loss:  Unable to assess     Body Fat Loss:  7 - Moderate body fat loss, Buccal region   Muscle Mass Loss:  7 - Moderate muscle mass loss, Clavicles (pectoralis & deltoids), Temples (temporalis)  Fluid Accumulation:  7 - Moderate to severe, Generalized   Strength:  Not performed     Nutritionally Significant Medications: IV Zosyn, Cardizem drip    Estimated Daily Nutrient Needs:  Energy (kcal): 0017-5169 (MSJ x 1.1 x 1.2); Weight Used for Energy Requirements: Current  Protein (g): 70-80 (1-1.1 g/kg);  Weight Used for Protein Requirements: Current  Fluid (ml/day): ~1700; Method Used for Fluid Requirements: 1 ml/kcal    Nutrition Related Findings:       BM: +mucousy BM 11/24  Edema: Generalized anasarca, 3+ pitting edema  Wounds:  Surgical incision     Recent Labs     11/24/21  0243 11/23/21  1903 11/23/21  0336 11/22/21  0554   * 145* 128* 279*   BUN 35* 34* 32* 35*   CREA 0.75 0.77 0.73 0.86    142 141 144   K 3.5 3.5 4.0 3.3*   * 111* 113* 112*   CO2 25 26 21 27   CA 7.8* 7.9* 8.0* 8.2*   PHOS 5.0*  --  2.9 2.2*   MG 1.5* 1.4* 1.2* 1.4*       Current Nutrition Therapies:   Diet: NPO  Nutrition Support: D18%, 5% AA's @ 75 ml/hr, lipids stopped as of yesterday 11/23  Supplements: None ordered  Additional Caloric Sources: on TPN    Meal intake:   Patient Vitals for the past 168 hrs:   % Diet Eaten 11/23/21 0830 0%     Supplement Intake:   Patient Vitals for the past 168 hrs:   Supplement intake %   11/23/21 0830 0%       Anthropometric Measures:  · Height:  5' 4\" (162.6 cm)  · Current Body Wt:  75.2 kg (165 lb 12.6 oz)   · Ideal Body Wt:  120:  133.4 %   · BMI Categories:  Overweight (BMI 25.0-29. 9)     Wt Readings from Last 10 Encounters:   11/24/21 75.2 kg (165 lb 12.6 oz)       Nutrition Diagnosis:   · Inadequate oral intake related to altered GI structure, cognitive or neurological impairment as evidenced by NPO or clear liquid status due to medical condition      Nutrition Interventions:   Food and/or Nutrient Delivery: Continue NPO, Modify parenteral nutrition  Nutrition Education and Counseling: No recommendations at this time  Coordination of Nutrition Care: No recommendation at this time    Goals:  PN to meet at least 90% kcal/protein needs over next 5-7 days       Nutrition Monitoring and Evaluation:   Behavioral-Environmental Outcomes: None identified  Food/Nutrient Intake Outcomes: Parenteral nutrition intake/tolerance  Physical Signs/Symptoms Outcomes: Biochemical data, GI status    Discharge Planning:     Too soon to determine     Shamar Canaels RD     Contact via [address] [place] [address]

## 2021-11-24 NOTE — PROGRESS NOTES
[facility] [phone] (COPE)  [facility] [phone] (COPE)          GOALS OF CARE: Full Code, restorative care       TREATMENT PREFERENCES:   Code Status: Full Code    Advance Care Planning:  [x] [facility] Interdisciplinary Team has updated the ACP Navigator with [address] and Patient Capacity    Primary Decision Maker ([address]):   Relationship to patient:  [x] Named in a scanned document   [] Legal Next of Kin  [] [address] Meeting Documentation    Participants: Jeffery Kerr NP     Discussion: The Palliative Medicine SW and NP Jeffery Kerr met with the patient at bedside along with her daughter Jignesh Mcclellan. Patient was lethargic, however, was able to engage some and nodded her head appropriately. Zoya provided Palliative Medicine team with the patient's Advance Medical Directive- directive lists Jignesh Mcclellan as primary Healthcare Agent, secondary is Roderick Tate. SW placed on hard chart to be scanned into the patient's chart. Palliative Medicine provided update on the patient's medical status, WBC coming down, remains on TPN- Palliative Medicine discussed the long recovery given patient's complex medical conditions and surgical intervention. Palliative Medicine also discussed Code Status with the patient and daughter, provided extensive education on Code Status, and that given the patient's medical presentation, CPR/Shock/Intubation would likely not be helpful in reaching recovery- the patient would like to remain FULL CODE, daughter Génesis Ortega is also in agreement with Full Code. Discussions held regarding rehabilitation, patient and daughter aware that patient will require rehabilitation upon discharge and agreeable for this plan. Outcome / Plan: Full Code status confirmed with patient and daughter Jignesh Mcclellan, goal is for rehabilitation.      Palliative Medicine will follow peripherally, family is hopeful for recovery and rehabilitation, please call if there are changes in status or concerns arise. Family also has contact information for Palliative Medicine. Thank you for including Palliative Medicine in the care of Manolo Alcaraz.        Nayla Sin, [facility]  [phone]

## 2021-11-24 NOTE — PROGRESS NOTES
[facility] Adult  Hospitalist Group                                                                                          Hospitalist Progress Note  Humphrey Portillo MD  Answering service: [phone] OR [phone] from in house phone        Date of Service:  2021  NAME:  Yashira Prescott  :  3/13/1933  MRN:  [id number]      Admission Summary:   Yashira Prescott is a 80 y.o. female with pmh of Afib/flutter, HTN, hypothyroidism who presents with bowel ischemia now s/p resection by Dr. Micki Daniel. Course complicated by Afib with RVR, and hyperglycemia     Interval history / Subjective:   BS controlled with 40U insulin in TPN, pt has been eating some liquids today      Assessment & Plan:     TPN induced hyperglycemia   - Continue 40U insulin added to TPN. If fasting BS less than 100, then decrease to ~20.   - Patient does not have diabetes, and BS will normalize when off TPN, and infection resolves. - Contineu SSI, POCT glucose checks and hypoglycemia protocol per hospital policy  - Wean TPN as tolerated, per primary team. Please ensure all long acting insulin is on hold if patient is taken off TPN.   - Noted speech has cleared, and patient on clear liquid diet. - DTC following. Given holiday weekend, hospitalist will continue to follow for BS control. DTC will return on Monday, and may be able to take over.      Afib with RVR - HR much improved. - Remains on IV diltiazem and IV metoprolol --> given patient is toleratting PO would consider switching to PO to minimize gtts. Defer to cards  - Cardiology following     Ischemic colitis with perforation s/p resection  - Management per general surgery   - CT abdomen  without pneumoperitoneum, discrete fluid collection.         Hospital Problems  Never Reviewed          Codes Class Noted POA    Small bowel ischemia [facility]) ICD-10-CM: K55.9  ICD-9-CM: 557.9  2021 Unknown                Review of Systems:   A comprehensive review of systems was negative except for that written in the HPI. Vital Signs:    Last 24hrs VS reviewed since prior progress note. Most recent are:  Visit Vitals  BP (!) 133/47 (BP 1 Location: Left upper arm, BP Patient Position: At rest)   Pulse 98   Temp 97.6 °F (36.4 °C)   Resp 18   Ht 5' 4\" (1.626 m)   Wt 75.2 kg (165 lb 12.6 oz)   SpO2 100%   BMI 28.46 kg/m²         Intake/Output Summary (Last 24 hours) at 11/24/2021 1501  Last data filed at 11/24/2021 4443  Gross per 24 hour   Intake 1730.06 ml   Output 300 ml   Net 1430.06 ml        Physical Examination:     I had a face to face encounter with this patient and independently examined them on 11/24/2021 as outlined below:          Constitutional:  No acute distress, cooperative, pleasant, elderly female    ENT:  Oral mucosa dry, oropharynx benign. Resp:  CTA bilaterally. No wheezing/rhonchi/rales. No accessory muscle use   CV:  Regular rhythm, normal rate, no murmurs, gallops, rubs    GI:  Soft, non distended, hypoactive. no hepatosplenomegaly, incision c/d/i     Musculoskeletal:  No edema, warm, 2+ pulses throughout    Neurologic:  Moves all extremities. Answering questions appropriately, CN II-XII reviewed            Data Review:    Review and/or order of clinical lab test  Review and/or order of tests in the radiology section of CPT  Review and/or order of tests in the medicine section of CPT      Labs:     Recent Labs     11/24/21  0243 11/23/21  0336   WBC 17.8* 26.3*   HGB 7.9* 8.9*   HCT 25.2* 29.2*    209     Recent Labs     11/24/21  0243 11/23/21  1903 11/23/21  0336 11/22/21  0554 11/22/21  0554    142 141   < > 144   K 3.5 3.5 4.0   < > 3.3*   * 111* 113*   < > 112*   CO2 25 26 21   < > 27   BUN 35* 34* 32*   < > 35*   CREA 0.75 0.77 0.73   < > 0.86   * 145* 128*   < > 279*   CA 7.8* 7.9* 8.0*   < > 8.2*   MG 1.5* 1.4* 1.2*   < > 1.4*   PHOS 5.0*  --  2.9  --  2.2*    < > = values in this interval not displayed.      No results for input(s): ALT, AP, TBIL, TBILI, TP, ALB, GLOB, GGT, AML, LPSE in the last 72 hours. No lab exists for component: SGOT, GPT, AMYP, HLPSE  No results for input(s): INR, PTP, APTT, INREXT, INREXT in the last 72 hours. No results for input(s): FE, TIBC, PSAT, FERR in the last 72 hours. No results found for: FOL, RBCF   No results for input(s): PH, PCO2, PO2 in the last 72 hours. No results for input(s): CPK, CKNDX, TROIQ in the last 72 hours.     No lab exists for component: CPKMB  No results found for: CHOL, CHOLX, CHLST, CHOLV, HDL, HDLP, LDL, LDLC, DLDLP, TGLX, TRIGL, TRIGP, CHHD, CHHDX  Lab Results   Component Value Date/Time    Glucose (POC) 107 11/24/2021 11:05 AM    Glucose (POC) 106 11/24/2021 06:02 AM    Glucose (POC) 127 (H) 11/23/2021 11:25 PM    Glucose (POC) 178 (H) 11/23/2021 06:22 PM    Glucose (POC) 176 (H) 11/23/2021 05:12 PM     Lab Results   Component Value Date/Time    Color YELLOW/STRAW 11/23/2021 05:05 PM    Appearance CLEAR 11/23/2021 05:05 PM    Specific gravity 1.015 11/23/2021 05:05 PM    pH (UA) 7.0 11/23/2021 05:05 PM    Protein 30 (A) 11/23/2021 05:05 PM    Glucose 250 (A) 11/23/2021 05:05 PM    Ketone Negative 11/23/2021 05:05 PM    Bilirubin Negative 11/23/2021 05:05 PM    Urobilinogen 0.2 11/23/2021 05:05 PM    Nitrites Negative 11/23/2021 05:05 PM    Leukocyte Esterase Negative 11/23/2021 05:05 PM    Epithelial cells FEW 11/23/2021 05:05 PM    Bacteria Negative 11/23/2021 05:05 PM    WBC 0-4 11/23/2021 05:05 PM    RBC 0-5 11/23/2021 05:05 PM         Medications Reviewed:     Current Facility-Administered Medications   Medication Dose Route Frequency    loperamide (IMODIUM) capsule 2 mg  2 mg Oral BID    TPN ADULT - CENTRAL   IntraVENous CONTINUOUS    TPN ADULT - CENTRAL   IntraVENous CONTINUOUS    enoxaparin (LOVENOX) injection 80 mg  80 mg SubCUTAneous Q12H    alteplase (CATHFLO) 2 mg in sterile water (preservative free) 2 mL injection  2 mg InterCATHeter PRN    glucose chewable tablet 16 g  4 Tablet Oral PRN    dextrose (D50W) injection syrg 12.5-25 g  25-50 mL IntraVENous PRN    glucagon (GLUCAGEN) injection 1 mg  1 mg IntraMUSCular PRN    lactated Ringers infusion  25 mL/hr IntraVENous CONTINUOUS    fat emulsion 20% (LIPOSYN, INTRAlipid) infusion 250 mL  250 mL IntraVENous Q MON, THU & SAT    metoprolol (LOPRESSOR) injection 5 mg  5 mg IntraVENous TID    acetaminophen (TYLENOL) suppository 650 mg  650 mg Rectal Q6H PRN    dilTIAZem (CARDIZEM) 125 mg in dextrose 5% 125 mL infusion  0-15 mg/hr IntraVENous TITRATE    piperacillin-tazobactam (ZOSYN) 3.375 g in 0.9% sodium chloride (MBP/ADV) 100 mL MBP  3.375 g IntraVENous Q8H    sodium chloride (NS) flush 5-40 mL  5-40 mL IntraVENous Q8H    sodium chloride (NS) flush 5-40 mL  5-40 mL IntraVENous PRN    HYDROmorphone (DILAUDID) injection 1 mg  1 mg IntraVENous Q4H PRN    naloxone (NARCAN) injection 0.4 mg  0.4 mg IntraVENous PRN    ondansetron (ZOFRAN) injection 4 mg  4 mg IntraVENous Q4H PRN     ______________________________________________________________________  EXPECTED LENGTH OF STAY: 10d 7h  ACTUAL LENGTH OF STAY:          9                 Aziza Tirado MD

## 2021-11-24 NOTE — PROGRESS NOTES
TEREZA: Anticipate discharge back to [facility] vs SNF pending medical progress. Transportation likely BLS.    RUR: 15%     Disposition: Patient admitted on 11/15 for small bowel ischemia. Had colectomy and small bowel resection. Palliative met with family. Patient and family want to continue with current treatment course. Will remain a full code.   CM will continue to follow for discharge needs.     [facility] contact: Hubert An, Director of Nursing, [phone]    Emergency contact: Ramona Bhattir, daughter, [address] [facility], [address] [place] [address],[address]  [phone]

## 2021-11-24 NOTE — PROGRESS NOTES
2000 Report received from Abril, [facility]. Patient resting bed, no needs expressed. Patient checked for incontinence and repositioned in bed. Patient has been lethargic, drowsy  and not engaging in conversation as she was last night. She is a lot more edematous in all extremities today than yesterday. MD notified, no orders given and will pass on to AM nurse to notify attending. 0400 Patient bladder scanned >317 ml shown. Patient incontinent, therefore unable to quantify output but has been cleaned up several times. Problem: Pressure Injury - Risk of  Goal: *Prevention of pressure injury  Description: Document Brian Scale and appropriate interventions in the flowsheet. Outcome: Progressing Towards Goal  Note: Pressure Injury Interventions:  Sensory Interventions: Assess changes in LOC, Minimize linen layers    Moisture Interventions: Absorbent underpads, Minimize layers    Activity Interventions: Pressure redistribution bed/mattress(bed type), Increase time out of bed    Mobility Interventions: Float heels, Turn and reposition approx. every two hours(pillow and wedges)    Nutrition Interventions: Document food/fluid/supplement intake    Friction and Shear Interventions: Apply protective barrier, creams and emollients, Minimize layers       Problem: Surgical Pathway Post-Op Day 2 through Discharge  Goal: Activity/Safety  Outcome: Progressing Towards Goal     Problem: Surgical Pathway Post-Op Day 2 through Discharge  Goal: *Tolerating diet  Outcome: Not Progressing Towards Goal     0613 Bedside shift change report given to TAMMY Samuels by Vernon Haynes RN. Report included the following information SBAR, Kardex, MAR, Accordion, Recent Results and Cardiac Rhythm Afib.

## 2021-11-24 NOTE — CONSULTS
Palliative Medicine Consult  [place]: [phone] ([phone])    Patient Name: Get Ansari  YOB: 1933    Date of Initial Consult: 11/19/21  Reason for Consult: Care decisions   Requesting Provider: Cookie Mcguire  Primary Care Physician: Faith Roy MD     SUMMARY:   Get Ansari is a 80 y.o. with a past history of a fib, HTN, hip fracture 3 months ago s/p repair who was admitted on 11/15/2021 from [address] [place] [address]  with RLQ pain. CT A/P showing pneumoperitoneum. Underwent R colectomy and small bowel resection on 11/15/21 w/ Dr Kitty Gallo after finding ischemic colon and small bowel. Initially on pressors, NGT in place, on Cardizem for a fib w/ RVR. Upon admission alert and oriented, however today sleepy and difficult to arouse, agitated. To be started on TPN. Current medical issues leading to Palliative Medicine involvement include: care decisions. Social: Pt in long term, was alert and oriented upon admission. Has 3 children in chart, no AMD on file. PALLIATIVE DIAGNOSES:   1. Lethargy, arousable but has been confused and not interacting much   2. Feeding difficulties to start TPN  3. Goals of care  4. Palliative care encounter       PLAN:   Patient seen at the bedside along with Veronica Bernheim LCSW for a family meeting. The patient's daughter, Yasmeen Deleon, was also present. We reviewed her hospital course and current medical issues. The patient feels that she is getting better slowly and wants to continue with the current care. We talked about the likely need for rehab at discharge. She is not happy about this, but is willing to go to get stronger so that she can reach her goal of getting back home. Zoya provided us with the patient's advance medical directive. We reviewed it and confirmed that Yasmeen Deleon is her primary medical power of . We also talked about her full code status and what this would entail if she had a cardiopulmonary arrest versus a do not resuscitate status.  We talked about how resuscitative measures are not as effective in patients with advanced age and co-morbidities. She chooses to remain a full code status, which her daughter Oralia Reinoso supports. Goals are clear for full restorative care and full code status. The palliative team will follow her progress peripherally in case further goals of care discussions are needed. Communicated plan of care with: Palliative [facility] Team incl Judy MUNOZ     GOALS OF CARE / TREATMENT PREFERENCES:     GOALS OF CARE:  Patient/Health Care Proxy Stated Goals: Rehabilitation    TREATMENT PREFERENCES:   Code Status: No Order      Advance Care Planning:  [x] [facility] Interdisciplinary Team has updated the ACP Navigator with Health Care Decision Maker and Patient Capacity      Primary Decision Maker: Pool Avendaño - Daughter - [phone]    Secondary Decision Maker: Kay Sandoval Almanza Son - [phone]    Supplemental (Other) Decision Maker: Julieta Besssurendra - Son - [phone]  No flowsheet data found. Medical Interventions: Full interventions       Other:    As far as possible, the palliative care team has discussed with patient / health care proxy about goals of care / treatment preferences for patient. HISTORY:     History obtained from: chart, staff    CHIEF COMPLAINT: Cannot obtain due to patient factors      HPI/SUBJECTIVE:    The patient is:   [] Verbal and participatory  [x] Non-participatory due to: medical condition     Opens eyes, not talking to me.      Clinical Pain Assessment (nonverbal scale for severity on nonverbal patients):   Clinical Pain Assessment  Severity: 0          Duration: for how long has pt been experiencing pain (e.g., 2 days, 1 month, years)  Frequency: how often pain is an issue (e.g., several times per day, once every few days, constant)     FUNCTIONAL ASSESSMENT:     Palliative Performance Scale (PPS):  PPS: 30       PSYCHOSOCIAL/SPIRITUAL SCREENING:     Palliative IDT has assessed this patient for cultural preferences / practices and a referral made as appropriate to needs (Cultural Services, Patient Advocacy, Ethics, etc.)    Any spiritual / Hinduism concerns:  [] Yes /  [x] No    Caregiver Burnout:  [] Yes /  [x] No /  [] No Caregiver Present      Anticipatory grief assessment:   [x] Normal  / [] Maladaptive       ESAS Anxiety:      ESAS Depression:       Cannot obtain due to patient factors     REVIEW OF SYSTEMS:     Positive and pertinent negative findings in ROS are noted above in HPI. The following systems were [x] reviewed / [] unable to be reviewed as noted in HPI  Other findings are noted below. Systems: constitutional, ears/nose/mouth/throat, respiratory, gastrointestinal, genitourinary, musculoskeletal, integumentary, neurologic, psychiatric, endocrine. Positive findings noted below. Modified ESAS Completed by: provider   Fatigue: 8 Drowsiness: 2     Pain: 0     Nausea: 0     Dyspnea: 0           Stool Occurrence(s): 1        PHYSICAL EXAM:     From RN flowsheet:  Wt Readings from Last 3 Encounters:   11/24/21 165 lb 12.6 oz (75.2 kg)     Blood pressure (!) 133/47, pulse 98, temperature 97.6 °F (36.4 °C), resp. rate 18, height 5' 4\" (1.626 m), weight 165 lb 12.6 oz (75.2 kg), SpO2 100 %.     Pain Scale 1: FLACC  Pain Intensity 1: 0  Pain Onset 1: post op  Pain Location 1: Abdomen  Pain Orientation 1: Right, Lower  Pain Description 1: Sore  Pain Intervention(s) 1: Medication (see MAR)  Last bowel movement, if known:     Constitutional: frail, weak, thin, ill appearing   Eyes: pupils equal, anicteric  ENMT: no nasal discharge, moist mucous membranes  Respiratory: breathing not labored  Skin: warm, dry         HISTORY:     Active Problems:    Small bowel ischemia (HCC) (11/16/2021)      Lethargy ()      Feeding difficulties ()      Goals of care, counseling/discussion ()      Palliative care encounter ()      Past Medical History:   Diagnosis Date    Atrial fibrillation ([facility] [facility]     Atrial flutter ([facility] [address]     Edema     Hypertension     Hypokalemia     Hypothyroidism     Insomnia     Major depressive disorder     Pain     UTI (urinary tract infection)     Vitamin D deficiency       History reviewed. No pertinent surgical history. History reviewed. No pertinent family history. History reviewed, no pertinent family history.   Social History     Tobacco Use    Smoking status: Never Smoker    Smokeless tobacco: Never Used   Substance Use Topics    Alcohol use: Never     Allergies   Allergen Reactions    Codeine Unknown (comments)    Gluten Unknown (comments)    Sulfa (Sulfonamide Antibiotics) Unknown (comments)      Current Facility-Administered Medications   Medication Dose Route Frequency    loperamide (IMODIUM) capsule 2 mg  2 mg Oral BID    TPN ADULT - CENTRAL   IntraVENous CONTINUOUS    TPN ADULT - CENTRAL   IntraVENous CONTINUOUS    enoxaparin (LOVENOX) injection 80 mg  80 mg SubCUTAneous Q12H    alteplase (CATHFLO) 2 mg in sterile water (preservative free) 2 mL injection  2 mg InterCATHeter PRN    glucose chewable tablet 16 g  4 Tablet Oral PRN    dextrose (D50W) injection syrg 12.5-25 g  25-50 mL IntraVENous PRN    glucagon (GLUCAGEN) injection 1 mg  1 mg IntraMUSCular PRN    lactated Ringers infusion  25 mL/hr IntraVENous CONTINUOUS    fat emulsion 20% (LIPOSYN, INTRAlipid) infusion 250 mL  250 mL IntraVENous Q MON, THU & SAT    metoprolol (LOPRESSOR) injection 5 mg  5 mg IntraVENous TID    acetaminophen (TYLENOL) suppository 650 mg  650 mg Rectal Q6H PRN    dilTIAZem (CARDIZEM) 125 mg in dextrose 5% 125 mL infusion  0-15 mg/hr IntraVENous TITRATE    piperacillin-tazobactam (ZOSYN) 3.375 g in 0.9% sodium chloride (MBP/ADV) 100 mL MBP  3.375 g IntraVENous Q8H    sodium chloride (NS) flush 5-40 mL  5-40 mL IntraVENous Q8H    sodium chloride (NS) flush 5-40 mL  5-40 mL IntraVENous PRN    HYDROmorphone (DILAUDID) injection 1 mg  1 mg IntraVENous Q4H PRN    naloxone (NARCAN) injection 0.4 mg  0.4 mg IntraVENous PRN    ondansetron (ZOFRAN) injection 4 mg  4 mg IntraVENous Q4H PRN          LAB AND IMAGING FINDINGS:     Lab Results   Component Value Date/Time    WBC 17.8 (H) 11/24/2021 02:43 AM    HGB 7.9 (L) 11/24/2021 02:43 AM    PLATELET 653 48/98/1472 02:43 AM     Lab Results   Component Value Date/Time    Sodium 142 11/24/2021 02:43 AM    Potassium 3.5 11/24/2021 02:43 AM    Chloride 112 (H) 11/24/2021 02:43 AM    CO2 25 11/24/2021 02:43 AM    BUN 35 (H) 11/24/2021 02:43 AM    Creatinine 0.75 11/24/2021 02:43 AM    Calcium 7.8 (L) 11/24/2021 02:43 AM    Magnesium 1.5 (L) 11/24/2021 02:43 AM    Phosphorus 5.0 (H) 11/24/2021 02:43 AM      Lab Results   Component Value Date/Time    Alk. phosphatase 71 11/17/2021 04:08 AM    Protein, total 4.4 (L) 11/17/2021 04:08 AM    Albumin 1.5 (L) 11/17/2021 04:08 AM    Globulin 2.9 11/17/2021 04:08 AM     No results found for: INR, PTMR, PTP, PT1, PT2, APTT, INREXT, INREXT   No results found for: IRON, FE, TIBC, IBCT, PSAT, FERR   No results found for: PH, PCO2, PO2  No components found for: GLPOC   No results found for: CPK, CKMB             Total time: 35 min   Counseling / coordination time, spent as noted above: 25 min   > 50% counseling / coordination?: yes    Prolonged service was provided for  []30 min   []75 min in face to face time in the presence of the patient, spent as noted above. Time Start:   Time End:   Note: this can only be billed with 41614 (initial) or 59481 (follow up). If multiple start / stop times, list each separately.

## 2021-11-25 LAB
ANION GAP SERPL CALC-SCNC: 6 MMOL/L (ref 5–15)
BUN SERPL-MCNC: 38 MG/DL (ref 6–20)
BUN/CREAT SERPL: 40 (ref 12–20)
CALCIUM SERPL-MCNC: 7.3 MG/DL (ref 8.5–10.1)
CHLORIDE SERPL-SCNC: 112 MMOL/L (ref 97–108)
CO2 SERPL-SCNC: 22 MMOL/L (ref 21–32)
CREAT SERPL-MCNC: 0.96 MG/DL (ref 0.55–1.02)
ERYTHROCYTE [DISTWIDTH] IN BLOOD BY AUTOMATED COUNT: 15.4 % (ref 11.5–14.5)
GLUCOSE BLD STRIP.AUTO-MCNC: 101 MG/DL (ref 65–117)
GLUCOSE BLD STRIP.AUTO-MCNC: 144 MG/DL (ref 65–117)
GLUCOSE SERPL-MCNC: 154 MG/DL (ref 65–100)
HCT VFR BLD AUTO: 24.1 % (ref 35–47)
HGB BLD-MCNC: 7.6 G/DL (ref 11.5–16)
MAGNESIUM SERPL-MCNC: 1.4 MG/DL (ref 1.6–2.4)
MCH RBC QN AUTO: 30 PG (ref 26–34)
MCHC RBC AUTO-ENTMCNC: 31.5 G/DL (ref 30–36.5)
MCV RBC AUTO: 95.3 FL (ref 80–99)
NRBC # BLD: 0 K/UL (ref 0–0.01)
NRBC BLD-RTO: 0 PER 100 WBC
PHOSPHATE SERPL-MCNC: 4.5 MG/DL (ref 2.6–4.7)
PLATELET # BLD AUTO: 318 K/UL (ref 150–400)
PMV BLD AUTO: 12.5 FL (ref 8.9–12.9)
POTASSIUM SERPL-SCNC: 3.7 MMOL/L (ref 3.5–5.1)
RBC # BLD AUTO: 2.53 M/UL (ref 3.8–5.2)
SERVICE CMNT-IMP: ABNORMAL
SERVICE CMNT-IMP: NORMAL
SODIUM SERPL-SCNC: 140 MMOL/L (ref 136–145)
WBC # BLD AUTO: 14.7 K/UL (ref 3.6–11)

## 2021-11-25 PROCEDURE — 74011000250 HC RX REV CODE- 250: Performed by: HOSPITALIST

## 2021-11-25 PROCEDURE — 74011250636 HC RX REV CODE- 250/636: Performed by: INTERNAL MEDICINE

## 2021-11-25 PROCEDURE — 65660000001 HC RM ICU INTERMED STEPDOWN

## 2021-11-25 PROCEDURE — 80048 BASIC METABOLIC PNL TOTAL CA: CPT

## 2021-11-25 PROCEDURE — 82962 GLUCOSE BLOOD TEST: CPT

## 2021-11-25 PROCEDURE — 36415 COLL VENOUS BLD VENIPUNCTURE: CPT

## 2021-11-25 PROCEDURE — 74011250636 HC RX REV CODE- 250/636: Performed by: SURGERY

## 2021-11-25 PROCEDURE — 74011000258 HC RX REV CODE- 258: Performed by: INTERNAL MEDICINE

## 2021-11-25 PROCEDURE — 74011000258 HC RX REV CODE- 258: Performed by: HOSPITALIST

## 2021-11-25 PROCEDURE — 74011000250 HC RX REV CODE- 250: Performed by: INTERNAL MEDICINE

## 2021-11-25 PROCEDURE — 74011000250 HC RX REV CODE- 250: Performed by: SURGERY

## 2021-11-25 PROCEDURE — 84100 ASSAY OF PHOSPHORUS: CPT

## 2021-11-25 PROCEDURE — 74011636637 HC RX REV CODE- 636/637: Performed by: INTERNAL MEDICINE

## 2021-11-25 PROCEDURE — 74011250637 HC RX REV CODE- 250/637: Performed by: SURGERY

## 2021-11-25 PROCEDURE — 83735 ASSAY OF MAGNESIUM: CPT

## 2021-11-25 PROCEDURE — 74011250636 HC RX REV CODE- 250/636: Performed by: HOSPITALIST

## 2021-11-25 PROCEDURE — 85027 COMPLETE CBC AUTOMATED: CPT

## 2021-11-25 PROCEDURE — 99024 POSTOP FOLLOW-UP VISIT: CPT | Performed by: SURGERY

## 2021-11-25 RX ORDER — ENOXAPARIN SODIUM 100 MG/ML
70 INJECTION SUBCUTANEOUS EVERY 12 HOURS
Status: DISCONTINUED | OUTPATIENT
Start: 2021-11-25 | End: 2021-11-29

## 2021-11-25 RX ORDER — MAGNESIUM SULFATE HEPTAHYDRATE 40 MG/ML
2 INJECTION, SOLUTION INTRAVENOUS ONCE
Status: COMPLETED | OUTPATIENT
Start: 2021-11-25 | End: 2021-11-25

## 2021-11-25 RX ADMIN — ENOXAPARIN SODIUM 80 MG: 80 INJECTION SUBCUTANEOUS at 06:09

## 2021-11-25 RX ADMIN — Medication 10 ML: at 05:36

## 2021-11-25 RX ADMIN — PIPERACILLIN SODIUM AND TAZOBACTAM SODIUM 3.38 G: 3; .375 INJECTION, POWDER, LYOPHILIZED, FOR SOLUTION INTRAVENOUS at 22:07

## 2021-11-25 RX ADMIN — I.V. FAT EMULSION 250 ML: 20 EMULSION INTRAVENOUS at 18:29

## 2021-11-25 RX ADMIN — ASCORBIC ACID: 500 INJECTION, SOLUTION INTRAMUSCULAR; INTRAVENOUS; SUBCUTANEOUS at 18:17

## 2021-11-25 RX ADMIN — PIPERACILLIN SODIUM AND TAZOBACTAM SODIUM 3.38 G: 3; .375 INJECTION, POWDER, LYOPHILIZED, FOR SOLUTION INTRAVENOUS at 12:43

## 2021-11-25 RX ADMIN — PIPERACILLIN SODIUM AND TAZOBACTAM SODIUM 3.38 G: 3; .375 INJECTION, POWDER, LYOPHILIZED, FOR SOLUTION INTRAVENOUS at 05:36

## 2021-11-25 RX ADMIN — Medication 10 ML: at 22:07

## 2021-11-25 RX ADMIN — LOPERAMIDE HYDROCHLORIDE 2 MG: 2 CAPSULE ORAL at 18:28

## 2021-11-25 RX ADMIN — LOPERAMIDE HYDROCHLORIDE 2 MG: 2 CAPSULE ORAL at 08:30

## 2021-11-25 RX ADMIN — DILTIAZEM HYDROCHLORIDE 5 MG/HR: 5 INJECTION, SOLUTION INTRAVENOUS at 12:41

## 2021-11-25 RX ADMIN — METOPROLOL TARTRATE 5 MG: 5 INJECTION INTRAVENOUS at 08:30

## 2021-11-25 RX ADMIN — Medication 20 ML: at 18:21

## 2021-11-25 RX ADMIN — ENOXAPARIN SODIUM 70 MG: 100 INJECTION SUBCUTANEOUS at 18:28

## 2021-11-25 RX ADMIN — MAGNESIUM SULFATE HEPTAHYDRATE 2 G: 40 INJECTION, SOLUTION INTRAVENOUS at 10:48

## 2021-11-25 RX ADMIN — METOPROLOL TARTRATE 5 MG: 5 INJECTION INTRAVENOUS at 15:32

## 2021-11-25 RX ADMIN — METOPROLOL TARTRATE 5 MG: 5 INJECTION INTRAVENOUS at 22:06

## 2021-11-25 NOTE — PROGRESS NOTES
Progress Note    Patient: Tali Garcia MRN: [id number]  SSN: [id number]    YOB: 1933  Age: 80 y.o. Sex: female      Admit Date: 11/15/2021    10 Days Post-Op    Procedure:  Procedure(s):  Laparoscopy Converted to Open, Right Colectomy, Extended Small Bowel Resection    Subjective:     Patient without complaints. States she has been tolerating full liquid diet without difficulties. Denies any nausea vomiting. Has been passing gas and having bowel movements. Objective:     Visit Vitals  BP (!) 136/56 (BP 1 Location: Right upper arm, BP Patient Position: At rest;Lying)   Pulse 96   Temp 98.4 °F (36.9 °C)   Resp 16   Ht 5' 4\" (1.626 m)   Wt 165 lb 9.1 oz (75.1 kg)   SpO2 99%   BMI 28.42 kg/m²       Temp (24hrs), Av.3 °F (36.8 °C), Min:97.6 °F (36.4 °C), Max:99.4 °F (37.4 °C)      Physical Exam:    General alert in no acute distress  Lungs clear bilaterally  Heart irregular rate and rhythm  Abdomen-soft nontender nondistended  Incision healing well    Data Review: images and reports reviewed    Lab Review: All lab results for the last 24 hours reviewed.   Recent Results (from the past 24 hour(s))   GLUCOSE, POC    Collection Time: 21  5:39 PM   Result Value Ref Range    Glucose (POC) 158 (H) 65 - 117 mg/dL    Performed by Stacy JESUS (CON)    MAGNESIUM    Collection Time: 21  3:10 AM   Result Value Ref Range    Magnesium 1.4 (L) 1.6 - 2.4 mg/dL   PHOSPHORUS    Collection Time: 21  3:10 AM   Result Value Ref Range    Phosphorus 4.5 2.6 - 4.7 MG/DL   METABOLIC PANEL, BASIC    Collection Time: 21  3:10 AM   Result Value Ref Range    Sodium 140 136 - 145 mmol/L    Potassium 3.7 3.5 - 5.1 mmol/L    Chloride 112 (H) 97 - 108 mmol/L    CO2 22 21 - 32 mmol/L    Anion gap 6 5 - 15 mmol/L    Glucose 154 (H) 65 - 100 mg/dL    BUN 38 (H) 6 - 20 MG/DL    Creatinine 0.96 0.55 - 1.02 MG/DL    BUN/Creatinine ratio 40 (H) 12 - 20      GFR est AA >60 >60 ml/min/1.73m2    GFR est non-AA 55 (L) >60 ml/min/1.73m2    Calcium 7.3 (L) 8.5 - 10.1 MG/DL   CBC W/O DIFF    Collection Time: 11/25/21  3:10 AM   Result Value Ref Range    WBC 14.7 (H) 3.6 - 11.0 K/uL    RBC 2.53 (L) 3.80 - 5.20 M/uL    HGB 7.6 (L) 11.5 - 16.0 g/dL    HCT 24.1 (L) 35.0 - 47.0 %    MCV 95.3 80.0 - 99.0 FL    MCH 30.0 26.0 - 34.0 PG    MCHC 31.5 30.0 - 36.5 g/dL    RDW 15.4 (H) 11.5 - 14.5 %    PLATELET 580 825 - 184 K/uL    MPV 12.5 8.9 - 12.9 FL    NRBC 0.0 0  WBC    ABSOLUTE NRBC 0.00 0.00 - 0.01 K/uL   GLUCOSE, POC    Collection Time: 11/25/21  6:08 AM   Result Value Ref Range    Glucose (POC) 144 (H) 65 - 117 mg/dL    Performed by [address] [place] [address], POC    Collection Time: 11/25/21 11:47 AM   Result Value Ref Range    Glucose (POC) 101 65 - 117 mg/dL    Performed by Efraín Alvarado        Assessment:     Hospital Problems  Never Reviewed          Codes Class Noted POA    Lethargy ICD-10-CM: R53.83  ICD-9-CM: 780.79  Unknown Unknown        Feeding difficulties ICD-10-CM: R63.30  ICD-9-CM: 886. 3  Unknown Unknown        Goals of care, counseling/discussion ICD-10-CM: Z71.89  ICD-9-CM: V65.49  Unknown Unknown        Palliative care encounter ICD-10-CM: Z51.5  ICD-9-CM: V66.7  Unknown Unknown        Small bowel ischemia ([facility] [address] ICD-10-CM: K55.9  ICD-9-CM: 557.9  11/16/2021 Unknown              Plan/Recommendations/Medical Decision Making:   Seems to overall be improving. Will advance diet to regular and see how she does. We will continue TPN until she is completely tolerating a diet.   Hypomagnesemia replete IV  Okay to transition medications to oral

## 2021-11-25 NOTE — PROGRESS NOTES
Ramses Limon 88yr old Female    Neuro: Alert and oriented X 3 today. Still confused on time/date  Patient able to move bilateral arms, still profoundly weak and unable to move legs on her own. Patient has developed foot drop. Bunny boots with foot drops straps were applied and positioned. CV: Still in Afib and on Diltiazem gtt. Hr controlled in the 90's. BP- 120's to 140's sys and 45'O - 18'H diastolic. Pulmonary: Patient continues to sat in the upper 90's on room air    GI: Patient was started on Imodium which seemed to help. She only stooled 2 X today which was better. The patient was able to swallow the capsule tonight with pudding. Appetite is still poor but improving. She ate pudding and jello and drank her water and juice    Genitourinary: The patient is incontinent and voiding    Skin: The patient has stage 2 on sacrum/buttocks. The wound is dressed with a mepilex. The wound was cleaned and redressed with each BM. The wound is looking better than it did 4 days ago. The patient still has a lot of edema and excoriation on her legs and in her groin. The skin was cleaned and covered with skin protecting cream.   Surgical incision is dry and approximated.  Open to air

## 2021-11-25 NOTE — PROGRESS NOTES
[facility]                                                                                          Hospitalist Progress Note  Vernell Harper MD  Answering service: [phone] from in house phone        Date of Service:  2021  NAME:  Savi Balderas  :  3/13/1933  MRN:  [id number]      Admission Summary:   Savi Balderas is a 80 y.o. female with pmh of Afib/flutter, HTN, hypothyroidism who presents with bowel ischemia now s/p resection by Dr. John Goodrich. Course complicated by Afib with RVR, and hyperglycemia     Interval history / Subjective:   Pt seen and examined. Feels well, no acute complaints. Overall feels she made good improvement, appreciative of everyone's help. BS controlled pretty well. Noted low magnesium, increased magnesium in TPN. Assessment & Plan:     TPN induced hyperglycemia   - Continue 40U insulin added to TPN. If fasting BS less than 100, then decrease to ~20.   - Patient doesn't have DM, BS will normalize when off TPN, and infection resolves. - Contineu SSI, POCT glucose checks and hypoglycemia protocol per hospital policy  - Wean TPN as tolerated, per primary team. Please ensure all long acting insulin is on hold if patient is taken off TPN.   - Noted speech has cleared, and patient on clear liquid diet- DTC following.     Afib with RVR - HR much improved. - Remains on IV diltiazem and IV metoprolol --> given patient is toleratting PO would consider switching to PO to minimize gtts. Defer to cards  - Cardiology following     Ischemic colitis with perforation s/p resection  - Management per general surgery - on TPN for bowel rest.  - CT abdomen  without pneumoperitoneum, discrete fluid collection. Hospital Problems  Never Reviewed          Codes Class Noted POA    Lethargy ICD-10-CM: R53.83  ICD-9-CM: 780.79  Unknown Unknown        Feeding difficulties ICD-10-CM: R63.30  ICD-9-CM: 892. 3  Unknown Unknown        Goals of care, counseling/discussion ICD-10-CM: Z71.89  ICD-9-CM: V65.49  Unknown Unknown        Palliative care encounter ICD-10-CM: Z51.5  ICD-9-CM: V66.7  Unknown Unknown        Small bowel ischemia ([facility] [address] ICD-10-CM: K55.9  ICD-9-CM: 557.9  11/16/2021 Unknown                Review of Systems:   A comprehensive review of systems was negative except for that written in the HPI. Vital Signs:    Last 24hrs VS reviewed since prior progress note. Most recent are:  Visit Vitals  BP (!) 151/69   Pulse (!) 101   Temp 98.1 °F (36.7 °C)   Resp 17   Ht 5' 4\" (1.626 m)   Wt 75.1 kg (165 lb 9.1 oz)   SpO2 99%   BMI 28.42 kg/m²         Intake/Output Summary (Last 24 hours) at 11/25/2021 1033  Last data filed at 11/25/2021 5660  Gross per 24 hour   Intake 3593.42 ml   Output 400 ml   Net 3193.42 ml        Physical Examination:     I had a face to face encounter with this patient and independently examined them on 11/25/2021 as outlined below:          Constitutional:  No acute distress, cooperative, pleasant, elderly female        Resp:  CTA bilaterally. No wheezing/rhonchi/rales. No accessory muscle use        GI:  Soft, non distended, hypoactive. no hepatosplenomegaly, incision c/d/i     Musculoskeletal:  No edema, warm    Neurologic:  Moves all extremities.  Answering questions appropriately, CN II-XII reviewed       Data Review:    Review and/or order of clinical lab test  Review and/or order of tests in the radiology section of CPT  Review and/or order of tests in the medicine section of CPT    Labs:     Recent Labs     11/25/21  0310 11/24/21  0243   WBC 14.7* 17.8*   HGB 7.6* 7.9*   HCT 24.1* 25.2*    261     Recent Labs     11/25/21  0310 11/24/21  0243 11/23/21  1903 11/23/21  0336 11/23/21  0336    142 142   < > 141   K 3.7 3.5 3.5   < > 4.0   * 112* 111*   < > 113*   CO2 22 25 26   < > 21   BUN 38* 35* 34*   < > 32*   CREA 0.96 0.75 0.77   < > 0.73   * 109* 145*   < > 128*   CA 7.3* 7.8* 7.9*   < > 8.0*   MG 1.4* 1.5* 1.4*   < > 1.2*   PHOS 4.5 5.0*  --   --  2.9    < > = values in this interval not displayed. No results for input(s): ALT, AP, TBIL, TBILI, TP, ALB, GLOB, GGT, AML, LPSE in the last 72 hours. No lab exists for component: SGOT, GPT, AMYP, HLPSE  No results for input(s): INR, PTP, APTT, INREXT, INREXT in the last 72 hours. No results for input(s): FE, TIBC, PSAT, FERR in the last 72 hours. No results found for: FOL, RBCF   No results for input(s): PH, PCO2, PO2 in the last 72 hours. No results for input(s): CPK, CKNDX, TROIQ in the last 72 hours.     No lab exists for component: CPKMB  No results found for: CHOL, CHOLX, CHLST, CHOLV, HDL, HDLP, LDL, LDLC, DLDLP, TGLX, TRIGL, TRIGP, CHHD, CHHDX  Lab Results   Component Value Date/Time    Glucose (POC) 144 (H) 11/25/2021 06:08 AM    Glucose (POC) 158 (H) 11/24/2021 05:39 PM    Glucose (POC) 107 11/24/2021 11:05 AM    Glucose (POC) 106 11/24/2021 06:02 AM    Glucose (POC) 127 (H) 11/23/2021 11:25 PM     Lab Results   Component Value Date/Time    Color YELLOW/STRAW 11/23/2021 05:05 PM    Appearance CLEAR 11/23/2021 05:05 PM    Specific gravity 1.015 11/23/2021 05:05 PM    pH (UA) 7.0 11/23/2021 05:05 PM    Protein 30 (A) 11/23/2021 05:05 PM    Glucose 250 (A) 11/23/2021 05:05 PM    Ketone Negative 11/23/2021 05:05 PM    Bilirubin Negative 11/23/2021 05:05 PM    Urobilinogen 0.2 11/23/2021 05:05 PM    Nitrites Negative 11/23/2021 05:05 PM    Leukocyte Esterase Negative 11/23/2021 05:05 PM    Epithelial cells FEW 11/23/2021 05:05 PM    Bacteria Negative 11/23/2021 05:05 PM    WBC 0-4 11/23/2021 05:05 PM    RBC 0-5 11/23/2021 05:05 PM         Medications Reviewed:     Current Facility-Administered Medications   Medication Dose Route Frequency    TPN ADULT - CENTRAL   IntraVENous CONTINUOUS    magnesium sulfate 2 g/50 ml IVPB (premix or compounded)  2 g IntraVENous ONCE    loperamide (IMODIUM) capsule 2 mg  2 mg Oral BID    TPN ADULT - CENTRAL   IntraVENous CONTINUOUS    enoxaparin (LOVENOX) injection 80 mg  80 mg SubCUTAneous Q12H    alteplase (CATHFLO) 2 mg in sterile water (preservative free) 2 mL injection  2 mg InterCATHeter PRN    glucose chewable tablet 16 g  4 Tablet Oral PRN    dextrose (D50W) injection syrg 12.5-25 g  25-50 mL IntraVENous PRN    glucagon (GLUCAGEN) injection 1 mg  1 mg IntraMUSCular PRN    lactated Ringers infusion  25 mL/hr IntraVENous CONTINUOUS    fat emulsion 20% (LIPOSYN, INTRAlipid) infusion 250 mL  250 mL IntraVENous Q MON, THU & SAT    metoprolol (LOPRESSOR) injection 5 mg  5 mg IntraVENous TID    acetaminophen (TYLENOL) suppository 650 mg  650 mg Rectal Q6H PRN    dilTIAZem (CARDIZEM) 125 mg in dextrose 5% 125 mL infusion  0-15 mg/hr IntraVENous TITRATE    piperacillin-tazobactam (ZOSYN) 3.375 g in 0.9% sodium chloride (MBP/ADV) 100 mL MBP  3.375 g IntraVENous Q8H    sodium chloride (NS) flush 5-40 mL  5-40 mL IntraVENous Q8H    sodium chloride (NS) flush 5-40 mL  5-40 mL IntraVENous PRN    HYDROmorphone (DILAUDID) injection 1 mg  1 mg IntraVENous Q4H PRN    naloxone (NARCAN) injection 0.4 mg  0.4 mg IntraVENous PRN    ondansetron (ZOFRAN) injection 4 mg  4 mg IntraVENous Q4H PRN     ______________________________________________________________________  EXPECTED LENGTH OF STAY: 10d 7h  ACTUAL LENGTH OF STAY:          10                 Claudeen Jack, MD

## 2021-11-25 NOTE — PROGRESS NOTES
Lovenox Monitoring  Indication: AFIB  Recent Labs     11/25/21  0310 11/24/21  0243 11/23/21  1903 11/23/21  0336 11/23/21  0336   HGB 7.6* 7.9*  --   --  8.9*    261  --   --  209   CREA 0.96 0.75 0.77   < > 0.73    < > = values in this interval not displayed. Current Weight: 75.1 kg  Est. CrCl = 40-45 ml/min  Current Dose: 80 mg subcutaneously every 12 hours. Plan: Change to enoxaparin 70 mg sc q12h based on approved weight ranges for this dosing strategy.

## 2021-11-25 NOTE — PROGRESS NOTES
Problem: Pressure Injury - Risk of  Goal: *Prevention of pressure injury  Description: Document Brian Scale and appropriate interventions in the flowsheet. Outcome: Progressing Towards Goal  Note: Pressure Injury Interventions:  Sensory Interventions: Assess need for specialty bed, Check visual cues for pain, Float heels, Keep linens dry and wrinkle-free    Moisture Interventions: Apply protective barrier, creams and emollients, Absorbent underpads, Check for incontinence Q2 hours and as needed, Minimize layers    Activity Interventions: Assess need for specialty bed    Mobility Interventions: Assess need for specialty bed, Float heels, HOB 30 degrees or less, Suspension boots, Turn and reposition approx.  every two hours(pillow and wedges)    Nutrition Interventions: Document food/fluid/supplement intake    Friction and Shear Interventions: Apply protective barrier, creams and emollients, Feet elevated on foot rest, Foam dressings/transparent film/skin sealants, HOB 30 degrees or less, Minimize layers

## 2021-11-25 NOTE — PROGRESS NOTES
2000 Report received from Mariely Mckeon, [facility]. Patient alert, much more communicative tonight, denies any pain. Call bell within reach. Patient repositioned Q2H. Patient drank two ensures and half cup of broth for dinner. Problem: Pressure Injury - Risk of  Goal: *Prevention of pressure injury  Description: Document Brian Scale and appropriate interventions in the flowsheet. Outcome: Progressing Towards Goal  Note: Pressure Injury Interventions:  Sensory Interventions: Assess changes in LOC, Assess need for specialty bed    Moisture Interventions: Absorbent underpads, Minimize layers, Offer toileting Q_hr    Activity Interventions: Pressure redistribution bed/mattress(bed type), Increase time out of bed    Mobility Interventions: Turn and reposition approx. every two hours(pillow and wedges), Pressure redistribution bed/mattress (bed type)    Nutrition Interventions: Document food/fluid/supplement intake, Discuss nutritional consult with provider    Friction and Shear Interventions: Apply protective barrier, creams and emollients, Minimize layers    Problem: Surgical Pathway Post-Op Day 2 through Discharge  Goal: Activity/Safety  Outcome: Progressing Towards Goal     0735 Bedside shift change report given to Digna Cerna RN by Cammie Grande RN. Report included the following information SBAR, Kardex, MAR, Accordion, Recent Results and Cardiac Rhythm Afib.

## 2021-11-26 LAB
ANION GAP SERPL CALC-SCNC: 9 MMOL/L (ref 5–15)
BUN SERPL-MCNC: 37 MG/DL (ref 6–20)
BUN/CREAT SERPL: 39 (ref 12–20)
CALCIUM SERPL-MCNC: 6.9 MG/DL (ref 8.5–10.1)
CHLORIDE SERPL-SCNC: 108 MMOL/L (ref 97–108)
CO2 SERPL-SCNC: 22 MMOL/L (ref 21–32)
CREAT SERPL-MCNC: 0.96 MG/DL (ref 0.55–1.02)
ERYTHROCYTE [DISTWIDTH] IN BLOOD BY AUTOMATED COUNT: 15.7 % (ref 11.5–14.5)
GLUCOSE BLD STRIP.AUTO-MCNC: 134 MG/DL (ref 65–117)
GLUCOSE SERPL-MCNC: 131 MG/DL (ref 65–100)
HCT VFR BLD AUTO: 23 % (ref 35–47)
HGB BLD-MCNC: 8 G/DL (ref 11.5–16)
MAGNESIUM SERPL-MCNC: 2.1 MG/DL (ref 1.6–2.4)
MCH RBC QN AUTO: 33.3 PG (ref 26–34)
MCHC RBC AUTO-ENTMCNC: 34.8 G/DL (ref 30–36.5)
MCV RBC AUTO: 95.8 FL (ref 80–99)
NRBC # BLD: 0 K/UL (ref 0–0.01)
NRBC BLD-RTO: 0 PER 100 WBC
PHOSPHATE SERPL-MCNC: 3.5 MG/DL (ref 2.6–4.7)
PLATELET # BLD AUTO: 381 K/UL (ref 150–400)
PMV BLD AUTO: 12.5 FL (ref 8.9–12.9)
POTASSIUM SERPL-SCNC: 3.9 MMOL/L (ref 3.5–5.1)
RBC # BLD AUTO: 2.4 M/UL (ref 3.8–5.2)
SERVICE CMNT-IMP: ABNORMAL
SODIUM SERPL-SCNC: 139 MMOL/L (ref 136–145)
WBC # BLD AUTO: 15.2 K/UL (ref 3.6–11)

## 2021-11-26 PROCEDURE — 74011250636 HC RX REV CODE- 250/636: Performed by: INTERNAL MEDICINE

## 2021-11-26 PROCEDURE — 74011000250 HC RX REV CODE- 250: Performed by: HOSPITALIST

## 2021-11-26 PROCEDURE — 74011000250 HC RX REV CODE- 250: Performed by: INTERNAL MEDICINE

## 2021-11-26 PROCEDURE — 82962 GLUCOSE BLOOD TEST: CPT

## 2021-11-26 PROCEDURE — 74011000258 HC RX REV CODE- 258: Performed by: SURGERY

## 2021-11-26 PROCEDURE — 84100 ASSAY OF PHOSPHORUS: CPT

## 2021-11-26 PROCEDURE — 74011000258 HC RX REV CODE- 258: Performed by: HOSPITALIST

## 2021-11-26 PROCEDURE — 83735 ASSAY OF MAGNESIUM: CPT

## 2021-11-26 PROCEDURE — 65660000001 HC RM ICU INTERMED STEPDOWN

## 2021-11-26 PROCEDURE — 74011250637 HC RX REV CODE- 250/637: Performed by: SURGERY

## 2021-11-26 PROCEDURE — 74011000250 HC RX REV CODE- 250: Performed by: SURGERY

## 2021-11-26 PROCEDURE — 80048 BASIC METABOLIC PNL TOTAL CA: CPT

## 2021-11-26 PROCEDURE — 74011636637 HC RX REV CODE- 636/637: Performed by: SURGERY

## 2021-11-26 PROCEDURE — 74011250636 HC RX REV CODE- 250/636: Performed by: HOSPITALIST

## 2021-11-26 PROCEDURE — 85027 COMPLETE CBC AUTOMATED: CPT

## 2021-11-26 PROCEDURE — 36415 COLL VENOUS BLD VENIPUNCTURE: CPT

## 2021-11-26 PROCEDURE — 74011250636 HC RX REV CODE- 250/636: Performed by: SURGERY

## 2021-11-26 RX ADMIN — LOPERAMIDE HYDROCHLORIDE 2 MG: 2 CAPSULE ORAL at 10:15

## 2021-11-26 RX ADMIN — PIPERACILLIN SODIUM AND TAZOBACTAM SODIUM 3.38 G: 3; .375 INJECTION, POWDER, LYOPHILIZED, FOR SOLUTION INTRAVENOUS at 13:00

## 2021-11-26 RX ADMIN — ENOXAPARIN SODIUM 70 MG: 100 INJECTION SUBCUTANEOUS at 18:09

## 2021-11-26 RX ADMIN — METOPROLOL TARTRATE 5 MG: 5 INJECTION INTRAVENOUS at 16:04

## 2021-11-26 RX ADMIN — METOPROLOL TARTRATE 5 MG: 5 INJECTION INTRAVENOUS at 10:15

## 2021-11-26 RX ADMIN — LOPERAMIDE HYDROCHLORIDE 2 MG: 2 CAPSULE ORAL at 18:09

## 2021-11-26 RX ADMIN — ASCORBIC ACID: 500 INJECTION, SOLUTION INTRAMUSCULAR; INTRAVENOUS; SUBCUTANEOUS at 18:42

## 2021-11-26 RX ADMIN — PIPERACILLIN SODIUM AND TAZOBACTAM SODIUM 3.38 G: 3; .375 INJECTION, POWDER, LYOPHILIZED, FOR SOLUTION INTRAVENOUS at 06:25

## 2021-11-26 RX ADMIN — DILTIAZEM HYDROCHLORIDE 5 MG/HR: 5 INJECTION, SOLUTION INTRAVENOUS at 15:59

## 2021-11-26 RX ADMIN — ENOXAPARIN SODIUM 70 MG: 100 INJECTION SUBCUTANEOUS at 06:25

## 2021-11-26 RX ADMIN — Medication 10 ML: at 14:43

## 2021-11-26 RX ADMIN — PIPERACILLIN SODIUM AND TAZOBACTAM SODIUM 3.38 G: 3; .375 INJECTION, POWDER, LYOPHILIZED, FOR SOLUTION INTRAVENOUS at 22:37

## 2021-11-26 RX ADMIN — METOPROLOL TARTRATE 5 MG: 5 INJECTION INTRAVENOUS at 22:37

## 2021-11-26 NOTE — PROGRESS NOTES
[facility] at [facility]    POD #11    Subjective     No acute issues but did have an episode of fever yesterday, no nausea vomiting, appearing very tired and somnolent today but did arouse for me, on TPN    Objective     Patient Vitals for the past 24 hrs:   Temp Pulse Resp BP SpO2   11/26/21 0846 98.7 °F (37.1 °C) (!) 112 17 (!) 142/61 93 %   11/26/21 0600 -- (!) 114 -- -- --   11/26/21 0400 -- 97 -- -- --   11/26/21 0340 98 °F (36.7 °C) (!) 110 23 (!) 133/57 100 %   11/26/21 0200 -- (!) 115 -- -- --   11/26/21 0007 97.6 °F (36.4 °C) (!) 104 17 (!) 131/40 99 %   11/25/21 2159 -- (!) 104 -- -- --   11/25/21 2026 (!) 100.7 °F (38.2 °C) (!) 103 20 (!) 149/64 100 %   11/25/21 2000 -- (!) 101 -- -- --   11/25/21 1806 -- (!) 102 -- -- --   11/25/21 1528 98.1 °F (36.7 °C) (!) 108 21 (!) 150/63 100 %   11/25/21 1210 -- 96 -- -- --   11/25/21 1117 98.4 °F (36.9 °C) 90 16 (!) 136/56 99 %   11/25/21 1112 98.8 °F (37.1 °C) 65 18 (!) 91/51 100 %   11/25/21 1048 -- 97 -- (!) 149/60 --       Date 11/25/21 0700 - 11/26/21 0659 11/26/21 0700 - 11/27/21 0659   Shift 5799-2464 4831-9047 24 Hour Total 7537-3724 3787-3251 24 Hour Total   INTAKE   P.O. 120  120        P. O. 120  120      I. V.(mL/kg/hr) 517.3(0.6)  517.3(0.3)        Cardizem Volume 26.1  26.1        Volume (piperacillin-tazobactam (ZOSYN) 3.375 g in 0.9% sodium chloride (MBP/ADV) 100 mL MBP) 100  100        Volume (fat emulsion 20% (LIPOSYN, INTRAlipid) infusion 250 mL) 0  0        Volume (TPN ADULT - CENTRAL) 391.3  391.3      Shift Total(mL/kg) 637. 3(8.5)  637. 3(8.5)      OUTPUT   Urine(mL/kg/hr) 400(0.4)  400(0.2)        Urine Voided 400  400        Urine Occurrence(s)  1 x 1 x      Stool           Stool Occurrence(s)  1 x 1 x      Shift Total(mL/kg) 400(5.3)  400(5.3)      .3  237.3      Weight (kg) 75.1 75.1 75.1 75.1 75.1 75.1       PE  Pulm - CTAB  CV - RRR  Abd -soft, nondistended, bowel sounds present, incisions clean dry and intact    Labs  Recent Results (from the past 12 hour(s))   GLUCOSE, POC    Collection Time: 11/26/21 12:04 AM   Result Value Ref Range    Glucose (POC) 134 (H) 65 - 117 mg/dL    Performed by Per Bonilla   PCT    MAGNESIUM    Collection Time: 11/26/21  4:36 AM   Result Value Ref Range    Magnesium 2.1 1.6 - 2.4 mg/dL   PHOSPHORUS    Collection Time: 11/26/21  4:36 AM   Result Value Ref Range    Phosphorus 3.5 2.6 - 4.7 MG/DL   METABOLIC PANEL, BASIC    Collection Time: 11/26/21  4:36 AM   Result Value Ref Range    Sodium 139 136 - 145 mmol/L    Potassium 3.9 3.5 - 5.1 mmol/L    Chloride 108 97 - 108 mmol/L    CO2 22 21 - 32 mmol/L    Anion gap 9 5 - 15 mmol/L    Glucose 131 (H) 65 - 100 mg/dL    BUN 37 (H) 6 - 20 MG/DL    Creatinine 0.96 0.55 - 1.02 MG/DL    BUN/Creatinine ratio 39 (H) 12 - 20      GFR est AA >60 >60 ml/min/1.73m2    GFR est non-AA 55 (L) >60 ml/min/1.73m2    Calcium 6.9 (L) 8.5 - 10.1 MG/DL   CBC W/O DIFF    Collection Time: 11/26/21  4:36 AM   Result Value Ref Range    WBC 15.2 (H) 3.6 - 11.0 K/uL    RBC 2.40 (L) 3.80 - 5.20 M/uL    HGB 8.0 (L) 11.5 - 16.0 g/dL    HCT 23.0 (L) 35.0 - 47.0 %    MCV 95.8 80.0 - 99.0 FL    MCH 33.3 26.0 - 34.0 PG    MCHC 34.8 30.0 - 36.5 g/dL    RDW 15.7 (H) 11.5 - 14.5 %    PLATELET 891 317 - 856 K/uL    MPV 12.5 8.9 - 12.9 FL    NRBC 0.0 0  WBC    ABSOLUTE NRBC 0.00 0.00 - 0.01 K/uL         Assessment     Janie Castillo is a 80 y. o.yr old female status post open right colectomy with extended small bowel resection    Plan     Continue regular diet as tolerated, hard to say how much she is eating  I will be ordering a nutrition consult for calorie count to see how much she is taking in  For now I will continue her TPN  Cardiology or hospitalist to change her over to oral Cardizem  For now needs to stay in the telemetry unit for the Cardizem drip  Continue IV antibiotics  Overall she is stable but still appearing very tired and weak and debilitated  Hard to say what her overall recovery will be but likely would go to SNF once ready for discharge    Samreen Velarde MD

## 2021-11-26 NOTE — PROGRESS NOTES
Nutrition Note    Consult received to start Calorie Count. Discussed with RN who will document intake on provided forms. RD to follow-up 11/29 with results. Pt remains on TPN d/t inadequate PO intake. Will modify ONS and add Magic cup supplement since pt ate 100% [place] Ice today. Ms Sergey Lee loves the soup and the [place]. Ate little at lunch besides the [place]; denies chewing difficulty. Discussed need to increase PO intake in order to come off the TPN. Pt c/o early satiety-encouraged her to eat frequently throughout the day (consume supplements between meals). TPN as ordered provides 1675 calories and 90 gm protein per day meeting >100% estimated needs.     Recommend: discontinue IV lipid; reduce TPN to 63 ml/hr    Estimated Nutrition Needs:   Energy: 4834-8789 (MSJ x 1.1 x 1.2)  Wt used: Current  Protein: 70-80 (1-1.1 g/kg)  Wt used: Current   Fluid: ~1700       Electronically signed by Jesus Laguna RD on 11/26/2021 at 2:05 PM  Contact: [employer]

## 2021-11-26 NOTE — PROGRESS NOTES
[address] [facility]  Hospitalist Group                                                                                          Hospitalist Progress Note  Olena Holcomb MD  Answering service: [phone] from in house phone        Date of Service:  2021  NAME:  Milana Yousif  :  3/13/1933  MRN:  [id number]      Admission Summary:   Milana Yousif is a 80 y.o. female with pmh of Afib/flutter, HTN, hypothyroidism who presents with bowel ischemia now s/p resection by Dr. Josse Davis. Course complicated by Afib with RVR, and hyperglycemia     Interval history / Subjective:   Pt seen and examined. Looks comfortable, resting, noted spike of fever last evening. Her sugars are pretty well controlled. Will suggest repeat Blood cultures if recurrent fever >100. 4. continued palliative conversations     Assessment & Plan:     TPN induced hyperglycemia   - Continue 40U insulin added to TPN. If fasting BS less than 100, then decrease to ~20.   - Patient doesn't have DM, BS will normalize when off TPN, and infection resolves. - Contineu SSI, POCT glucose checks and hypoglycemia protocol per hospital policy  - Wean TPN as tolerated, per primary team. Please ensure all long acting insulin is on hold if patient is taken off TPN.   - Noted speech has cleared, and patient on clear liquid diet- DTC following.     Afib with RVR - HR much improved. - Remains on IV diltiazem and IV metoprolol --> given patient is toleratting PO would consider switching to PO to minimize gtts. Defer to cards  - Cardiology following     Ischemic colitis with perforation s/p resection  - Management per general surgery - on TPN for bowel rest.  - CT abdomen  without pneumoperitoneum, discrete fluid collection. Hospital Problems  Never Reviewed          Codes Class Noted POA    Lethargy ICD-10-CM: R53.83  ICD-9-CM: 780.79  Unknown Unknown        Feeding difficulties ICD-10-CM: R63.30  ICD-9-CM: 661. 3  Unknown Unknown Goals of care, counseling/discussion ICD-10-CM: Z71.89  ICD-9-CM: V65.49  Unknown Unknown        Palliative care encounter ICD-10-CM: Z51.5  ICD-9-CM: V66.7  Unknown Unknown        Small bowel ischemia ([facility] [address] ICD-10-CM: K55.9  ICD-9-CM: 557.9  11/16/2021 Unknown                Review of Systems:   A comprehensive review of systems was negative except for that written in the HPI. Vital Signs:    Last 24hrs VS reviewed since prior progress note. Most recent are:  Visit Vitals  BP (!) 142/61 (BP 1 Location: Left upper arm, BP Patient Position: At rest)   Pulse (!) 112   Temp 98.7 °F (37.1 °C)   Resp 17   Ht 5' 4\" (1.626 m)   Wt 75.1 kg (165 lb 9.1 oz)   SpO2 93%   BMI 28.42 kg/m²       No intake or output data in the 24 hours ending 11/26/21 0561     Physical Examination:     I had a face to face encounter with this patient and independently examined them on 11/26/2021 as outlined below:          Constitutional:  No acute distress, cooperative, pleasant, elderly female        Resp:  CTA bilaterally. No wheezing/rhonchi/rales. No accessory muscle use        GI:  Soft, non distended, hypoactive. no hepatosplenomegaly, incision c/d/i     Musculoskeletal:  No edema, warm    Neurologic:  Moves all extremities.  Answering questions appropriately, CN II-XII reviewed       Data Review:    Review and/or order of clinical lab test  Review and/or order of tests in the radiology section of CPT  Review and/or order of tests in the medicine section of CPT    Labs:     Recent Labs     11/26/21 0436 11/25/21 0310   WBC 15.2* 14.7*   HGB 8.0* 7.6*   HCT 23.0* 24.1*    318     Recent Labs     11/26/21 0436 11/25/21 0310 11/24/21  0243    140 142   K 3.9 3.7 3.5    112* 112*   CO2 22 22 25   BUN 37* 38* 35*   CREA 0.96 0.96 0.75   * 154* 109*   CA 6.9* 7.3* 7.8*   MG 2.1 1.4* 1.5*   PHOS 3.5 4.5 5.0*     No results for input(s): ALT, AP, TBIL, TBILI, TP, ALB, GLOB, GGT, AML, LPSE in the last 72 hours.    No lab exists for component: SGOT, GPT, AMYP, HLPSE  No results for input(s): INR, PTP, APTT, INREXT, INREXT in the last 72 hours. No results for input(s): FE, TIBC, PSAT, FERR in the last 72 hours. No results found for: FOL, RBCF   No results for input(s): PH, PCO2, PO2 in the last 72 hours. No results for input(s): CPK, CKNDX, TROIQ in the last 72 hours.     No lab exists for component: CPKMB  No results found for: CHOL, CHOLX, CHLST, CHOLV, HDL, HDLP, LDL, LDLC, DLDLP, TGLX, TRIGL, TRIGP, CHHD, CHHDX  Lab Results   Component Value Date/Time    Glucose (POC) 134 (H) 11/26/2021 12:04 AM    Glucose (POC) 101 11/25/2021 11:47 AM    Glucose (POC) 144 (H) 11/25/2021 06:08 AM    Glucose (POC) 158 (H) 11/24/2021 05:39 PM    Glucose (POC) 107 11/24/2021 11:05 AM     Lab Results   Component Value Date/Time    Color YELLOW/STRAW 11/23/2021 05:05 PM    Appearance CLEAR 11/23/2021 05:05 PM    Specific gravity 1.015 11/23/2021 05:05 PM    pH (UA) 7.0 11/23/2021 05:05 PM    Protein 30 (A) 11/23/2021 05:05 PM    Glucose 250 (A) 11/23/2021 05:05 PM    Ketone Negative 11/23/2021 05:05 PM    Bilirubin Negative 11/23/2021 05:05 PM    Urobilinogen 0.2 11/23/2021 05:05 PM    Nitrites Negative 11/23/2021 05:05 PM    Leukocyte Esterase Negative 11/23/2021 05:05 PM    Epithelial cells FEW 11/23/2021 05:05 PM    Bacteria Negative 11/23/2021 05:05 PM    WBC 0-4 11/23/2021 05:05 PM    RBC 0-5 11/23/2021 05:05 PM         Medications Reviewed:     Current Facility-Administered Medications   Medication Dose Route Frequency    TPN ADULT - CENTRAL   IntraVENous CONTINUOUS    enoxaparin (LOVENOX) injection 70 mg  70 mg SubCUTAneous Q12H    loperamide (IMODIUM) capsule 2 mg  2 mg Oral BID    alteplase (CATHFLO) 2 mg in sterile water (preservative free) 2 mL injection  2 mg InterCATHeter PRN    glucose chewable tablet 16 g  4 Tablet Oral PRN    dextrose (D50W) injection syrg 12.5-25 g  25-50 mL IntraVENous PRN    glucagon (GLUCAGEN) injection 1 mg  1 mg IntraMUSCular PRN    lactated Ringers infusion  25 mL/hr IntraVENous CONTINUOUS    fat emulsion 20% (LIPOSYN, INTRAlipid) infusion 250 mL  250 mL IntraVENous Q MON, THU & SAT    metoprolol (LOPRESSOR) injection 5 mg  5 mg IntraVENous TID    acetaminophen (TYLENOL) suppository 650 mg  650 mg Rectal Q6H PRN    dilTIAZem (CARDIZEM) 125 mg in dextrose 5% 125 mL infusion  0-15 mg/hr IntraVENous TITRATE    piperacillin-tazobactam (ZOSYN) 3.375 g in 0.9% sodium chloride (MBP/ADV) 100 mL MBP  3.375 g IntraVENous Q8H    sodium chloride (NS) flush 5-40 mL  5-40 mL IntraVENous Q8H    sodium chloride (NS) flush 5-40 mL  5-40 mL IntraVENous PRN    HYDROmorphone (DILAUDID) injection 1 mg  1 mg IntraVENous Q4H PRN    naloxone (NARCAN) injection 0.4 mg  0.4 mg IntraVENous PRN    ondansetron (ZOFRAN) injection 4 mg  4 mg IntraVENous Q4H PRN     ______________________________________________________________________  EXPECTED LENGTH OF STAY: 10d 7h  ACTUAL LENGTH OF STAY:          Coralee Opitz, MD

## 2021-11-27 ENCOUNTER — APPOINTMENT (OUTPATIENT)
Dept: VASCULAR SURGERY | Age: 86
DRG: 853 | End: 2021-11-27
Attending: INTERNAL MEDICINE
Payer: MEDICARE

## 2021-11-27 LAB
ANION GAP SERPL CALC-SCNC: 7 MMOL/L (ref 5–15)
BUN SERPL-MCNC: 42 MG/DL (ref 6–20)
BUN/CREAT SERPL: 43 (ref 12–20)
CALCIUM SERPL-MCNC: 7.9 MG/DL (ref 8.5–10.1)
CHLORIDE SERPL-SCNC: 111 MMOL/L (ref 97–108)
CO2 SERPL-SCNC: 19 MMOL/L (ref 21–32)
CREAT SERPL-MCNC: 0.98 MG/DL (ref 0.55–1.02)
ERYTHROCYTE [DISTWIDTH] IN BLOOD BY AUTOMATED COUNT: 15.8 % (ref 11.5–14.5)
GLUCOSE SERPL-MCNC: 158 MG/DL (ref 65–100)
HCT VFR BLD AUTO: 24.2 % (ref 35–47)
HGB BLD-MCNC: 7.4 G/DL (ref 11.5–16)
MAGNESIUM SERPL-MCNC: 2.1 MG/DL (ref 1.6–2.4)
MCH RBC QN AUTO: 29.8 PG (ref 26–34)
MCHC RBC AUTO-ENTMCNC: 30.6 G/DL (ref 30–36.5)
MCV RBC AUTO: 97.6 FL (ref 80–99)
NRBC # BLD: 0 K/UL (ref 0–0.01)
NRBC BLD-RTO: 0 PER 100 WBC
PHOSPHATE SERPL-MCNC: 3.6 MG/DL (ref 2.6–4.7)
PLATELET # BLD AUTO: 456 K/UL (ref 150–400)
PMV BLD AUTO: 12.3 FL (ref 8.9–12.9)
POTASSIUM SERPL-SCNC: 4.1 MMOL/L (ref 3.5–5.1)
RBC # BLD AUTO: 2.48 M/UL (ref 3.8–5.2)
SODIUM SERPL-SCNC: 137 MMOL/L (ref 136–145)
WBC # BLD AUTO: 13.5 K/UL (ref 3.6–11)

## 2021-11-27 PROCEDURE — 74011250636 HC RX REV CODE- 250/636: Performed by: SURGERY

## 2021-11-27 PROCEDURE — 74011250636 HC RX REV CODE- 250/636: Performed by: HOSPITALIST

## 2021-11-27 PROCEDURE — 36593 DECLOT VASCULAR DEVICE: CPT

## 2021-11-27 PROCEDURE — 74011636637 HC RX REV CODE- 636/637: Performed by: SURGERY

## 2021-11-27 PROCEDURE — 74011250636 HC RX REV CODE- 250/636: Performed by: INTERNAL MEDICINE

## 2021-11-27 PROCEDURE — 65660000001 HC RM ICU INTERMED STEPDOWN

## 2021-11-27 PROCEDURE — 83735 ASSAY OF MAGNESIUM: CPT

## 2021-11-27 PROCEDURE — 80048 BASIC METABOLIC PNL TOTAL CA: CPT

## 2021-11-27 PROCEDURE — 36415 COLL VENOUS BLD VENIPUNCTURE: CPT

## 2021-11-27 PROCEDURE — 85027 COMPLETE CBC AUTOMATED: CPT

## 2021-11-27 PROCEDURE — 74011000250 HC RX REV CODE- 250: Performed by: SURGERY

## 2021-11-27 PROCEDURE — 93971 EXTREMITY STUDY: CPT

## 2021-11-27 PROCEDURE — 74011000258 HC RX REV CODE- 258: Performed by: HOSPITALIST

## 2021-11-27 PROCEDURE — 74011000250 HC RX REV CODE- 250: Performed by: INTERNAL MEDICINE

## 2021-11-27 PROCEDURE — 94760 N-INVAS EAR/PLS OXIMETRY 1: CPT

## 2021-11-27 PROCEDURE — 74011250637 HC RX REV CODE- 250/637: Performed by: SURGERY

## 2021-11-27 PROCEDURE — 74011000258 HC RX REV CODE- 258: Performed by: SURGERY

## 2021-11-27 PROCEDURE — 84100 ASSAY OF PHOSPHORUS: CPT

## 2021-11-27 RX ADMIN — I.V. FAT EMULSION 250 ML: 20 EMULSION INTRAVENOUS at 18:11

## 2021-11-27 RX ADMIN — Medication 20 ML: at 18:23

## 2021-11-27 RX ADMIN — PIPERACILLIN SODIUM AND TAZOBACTAM SODIUM 3.38 G: 3; .375 INJECTION, POWDER, LYOPHILIZED, FOR SOLUTION INTRAVENOUS at 21:04

## 2021-11-27 RX ADMIN — METOPROLOL TARTRATE 5 MG: 5 INJECTION INTRAVENOUS at 16:47

## 2021-11-27 RX ADMIN — ENOXAPARIN SODIUM 70 MG: 100 INJECTION SUBCUTANEOUS at 18:08

## 2021-11-27 RX ADMIN — Medication 20 ML: at 18:25

## 2021-11-27 RX ADMIN — LOPERAMIDE HYDROCHLORIDE 2 MG: 2 CAPSULE ORAL at 09:14

## 2021-11-27 RX ADMIN — ASCORBIC ACID: 500 INJECTION, SOLUTION INTRAMUSCULAR; INTRAVENOUS; SUBCUTANEOUS at 18:11

## 2021-11-27 RX ADMIN — Medication 10 ML: at 16:10

## 2021-11-27 RX ADMIN — PIPERACILLIN SODIUM AND TAZOBACTAM SODIUM 3.38 G: 3; .375 INJECTION, POWDER, LYOPHILIZED, FOR SOLUTION INTRAVENOUS at 05:52

## 2021-11-27 RX ADMIN — ENOXAPARIN SODIUM 70 MG: 100 INJECTION SUBCUTANEOUS at 06:00

## 2021-11-27 RX ADMIN — PIPERACILLIN SODIUM AND TAZOBACTAM SODIUM 3.38 G: 3; .375 INJECTION, POWDER, LYOPHILIZED, FOR SOLUTION INTRAVENOUS at 14:02

## 2021-11-27 RX ADMIN — METOPROLOL TARTRATE 5 MG: 5 INJECTION INTRAVENOUS at 09:14

## 2021-11-27 RX ADMIN — ALTEPLASE 1 MG: 2.2 INJECTION, POWDER, LYOPHILIZED, FOR SOLUTION INTRAVENOUS at 16:11

## 2021-11-27 RX ADMIN — HYDROMORPHONE HYDROCHLORIDE 1 MG: 1 INJECTION, SOLUTION INTRAMUSCULAR; INTRAVENOUS; SUBCUTANEOUS at 16:47

## 2021-11-27 RX ADMIN — Medication 10 ML: at 21:05

## 2021-11-27 RX ADMIN — METOPROLOL TARTRATE 5 MG: 5 INJECTION INTRAVENOUS at 21:04

## 2021-11-27 RX ADMIN — LOPERAMIDE HYDROCHLORIDE 2 MG: 2 CAPSULE ORAL at 18:08

## 2021-11-27 RX ADMIN — Medication 10 ML: at 14:34

## 2021-11-27 NOTE — PROGRESS NOTES
Pt having frequent stools, yolanda/anal area very red and tender, pt's legs are also red and tender to touch; order received for fecal management system.

## 2021-11-27 NOTE — PROGRESS NOTES
11/27/21 1530   Vitals   Temp 98.6 °F (37 °C)   Temp Source Axillary   Pulse (Heart Rate) (!) 111   Heart Rate Source Monitor   Resp Rate 21   O2 Sat (%) 100 %   Level of Consciousness Alert (0)   BP (!) 163/66   MAP (Calculated) 98   BP 1 Location Left upper arm   BP 1 Method Automatic   BP Patient Position At rest   MEWS Score 4   will resume cardizem gtt

## 2021-11-27 NOTE — PROGRESS NOTES
Bedside shift change report given to [employer] (oncoming nurse) by Sancho Schmid RN (offgoing nurse). Report included the following information SBAR, Kardex and Cardiac Rhythm A-fib.

## 2021-11-27 NOTE — PROGRESS NOTES
Pt's left arm with the picc line is edematous, cold to touch, none of the ports give back blood and grey port does not flush . Order received for venous duplex and cathflow.

## 2021-11-27 NOTE — PROGRESS NOTES
Progress Note    Patient: Savi Balderas MRN: [id number]  SSN: [id number]    YOB: 1933  Age: 80 y.o. Sex: female      Admit Date: 11/15/2021    12 Days Post-Op    Procedure:  Procedure(s):  Laparoscopy Converted to Open, Right Colectomy, Extended Small Bowel Resection    Subjective:     No acute surgical issues. Pt is overall doing well. RN reported pt is having a lot of loose stool. Rectal tube is being placed. Pain is under control. No nausea or vomiting.     Objective:     Visit Vitals  BP (!) 169/79 (BP 1 Location: Left upper arm, BP Patient Position: At rest)   Pulse (!) 102   Temp 97.7 °F (36.5 °C)   Resp 19   Ht 5' 4\" (1.626 m)   Wt 163 lb 14.4 oz (74.3 kg)   SpO2 96%   BMI 28.13 kg/m²       Temp (24hrs), Av.9 °F (36.6 °C), Min:97.4 °F (36.3 °C), Max:98.3 °F (36.8 °C)        Physical Exam:    Gen:  NAD  Pulm:  Unlabored  Abd:  S/ND/appropriate TTP  Wound:  C/D/I    Recent Results (from the past 24 hour(s))   MAGNESIUM    Collection Time: 21  2:03 AM   Result Value Ref Range    Magnesium 2.1 1.6 - 2.4 mg/dL   PHOSPHORUS    Collection Time: 21  2:03 AM   Result Value Ref Range    Phosphorus 3.6 2.6 - 4.7 MG/DL   METABOLIC PANEL, BASIC    Collection Time: 21  2:03 AM   Result Value Ref Range    Sodium 137 136 - 145 mmol/L    Potassium 4.1 3.5 - 5.1 mmol/L    Chloride 111 (H) 97 - 108 mmol/L    CO2 19 (L) 21 - 32 mmol/L    Anion gap 7 5 - 15 mmol/L    Glucose 158 (H) 65 - 100 mg/dL    BUN 42 (H) 6 - 20 MG/DL    Creatinine 0.98 0.55 - 1.02 MG/DL    BUN/Creatinine ratio 43 (H) 12 - 20      GFR est AA >60 >60 ml/min/1.73m2    GFR est non-AA 54 (L) >60 ml/min/1.73m2    Calcium 7.9 (L) 8.5 - 10.1 MG/DL   CBC W/O DIFF    Collection Time: 21  2:03 AM   Result Value Ref Range    WBC 13.5 (H) 3.6 - 11.0 K/uL    RBC 2.48 (L) 3.80 - 5.20 M/uL    HGB 7.4 (L) 11.5 - 16.0 g/dL    HCT 24.2 (L) 35.0 - 47.0 %    MCV 97.6 80.0 - 99.0 FL    MCH 29.8 26.0 - 34.0 PG    MCHC 30.6 30.0 - 36.5 g/dL    RDW 15.8 (H) 11.5 - 14.5 %    PLATELET 827 (H) 403 - 400 K/uL    MPV 12.3 8.9 - 12.9 FL    NRBC 0.0 0  WBC    ABSOLUTE NRBC 0.00 0.00 - 0.01 K/uL       Assessment:     Hospital Problems  Never Reviewed          Codes Class Noted POA    Lethargy ICD-10-CM: R53.83  ICD-9-CM: 780.79  Unknown Unknown        Feeding difficulties ICD-10-CM: R63.30  ICD-9-CM: 102. 3  Unknown Unknown        Goals of care, counseling/discussion ICD-10-CM: Z71.89  ICD-9-CM: V65.49  Unknown Unknown        Palliative care encounter ICD-10-CM: Z51.5  ICD-9-CM: V66.7  Unknown Unknown        Small bowel ischemia ([facility] [address] ICD-10-CM: K55.9  ICD-9-CM: 557.9  11/16/2021 Unknown              Plan/Recommendations/Medical Decision Making:     - Diet as tolerated  - Pain control  - Continue antibiotic therapy  - PT as tolerated  - Renew TPN

## 2021-11-27 NOTE — PROGRESS NOTES
Bedside shift change report given to BRADLEY Hagen RN (oncoming nurse) by Gauri Self RN (offgoing nurse). Report included the following information SBAR, Kardex, OR Summary, Intake/Output, MAR and Cardiac Rhythm SR/S-tach.

## 2021-11-27 NOTE — PROGRESS NOTES
Bedside shift change report given to June  (oncoming nurse) by Vernell Warner  (offgoing nurse). Report included the following information SBAR, Kardex and Cardiac Rhythm Afib.

## 2021-11-27 NOTE — PROGRESS NOTES
[address] [facility] Adult  Hospitalist Group                                                                                          Hospitalist Progress Note  Beka Mcleod MD  Answering service: [phone] from in house phone        Date of Service:  2021  NAME:  Yashira Prescott  :  3/13/1933  MRN:  [id number]      Admission Summary:   Yashira Prescott is a 80 y.o. female with pmh of Afib/flutter, HTN, hypothyroidism who presents with bowel ischemia now s/p resection by Dr. Micki Daniel. Course complicated by Afib with RVR, and hyperglycemia     Interval history / Subjective:   Pt seen and examined. Looks well, no acute complaints, remains weak. sugars remain pretty well controlled. Will continue watching peripherally. Assessment & Plan:     TPN induced hyperglycemia   - Continue 40U insulin added to TPN. If fasting BS less than 100, then decrease to ~20.   - Patient doesn't have DM, BS will normalize when off TPN, and infection resolves. - Contineu SSI, POCT glucose checks and hypoglycemia protocol per hospital policy  - Wean TPN as tolerated, per primary team. Please ensure all long acting insulin is on hold if patient is taken off TPN.   - Noted speech has cleared, and patient on clear liquid diet- DTC following.     Afib with RVR - HR much improved. - Remains on IV diltiazem and IV metoprolol -- Cardiology following     Ischemic colitis with perforation s/p resection  - Management per general surgery - on TPN for bowel rest.  - CT abdomen  without pneumoperitoneum, discrete fluid collection. Hospital Problems  Never Reviewed          Codes Class Noted POA    Lethargy ICD-10-CM: R53.83  ICD-9-CM: 780.79  Unknown Unknown        Feeding difficulties ICD-10-CM: R63.30  ICD-9-CM: 841. 3  Unknown Unknown        Goals of care, counseling/discussion ICD-10-CM: Z71.89  ICD-9-CM: V65.49  Unknown Unknown        Palliative care encounter ICD-10-CM: Z51.5  ICD-9-CM: V66.7  Unknown Unknown        Small bowel ischemia [facility]) ICD-10-CM: K55.9  ICD-9-CM: 557.9  11/16/2021 Unknown            Review of Systems:   A comprehensive review of systems was negative except for that written in the HPI. Vital Signs:    Last 24hrs VS reviewed since prior progress note. Most recent are:  Visit Vitals  BP (!) 125/39 (BP 1 Location: Left upper arm, BP Patient Position: At rest)   Pulse (!) 104   Temp 97.4 °F (36.3 °C)   Resp 15   Ht 5' 4\" (1.626 m)   Wt 74.3 kg (163 lb 14.4 oz)   SpO2 95%   BMI 28.13 kg/m²         Intake/Output Summary (Last 24 hours) at 11/27/2021 1031  Last data filed at 11/27/2021 2367  Gross per 24 hour   Intake 2176.08 ml   Output 900 ml   Net 1276.08 ml        Physical Examination:     I had a face to face encounter with this patient and independently examined them on 11/27/2021 as outlined below:    Constitutional:  No acute distress, cooperative, pleasant, elderly female        Resp:  CTA bilaterally. No wheezing/rhonchi/rales. No accessory muscle use        GI:  Soft, non distended, hypoactive. no hepatosplenomegaly, incision c/d/i     Musculoskeletal:  No edema, warm    Neurologic:  Moves all extremities. Answering questions appropriately, CN II-XII reviewed       Data Review:    Review and/or order of clinical lab test  Review and/or order of tests in the radiology section of CPT  Review and/or order of tests in the medicine section of CPT    Labs:     Recent Labs     11/27/21  0203 11/26/21 0436   WBC 13.5* 15.2*   HGB 7.4* 8.0*   HCT 24.2* 23.0*   * 381     Recent Labs     11/27/21  0203 11/26/21  0436 11/25/21  0310    139 140   K 4.1 3.9 3.7   * 108 112*   CO2 19* 22 22   BUN 42* 37* 38*   CREA 0.98 0.96 0.96   * 131* 154*   CA 7.9* 6.9* 7.3*   MG 2.1 2.1 1.4*   PHOS 3.6 3.5 4.5     No results for input(s): ALT, AP, TBIL, TBILI, TP, ALB, GLOB, GGT, AML, LPSE in the last 72 hours.     No lab exists for component: SGOT, GPT, AMYP, HLPSE  No results for input(s): INR, PTP, APTT, INREXT, INREXT in the last 72 hours. No results for input(s): FE, TIBC, PSAT, FERR in the last 72 hours. No results found for: FOL, RBCF   No results for input(s): PH, PCO2, PO2 in the last 72 hours. No results for input(s): CPK, CKNDX, TROIQ in the last 72 hours.     No lab exists for component: CPKMB  No results found for: CHOL, CHOLX, CHLST, CHOLV, HDL, HDLP, LDL, LDLC, DLDLP, TGLX, TRIGL, TRIGP, CHHD, CHHDX  Lab Results   Component Value Date/Time    Glucose (POC) 134 (H) 11/26/2021 12:04 AM    Glucose (POC) 101 11/25/2021 11:47 AM    Glucose (POC) 144 (H) 11/25/2021 06:08 AM    Glucose (POC) 158 (H) 11/24/2021 05:39 PM    Glucose (POC) 107 11/24/2021 11:05 AM     Lab Results   Component Value Date/Time    Color YELLOW/STRAW 11/23/2021 05:05 PM    Appearance CLEAR 11/23/2021 05:05 PM    Specific gravity 1.015 11/23/2021 05:05 PM    pH (UA) 7.0 11/23/2021 05:05 PM    Protein 30 (A) 11/23/2021 05:05 PM    Glucose 250 (A) 11/23/2021 05:05 PM    Ketone Negative 11/23/2021 05:05 PM    Bilirubin Negative 11/23/2021 05:05 PM    Urobilinogen 0.2 11/23/2021 05:05 PM    Nitrites Negative 11/23/2021 05:05 PM    Leukocyte Esterase Negative 11/23/2021 05:05 PM    Epithelial cells FEW 11/23/2021 05:05 PM    Bacteria Negative 11/23/2021 05:05 PM    WBC 0-4 11/23/2021 05:05 PM    RBC 0-5 11/23/2021 05:05 PM         Medications Reviewed:     Current Facility-Administered Medications   Medication Dose Route Frequency    TPN ADULT - CENTRAL   IntraVENous CONTINUOUS    TPN ADULT - CENTRAL   IntraVENous CONTINUOUS    enoxaparin (LOVENOX) injection 70 mg  70 mg SubCUTAneous Q12H    loperamide (IMODIUM) capsule 2 mg  2 mg Oral BID    alteplase (CATHFLO) 2 mg in sterile water (preservative free) 2 mL injection  2 mg InterCATHeter PRN    glucose chewable tablet 16 g  4 Tablet Oral PRN    dextrose (D50W) injection syrg 12.5-25 g  25-50 mL IntraVENous PRN    glucagon (GLUCAGEN) injection 1 mg  1 mg IntraMUSCular PRN    lactated Ringers infusion  25 mL/hr IntraVENous CONTINUOUS    fat emulsion 20% (LIPOSYN, INTRAlipid) infusion 250 mL  250 mL IntraVENous Q MON, THU & SAT    metoprolol (LOPRESSOR) injection 5 mg  5 mg IntraVENous TID    acetaminophen (TYLENOL) suppository 650 mg  650 mg Rectal Q6H PRN    dilTIAZem (CARDIZEM) 125 mg in dextrose 5% 125 mL infusion  0-15 mg/hr IntraVENous TITRATE    piperacillin-tazobactam (ZOSYN) 3.375 g in 0.9% sodium chloride (MBP/ADV) 100 mL MBP  3.375 g IntraVENous Q8H    sodium chloride (NS) flush 5-40 mL  5-40 mL IntraVENous Q8H    sodium chloride (NS) flush 5-40 mL  5-40 mL IntraVENous PRN    HYDROmorphone (DILAUDID) injection 1 mg  1 mg IntraVENous Q4H PRN    naloxone (NARCAN) injection 0.4 mg  0.4 mg IntraVENous PRN    ondansetron (ZOFRAN) injection 4 mg  4 mg IntraVENous Q4H PRN     ______________________________________________________________________  EXPECTED LENGTH OF STAY: 10d 7h  ACTUAL LENGTH OF STAY:          12                 Matty Kay MD

## 2021-11-28 LAB
ANION GAP SERPL CALC-SCNC: 6 MMOL/L (ref 5–15)
BUN SERPL-MCNC: 42 MG/DL (ref 6–20)
BUN/CREAT SERPL: 44 (ref 12–20)
CALCIUM SERPL-MCNC: 7.8 MG/DL (ref 8.5–10.1)
CHLORIDE SERPL-SCNC: 112 MMOL/L (ref 97–108)
CO2 SERPL-SCNC: 19 MMOL/L (ref 21–32)
CREAT SERPL-MCNC: 0.95 MG/DL (ref 0.55–1.02)
ERYTHROCYTE [DISTWIDTH] IN BLOOD BY AUTOMATED COUNT: 15.9 % (ref 11.5–14.5)
GLUCOSE BLD STRIP.AUTO-MCNC: 122 MG/DL (ref 65–117)
GLUCOSE BLD STRIP.AUTO-MCNC: 127 MG/DL (ref 65–117)
GLUCOSE BLD STRIP.AUTO-MCNC: 139 MG/DL (ref 65–117)
GLUCOSE BLD STRIP.AUTO-MCNC: 199 MG/DL (ref 65–117)
GLUCOSE BLD STRIP.AUTO-MCNC: 99 MG/DL (ref 65–117)
GLUCOSE SERPL-MCNC: 106 MG/DL (ref 65–100)
HCT VFR BLD AUTO: 21.7 % (ref 35–47)
HGB BLD-MCNC: 6.6 G/DL (ref 11.5–16)
HISTORY CHECKED?,CKHIST: NORMAL
MAGNESIUM SERPL-MCNC: 2.1 MG/DL (ref 1.6–2.4)
MCH RBC QN AUTO: 30 PG (ref 26–34)
MCHC RBC AUTO-ENTMCNC: 30.4 G/DL (ref 30–36.5)
MCV RBC AUTO: 98.6 FL (ref 80–99)
NRBC # BLD: 0 K/UL (ref 0–0.01)
NRBC BLD-RTO: 0 PER 100 WBC
PHOSPHATE SERPL-MCNC: 3.7 MG/DL (ref 2.6–4.7)
PLATELET # BLD AUTO: 450 K/UL (ref 150–400)
PMV BLD AUTO: 12 FL (ref 8.9–12.9)
POTASSIUM SERPL-SCNC: 3.7 MMOL/L (ref 3.5–5.1)
RBC # BLD AUTO: 2.2 M/UL (ref 3.8–5.2)
SERVICE CMNT-IMP: ABNORMAL
SERVICE CMNT-IMP: NORMAL
SODIUM SERPL-SCNC: 137 MMOL/L (ref 136–145)
WBC # BLD AUTO: 11.8 K/UL (ref 3.6–11)

## 2021-11-28 PROCEDURE — 80048 BASIC METABOLIC PNL TOTAL CA: CPT

## 2021-11-28 PROCEDURE — 94760 N-INVAS EAR/PLS OXIMETRY 1: CPT

## 2021-11-28 PROCEDURE — 86923 COMPATIBILITY TEST ELECTRIC: CPT

## 2021-11-28 PROCEDURE — 30233N1 TRANSFUSION OF NONAUTOLOGOUS RED BLOOD CELLS INTO PERIPHERAL VEIN, PERCUTANEOUS APPROACH: ICD-10-PCS | Performed by: INTERNAL MEDICINE

## 2021-11-28 PROCEDURE — 82962 GLUCOSE BLOOD TEST: CPT

## 2021-11-28 PROCEDURE — 65660000001 HC RM ICU INTERMED STEPDOWN

## 2021-11-28 PROCEDURE — 74011000250 HC RX REV CODE- 250: Performed by: SURGERY

## 2021-11-28 PROCEDURE — 36415 COLL VENOUS BLD VENIPUNCTURE: CPT

## 2021-11-28 PROCEDURE — 74011250636 HC RX REV CODE- 250/636: Performed by: INTERNAL MEDICINE

## 2021-11-28 PROCEDURE — 74011250636 HC RX REV CODE- 250/636: Performed by: HOSPITALIST

## 2021-11-28 PROCEDURE — 74011000250 HC RX REV CODE- 250: Performed by: HOSPITALIST

## 2021-11-28 PROCEDURE — 84100 ASSAY OF PHOSPHORUS: CPT

## 2021-11-28 PROCEDURE — 36430 TRANSFUSION BLD/BLD COMPNT: CPT

## 2021-11-28 PROCEDURE — 74011636637 HC RX REV CODE- 636/637: Performed by: SURGERY

## 2021-11-28 PROCEDURE — 74011250637 HC RX REV CODE- 250/637: Performed by: SURGERY

## 2021-11-28 PROCEDURE — 74011000258 HC RX REV CODE- 258: Performed by: SURGERY

## 2021-11-28 PROCEDURE — 74011000258 HC RX REV CODE- 258: Performed by: HOSPITALIST

## 2021-11-28 PROCEDURE — 74011000250 HC RX REV CODE- 250: Performed by: INTERNAL MEDICINE

## 2021-11-28 PROCEDURE — 74011250637 HC RX REV CODE- 250/637: Performed by: INTERNAL MEDICINE

## 2021-11-28 PROCEDURE — 83735 ASSAY OF MAGNESIUM: CPT

## 2021-11-28 PROCEDURE — P9016 RBC LEUKOCYTES REDUCED: HCPCS

## 2021-11-28 PROCEDURE — 99024 POSTOP FOLLOW-UP VISIT: CPT | Performed by: SURGERY

## 2021-11-28 PROCEDURE — 86901 BLOOD TYPING SEROLOGIC RH(D): CPT

## 2021-11-28 PROCEDURE — 85027 COMPLETE CBC AUTOMATED: CPT

## 2021-11-28 PROCEDURE — 74011250636 HC RX REV CODE- 250/636: Performed by: SURGERY

## 2021-11-28 RX ORDER — LOPERAMIDE HYDROCHLORIDE 2 MG/1
2 CAPSULE ORAL
Status: DISCONTINUED | OUTPATIENT
Start: 2021-11-28 | End: 2021-11-29

## 2021-11-28 RX ORDER — SODIUM CHLORIDE 9 MG/ML
250 INJECTION, SOLUTION INTRAVENOUS AS NEEDED
Status: DISCONTINUED | OUTPATIENT
Start: 2021-11-28 | End: 2021-12-07 | Stop reason: HOSPADM

## 2021-11-28 RX ADMIN — PIPERACILLIN SODIUM AND TAZOBACTAM SODIUM 3.38 G: 3; .375 INJECTION, POWDER, LYOPHILIZED, FOR SOLUTION INTRAVENOUS at 20:38

## 2021-11-28 RX ADMIN — LOPERAMIDE HYDROCHLORIDE 2 MG: 2 CAPSULE ORAL at 18:06

## 2021-11-28 RX ADMIN — Medication 1 CAPSULE: at 08:49

## 2021-11-28 RX ADMIN — METOPROLOL TARTRATE 5 MG: 5 INJECTION INTRAVENOUS at 20:39

## 2021-11-28 RX ADMIN — ASCORBIC ACID: 500 INJECTION, SOLUTION INTRAMUSCULAR; INTRAVENOUS; SUBCUTANEOUS at 18:44

## 2021-11-28 RX ADMIN — DILTIAZEM HYDROCHLORIDE 10 MG/HR: 5 INJECTION, SOLUTION INTRAVENOUS at 04:57

## 2021-11-28 RX ADMIN — METOPROLOL TARTRATE 5 MG: 5 INJECTION INTRAVENOUS at 08:52

## 2021-11-28 RX ADMIN — METOPROLOL TARTRATE 5 MG: 5 INJECTION INTRAVENOUS at 15:47

## 2021-11-28 RX ADMIN — PIPERACILLIN SODIUM AND TAZOBACTAM SODIUM 3.38 G: 3; .375 INJECTION, POWDER, LYOPHILIZED, FOR SOLUTION INTRAVENOUS at 13:50

## 2021-11-28 RX ADMIN — Medication 10 ML: at 05:53

## 2021-11-28 RX ADMIN — LOPERAMIDE HYDROCHLORIDE 2 MG: 2 CAPSULE ORAL at 08:49

## 2021-11-28 RX ADMIN — Medication 10 ML: at 14:00

## 2021-11-28 RX ADMIN — ENOXAPARIN SODIUM 70 MG: 100 INJECTION SUBCUTANEOUS at 07:19

## 2021-11-28 RX ADMIN — PIPERACILLIN SODIUM AND TAZOBACTAM SODIUM 3.38 G: 3; .375 INJECTION, POWDER, LYOPHILIZED, FOR SOLUTION INTRAVENOUS at 05:53

## 2021-11-28 NOTE — ROUTINE PROCESS
Bedside and Verbal shift change report given to 1405 Julio Mae (oncoming nurse) by Mariposa Garcia (offgoing nurse). Report included the following information SBAR, Kardex, OR Summary, Intake/Output, MAR and Cardiac Rhythm AFib.

## 2021-11-28 NOTE — PROGRESS NOTES
[facility] [facility] Adult  Hospitalist Group                                                                                          Hospitalist Progress Note  Dotty Rachel MD  Answering service: [phone] from in house phone        Date of Service:  2021  NAME:  April Reyes  :  3/13/1933  MRN:  [id number]      Admission Summary:   April Reyes is a 80 y.o. female with pmh of Afib/flutter, HTN, hypothyroidism who presents with bowel ischemia now s/p resection by Dr. Jazmin Amaya. Course complicated by Afib with RVR, and hyperglycemia     Interval history / Subjective:   Pt seen and examined. Looks pale, started having frequent diarrhea yesterday, green/dark looking in fecal management tube. Will transfuse 1 unit- RN till update primary team 'surgery'. ? GI consult for endoscopy. Assessment & Plan:     #. Ischemic colitis with perforation s/p resection- Management per surgery  #. Anemia: acute on chronic- likely blood loss from GI tract.   - CT abdomen  without pneumoperitoneum, discrete fluid collection.  - Transfuse 1 unit RBC . Monitor Hb- transfuse further if <7.    #. Diarrhea: frequent, fecal management tube placed. Started probiotics. PRN imodium    #. TPN induced hyperglycemia   - Continue 40U insulin added to TPN- DTC following.  - Patient doesn't have DM, BS will normalize when off TPN, and infection resolves. - Continue SSI, POCT glucose checks and hypoglycemia protocol per hospital policy  - Noted speech has cleared, and patient on clear liquid diet     #. Afib with RVR - HR improved- IV diltiazem and IV metoprolol- Cardiology following     Hospital Problems  Never Reviewed          Codes Class Noted POA    Lethargy ICD-10-CM: R53.83  ICD-9-CM: 780.79  Unknown Unknown        Feeding difficulties ICD-10-CM: R63.30  ICD-9-CM: 964. 3  Unknown Unknown        Goals of care, counseling/discussion ICD-10-CM: Z71.89  ICD-9-CM: V65.49  Unknown Unknown        Palliative care encounter ICD-10-CM: Z51.5  ICD-9-CM: V66.7  Unknown Unknown        Small bowel ischemia ([facility] [address] ICD-10-CM: K55.9  ICD-9-CM: 557.9  11/16/2021 Unknown            Review of Systems:   A comprehensive review of systems was negative except for that written in the HPI. Vital Signs:    Last 24hrs VS reviewed since prior progress note. Most recent are:  Visit Vitals  BP (!) 133/36 (BP 1 Location: Left upper arm, BP Patient Position: At rest)   Pulse 96   Temp 98.7 °F (37.1 °C)   Resp 16   Ht 5' 4\" (1.626 m)   Wt 74.3 kg (163 lb 12.8 oz)   SpO2 99%   BMI 28.12 kg/m²         Intake/Output Summary (Last 24 hours) at 11/28/2021 3710  Last data filed at 11/27/2021 1853  Gross per 24 hour   Intake 5062.91 ml   Output 25 ml   Net 5037.91 ml        Physical Examination:     I had a face to face encounter with this patient and independently examined them on 11/28/2021 as outlined below:    Constitutional:  No acute distress, cooperative, pleasant, frail, elderly female        Resp:  CTA bilaterally. No wheezing/rhonchi/rales. No accessory muscle use        GI:  Soft, non distended, hypoactive. no hepatosplenomegaly, incision c/d/i     Musculoskeletal:  No edema, warm    Neurologic:  Moves all extremities. Answering questions appropriately       Data Review:    Review and/or order of clinical lab test  Review and/or order of tests in the radiology section of CPT  Review and/or order of tests in the medicine section of CPT    Labs:     Recent Labs     11/28/21 0438 11/27/21 0203   WBC 11.8* 13.5*   HGB 6.6* 7.4*   HCT 21.7* 24.2*   * 456*     Recent Labs     11/28/21 0438 11/27/21 0203 11/26/21 0436    137 139   K 3.7 4.1 3.9   * 111* 108   CO2 19* 19* 22   BUN 42* 42* 37*   CREA 0.95 0.98 0.96   * 158* 131*   CA 7.8* 7.9* 6.9*   MG  --  2.1 2.1   PHOS  --  3.6 3.5     No results for input(s): ALT, AP, TBIL, TBILI, TP, ALB, GLOB, GGT, AML, LPSE in the last 72 hours.     No lab exists for component: SGOT, GPT, AMYP, HLPSE  No results for input(s): INR, PTP, APTT, INREXT, INREXT in the last 72 hours. No results for input(s): FE, TIBC, PSAT, FERR in the last 72 hours. No results found for: FOL, RBCF   No results for input(s): PH, PCO2, PO2 in the last 72 hours. No results for input(s): CPK, CKNDX, TROIQ in the last 72 hours.     No lab exists for component: CPKMB  No results found for: CHOL, CHOLX, CHLST, CHOLV, HDL, HDLP, LDL, LDLC, DLDLP, TGLX, TRIGL, TRIGP, CHHD, CHHDX  Lab Results   Component Value Date/Time    Glucose (POC) 127 (H) 11/28/2021 06:17 AM    Glucose (POC) 199 (H) 11/28/2021 01:07 AM    Glucose (POC) 134 (H) 11/26/2021 12:04 AM    Glucose (POC) 101 11/25/2021 11:47 AM    Glucose (POC) 144 (H) 11/25/2021 06:08 AM     Lab Results   Component Value Date/Time    Color YELLOW/STRAW 11/23/2021 05:05 PM    Appearance CLEAR 11/23/2021 05:05 PM    Specific gravity 1.015 11/23/2021 05:05 PM    pH (UA) 7.0 11/23/2021 05:05 PM    Protein 30 (A) 11/23/2021 05:05 PM    Glucose 250 (A) 11/23/2021 05:05 PM    Ketone Negative 11/23/2021 05:05 PM    Bilirubin Negative 11/23/2021 05:05 PM    Urobilinogen 0.2 11/23/2021 05:05 PM    Nitrites Negative 11/23/2021 05:05 PM    Leukocyte Esterase Negative 11/23/2021 05:05 PM    Epithelial cells FEW 11/23/2021 05:05 PM    Bacteria Negative 11/23/2021 05:05 PM    WBC 0-4 11/23/2021 05:05 PM    RBC 0-5 11/23/2021 05:05 PM         Medications Reviewed:     Current Facility-Administered Medications   Medication Dose Route Frequency    TPN ADULT - CENTRAL   IntraVENous CONTINUOUS    alteplase (CATHFLO) 1 mg in sterile water (preservative free) 1 mL injection  1 mg InterCATHeter PRN    enoxaparin (LOVENOX) injection 70 mg  70 mg SubCUTAneous Q12H    loperamide (IMODIUM) capsule 2 mg  2 mg Oral BID    alteplase (CATHFLO) 2 mg in sterile water (preservative free) 2 mL injection  2 mg InterCATHeter PRN    glucose chewable tablet 16 g  4 Tablet Oral PRN    dextrose (D50W) injection syrg 12.5-25 g  25-50 mL IntraVENous PRN    glucagon (GLUCAGEN) injection 1 mg  1 mg IntraMUSCular PRN    lactated Ringers infusion  25 mL/hr IntraVENous CONTINUOUS    fat emulsion 20% (LIPOSYN, INTRAlipid) infusion 250 mL  250 mL IntraVENous Q MON, THU & SAT    metoprolol (LOPRESSOR) injection 5 mg  5 mg IntraVENous TID    acetaminophen (TYLENOL) suppository 650 mg  650 mg Rectal Q6H PRN    dilTIAZem (CARDIZEM) 125 mg in dextrose 5% 125 mL infusion  0-15 mg/hr IntraVENous TITRATE    piperacillin-tazobactam (ZOSYN) 3.375 g in 0.9% sodium chloride (MBP/ADV) 100 mL MBP  3.375 g IntraVENous Q8H    sodium chloride (NS) flush 5-40 mL  5-40 mL IntraVENous Q8H    sodium chloride (NS) flush 5-40 mL  5-40 mL IntraVENous PRN    HYDROmorphone (DILAUDID) injection 1 mg  1 mg IntraVENous Q4H PRN    naloxone (NARCAN) injection 0.4 mg  0.4 mg IntraVENous PRN    ondansetron (ZOFRAN) injection 4 mg  4 mg IntraVENous Q4H PRN     ______________________________________________________________________  EXPECTED LENGTH OF STAY: 10d 7h  ACTUAL LENGTH OF STAY:          Elise Rodriguez MD

## 2021-11-28 NOTE — ROUTINE PROCESS
Consent for blood transfusion obtained. Bedside and Verbal shift change report given to Genny Kyle (oncoming nurse) by Corbin Tavares (offgoing nurse). Report included the following information SBAR, Kardex, Procedure Summary, Intake/Output, MAR, Recent Results and Cardiac Rhythm NSR.

## 2021-11-28 NOTE — ACP (ADVANCE CARE PLANNING)
Advance Care Planning Note    Name: Janie Castillo  YOB: 1933  MRN: [id number]  Admission Date: 11/15/2021  7:03 PM    Date of discussion: 11/28/2021    Active Diagnoses:    Hospital Problems  Never Reviewed          Codes Class Noted POA    Lethargy ICD-10-CM: R53.83  ICD-9-CM: 780.79  Unknown Unknown        Feeding difficulties ICD-10-CM: R63.30  ICD-9-CM: 440. 3  Unknown Unknown        Goals of care, counseling/discussion ICD-10-CM: Z71.89  ICD-9-CM: V65.49  Unknown Unknown        Palliative care encounter ICD-10-CM: Z51.5  ICD-9-CM: V66.7  Unknown Unknown        Small bowel ischemia ([facility] [address] ICD-10-CM: K55.9  ICD-9-CM: 557.9  11/16/2021 Unknown               These active diagnoses are of sufficient risk that focused discussion on advance care planning is indicated in order to allow the patient to thoughtfully consider personal goals of care, and if situations arise that prevent the ability to personally give input, to ensure appropriate representation of their personal desires for different levels and aggressiveness of care. Discussion:     Persons present and participating in discussion: Leandro Raymundo MD    Discussion: discussed current condition, short and long term prognosis. I have explained in details the code status options and what CPR/intubation means, when indicated. And patient opted for Full code . She has no documented code status, will document full code. Time Spent:     Total time spent face-to-face in education and discussion: 18 minutes.      Lynette Morelos MD  11/28/2021  7:56 AM

## 2021-11-28 NOTE — PROGRESS NOTES
Progress Note    Patient: Michael Riggs MRN: [id number]  SSN: [id number]    YOB: 1933  Age: 80 y.o. Sex: female      Admit Date: 11/15/2021    13 Days Post-Op    Procedure:  Procedure(s):  Laparoscopy Converted to Open, Right Colectomy, Extended Small Bowel Resection    Subjective:     No acute surgical issues. Pt is overall doing well. Pt is tolerating diet. Pain is under control. No nausea or vomiting. Rectal tube is being placed for fecal control. Pain is under control. No nausea or vomiting.     Objective:     Visit Vitals  /62 (BP 1 Location: Left upper arm)   Pulse 100   Temp 98.6 °F (37 °C)   Resp 20   Ht 5' 4\" (1.626 m)   Wt 163 lb 12.8 oz (74.3 kg)   SpO2 96%   BMI 28.12 kg/m²       Temp (24hrs), Av.6 °F (37 °C), Min:98.4 °F (36.9 °C), Max:98.9 °F (37.2 °C)        Physical Exam:    Gen:  NAD  Pulm:  Unlabored  Abd:  S/ND/appropriate TTP  Wound:  C/D/I    Recent Results (from the past 24 hour(s))   GLUCOSE, POC    Collection Time: 21  1:07 AM   Result Value Ref Range    Glucose (POC) 199 (H) 65 - 117 mg/dL    Performed by Rossi, BASIC    Collection Time: 21  4:38 AM   Result Value Ref Range    Sodium 137 136 - 145 mmol/L    Potassium 3.7 3.5 - 5.1 mmol/L    Chloride 112 (H) 97 - 108 mmol/L    CO2 19 (L) 21 - 32 mmol/L    Anion gap 6 5 - 15 mmol/L    Glucose 106 (H) 65 - 100 mg/dL    BUN 42 (H) 6 - 20 MG/DL    Creatinine 0.95 0.55 - 1.02 MG/DL    BUN/Creatinine ratio 44 (H) 12 - 20      GFR est AA >60 >60 ml/min/1.73m2    GFR est non-AA 56 (L) >60 ml/min/1.73m2    Calcium 7.8 (L) 8.5 - 10.1 MG/DL   CBC W/O DIFF    Collection Time: 21  4:38 AM   Result Value Ref Range    WBC 11.8 (H) 3.6 - 11.0 K/uL    RBC 2.20 (L) 3.80 - 5.20 M/uL    HGB 6.6 (L) 11.5 - 16.0 g/dL    HCT 21.7 (L) 35.0 - 47.0 %    MCV 98.6 80.0 - 99.0 FL    MCH 30.0 26.0 - 34.0 PG    MCHC 30.4 30.0 - 36.5 g/dL    RDW 15.9 (H) 11.5 - 14.5 %    PLATELET 479 (H) 718 - 400 K/uL    MPV 12.0 8.9 - 12.9 FL    NRBC 0.0 0  WBC    ABSOLUTE NRBC 0.00 0.00 - 0.01 K/uL   GLUCOSE, POC    Collection Time: 11/28/21  6:17 AM   Result Value Ref Range    Glucose (POC) 127 (H) 65 - 117 mg/dL    Performed by Shelley Mars, ALLOCATE    Collection Time: 11/28/21  7:45 AM   Result Value Ref Range    HISTORY CHECKED? Historical check performed    MAGNESIUM    Collection Time: 11/28/21  8:34 AM   Result Value Ref Range    Magnesium 2.1 1.6 - 2.4 mg/dL   PHOSPHORUS    Collection Time: 11/28/21  8:34 AM   Result Value Ref Range    Phosphorus 3.7 2.6 - 4.7 MG/DL   TYPE & SCREEN    Collection Time: 11/28/21  8:34 AM   Result Value Ref Range    Crossmatch Expiration 12/01/2021,2359     ABO/Rh(D) Joan Fisher POSITIVE     Antibody screen NEG     Unit number [id number]     Blood component type RC LR     Unit division 00     Status of unit ISSUED     Crossmatch result Compatible    GLUCOSE, POC    Collection Time: 11/28/21 11:19 AM   Result Value Ref Range    Glucose (POC) 122 (H) 65 - 117 mg/dL    Performed by Panda Boyce        Assessment:     Hospital Problems  Never Reviewed          Codes Class Noted POA    Lethargy ICD-10-CM: R53.83  ICD-9-CM: 780.79  Unknown Unknown        Feeding difficulties ICD-10-CM: R63.30  ICD-9-CM: 107. 3  Unknown Unknown        Goals of care, counseling/discussion ICD-10-CM: Z71.89  ICD-9-CM: V65.49  Unknown Unknown        Palliative care encounter ICD-10-CM: Z51.5  ICD-9-CM: V66.7  Unknown Unknown        Small bowel ischemia ([facility] [address] ICD-10-CM: K55.9  ICD-9-CM: 557.9  11/16/2021 Unknown              Plan/Recommendations/Medical Decision Making:     - Diet as tolerated  - Pain control  - Continue antibiotic therapy  - PT as tolerated  - Renew TPN

## 2021-11-28 NOTE — PROGRESS NOTES
9394: Dr. Humera Brown notified verbally at the [address] station that the pt Ms. Prince's Hgb was 6.6 this AM. Will notify the [address] [place] [address] as well in report. Bedside and Verbal shift change report given to [address] (oncoming nurse) by Larissa Serrato RN/Ivana MUNOZ (offgoing nurse). Report included the following information SBAR, Kardex, Procedure Summary, Intake/Output, MAR, Recent Results and Cardiac Rhythm Atrial Fibrilation.

## 2021-11-29 LAB
ABO + RH BLD: NORMAL
ANION GAP SERPL CALC-SCNC: 8 MMOL/L (ref 5–15)
BLD PROD TYP BPU: NORMAL
BLOOD GROUP ANTIBODIES SERPL: NORMAL
BPU ID: NORMAL
BUN SERPL-MCNC: 39 MG/DL (ref 6–20)
BUN/CREAT SERPL: 45 (ref 12–20)
CALCIUM SERPL-MCNC: 7.9 MG/DL (ref 8.5–10.1)
CHLORIDE SERPL-SCNC: 110 MMOL/L (ref 97–108)
CO2 SERPL-SCNC: 19 MMOL/L (ref 21–32)
CREAT SERPL-MCNC: 0.87 MG/DL (ref 0.55–1.02)
CROSSMATCH RESULT,%XM: NORMAL
ERYTHROCYTE [DISTWIDTH] IN BLOOD BY AUTOMATED COUNT: 15.9 % (ref 11.5–14.5)
GLUCOSE BLD STRIP.AUTO-MCNC: 130 MG/DL (ref 65–117)
GLUCOSE BLD STRIP.AUTO-MCNC: 289 MG/DL (ref 65–117)
GLUCOSE BLD STRIP.AUTO-MCNC: 52 MG/DL (ref 65–117)
GLUCOSE BLD STRIP.AUTO-MCNC: 68 MG/DL (ref 65–117)
GLUCOSE BLD STRIP.AUTO-MCNC: 74 MG/DL (ref 65–117)
GLUCOSE BLD STRIP.AUTO-MCNC: 80 MG/DL (ref 65–117)
GLUCOSE BLD STRIP.AUTO-MCNC: 82 MG/DL (ref 65–117)
GLUCOSE BLD STRIP.AUTO-MCNC: 88 MG/DL (ref 65–117)
GLUCOSE SERPL-MCNC: 88 MG/DL (ref 65–100)
HCT VFR BLD AUTO: 24.7 % (ref 35–47)
HGB BLD-MCNC: 7.6 G/DL (ref 11.5–16)
MAGNESIUM SERPL-MCNC: 2 MG/DL (ref 1.6–2.4)
MCH RBC QN AUTO: 29.5 PG (ref 26–34)
MCHC RBC AUTO-ENTMCNC: 30.8 G/DL (ref 30–36.5)
MCV RBC AUTO: 95.7 FL (ref 80–99)
NRBC # BLD: 0 K/UL (ref 0–0.01)
NRBC BLD-RTO: 0 PER 100 WBC
PHOSPHATE SERPL-MCNC: 3.6 MG/DL (ref 2.6–4.7)
PLATELET # BLD AUTO: 418 K/UL (ref 150–400)
PMV BLD AUTO: 11.7 FL (ref 8.9–12.9)
POTASSIUM SERPL-SCNC: 3.6 MMOL/L (ref 3.5–5.1)
RBC # BLD AUTO: 2.58 M/UL (ref 3.8–5.2)
SERVICE CMNT-IMP: ABNORMAL
SERVICE CMNT-IMP: NORMAL
SODIUM SERPL-SCNC: 137 MMOL/L (ref 136–145)
SPECIMEN EXP DATE BLD: NORMAL
STATUS OF UNIT,%ST: NORMAL
UNIT DIVISION, %UDIV: 0
WBC # BLD AUTO: 9 K/UL (ref 3.6–11)

## 2021-11-29 PROCEDURE — 99231 SBSQ HOSP IP/OBS SF/LOW 25: CPT | Performed by: CLINICAL NURSE SPECIALIST

## 2021-11-29 PROCEDURE — 74011250636 HC RX REV CODE- 250/636: Performed by: SURGERY

## 2021-11-29 PROCEDURE — 74011250637 HC RX REV CODE- 250/637: Performed by: INTERNAL MEDICINE

## 2021-11-29 PROCEDURE — 74011000250 HC RX REV CODE- 250: Performed by: INTERNAL MEDICINE

## 2021-11-29 PROCEDURE — 65270000029 HC RM PRIVATE

## 2021-11-29 PROCEDURE — 84100 ASSAY OF PHOSPHORUS: CPT

## 2021-11-29 PROCEDURE — 36415 COLL VENOUS BLD VENIPUNCTURE: CPT

## 2021-11-29 PROCEDURE — 74011250637 HC RX REV CODE- 250/637: Performed by: SURGERY

## 2021-11-29 PROCEDURE — 83735 ASSAY OF MAGNESIUM: CPT

## 2021-11-29 PROCEDURE — 74011000258 HC RX REV CODE- 258: Performed by: HOSPITALIST

## 2021-11-29 PROCEDURE — 80048 BASIC METABOLIC PNL TOTAL CA: CPT

## 2021-11-29 PROCEDURE — 74011000258 HC RX REV CODE- 258: Performed by: SURGERY

## 2021-11-29 PROCEDURE — 74011250636 HC RX REV CODE- 250/636: Performed by: HOSPITALIST

## 2021-11-29 PROCEDURE — 74011000250 HC RX REV CODE- 250: Performed by: HOSPITALIST

## 2021-11-29 PROCEDURE — 82962 GLUCOSE BLOOD TEST: CPT

## 2021-11-29 PROCEDURE — 85027 COMPLETE CBC AUTOMATED: CPT

## 2021-11-29 PROCEDURE — 74011636637 HC RX REV CODE- 636/637: Performed by: SURGERY

## 2021-11-29 PROCEDURE — 74011000250 HC RX REV CODE- 250: Performed by: SURGERY

## 2021-11-29 RX ORDER — LOPERAMIDE HYDROCHLORIDE 2 MG/1
2 CAPSULE ORAL 2 TIMES DAILY
Status: DISCONTINUED | OUTPATIENT
Start: 2021-11-29 | End: 2021-11-30

## 2021-11-29 RX ORDER — DILTIAZEM HYDROCHLORIDE 30 MG/1
60 TABLET, FILM COATED ORAL
Status: DISCONTINUED | OUTPATIENT
Start: 2021-11-29 | End: 2021-11-30

## 2021-11-29 RX ORDER — METOPROLOL SUCCINATE 25 MG/1
25 TABLET, EXTENDED RELEASE ORAL DAILY
Status: DISCONTINUED | OUTPATIENT
Start: 2021-11-30 | End: 2021-12-01

## 2021-11-29 RX ADMIN — DILTIAZEM HYDROCHLORIDE 60 MG: 30 TABLET, FILM COATED ORAL at 14:37

## 2021-11-29 RX ADMIN — APIXABAN 2.5 MG: 2.5 TABLET, FILM COATED ORAL at 14:38

## 2021-11-29 RX ADMIN — COLLAGENASE SANTYL: 250 OINTMENT TOPICAL at 21:22

## 2021-11-29 RX ADMIN — LOPERAMIDE HYDROCHLORIDE 2 MG: 2 CAPSULE ORAL at 08:49

## 2021-11-29 RX ADMIN — I.V. FAT EMULSION 250 ML: 20 EMULSION INTRAVENOUS at 17:57

## 2021-11-29 RX ADMIN — PIPERACILLIN SODIUM AND TAZOBACTAM SODIUM 3.38 G: 3; .375 INJECTION, POWDER, LYOPHILIZED, FOR SOLUTION INTRAVENOUS at 21:22

## 2021-11-29 RX ADMIN — Medication 1 CAPSULE: at 08:48

## 2021-11-29 RX ADMIN — LOPERAMIDE HYDROCHLORIDE 2 MG: 2 CAPSULE ORAL at 17:38

## 2021-11-29 RX ADMIN — PIPERACILLIN SODIUM AND TAZOBACTAM SODIUM 3.38 G: 3; .375 INJECTION, POWDER, LYOPHILIZED, FOR SOLUTION INTRAVENOUS at 04:00

## 2021-11-29 RX ADMIN — METOPROLOL TARTRATE 5 MG: 5 INJECTION INTRAVENOUS at 08:49

## 2021-11-29 RX ADMIN — DILTIAZEM HYDROCHLORIDE 2.5 MG/HR: 5 INJECTION, SOLUTION INTRAVENOUS at 03:55

## 2021-11-29 RX ADMIN — Medication 10 ML: at 12:56

## 2021-11-29 RX ADMIN — ASCORBIC ACID: 500 INJECTION, SOLUTION INTRAMUSCULAR; INTRAVENOUS; SUBCUTANEOUS at 17:58

## 2021-11-29 RX ADMIN — PIPERACILLIN SODIUM AND TAZOBACTAM SODIUM 3.38 G: 3; .375 INJECTION, POWDER, LYOPHILIZED, FOR SOLUTION INTRAVENOUS at 12:54

## 2021-11-29 RX ADMIN — Medication 20 ML: at 18:06

## 2021-11-29 NOTE — WOUND CARE
WOCN Note:     New consult placed for assessment to sacrum and buttocks. Assessed in room 439B. Chart reviewed. Admitted DX:  Small bowel ischemia ([facility] [address] [K55.9]  Past Medical History:   Diagnosis Date    Atrial fibrillation (HCC)     Atrial flutter (HCC)     Edema     Hypertension     Hypokalemia     Hypothyroidism     Insomnia     Major depressive disorder     Pain     UTI (urinary tract infection)     Vitamin D deficiency      Assessment:   Patient is alert, communicative and requires assist of 2 with repositioning. Bed: foam  Patient has a FMS and pure wick. Patient repositioned on right side with pillow. Heels offloaded with pillows. Heels intact without erythema. 1.  Sacrum, Pressure Injury Unstagable: 5 x 14 x 0.2 cm  65% non blanching purple/non blanching red 25% tan 10% pink; scant serosang exudate; no odor. Periwound with non blanching red erythema. Believe to be POA based on nursing documentation from 11.17.21. Wound, Pressure Prevention & Skin Care Recommendations:    1. Minimize layers of linen/pads under patient to optimize support surface. 2.  Turn/reposition approximately every 2 hours and offload heels. 3.  Manage moisture/ Keep skin folds clean and dry. 4.  Specialty bed: Prius air mattress ordered via [employer]. Use only flat sheet and one incontinence pad.   5.  Sacrum:  Daily cleanse with saline; apply Santyl and saline moist gauze; cover with dry dressing. Discussed above plan with Kamilla Martínez.     Transition of Care:   Plan to follow as needed while admitted to hospital.    EILEEN GoldN RN [facility] Inpatient Wound Care  Available on [employer]  Pager [phone]  Office [phone]

## 2021-11-29 NOTE — PROGRESS NOTES
Bedside shift change report given to Lois Cho (oncoming nurse) by Kecia Padgett (offgoing nurse). Report included the following information SBAR, Intake/Output, MAR, Med Rec Status and Cardiac Rhythm Afib.

## 2021-11-29 NOTE — PROGRESS NOTES
[facility] at [facility]    POD #14    Subjective     Doing well, tolerating diet some, not eating much, no N/V, fecal manager for loose BM    Objective     Patient Vitals for the past 24 hrs:   Temp Pulse Resp BP SpO2   11/29/21 0552 -- 92 -- -- --   11/29/21 0351 -- 94 -- -- --   11/29/21 0307 98 °F (36.7 °C) (!) 106 18 (!) 121/56 100 %   11/29/21 0153 -- 92 -- -- --   11/28/21 2246 98.5 °F (36.9 °C) 90 18 (!) 103/48 100 %   11/28/21 2200 -- 86 -- -- --   11/28/21 2045 -- -- -- -- 100 %   11/28/21 2039 98.7 °F (37.1 °C) (!) 107 19 (!) 137/52 100 %   11/28/21 2012 -- 87 -- -- --   11/28/21 2009 98.8 °F (37.1 °C) 86 20 96/68 100 %   11/28/21 1516 98.8 °F (37.1 °C) 100 16 (!) 159/56 96 %   11/28/21 1408 98.6 °F (37 °C) 99 16 (!) 139/53 100 %   11/28/21 1346 98.3 °F (36.8 °C) 96 18 (!) 143/53 100 %   11/28/21 1247 98.4 °F (36.9 °C) 98 20 137/62 96 %   11/28/21 1237 -- (!) 105 -- -- --   11/28/21 1146 98.6 °F (37 °C) 100 20 137/62 96 %   11/28/21 1117 98.4 °F (36.9 °C) 93 18 129/75 99 %   11/28/21 1102 98.7 °F (37.1 °C) 97 18 (!) 112/47 99 %   11/28/21 1047 98.7 °F (37.1 °C) (!) 101 20 (!) 117/47 --   11/28/21 1014 -- (!) 102 -- -- --   11/28/21 0810 98.5 °F (36.9 °C) (!) 102 18 129/66 100 %       Date 11/28/21 0700 - 11/29/21 0659 11/29/21 0700 - 11/30/21 0659   Shift 4494-4320 1336-4911 24 Hour Total 5270-5827 0970-2867 24 Hour Total   INTAKE   P.O. 360  360        P. O. 360  360      I. V.(mL/kg/hr) 5904(3.0)  0477(5.8)        Volume (lactated Ringers infusion) 681  681        Volume (0.9% sodium chloride infusion 250 mL) 0  0        Volume (piperacillin-tazobactam (ZOSYN) 3.375 g in 0.9% sodium chloride (MBP/ADV) 100 mL MBP) 300  300        Volume (TPN ADULT - CENTRAL) 1815  1815      Blood 353.5  353.5        Volume (TRANSFUSE PACKED RBC'S) 353.5  353.5      Shift Total(mL/kg) 3509. 5(47.2)  [id number]. 5(47.2)      OUTPUT   Urine(mL/kg/hr) 700(0.8)  700(0.4)        Urine Output (mL) (External Urinary Catheter 11/28/21) 700  700      Drains 175  175        Output (ml) (Fecal Management) 175  175      Shift Total(mL/kg) 875(11.8)  875(11.8)      NET 2634.5  2634.5      Weight (kg) 74.3 74.3 74.3 74.3 74.3 74.3       PE  Pulm - CTAB  CV - RRR  Abd - soft, ND, BS present, incision c/d/i    Labs  Recent Results (from the past 12 hour(s))   GLUCOSE, POC    Collection Time: 11/28/21 10:45 PM   Result Value Ref Range    Glucose (POC) 99 65 - 117 mg/dL    Performed by Charline Goldberg RN    MAGNESIUM    Collection Time: 11/29/21 12:13 AM   Result Value Ref Range    Magnesium 2.0 1.6 - 2.4 mg/dL   PHOSPHORUS    Collection Time: 11/29/21 12:13 AM   Result Value Ref Range    Phosphorus 3.6 2.6 - 4.7 MG/DL   METABOLIC PANEL, BASIC    Collection Time: 11/29/21 12:13 AM   Result Value Ref Range    Sodium 137 136 - 145 mmol/L    Potassium 3.6 3.5 - 5.1 mmol/L    Chloride 110 (H) 97 - 108 mmol/L    CO2 19 (L) 21 - 32 mmol/L    Anion gap 8 5 - 15 mmol/L    Glucose 88 65 - 100 mg/dL    BUN 39 (H) 6 - 20 MG/DL    Creatinine 0.87 0.55 - 1.02 MG/DL    BUN/Creatinine ratio 45 (H) 12 - 20      GFR est AA >60 >60 ml/min/1.73m2    GFR est non-AA >60 >60 ml/min/1.73m2    Calcium 7.9 (L) 8.5 - 10.1 MG/DL   CBC W/O DIFF    Collection Time: 11/29/21 12:13 AM   Result Value Ref Range    WBC 9.0 3.6 - 11.0 K/uL    RBC 2.58 (L) 3.80 - 5.20 M/uL    HGB 7.6 (L) 11.5 - 16.0 g/dL    HCT 24.7 (L) 35.0 - 47.0 %    MCV 95.7 80.0 - 99.0 FL    MCH 29.5 26.0 - 34.0 PG    MCHC 30.8 30.0 - 36.5 g/dL    RDW 15.9 (H) 11.5 - 14.5 %    PLATELET 158 (H) 696 - 400 K/uL    MPV 11.7 8.9 - 12.9 FL    NRBC 0.0 0  WBC    ABSOLUTE NRBC 0.00 0.00 - 0.01 K/uL   GLUCOSE, POC    Collection Time: 11/29/21  5:49 AM   Result Value Ref Range    Glucose (POC) 88 65 - 117 mg/dL    Performed by Charline Goldberg RN          Assessment     Henri Mi is a 80 y. o.yr old female s/p open R colectomy and extended small bowel resection    Plan     Decrease TPN dose, she is eating some but not sure how much, will get a calorie count  I will stop abx today, she has been on it 14days  Imodium daily to help with loose stool, she did have a large amount of small bowel and colon resected, likely the cuase of loose stools, should improve with time  Pt needs to be switched to PO cardizem per cardiology team, it is the only think keeping her here in the tele unit, she can be Tx to the floor today if she can be switched    Aleksander Singh MD

## 2021-11-29 NOTE — PROGRESS NOTES
[facility]  Hospitalist Group                                                                                          Hospitalist Progress Note  Emiliana Barclay MD  Answering service: [phone] from in house phone        Date of Service:  2021  NAME:  Zelda Smith  :  3/13/1933  MRN:  [id number]      Admission Summary:   Zelda Smith is a 80 y.o. female with pmh of Afib/flutter, HTN, hypothyroidism who presents with bowel ischemia now s/p resection by Dr. Reid Jacques. Course complicated by Afib with RVR, and hyperglycemia     Interval history / Subjective:   Pt seen and examined. Feels 'good this morning'. Diarrhea slowly improving. Has some appetite. Sugars coming down, insulin reduced in TPN. Assessment & Plan:     #. Ischemic colitis with perforation s/p resection- Management per surgery  #. Anemia: acute on chronic- likely blood loss from GI tract.   - CT abdomen  without pneumoperitoneum, discrete fluid collection.  - Transfused 1 unit RBC . Monitor Hb- transfuse further if <7.    #. Diarrhea: frequent, fecal management tube placed. Started probiotics. PRN imodium  - could be related to large chunk of intestine resection. #. TPN induced hyperglycemia   - Continue 40U insulin added to TPN- DTC following.  - Patient doesn't have DM, BS will normalize when off TPN, and infection resolves. - Continue SSI, POCT glucose checks and hypoglycemia protocol per hospital policy  - Noted speech has cleared, and patient on clear liquid diet     #. Afib with RVR - HR improved- IV diltiazem and IV metoprolol- Cardiology following     Hospital Problems  Never Reviewed          Codes Class Noted POA    Lethargy ICD-10-CM: R53.83  ICD-9-CM: 780.79  Unknown Unknown        Feeding difficulties ICD-10-CM: R63.30  ICD-9-CM: 983. 3  Unknown Unknown        Goals of care, counseling/discussion ICD-10-CM: Z71.89  ICD-9-CM: V65.49  Unknown Unknown        Palliative care encounter ICD-10-CM: Z51.5  ICD-9-CM: V66.7  Unknown Unknown        Small bowel ischemia ([facility] [address] ICD-10-CM: K55.9  ICD-9-CM: 557.9  11/16/2021 Unknown            Review of Systems:   A comprehensive review of systems was negative except for that written in the HPI. Vital Signs:    Last 24hrs VS reviewed since prior progress note. Most recent are:  Visit Vitals  BP (!) 138/49 (BP 1 Location: Left upper arm, BP Patient Position: At rest;Supine)   Pulse 92   Temp 97.6 °F (36.4 °C)   Resp 14   Ht 5' 4\" (1.626 m)   Wt 74.3 kg (163 lb 12.8 oz)   SpO2 99%   BMI 28.12 kg/m²         Intake/Output Summary (Last 24 hours) at 11/29/2021 0858  Last data filed at 11/28/2021 1844  Gross per 24 hour   Intake 3509.5 ml   Output 375 ml   Net 3134.5 ml        Physical Examination:     I had a face to face encounter with this patient and independently examined them on 11/29/2021 as outlined below:    Constitutional:  No acute distress, cooperative, pleasant, frail, elderly female        Resp:  CTA bilaterally. No wheezing/rhonchi/rales. No accessory muscle use        GI:  Soft, non distended, hypoactive. no hepatosplenomegaly, incision c/d/i     Musculoskeletal:  No edema, warm    Neurologic:  Moves all extremities.  Answering questions appropriately       Data Review:    Review and/or order of clinical lab test  Review and/or order of tests in the radiology section of CPT  Review and/or order of tests in the medicine section of CPT    Labs:     Recent Labs     11/29/21 0013 11/28/21 0438   WBC 9.0 11.8*   HGB 7.6* 6.6*   HCT 24.7* 21.7*   * 450*     Recent Labs     11/29/21 0013 11/28/21  0834 11/28/21 0438 11/27/21  0203     --  137 137   K 3.6  --  3.7 4.1   *  --  112* 111*   CO2 19*  --  19* 19*   BUN 39*  --  42* 42*   CREA 0.87  --  0.95 0.98   GLU 88  --  106* 158*   CA 7.9*  --  7.8* 7.9*   MG 2.0 2.1  --  2.1   PHOS 3.6 3.7  --  3.6     No results for input(s): ALT, AP, TBIL, TBILI, TP, ALB, GLOB, GGT, AML, LPSE in the last 72 hours. No lab exists for component: SGOT, GPT, AMYP, HLPSE  No results for input(s): INR, PTP, APTT, INREXT, INREXT in the last 72 hours. No results for input(s): FE, TIBC, PSAT, FERR in the last 72 hours. No results found for: FOL, RBCF   No results for input(s): PH, PCO2, PO2 in the last 72 hours. No results for input(s): CPK, CKNDX, TROIQ in the last 72 hours.     No lab exists for component: CPKMB  No results found for: CHOL, CHOLX, CHLST, CHOLV, HDL, HDLP, LDL, LDLC, DLDLP, TGLX, TRIGL, TRIGP, CHHD, CHHDX  Lab Results   Component Value Date/Time    Glucose (POC) 88 11/29/2021 05:49 AM    Glucose (POC) 99 11/28/2021 10:45 PM    Glucose (POC) 139 (H) 11/28/2021 03:49 PM    Glucose (POC) 122 (H) 11/28/2021 11:19 AM    Glucose (POC) 127 (H) 11/28/2021 06:17 AM     Lab Results   Component Value Date/Time    Color YELLOW/STRAW 11/23/2021 05:05 PM    Appearance CLEAR 11/23/2021 05:05 PM    Specific gravity 1.015 11/23/2021 05:05 PM    pH (UA) 7.0 11/23/2021 05:05 PM    Protein 30 (A) 11/23/2021 05:05 PM    Glucose 250 (A) 11/23/2021 05:05 PM    Ketone Negative 11/23/2021 05:05 PM    Bilirubin Negative 11/23/2021 05:05 PM    Urobilinogen 0.2 11/23/2021 05:05 PM    Nitrites Negative 11/23/2021 05:05 PM    Leukocyte Esterase Negative 11/23/2021 05:05 PM    Epithelial cells FEW 11/23/2021 05:05 PM    Bacteria Negative 11/23/2021 05:05 PM    WBC 0-4 11/23/2021 05:05 PM    RBC 0-5 11/23/2021 05:05 PM         Medications Reviewed:     Current Facility-Administered Medications   Medication Dose Route Frequency    loperamide (IMODIUM) capsule 2 mg  2 mg Oral BID    TPN ADULT - CENTRAL   IntraVENous CONTINUOUS    L.acidophilus-paracasei-S.thermophil-bifidobacter (RISAQUAD) 8 billion cell capsule  1 Capsule Oral DAILY    0.9% sodium chloride infusion 250 mL  250 mL IntraVENous PRN    TPN ADULT - CENTRAL   IntraVENous CONTINUOUS    alteplase (CATHFLO) 1 mg in sterile water (preservative free) 1 mL injection  1 mg InterCATHeter PRN    [Held by provider] enoxaparin (LOVENOX) injection 70 mg  70 mg SubCUTAneous Q12H    loperamide (IMODIUM) capsule 2 mg  2 mg Oral BID    alteplase (CATHFLO) 2 mg in sterile water (preservative free) 2 mL injection  2 mg InterCATHeter PRN    glucose chewable tablet 16 g  4 Tablet Oral PRN    dextrose (D50W) injection syrg 12.5-25 g  25-50 mL IntraVENous PRN    glucagon (GLUCAGEN) injection 1 mg  1 mg IntraMUSCular PRN    lactated Ringers infusion  25 mL/hr IntraVENous CONTINUOUS    fat emulsion 20% (LIPOSYN, INTRAlipid) infusion 250 mL  250 mL IntraVENous Q MON, THU & SAT    metoprolol (LOPRESSOR) injection 5 mg  5 mg IntraVENous TID    acetaminophen (TYLENOL) suppository 650 mg  650 mg Rectal Q6H PRN    dilTIAZem (CARDIZEM) 125 mg in dextrose 5% 125 mL infusion  0-15 mg/hr IntraVENous TITRATE    piperacillin-tazobactam (ZOSYN) 3.375 g in 0.9% sodium chloride (MBP/ADV) 100 mL MBP  3.375 g IntraVENous Q8H    sodium chloride (NS) flush 5-40 mL  5-40 mL IntraVENous Q8H    sodium chloride (NS) flush 5-40 mL  5-40 mL IntraVENous PRN    HYDROmorphone (DILAUDID) injection 1 mg  1 mg IntraVENous Q4H PRN    naloxone (NARCAN) injection 0.4 mg  0.4 mg IntraVENous PRN    ondansetron (ZOFRAN) injection 4 mg  4 mg IntraVENous Q4H PRN     ______________________________________________________________________  EXPECTED LENGTH OF STAY: 10d 7h  ACTUAL LENGTH OF STAY:          14                 Ethan Tipton MD

## 2021-11-29 NOTE — PROGRESS NOTES
TEREZA: Anticipate discharge back to [facility] vs SNF pending medical progress. Transportation likely BLS.    RUR: 15%     Disposition: Patient admitted on 11/15 for small bowel ischemia. Had colectomy and small bowel resection. Hx: aFib, hypertension, hypothyroidism, atrial flutter, insomnia. Palliative and cardio vascular following. Currently on TPN. CM will continue to follow for discharge needs.     [facility] contact: Guillermina Koyanagi, Director of Nursing, [phone]     Emergency contact: Lor Lynn, daughter, [facility], [address] [place] [address],[address]  [phone]

## 2021-11-29 NOTE — PROGRESS NOTES
Palliative Medicine    Goals clear for full restorative measures, full code. Family has our contact. Pt making slow gains. Signing off a this time, but please reconsult with specific questions/concerns . Thank you for allowing us to assist in pt's care.

## 2021-11-29 NOTE — DIABETES MGMT
[facility]    CLINICAL NURSE SPECIALIST CONSULT     Initial Presentation   Dana Thomson is a 80 y.o. female admitted 11/15/21  with abdominal pain. + N/V at admission. S/p hip fracture repair- Resides in [facility] facility - consult received for management of hospital BG while on TPN. HX:   Past Medical History:   Diagnosis Date    Atrial fibrillation ([address]     Atrial flutter (HCC)     Edema     Hypertension     Hypokalemia     Hypothyroidism     Insomnia     Major depressive disorder     Pain     UTI (urinary tract infection)     Vitamin D deficiency         INITIAL DX:   Small bowel ischemia ([address] [K55.9]     Current Treatment     TX: 11/16 Laparoscopy Converted to Open, Right Colectomy, Extended Small Bowel Resection     Consulted by Provider for advanced diabetes nursing assessment and care for:   [x] Inpatient management strategy  Hospital Course   Clinical progress has been complicated by metabolic acidosis/ hypovolemia- required ICU level of care . 11/16: patient improved in ICU- Lactic trending down-   11/17: NGT to suction; per notes continued to improve; on PM rounds less conversant/ tachycardic/ BP stable-afib with rvr-cardiology consulted  11/18: febrile overnight- 100.7F/not conversant/ cardiazem infusion for uncontrolled afib/ECHO ordered/ PICC line to be placed for TPN  11/19: URSULA drain pulled out/ NGT in  Place -scant drainage/ noted palliative consult/ TPN started  11/20: patient arousable/ guarded prognosis per surgery/ BG rising per notes  11/21: BG still high per notes while on TPN  11/23: insulin infusion noted - BG improved overnight- will transition all insulin into patient's TPN to better manage hyperglycemia - CT scan   11/24: CT scan showing no leaks/ WBC 17.8/ transitioned off insulin infusion when TPN started last evening. All insulin in TPN  11/29: TPN rate decreased/ patient annette.  Some diet    Diabetes History   NO DM2 history -A1C 5.5%.     Diabetes Medication History  Key Antihyperglycemic Medications     Patient is on no antihyperglycemic meds. Subjective   Resting in bed at time of visit. Objective   Physical exam  General Normal weight  female in no acute distress/ill-appearing. Conversant and cooperative  Neuro  Alert, oriented   Vital Signs   Visit Vitals  BP (!) 121/56 (BP 1 Location: Left lower arm, BP Patient Position: At rest)   Pulse 92   Temp 98 °F (36.7 °C)   Resp 18   Ht 5' 4\" (1.626 m)   Wt 74.3 kg (163 lb 12.8 oz)   SpO2 100%   BMI 28.12 kg/m²     Skin  Warm and dry. Heart   Regular rate and rhythm.  No murmurs, rubs or gallops  Lungs  Clear to auscultation without rales or rhonchi  Extremities No foot wounds      Laboratory  Recent Labs     11/29/21  0013 11/28/21  0438 11/27/21  0203   GLU 88 106* 158*   AGAP 8 6 7   WBC 9.0 11.8* 13.5*   CREA 0.87 0.95 0.98   GFRNA >60 56* 54*       Factors impacting BG management  Factor Dose Comments   Nutrition:  TPN   324g dextrose/ bag      Drugs:  Vasopressor load         Required in ICU-discontinued  *     Affects insulin delivery     Pain Post op    Infection Small bowel ischemia    Other:   Kidney function  Liver function     WNL  WNL      Blood glucose pattern      Significant diabetes-related events over the past 24-72 hours  BG in target this AM-106 after transitioned off insulin infusion- all insulin in TPN   Noted diet advancement to full liquid + remains on TPN      Assessment and Plan   Nursing Diagnosis Risk for unstable blood glucose pattern   Nursing Intervention Domain 5250 Decision-making Support   Nursing Interventions Examined current inpatient diabetes control   Explored factors facilitating and impeding inpatient management  Identified self-management practices impeding diabetes control  Explored corrective strategies with patient and responsible inpatient provider   Informed patient of rational for insulin strategy while hospitalized     Evaluation This  female, NO diabetes is s/p small bowel resection and right colectomy for ischemic small bowel on 11/16/21. Progress has been complicated by post op fever/ onset of afib with rvr requiring cardiazem/ marked hyperglycemia with TPN/ cognitive decline while hospitalized. Currently on step down unit- in order to manage marked hyperglycemia in setting of TPN, [facility] was consulted to assist.  After conferring with hospitalist,  Dr. David Gordillo, will stop insulin infusion today and add insulin to TPN to better manage hyperglycemia. This will also be safer as well. 11/29: Patient remains on daily TPN along with adult regular low fiber diet (consuming <25%). Since POC BG is trending  and adjustments made to TPN rate, would also adjust and decrease insulin in TPN to reduce risk of hypoglycemia. See recs below. Recommendations   1. Total dextrose /bag=  272g;   3. IF FBG <100, reduce insulin in     TPN to 1  unit insulin:15g dextrose = 20 units/ L-conferred with Dr. Vale Yadav via [facility] and pharmacy. [x] Use of Subcutaneous Insulin Order set (5763)  Insulin Dosing Specific recommendation   CONTINUE Corrective                                       (Useful in adjusting insulin dosing) [x] Normal sensitivity          Billing Code(s)   53194    Before making these care recommendations, I personally reviewed the hospitalization record, including notes, laboratory & diagnostic data and current medications, and examined the patient at the bedside (circumstances permitting) before making care recommendations.      Total minutes: [address]  Diabetes Clinical Nurse Specialist  [facility]  Access via [employer]

## 2021-11-29 NOTE — PROGRESS NOTES
Cardiology Progress Note                                        Admit Date: 11/15/2021    Assessment/Plan:     Persistent Afib; now that she can take po; will change regimen to po regimen; Cardizem 60 mg TID, Toprol 25 mg everyday, Eliquis 2.5 mg BID    David Matos is a 80 y.o. female with     PROBLEM LIST:  Patient Active Problem List    Diagnosis Date Noted    Lethargy     Feeding difficulties     Goals of care, counseling/discussion     Palliative care encounter     Small bowel ischemia ([address] 11/16/2021         Subjective:     David Matos reports none. Visit Vitals  BP (!) 143/55 (BP 1 Location: Left upper arm, BP Patient Position: At rest)   Pulse 99   Temp 98.7 °F (37.1 °C)   Resp 14   Ht 5' 4\" (1.626 m)   Wt 163 lb 12.8 oz (74.3 kg)   SpO2 100%   BMI 28.12 kg/m²       Intake/Output Summary (Last 24 hours) at 11/29/2021 1426  Last data filed at 11/29/2021 0844  Gross per 24 hour   Intake 4477.13 ml   Output 375 ml   Net 4102.13 ml       Objective:      Physical Exam:  HEENT: Perrla, EOMI  Neck: No JVD,  No thyroidmegaly  Resp: CTA bilaterally;  No wheezes or rales  CV: [id number] No murmur no s3  Abd:Soft, Nontender  Ext: No edema  Neuro: Alert and oriented; Nonfocal  Skin: Warm, Dry, Intact  Pulses: 2+ DP/PT/Rad      Telemetry: AFIB    Current Facility-Administered Medications   Medication Dose Route Frequency    loperamide (IMODIUM) capsule 2 mg  2 mg Oral BID    TPN ADULT - CENTRAL   IntraVENous CONTINUOUS    apixaban (ELIQUIS) tablet 2.5 mg  2.5 mg Oral BID    dilTIAZem IR (CARDIZEM) tablet 60 mg  60 mg Oral TIDAC    [START ON 11/30/2021] metoprolol succinate (TOPROL-XL) XL tablet 25 mg  25 mg Oral DAILY    L.acidophilus-paracasei-S.thermophil-bifidobacter (RISAQUAD) 8 billion cell capsule  1 Capsule Oral DAILY    0.9% sodium chloride infusion 250 mL  250 mL IntraVENous PRN    TPN ADULT - CENTRAL   IntraVENous CONTINUOUS    alteplase (CATHFLO) 1 mg in sterile water (preservative free) 1 mL injection  1 mg InterCATHeter PRN    loperamide (IMODIUM) capsule 2 mg  2 mg Oral BID    alteplase (CATHFLO) 2 mg in sterile water (preservative free) 2 mL injection  2 mg InterCATHeter PRN    glucose chewable tablet 16 g  4 Tablet Oral PRN    dextrose (D50W) injection syrg 12.5-25 g  25-50 mL IntraVENous PRN    glucagon (GLUCAGEN) injection 1 mg  1 mg IntraMUSCular PRN    lactated Ringers infusion  25 mL/hr IntraVENous CONTINUOUS    fat emulsion 20% (LIPOSYN, INTRAlipid) infusion 250 mL  250 mL IntraVENous Q MON, THU & SAT    acetaminophen (TYLENOL) suppository 650 mg  650 mg Rectal Q6H PRN    piperacillin-tazobactam (ZOSYN) 3.375 g in 0.9% sodium chloride (MBP/ADV) 100 mL MBP  3.375 g IntraVENous Q8H    sodium chloride (NS) flush 5-40 mL  5-40 mL IntraVENous Q8H    sodium chloride (NS) flush 5-40 mL  5-40 mL IntraVENous PRN    HYDROmorphone (DILAUDID) injection 1 mg  1 mg IntraVENous Q4H PRN    naloxone (NARCAN) injection 0.4 mg  0.4 mg IntraVENous PRN    ondansetron (ZOFRAN) injection 4 mg  4 mg IntraVENous Q4H PRN         Data Review:   Labs:    Recent Results (from the past 24 hour(s))   GLUCOSE, POC    Collection Time: 11/28/21  3:49 PM   Result Value Ref Range    Glucose (POC) 139 (H) 65 - 117 mg/dL    Performed by Bandar Zapata    GLUCOSE, POC    Collection Time: 11/28/21 10:45 PM   Result Value Ref Range    Glucose (POC) 99 65 - 117 mg/dL    Performed by Charline Goldberg RN    MAGNESIUM    Collection Time: 11/29/21 12:13 AM   Result Value Ref Range    Magnesium 2.0 1.6 - 2.4 mg/dL   PHOSPHORUS    Collection Time: 11/29/21 12:13 AM   Result Value Ref Range    Phosphorus 3.6 2.6 - 4.7 MG/DL   METABOLIC PANEL, BASIC    Collection Time: 11/29/21 12:13 AM   Result Value Ref Range    Sodium 137 136 - 145 mmol/L    Potassium 3.6 3.5 - 5.1 mmol/L    Chloride 110 (H) 97 - 108 mmol/L    CO2 19 (L) 21 - 32 mmol/L    Anion gap 8 5 - 15 mmol/L    Glucose 88 65 - 100 mg/dL    BUN 39 (H) 6 - 20 MG/DL    Creatinine 0.87 0.55 - 1.02 MG/DL    BUN/Creatinine ratio 45 (H) 12 - 20      GFR est AA >60 >60 ml/min/1.73m2    GFR est non-AA >60 >60 ml/min/1.73m2    Calcium 7.9 (L) 8.5 - 10.1 MG/DL   CBC W/O DIFF    Collection Time: 11/29/21 12:13 AM   Result Value Ref Range    WBC 9.0 3.6 - 11.0 K/uL    RBC 2.58 (L) 3.80 - 5.20 M/uL    HGB 7.6 (L) 11.5 - 16.0 g/dL    HCT 24.7 (L) 35.0 - 47.0 %    MCV 95.7 80.0 - 99.0 FL    MCH 29.5 26.0 - 34.0 PG    MCHC 30.8 30.0 - 36.5 g/dL    RDW 15.9 (H) 11.5 - 14.5 %    PLATELET 627 (H) 210 - 400 K/uL    MPV 11.7 8.9 - 12.9 FL    NRBC 0.0 0  WBC    ABSOLUTE NRBC 0.00 0.00 - 0.01 K/uL   GLUCOSE, POC    Collection Time: 11/29/21  5:49 AM   Result Value Ref Range    Glucose (POC) 88 65 - 117 mg/dL    Performed by Balta Eisenberg RN    GLUCOSE, POC    Collection Time: 11/29/21 12:30 PM   Result Value Ref Range    Glucose (POC) 82 65 - 117 mg/dL    Performed by Dorita Morelos (RN)

## 2021-11-29 NOTE — PROGRESS NOTES
Comprehensive Nutrition Assessment    Type and Reason for Visit: Reassess    Nutrition Recommendations/Plan:      1. Continue with regular low fiber diet as ordered    2.  RD has added additional supplements (Apple Ensure Clear, Boost pudding)    3. Continue to slowly wean down TPN rate over the next few days    4. Can consider adding appetite stimulant    5. If stools continue to be frequent and loose, consider increasing Imodium to at least 3 times/day      Nutrition Assessment:    Past Medical History:   Diagnosis Date    Atrial fibrillation (HCC)     Atrial flutter (HCC)     Edema     Hypertension     Hypokalemia     Hypothyroidism     Insomnia     Major depressive disorder     Pain     UTI (urinary tract infection)     Vitamin D deficiency    Pt admitted with pneumoperitoneum, now s/p R open colectomy and extended small bowel resection. Pt lethargic post-op and has started TPN given lethargy and post-op complication. Palliative care has been consulted as well. 11/24: Visited with pt this afternoon. She started on clear liquids 11/22, advanced to fulls today. Noted for lunch she had only sips of beverages and soup. RN reports intakes have been variable, with first day she was able to have liquids, she consumed nearly everything but yesterday intakes are reported to have dropped off. Diabetes treatment team consulted for BG management as BG levels running high on TPN, insulin added to TPN and much improved. Lipids also d/c'd with diet advancement. Pt reports to me she has an appetite but feels full quickly, and is agreeable to daily Ensure to aid in overall intakes ([facility] Ensure Enlive, Apple Ensure Clear). Certainly do not feel at this moment she is taking enough PO to maintain nutrition needs. Current TPN providing 1460 kcals, 90 g protein, 324 g CHO/GIR 2.99 mg/kg/min. 11/29:  Visited pt but she was sleeping soundly/snoring so I did not wake her. Spoke with her nurse, Harleen Cox.   Harleen Cox reports that pt doesn't jamal to eat if she is alone in the room, but pt will eat well if someone is there with her. Pt likes the apple Ensure Clear, so I have added this to all meals; she already receives strawberry Ensure Enlive at dinner, and I will add Boost pudding to L&D meals as well. Pt can hopefully come off of TPN over the next several days. RD will continue to follow. Malnutrition Assessment:  Malnutrition Status:  Severe malnutrition    Context:  Acute illness     Findings of the 6 clinical characteristics of malnutrition:   Energy Intake:  7 - 50% or less of est energy requirements for 5 or more days  Weight Loss:  Unable to assess     Body Fat Loss:  7 - Moderate body fat loss, Buccal region   Muscle Mass Loss:  7 - Moderate muscle mass loss, Clavicles (pectoralis & deltoids), Temples (temporalis)  Fluid Accumulation:  7 - Moderate to severe, Generalized   Strength:  Not performed     Nutritionally Significant Medications: IV Zosyn, Cardizem drip    Estimated Daily Nutrient Needs:  Energy (kcal): 7775-3183 (MSJ x 1.1 x 1.2); Weight Used for Energy Requirements: Current  Protein (g): 70-80 (1-1.1 g/kg);  Weight Used for Protein Requirements: Current  Fluid (ml/day): ~1700; Method Used for Fluid Requirements: 1 ml/kcal    Nutrition Related Findings:  rectal tube for many loose stools    BM:  11/29, has flexi-seal d/t many loose stools  Edema:  BLE 2+, BUE 1+  Wounds:  Surgical incision     Recent Labs     11/29/21  0013 11/28/21  0834 11/28/21  0438 11/27/21  0203   GLU 88  --  106* 158*   BUN 39*  --  42* 42*   CREA 0.87  --  0.95 0.98     --  137 137   K 3.6  --  3.7 4.1   *  --  112* 111*   CO2 19*  --  19* 19*   CA 7.9*  --  7.8* 7.9*   PHOS 3.6 3.7  --  3.6   MG 2.0 2.1  --  2.1       Current Nutrition Therapies:   Diet: NPO  Nutrition Support: D18%, 5% AA's @ 63 ml/hr, 250 ml of 20% IL 3x/week  Supplements: yes see above  Additional Caloric Sources: on TPN    Meal intake: Patient Vitals for the past 168 hrs:   % Diet Eaten   11/28/21 1843 1 - 25%   11/28/21 1253 1 - 25%   11/28/21 0905 1 - 25%   11/27/21 1853 26 - 50%   11/24/21 1205 1 - 25%   11/24/21 0840 1 - 25%   11/23/21 0830 0%     Supplement Intake:   Patient Vitals for the past 168 hrs:   Supplement intake %   11/24/21 2246 76 - 100%   11/23/21 0830 0%       Anthropometric Measures:  · Height:  5' 4\" (162.6 cm)  · Current Body Wt:  75.2 kg (165 lb 12.6 oz)   · Ideal Body Wt:  120:  133.4 %   · BMI Categories:  Overweight (BMI 25.0-29. 9)     Wt Readings from Last 10 Encounters:   11/28/21 74.3 kg (163 lb 12.8 oz)       Nutrition Diagnosis:   · Inadequate oral intake related to altered GI function as evidenced by intake 0-25%      Nutrition Interventions:   Food and/or Nutrient Delivery: Continue current diet, Continue oral nutrition supplement, Modify parenteral nutrition  Nutrition Education and Counseling: No recommendations at this time  Coordination of Nutrition Care: Continue to monitor while inpatient    Goals: Tolerance of at least 50% of meals and d/c of TPN over the next week       Nutrition Monitoring and Evaluation:   Behavioral-Environmental Outcomes: None identified  Food/Nutrient Intake Outcomes: Supplement intake, Food and nutrient intake, Parenteral nutrition intake/tolerance  Physical Signs/Symptoms Outcomes: Biochemical data, Diarrhea, GI status, Weight    Discharge Planning:     Too soon to determine     Patricia Oscar RD, CSP     Contact via [employer]

## 2021-11-30 LAB
ANION GAP SERPL CALC-SCNC: 11 MMOL/L (ref 5–15)
BUN SERPL-MCNC: 32 MG/DL (ref 6–20)
BUN/CREAT SERPL: 40 (ref 12–20)
CALCIUM SERPL-MCNC: 7.9 MG/DL (ref 8.5–10.1)
CHLORIDE SERPL-SCNC: 110 MMOL/L (ref 97–108)
CO2 SERPL-SCNC: 17 MMOL/L (ref 21–32)
CREAT SERPL-MCNC: 0.81 MG/DL (ref 0.55–1.02)
ERYTHROCYTE [DISTWIDTH] IN BLOOD BY AUTOMATED COUNT: 16.2 % (ref 11.5–14.5)
GLUCOSE BLD STRIP.AUTO-MCNC: 121 MG/DL (ref 65–117)
GLUCOSE BLD STRIP.AUTO-MCNC: 177 MG/DL (ref 65–117)
GLUCOSE BLD STRIP.AUTO-MCNC: 222 MG/DL (ref 65–117)
GLUCOSE BLD STRIP.AUTO-MCNC: 227 MG/DL (ref 65–117)
GLUCOSE SERPL-MCNC: 169 MG/DL (ref 65–100)
HCT VFR BLD AUTO: 27.3 % (ref 35–47)
HGB BLD-MCNC: 8.6 G/DL (ref 11.5–16)
MAGNESIUM SERPL-MCNC: 2.1 MG/DL (ref 1.6–2.4)
MCH RBC QN AUTO: 29.7 PG (ref 26–34)
MCHC RBC AUTO-ENTMCNC: 31.5 G/DL (ref 30–36.5)
MCV RBC AUTO: 94.1 FL (ref 80–99)
NRBC # BLD: 0 K/UL (ref 0–0.01)
NRBC BLD-RTO: 0 PER 100 WBC
PHOSPHATE SERPL-MCNC: 3.3 MG/DL (ref 2.6–4.7)
PLATELET # BLD AUTO: 400 K/UL (ref 150–400)
PMV BLD AUTO: 11.4 FL (ref 8.9–12.9)
POTASSIUM SERPL-SCNC: 3.5 MMOL/L (ref 3.5–5.1)
RBC # BLD AUTO: 2.9 M/UL (ref 3.8–5.2)
SERVICE CMNT-IMP: ABNORMAL
SODIUM SERPL-SCNC: 138 MMOL/L (ref 136–145)
WBC # BLD AUTO: 7.8 K/UL (ref 3.6–11)

## 2021-11-30 PROCEDURE — 82962 GLUCOSE BLOOD TEST: CPT

## 2021-11-30 PROCEDURE — 77030038269 HC DRN EXT URIN PURWCK BARD -A

## 2021-11-30 PROCEDURE — 74011000258 HC RX REV CODE- 258: Performed by: HOSPITALIST

## 2021-11-30 PROCEDURE — 74011000250 HC RX REV CODE- 250: Performed by: INTERNAL MEDICINE

## 2021-11-30 PROCEDURE — 99231 SBSQ HOSP IP/OBS SF/LOW 25: CPT | Performed by: CLINICAL NURSE SPECIALIST

## 2021-11-30 PROCEDURE — 84100 ASSAY OF PHOSPHORUS: CPT

## 2021-11-30 PROCEDURE — 80048 BASIC METABOLIC PNL TOTAL CA: CPT

## 2021-11-30 PROCEDURE — 85027 COMPLETE CBC AUTOMATED: CPT

## 2021-11-30 PROCEDURE — 74011250636 HC RX REV CODE- 250/636: Performed by: SURGERY

## 2021-11-30 PROCEDURE — 74011000258 HC RX REV CODE- 258: Performed by: INTERNAL MEDICINE

## 2021-11-30 PROCEDURE — 74011250637 HC RX REV CODE- 250/637: Performed by: SURGERY

## 2021-11-30 PROCEDURE — 36415 COLL VENOUS BLD VENIPUNCTURE: CPT

## 2021-11-30 PROCEDURE — 74011250637 HC RX REV CODE- 250/637: Performed by: INTERNAL MEDICINE

## 2021-11-30 PROCEDURE — 74011636637 HC RX REV CODE- 636/637: Performed by: INTERNAL MEDICINE

## 2021-11-30 PROCEDURE — 83735 ASSAY OF MAGNESIUM: CPT

## 2021-11-30 PROCEDURE — 65270000032 HC RM SEMIPRIVATE

## 2021-11-30 PROCEDURE — 74011250636 HC RX REV CODE- 250/636: Performed by: HOSPITALIST

## 2021-11-30 PROCEDURE — 74011250636 HC RX REV CODE- 250/636: Performed by: INTERNAL MEDICINE

## 2021-11-30 RX ORDER — MEGESTROL ACETATE 40 MG/1
20 TABLET ORAL DAILY
Status: DISCONTINUED | OUTPATIENT
Start: 2021-11-30 | End: 2021-12-07 | Stop reason: HOSPADM

## 2021-11-30 RX ORDER — LOPERAMIDE HYDROCHLORIDE 2 MG/1
4 CAPSULE ORAL 2 TIMES DAILY
Status: DISCONTINUED | OUTPATIENT
Start: 2021-11-30 | End: 2021-12-03

## 2021-11-30 RX ORDER — DEXTROSE 50 % IN WATER (D50W) INTRAVENOUS SYRINGE
12.5-25 AS NEEDED
Status: DISCONTINUED | OUTPATIENT
Start: 2021-11-30 | End: 2021-12-07 | Stop reason: HOSPADM

## 2021-11-30 RX ORDER — INSULIN LISPRO 100 [IU]/ML
INJECTION, SOLUTION INTRAVENOUS; SUBCUTANEOUS
Status: DISCONTINUED | OUTPATIENT
Start: 2021-11-30 | End: 2021-11-30

## 2021-11-30 RX ORDER — INSULIN LISPRO 100 [IU]/ML
INJECTION, SOLUTION INTRAVENOUS; SUBCUTANEOUS EVERY 6 HOURS
Status: DISCONTINUED | OUTPATIENT
Start: 2021-12-01 | End: 2021-12-02

## 2021-11-30 RX ORDER — DILTIAZEM HYDROCHLORIDE 240 MG/1
240 CAPSULE, COATED, EXTENDED RELEASE ORAL DAILY
Status: DISCONTINUED | OUTPATIENT
Start: 2021-12-01 | End: 2021-12-07 | Stop reason: HOSPADM

## 2021-11-30 RX ADMIN — METOPROLOL SUCCINATE 25 MG: 25 TABLET, EXTENDED RELEASE ORAL at 09:19

## 2021-11-30 RX ADMIN — ASCORBIC ACID: 500 INJECTION, SOLUTION INTRAMUSCULAR; INTRAVENOUS; SUBCUTANEOUS at 20:25

## 2021-11-30 RX ADMIN — MEGESTROL ACETATE 20 MG: 40 TABLET ORAL at 10:29

## 2021-11-30 RX ADMIN — DILTIAZEM HYDROCHLORIDE 60 MG: 30 TABLET, FILM COATED ORAL at 07:30

## 2021-11-30 RX ADMIN — DILTIAZEM HYDROCHLORIDE 60 MG: 30 TABLET, FILM COATED ORAL at 10:32

## 2021-11-30 RX ADMIN — PIPERACILLIN SODIUM AND TAZOBACTAM SODIUM 3.38 G: 3; .375 INJECTION, POWDER, LYOPHILIZED, FOR SOLUTION INTRAVENOUS at 04:04

## 2021-11-30 RX ADMIN — APIXABAN 2.5 MG: 2.5 TABLET, FILM COATED ORAL at 09:19

## 2021-11-30 RX ADMIN — LOPERAMIDE HYDROCHLORIDE 4 MG: 2 CAPSULE ORAL at 19:36

## 2021-11-30 RX ADMIN — INSULIN LISPRO 3 UNITS: 100 INJECTION, SOLUTION INTRAVENOUS; SUBCUTANEOUS at 17:46

## 2021-11-30 RX ADMIN — COLLAGENASE SANTYL: 250 OINTMENT TOPICAL at 09:19

## 2021-11-30 RX ADMIN — Medication 10 ML: at 06:00

## 2021-11-30 RX ADMIN — LOPERAMIDE HYDROCHLORIDE 4 MG: 2 CAPSULE ORAL at 09:19

## 2021-11-30 RX ADMIN — APIXABAN 2.5 MG: 2.5 TABLET, FILM COATED ORAL at 19:36

## 2021-11-30 RX ADMIN — INSULIN LISPRO 2 UNITS: 100 INJECTION, SOLUTION INTRAVENOUS; SUBCUTANEOUS at 13:12

## 2021-11-30 RX ADMIN — Medication 1 CAPSULE: at 09:19

## 2021-11-30 NOTE — PROGRESS NOTES
TRANSFER - IN REPORT:    Verbal report received from [facility] on [facility]  being received from [facility] for routine progression of care      Report consisted of patients Situation, Background, Assessment and   Recommendations(SBAR). Information from the following report(s) SBAR, Kardex, [facility] and Recent Results was reviewed with the receiving nurse. Opportunity for questions and clarification was provided. Assessment completed upon patients arrival to unit and care assumed.

## 2021-11-30 NOTE — PROGRESS NOTES
[address]  Hospitalist Group                                                                                          Hospitalist Progress Note  Haseeb Hoang MD  Answering service: [phone] from in house phone        Date of Service:  2021  NAME:  Sixto Kerr  :  3/13/1933  MRN:  [id number]      Admission Summary:   Sixto Kerr is a 80 y.o. female with pmh of Afib/flutter, HTN, hypothyroidism who presents with bowel ischemia now s/p resection by Dr. Gray Zamorano. Course complicated by Afib with RVR, and hyperglycemia     Interval history / Subjective:   Pt seen and examined. Feels well, says she is getting better with po intake, drinking 'plenty'. Will take off IVFs  Her sugars dropped even after reducing insulin with TPN. TPN rate coming down, will take off insulin from TPN and use correctional insulin. Assessment & Plan:     #. Ischemic colitis with perforation s/p resection- Management per surgery  #. Anemia: acute on chronic- likely blood loss from GI tract.   - CT abdomen  without pneumoperitoneum, discrete fluid collection.  - Transfused 1 unit RBC . Monitor Hb- transfuse further if <7.    #. Diarrhea: frequent, fecal management tube placed. Started probiotics. PRN imodium  - could be related to large chunk of intestine resection. #. TPN induced hyperglycemia - DTC following.  - Patient doesn't have DM, BS will normalize when off TPN, and infection resolves. - Continue SSI, POCT glucose checks and hypoglycemia protocol per hospital policy  - PO intake improving, weaning down on TPN rate. Took off insulin from TPN- after hypoglycemic episode . Continue correction insulin.     #.  Afib with RVR - HR improved- diltiazem and metoprolol- Cardiology following. monitor     Hospital Problems  Never Reviewed          Codes Class Noted POA    Lethargy ICD-10-CM: R53.83  ICD-9-CM: 780.79  Unknown Unknown        Feeding difficulties ICD-10-CM: R63.30  ICD-9-CM: 335. 3  Unknown Unknown        Goals of care, counseling/discussion ICD-10-CM: Z71.89  ICD-9-CM: V65.49  Unknown Unknown        Palliative care encounter ICD-10-CM: Z51.5  ICD-9-CM: V66.7  Unknown Unknown        Small bowel ischemia ([facility] [address] ICD-10-CM: K55.9  ICD-9-CM: 557.9  11/16/2021 Unknown            Review of Systems:   A comprehensive review of systems was negative except for that written in the HPI. Vital Signs:    Last 24hrs VS reviewed since prior progress note. Most recent are:  Visit Vitals  BP (!) 168/65 (BP 1 Location: Left upper arm, BP Patient Position: At rest;Lying)   Pulse (!) 120   Temp 98.4 °F (36.9 °C)   Resp 18   Ht 5' 4\" (1.626 m)   Wt 74.3 kg (163 lb 12.8 oz)   SpO2 97%   BMI 28.12 kg/m²         Intake/Output Summary (Last 24 hours) at 11/30/2021 1016  Last data filed at 11/29/2021 1533  Gross per 24 hour   Intake 220 ml   Output --   Net 220 ml        Physical Examination:     I had a face to face encounter with this patient and independently examined them on 11/30/2021 as outlined below:    Constitutional:  No acute distress, cooperative, pleasant, frail, elderly female        Resp:  CTA bilaterally. No wheezing/rhonchi/rales. No accessory muscle use        GI:  Soft, non distended, hypoactive. no hepatosplenomegaly, incision c/d/i     Musculoskeletal:  No edema, warm    Neurologic:  Moves all extremities.  Answering questions appropriately       Data Review:    Review and/or order of clinical lab test  Review and/or order of tests in the radiology section of CPT  Review and/or order of tests in the medicine section of CPT    Labs:     Recent Labs     11/29/21 0013 11/28/21 0438   WBC 9.0 11.8*   HGB 7.6* 6.6*   HCT 24.7* 21.7*   * 450*     Recent Labs     11/29/21 0013 11/28/21  0834 11/28/21 0438     --  137   K 3.6  --  3.7   *  --  112*   CO2 19*  --  19*   BUN 39*  --  42*   CREA 0.87  --  0.95   GLU 88  --  106*   CA 7.9*  --  7.8*   MG 2.0 2.1 --    PHOS 3.6 3.7  --      No results for input(s): ALT, AP, TBIL, TBILI, TP, ALB, GLOB, GGT, AML, LPSE in the last 72 hours. No lab exists for component: SGOT, GPT, AMYP, HLPSE  No results for input(s): INR, PTP, APTT, INREXT, INREXT in the last 72 hours. No results for input(s): FE, TIBC, PSAT, FERR in the last 72 hours. No results found for: FOL, RBCF   No results for input(s): PH, PCO2, PO2 in the last 72 hours. No results for input(s): CPK, CKNDX, TROIQ in the last 72 hours.     No lab exists for component: CPKMB  No results found for: CHOL, CHOLX, CHLST, CHOLV, HDL, HDLP, LDL, LDLC, DLDLP, TGLX, TRIGL, TRIGP, CHHD, CHHDX  Lab Results   Component Value Date/Time    Glucose (POC) 227 (H) 11/30/2021 05:34 AM    Glucose (POC) 289 (H) 11/29/2021 11:43 PM    Glucose (POC) 130 (H) 11/29/2021 08:39 PM    Glucose (POC) 74 11/29/2021 07:21 PM    Glucose (POC) 68 11/29/2021 06:44 PM     Lab Results   Component Value Date/Time    Color YELLOW/STRAW 11/23/2021 05:05 PM    Appearance CLEAR 11/23/2021 05:05 PM    Specific gravity 1.015 11/23/2021 05:05 PM    pH (UA) 7.0 11/23/2021 05:05 PM    Protein 30 (A) 11/23/2021 05:05 PM    Glucose 250 (A) 11/23/2021 05:05 PM    Ketone Negative 11/23/2021 05:05 PM    Bilirubin Negative 11/23/2021 05:05 PM    Urobilinogen 0.2 11/23/2021 05:05 PM    Nitrites Negative 11/23/2021 05:05 PM    Leukocyte Esterase Negative 11/23/2021 05:05 PM    Epithelial cells FEW 11/23/2021 05:05 PM    Bacteria Negative 11/23/2021 05:05 PM    WBC 0-4 11/23/2021 05:05 PM    RBC 0-5 11/23/2021 05:05 PM         Medications Reviewed:     Current Facility-Administered Medications   Medication Dose Route Frequency    loperamide (IMODIUM) capsule 4 mg  4 mg Oral BID    megestroL (MEGACE) tablet 20 mg  20 mg Oral DAILY    TPN ADULT - CENTRAL   IntraVENous CONTINUOUS    insulin lispro (HUMALOG) injection   SubCUTAneous AC&HS    dextrose (D50W) injection syrg 12.5-25 g  12.5-25 g IntraVENous PRN    TPN ADULT - CENTRAL   IntraVENous CONTINUOUS    apixaban (ELIQUIS) tablet 2.5 mg  2.5 mg Oral BID    dilTIAZem IR (CARDIZEM) tablet 60 mg  60 mg Oral TIDAC    metoprolol succinate (TOPROL-XL) XL tablet 25 mg  25 mg Oral DAILY    collagenase (SANTYL) 250 unit/gram ointment   Topical DAILY    L.acidophilus-paracasei-S.thermophil-bifidobacter (RISAQUAD) 8 billion cell capsule  1 Capsule Oral DAILY    0.9% sodium chloride infusion 250 mL  250 mL IntraVENous PRN    alteplase (CATHFLO) 1 mg in sterile water (preservative free) 1 mL injection  1 mg InterCATHeter PRN    alteplase (CATHFLO) 2 mg in sterile water (preservative free) 2 mL injection  2 mg InterCATHeter PRN    glucose chewable tablet 16 g  4 Tablet Oral PRN    dextrose (D50W) injection syrg 12.5-25 g  25-50 mL IntraVENous PRN    glucagon (GLUCAGEN) injection 1 mg  1 mg IntraMUSCular PRN    fat emulsion 20% (LIPOSYN, INTRAlipid) infusion 250 mL  250 mL IntraVENous Q MON, THU & SAT    acetaminophen (TYLENOL) suppository 650 mg  650 mg Rectal Q6H PRN    sodium chloride (NS) flush 5-40 mL  5-40 mL IntraVENous Q8H    sodium chloride (NS) flush 5-40 mL  5-40 mL IntraVENous PRN    HYDROmorphone (DILAUDID) injection 1 mg  1 mg IntraVENous Q4H PRN    naloxone (NARCAN) injection 0.4 mg  0.4 mg IntraVENous PRN    ondansetron (ZOFRAN) injection 4 mg  4 mg IntraVENous Q4H PRN     ______________________________________________________________________  EXPECTED LENGTH OF STAY: 10d 7h  ACTUAL LENGTH OF STAY:          15                 Miki Lesches, MD

## 2021-11-30 NOTE — PROGRESS NOTES
TRANSFER - OUT REPORT:    Verbal report given to Herminia(name) on Chilo Casanova  being transferred to (unit) for routine progression of care       Report consisted of patients Situation, Background, Assessment and   Recommendations(SBAR). Information from the following report(s) Kardex, Procedure Summary, Intake/Output, MAR, Recent Results, Med Rec Status and Cardiac Rhythm A FIB was reviewed with the receiving nurse. Lines:   PICC Triple Lumen 40/49/17 Right; Basilic (Active)   Central Line Being Utilized Yes 11/29/21 0844   Criteria for Appropriate Use Total parenteral nutrition 11/29/21 0844   Site Assessment Clean, dry, & intact 11/29/21 0844   Phlebitis Assessment 0 11/29/21 0844   Infiltration Assessment 0 11/29/21 0844   Arm Circumference (cm) 27 cm 11/28/21 2039   Date of Last Dressing Change 11/25/21 11/29/21 0844   Dressing Status Clean, dry, & intact 11/29/21 0844   External Catheter Length (cm) 0 centimeters 11/28/21 2039   Dressing Type Disk with Chlorhexadine gluconate (CHG); Transparent 11/29/21 0844   Action Taken Open ports on tubing capped 11/29/21 0844   Hub Color/Line Status Fide Quinn; Infusing 11/29/21 0844   Positive Blood Return (Site #1) Yes 11/29/21 0844   Hub Color/Line Status Red; Infusing; Flushed 11/29/21 0844   Positive Blood Return (Site #2) Yes 11/29/21 0844   Hub Color/Line Status White;  Infusing; Capped 11/29/21 0844   Positive Blood Return (Site #3) Yes 11/29/21 0844   Alcohol Cap Used Yes 11/28/21 2039       Peripheral IV 11/18/21 Anterior; Right Forearm (Active)   Site Assessment Clean, dry, & intact 11/29/21 0844   Phlebitis Assessment 0 11/29/21 0844   Infiltration Assessment 0 11/29/21 0844   Dressing Status Clean, dry, & intact 11/29/21 0844   Dressing Type Transparent 11/29/21 0844   Hub Color/Line Status Blue; Capped 11/29/21 1533   Action Taken Open ports on tubing capped 11/28/21 1059   Alcohol Cap Used Yes 11/29/21 0844        Opportunity for questions and clarification was provided.       Patient transported with:   O2 @ 0 liters

## 2021-11-30 NOTE — DIABETES MGMT
[facility]    CLINICAL NURSE SPECIALIST CONSULT     Initial Presentation   Yashira Prescott is a 80 y.o. female admitted 11/15/21  with abdominal pain. + N/V at admission. S/p hip fracture repair- [place] [facility] [place] received for management of hospital BG while on TPN. HX:   Past Medical History:   Diagnosis Date    Atrial fibrillation ([address]     Atrial flutter (HCC)     Edema     Hypertension     Hypokalemia     Hypothyroidism     Insomnia     Major depressive disorder     Pain     UTI (urinary tract infection)     Vitamin D deficiency         INITIAL DX:   Small bowel ischemia ([address] [K55.9]     Current Treatment     TX: 11/16 Laparoscopy Converted to Open, Right Colectomy, Extended Small Bowel Resection     Consulted by Provider for advanced diabetes nursing assessment and care for:   [x] Inpatient management strategy  Hospital Course   Clinical progress has been complicated by metabolic acidosis/ hypovolemia- required ICU level of care . 11/16: patient improved in ICU- Lactic trending down-   11/17: NGT to suction; per notes continued to improve; on PM rounds less conversant/ tachycardic/ BP stable-afib with rvr-cardiology consulted  11/18: febrile overnight- 100.7F/not conversant/ cardiazem infusion for uncontrolled afib/ECHO ordered/ PICC line to be placed for TPN  11/19: URSULA drain pulled out/ NGT in  Place -scant drainage/ noted palliative consult/ TPN started  11/20: patient arousable/ guarded prognosis per surgery/ BG rising per notes  11/21: BG still high per notes while on TPN  11/23: insulin infusion noted - BG improved overnight- will transition all insulin into patient's TPN to better manage hyperglycemia - CT scan   11/24: CT scan showing no leaks/ WBC 17.8/ transitioned off insulin infusion when TPN started last evening. All insulin in TPN  11/29: TPN rate decreased/ patient annette.  Some diet intake  11/30/21: TPN rate continuing to be decreased; noted low BG , 68 in afternoon. Diabetes History   NO DM2 history -A1C 5.5%. Diabetes Medication History  Key Antihyperglycemic Medications     Patient is on no antihyperglycemic meds. Subjective   Resting in bed at time of visit. Objective   Physical exam  General Normal weight  female in no acute distress/ill-appearing. Conversant and cooperative  Neuro  Alert, oriented   Vital Signs   Visit Vitals  BP (!) 168/65 (BP 1 Location: Left upper arm, BP Patient Position: At rest;Lying)   Pulse (!) 120   Temp 98.4 °F (36.9 °C)   Resp 18   Ht 5' 4\" (1.626 m)   Wt 74.3 kg (163 lb 12.8 oz)   SpO2 97%   BMI 28.12 kg/m²     Skin  Warm and dry. Heart   Regular rate and rhythm.  No murmurs, rubs or gallops  Lungs  Clear to auscultation without rales or rhonchi  Extremities No foot wounds      Laboratory  Recent Labs     11/29/21  0013 11/28/21  0438   GLU 88 106*   AGAP 8 6   WBC 9.0 11.8*   CREA 0.87 0.95   GFRNA >60 56*       Factors impacting BG management  Factor Dose Comments   Nutrition:  TPN   324g dextrose/ bag      Drugs:  Vasopressor load         Required in ICU-discontinued  *     Affects insulin delivery     Pain Post op    Infection Small bowel ischemia    Other:   Kidney function  Liver function     WNL  WNL      Blood glucose pattern      Significant diabetes-related events over the past 24-72 hours  Noted low BG on 11/29/21- 68, likely had dinner and BG rebounded to 289 and naturally has come down to 227 this AM-   PO diet advanced and patient consuming >25% of meal  TPN rate continues to be decreased-      Assessment and Plan   Nursing Diagnosis Risk for unstable blood glucose pattern   Nursing Intervention Domain 9092 Decision-making Support   Nursing Interventions Examined current inpatient diabetes control   Explored factors facilitating and impeding inpatient management  Identified self-management practices impeding diabetes control  Explored corrective strategies with patient and responsible inpatient provider   Informed patient of rational for insulin strategy while hospitalized     Evaluation   This  female, NO diabetes is s/p small bowel resection and right colectomy for ischemic small bowel on 11/16/21. Progress has been complicated by post op fever/ onset of afib with rvr requiring cardiazem/ marked hyperglycemia with TPN/ cognitive decline while hospitalized. Currently on step down unit- in order to manage marked hyperglycemia in setting of TPN, [facility] was consulted to assist.  After conferring with hospitalist,  Dr. Jesus Acuna, will stop insulin infusion today and add insulin to TPN to better manage hyperglycemia. This will also be safer as well. 11/29: Patient remains on daily TPN along with adult regular low fiber diet (consuming <25%). Since POC BG is trending  and adjustments made to TPN rate, would also adjust and decrease insulin in TPN to reduce risk of hypoglycemia. See recs below. 11/30: Had low BG yesterday afternoon, 68- TPN rate continues to be reduced-PO intake improving per flowsheet- to reduce further hypoglycemia risk will take insulin out of TPN bag and cover BG >200 with correctional insulin. Recommendations   1. All insulin taken out of TPN bag     [x] Use of Subcutaneous Insulin Order set (4149)  Insulin Dosing Specific recommendation   CONTINUE Corrective                                       (Useful in adjusting insulin dosing) [x] Normal sensitivity        Billing Code(s)   48582    Before making these care recommendations, I personally reviewed the hospitalization record, including notes, laboratory & diagnostic data and current medications, and examined the patient at the bedside (circumstances permitting) before making care recommendations.      Total minutes: [address] [place] Wen Crescencio, CNS  Diabetes Clinical Nurse Specialist  [facility]  Access via [employer] [place]

## 2021-11-30 NOTE — PROGRESS NOTES
Attempted to call report on patient but they were busy assigning patients. Told that a nurse would call me back.

## 2021-11-30 NOTE — PROGRESS NOTES
Lab called this am to report that the CBC and BMP, Phosphorus and Magnesium were all contaminated and need to be redrawn.

## 2021-11-30 NOTE — PROGRESS NOTES
Problem: Pressure Injury - Risk of  Goal: *Prevention of pressure injury  Description: Document Brian Scale and appropriate interventions in the flowsheet. 11/30/2021 1242 by Pascale Hector RN  Outcome: Progressing Towards Goal  Note: Pressure Injury Interventions:  Sensory Interventions: Assess changes in LOC    Moisture Interventions: Absorbent underpads, Apply protective barrier, creams and emollients    Activity Interventions: Pressure redistribution bed/mattress(bed type)    Mobility Interventions: Pressure redistribution bed/mattress (bed type)    Nutrition Interventions: Document food/fluid/supplement intake    Friction and Shear Interventions: Apply protective barrier, creams and emollients, HOB 30 degrees or less             11/30/2021 1241 by Pascale Hector RN  Outcome: Progressing Towards Goal  Note: Pressure Injury Interventions:  Sensory Interventions: Assess changes in LOC    Moisture Interventions: Absorbent underpads, Apply protective barrier, creams and emollients    Activity Interventions: Pressure redistribution bed/mattress(bed type)    Mobility Interventions: Pressure redistribution bed/mattress (bed type)    Nutrition Interventions: Document food/fluid/supplement intake    Friction and Shear Interventions: Apply protective barrier, creams and emollients, HOB 30 degrees or less                Problem: Patient Education: Go to Patient Education Activity  Goal: Patient/Family Education  11/30/2021 1242 by Pascale Hector RN  Outcome: Progressing Towards Goal  11/30/2021 1241 by Pascale Hector RN  Outcome: Not Progressing Towards Goal     Problem: Falls - Risk of  Goal: *Absence of Falls  Description: Document Devi Delaney Fall Risk and appropriate interventions in the flowsheet.   11/30/2021 1242 by Pascale Hector RN  Outcome: Progressing Towards Goal  Note: Fall Risk Interventions:  Mobility Interventions: Bed/chair exit alarm, Patient to call before getting OOB    Mentation Interventions: Adequate sleep, hydration, pain control, Bed/chair exit alarm    Medication Interventions: Bed/chair exit alarm, Patient to call before getting OOB    Elimination Interventions: Bed/chair exit alarm, Call light in reach    History of Falls Interventions: Bed/chair exit alarm      11/30/2021 1241 by Anastasiia Avila RN  Outcome: Progressing Towards Goal  Note: Fall Risk Interventions:  Mobility Interventions: Bed/chair exit alarm, Patient to call before getting OOB    Mentation Interventions: Adequate sleep, hydration, pain control, Bed/chair exit alarm    Medication Interventions: Bed/chair exit alarm, Patient to call before getting OOB    Elimination Interventions: Bed/chair exit alarm, Call light in reach    History of Falls Interventions: Bed/chair exit alarm         Problem: Patient Education: Go to Patient Education Activity  Goal: Patient/Family Education  11/30/2021 1242 by Anastasiia Avila RN  Outcome: Progressing Towards Goal  11/30/2021 1241 by Anastasiia Avila RN  Outcome: Progressing Towards Goal     Problem: Discharge Planning  Goal: *Discharge to safe environment  11/30/2021 1242 by Anastasiia Avila RN  Outcome: Progressing Towards Goal  11/30/2021 1241 by Anastasiia Avila RN  Outcome: Progressing Towards Goal     Problem: Patient Education: Go to Patient Education Activity  Goal: Patient/Family Education  11/30/2021 1242 by Anastasiia Avila RN  Outcome: Progressing Towards Goal  11/30/2021 1241 by Anastasiia Avila RN  Outcome: Progressing Towards Goal     Problem: Surgical Pathway Post-Op Day 2 through Discharge  Goal: Off Pathway (Use only if patient is Off Pathway)  11/30/2021 1242 by Anastasiia Avila RN  Outcome: Progressing Towards Goal  11/30/2021 1241 by Anastasiia Avila RN  Outcome: Progressing Towards Goal  Goal: Activity/Safety  11/30/2021 1242 by Anastasiia Avila RN  Outcome: Progressing Towards Goal  11/30/2021 1241 by Anastasiia Avila RN  Outcome: Progressing Towards Goal  Goal: Medications  11/30/2021 1242 by Tracey Cooper RN  Outcome: Progressing Towards Goal  11/30/2021 1241 by Tracey Cooper RN  Outcome: Progressing Towards Goal  Goal: Respiratory  11/30/2021 1242 by Tracey Cooper RN  Outcome: Progressing Towards Goal  11/30/2021 1241 by Tracey Cooper RN  Outcome: Progressing Towards Goal  Goal: Treatments/Interventions/Procedures  11/30/2021 1242 by Tracey Cooper RN  Outcome: Progressing Towards Goal  11/30/2021 1241 by Tracey Cooper RN  Outcome: Progressing Towards Goal  Goal: Psychosocial  11/30/2021 1242 by Tracey Cooper RN  Outcome: Progressing Towards Goal  11/30/2021 1241 by Tracey Cooper RN  Outcome: Progressing Towards Goal  Goal: *No signs and symptoms of infection or wound complications  Outcome: Progressing Towards Goal  Goal: *Optimal pain control at patient's stated goal  11/30/2021 1242 by Tracey Cooper RN  Outcome: Progressing Towards Goal  11/30/2021 1241 by Tracey Cooper RN  Outcome: Progressing Towards Goal  Goal: *Adequate urinary output (equal to or greater than 30 milliliters/hour)  11/30/2021 1242 by Tracey Cooper RN  Outcome: Progressing Towards Goal  11/30/2021 1241 by Tracey Cooper RN  Outcome: Progressing Towards Goal  Goal: *Hemodynamically stable  11/30/2021 1242 by Tracey Cooper RN  Outcome: Progressing Towards Goal  11/30/2021 1241 by Tracey Cooper RN  Outcome: Progressing Towards Goal  Goal: *Tolerating diet  11/30/2021 1242 by Tracey Cooper RN  Outcome: Progressing Towards Goal  11/30/2021 1241 by Tracey Cooper RN  Outcome: Progressing Towards Goal  Goal: *Demonstrates progressive activity  11/30/2021 1242 by Tracey Cooper RN  Outcome: Progressing Towards Goal  11/30/2021 1241 by Tracey Cooper RN  Outcome: Progressing Towards Goal  Goal: *Lungs clear or at baseline  11/30/2021 1242 by Tracey Cooper RN  Outcome: Progressing Towards Goal  11/30/2021 1241 by Tracey Cooper RN  Outcome: Progressing Towards Goal Problem: Nutrition Deficit  Goal: *Optimize nutritional status  11/30/2021 1242 by Maribell Coreas RN  Outcome: Progressing Towards Goal  11/30/2021 1241 by Maribell Coreas RN  Outcome: Progressing Towards Goal

## 2021-11-30 NOTE — ROUTINE PROCESS
TRANSFER - IN REPORT:    Verbal report received from Frank on Tali Days  being received from ICU for routine progression of care      Report consisted of patients Situation, Background, Assessment and   Recommendations(SBAR). Information from the following report(s) SBAR and Kardex was reviewed with the receiving nurse. Opportunity for questions and clarification was provided. Assessment completed upon patients arrival to unit and care assumed. Visit Vitals  BP (!) 168/65 (BP 1 Location: Left upper arm, BP Patient Position: At rest;Lying)   Pulse (!) 107   Temp 98.4 °F (36.9 °C)   Resp 18   Ht 5' 4\" (1.626 m)   Wt 74.3 kg (163 lb 12.8 oz)   SpO2 97%   BMI 28.12 kg/m²       Patient has been repositioned. Resting comfortably. Medicated per MAR.

## 2021-11-30 NOTE — PROGRESS NOTES
Cardiology Progress Note                                        Admit Date: 11/15/2021    Assessment/Plan:     Persistent Afib; tolerating PO regimen; will change Cardizem to long-acting once a day regimen  AC; tolerating Eliquis   HTN; needs better control; will increase Cardizem some    Henri Mi is a 80 y.o. female with     PROBLEM LIST:  Patient Active Problem List    Diagnosis Date Noted    Lethargy     Feeding difficulties     Goals of care, counseling/discussion     Palliative care encounter     Small bowel ischemia ([address] 11/16/2021         Subjective:     Henri Mi reports none. Visit Vitals  BP (!) 150/69 (BP 1 Location: Left upper arm, BP Patient Position: Lying)   Pulse 80   Temp 97.5 °F (36.4 °C)   Resp 16   Ht 5' 4\" (1.626 m)   Wt 163 lb 12.8 oz (74.3 kg)   SpO2 99%   BMI 28.12 kg/m²       Intake/Output Summary (Last 24 hours) at 11/30/2021 1557  Last data filed at 11/30/2021 0800  Gross per 24 hour   Intake 3009.9 ml   Output --   Net 3009.9 ml       Objective:      Physical Exam:  HEENT: P[place], EOMI  Neck: No JVD,  No thyroidmegaly  Resp: CTA bilaterally;  No wheezes or rales  CV: irregular s1s2 No murmur no s3  Abd:Soft, Nontender  Ext: No edema  Neuro: Alert and oriented; Nonfocal  Skin: Warm, Dry, Intact  Pulses: 2+ DP/PT/Rad      Telemetry: AFIB    Current Facility-Administered Medications   Medication Dose Route Frequency    loperamide (IMODIUM) capsule 4 mg  4 mg Oral BID    megestroL (MEGACE) tablet 20 mg  20 mg Oral DAILY    TPN ADULT - CENTRAL   IntraVENous CONTINUOUS    insulin lispro (HUMALOG) injection   SubCUTAneous AC&HS    dextrose (D50W) injection syrg 12.5-25 g  12.5-25 g IntraVENous PRN    [START ON 12/1/2021] dilTIAZem ER (CARDIZEM CD) capsule 240 mg  240 mg Oral DAILY    TPN ADULT - CENTRAL   IntraVENous CONTINUOUS    apixaban (ELIQUIS) tablet 2.5 mg  2.5 mg Oral BID    metoprolol succinate (TOPROL-XL) XL tablet 25 mg  25 mg Oral DAILY    collagenase (SANTYL) 250 unit/gram ointment   Topical DAILY    L.acidophilus-paracasei-S.thermophil-bifidobacter (RISAQUAD) 8 billion cell capsule  1 Capsule Oral DAILY    0.9% sodium chloride infusion 250 mL  250 mL IntraVENous PRN    alteplase (CATHFLO) 1 mg in sterile water (preservative free) 1 mL injection  1 mg InterCATHeter PRN    alteplase (CATHFLO) 2 mg in sterile water (preservative free) 2 mL injection  2 mg InterCATHeter PRN    glucose chewable tablet 16 g  4 Tablet Oral PRN    dextrose (D50W) injection syrg 12.5-25 g  25-50 mL IntraVENous PRN    glucagon (GLUCAGEN) injection 1 mg  1 mg IntraMUSCular PRN    fat emulsion 20% (LIPOSYN, INTRAlipid) infusion 250 mL  250 mL IntraVENous Q MON, THU & SAT    acetaminophen (TYLENOL) suppository 650 mg  650 mg Rectal Q6H PRN    sodium chloride (NS) flush 5-40 mL  5-40 mL IntraVENous Q8H    sodium chloride (NS) flush 5-40 mL  5-40 mL IntraVENous PRN    HYDROmorphone (DILAUDID) injection 1 mg  1 mg IntraVENous Q4H PRN    naloxone (NARCAN) injection 0.4 mg  0.4 mg IntraVENous PRN    ondansetron (ZOFRAN) injection 4 mg  4 mg IntraVENous Q4H PRN         Data Review:   Labs:    Recent Results (from the past 24 hour(s))   GLUCOSE, POC    Collection Time: 11/29/21  6:26 PM   Result Value Ref Range    Glucose (POC) 52 (LL) 65 - 117 mg/dL    Performed by Beka Abbott   PCT    GLUCOSE, POC    Collection Time: 11/29/21  6:44 PM   Result Value Ref Range    Glucose (POC) 68 65 - 117 mg/dL    Performed by Beka Abbott   PCT    GLUCOSE, POC    Collection Time: 11/29/21  7:21 PM   Result Value Ref Range    Glucose (POC) 74 65 - 117 mg/dL    Performed by Ed Saha, POC    Collection Time: 11/29/21  8:39 PM   Result Value Ref Range    Glucose (POC) 130 (H) 65 - 117 mg/dL    Performed by SOFIA Watt    GLUCOSE, POC    Collection Time: 11/29/21 11:43 PM   Result Value Ref Range    Glucose (POC) 289 (H) 65 - 117 mg/dL    Performed by SOFIA Watt    GLUCOSE, POC Collection Time: 11/30/21  5:34 AM   Result Value Ref Range    Glucose (POC) 227 (H) 65 - 117 mg/dL    Performed by Ruth Paulino    METABOLIC PANEL, BASIC    Collection Time: 11/30/21  9:47 AM   Result Value Ref Range    Sodium 138 136 - 145 mmol/L    Potassium 3.5 3.5 - 5.1 mmol/L    Chloride 110 (H) 97 - 108 mmol/L    CO2 17 (L) 21 - 32 mmol/L    Anion gap 11 5 - 15 mmol/L    Glucose 169 (H) 65 - 100 mg/dL    BUN 32 (H) 6 - 20 MG/DL    Creatinine 0.81 0.55 - 1.02 MG/DL    BUN/Creatinine ratio 40 (H) 12 - 20      GFR est AA >60 >60 ml/min/1.73m2    GFR est non-AA >60 >60 ml/min/1.73m2    Calcium 7.9 (L) 8.5 - 10.1 MG/DL   CBC W/O DIFF    Collection Time: 11/30/21  9:47 AM   Result Value Ref Range    WBC 7.8 3.6 - 11.0 K/uL    RBC 2.90 (L) 3.80 - 5.20 M/uL    HGB 8.6 (L) 11.5 - 16.0 g/dL    HCT 27.3 (L) 35.0 - 47.0 %    MCV 94.1 80.0 - 99.0 FL    MCH 29.7 26.0 - 34.0 PG    MCHC 31.5 30.0 - 36.5 g/dL    RDW 16.2 (H) 11.5 - 14.5 %    PLATELET 443 112 - 147 K/uL    MPV 11.4 8.9 - 12.9 FL    NRBC 0.0 0  WBC    ABSOLUTE NRBC 0.00 0.00 - 0.01 K/uL   MAGNESIUM    Collection Time: 11/30/21  9:47 AM   Result Value Ref Range    Magnesium 2.1 1.6 - 2.4 mg/dL   PHOSPHORUS    Collection Time: 11/30/21  9:47 AM   Result Value Ref Range    Phosphorus 3.3 2.6 - 4.7 MG/DL   GLUCOSE, POC    Collection Time: 11/30/21 11:52 AM   Result Value Ref Range    Glucose (POC) 177 (H) 65 - 117 mg/dL    Performed by Yue Fierro  PCT

## 2021-11-30 NOTE — PROGRESS NOTES
[facility] at [facility]    POD #15    Subjective     Doing well, off cardizem drip, no N/V, loose BM with FM system in place, minimal pain    Objective     Patient Vitals for the past 24 hrs:   Temp Pulse Resp BP SpO2   11/30/21 0600 -- (!) 120 -- -- --   11/30/21 0400 -- (!) 111 -- -- --   11/30/21 0317 98.4 °F (36.9 °C) (!) 107 18 (!) 168/65 97 %   11/30/21 0200 -- (!) 108 -- -- --   11/30/21 0005 -- -- -- (!) 146/51 --   11/29/21 2340 98.2 °F (36.8 °C) (!) 104 16 (!) 173/55 100 %   11/29/21 2200 -- (!) 105 -- -- --   11/29/21 2100 97.8 °F (36.6 °C) (!) 112 26 (!) 162/53 99 %   11/29/21 2000 -- 99 -- -- --   11/29/21 1925 97.7 °F (36.5 °C) 98 24 (!) 156/53 100 %   11/29/21 1812 -- 97 -- -- --   11/29/21 1533 97.7 °F (36.5 °C) 92 12 (!) 135/59 100 %   11/29/21 1437 -- 89 -- (!) 133/58 --   11/29/21 1252 98.7 °F (37.1 °C) 99 14 (!) 143/55 100 %   11/29/21 1002 -- 86 -- -- --   11/29/21 0844 97.6 °F (36.4 °C) 92 14 (!) 138/49 99 %       Date 11/29/21 0700 - 11/30/21 0659 11/30/21 0700 - 12/01/21 0659   Shift 2563-0914 2246-1661 24 Hour Total 4645-5151 4722-2622 24 Hour Total   INTAKE   P.O. 240  240        P. O. 240  240      I. V.(mL/kg/hr) 1541.1(1.7)  1541.1(0.9)        Cardizem Volume 191.1  191.1        Volume (piperacillin-tazobactam (ZOSYN) 3.375 g in 0.9% sodium chloride (MBP/ADV) 100 mL MBP) 300  300        Volume (TPN ADULT - CENTRAL) 1050  1050        Volume (TPN ADULT - CENTRAL) 0  0        Volume (TPN ADULT - CENTRAL) 0  0      Shift Total(mL/kg) 1781.1(24)  1781.1(24)      OUTPUT   Shift Total(mL/kg)         NET 1781. 1  1781.1      Weight (kg) 74.3 74.3 74.3 74.3 74.3 74.3       PE  Pulm - CTAB  CV - RRR  Abd - soft, ND BS present, incision c/d/i    Labs  Recent Results (from the past 12 hour(s))   GLUCOSE, POC    Collection Time: 11/29/21  8:39 PM   Result Value Ref Range    Glucose (POC) 130 (H) 65 - 117 mg/dL    Performed by SOFIA Watt    GLUCOSE, POC    Collection Time: 11/29/21 11:43 PM   Result Value Ref Range    Glucose (POC) 289 (H) 65 - 117 mg/dL    Performed by SOFIA Watt    GLUCOSE, POC    Collection Time: 11/30/21  5:34 AM   Result Value Ref Range    Glucose (POC) 227 (H) 65 - 117 mg/dL    Performed by [employer] [facility] Santi is a 80 y. o.yr old female s/p open R colectomy and extended small bowel resection    Plan     Weaning down TPN now to 42cc/hr  Continue regular diet  Increasing imodium to 4mg bid today to help with BM, loose BM likely related to bowel resection and should improve with time  BMP and CBC pending  IV abx DC'd  Continue eliquis  Will add megace to help with hunger        Kitty Parks MD

## 2021-12-01 LAB
ANION GAP SERPL CALC-SCNC: 9 MMOL/L (ref 5–15)
BUN SERPL-MCNC: 29 MG/DL (ref 6–20)
BUN/CREAT SERPL: 39 (ref 12–20)
CALCIUM SERPL-MCNC: 8.5 MG/DL (ref 8.5–10.1)
CHLORIDE SERPL-SCNC: 113 MMOL/L (ref 97–108)
CO2 SERPL-SCNC: 16 MMOL/L (ref 21–32)
CREAT SERPL-MCNC: 0.74 MG/DL (ref 0.55–1.02)
ERYTHROCYTE [DISTWIDTH] IN BLOOD BY AUTOMATED COUNT: 16.2 % (ref 11.5–14.5)
GLUCOSE BLD STRIP.AUTO-MCNC: 161 MG/DL (ref 65–117)
GLUCOSE BLD STRIP.AUTO-MCNC: 170 MG/DL (ref 65–117)
GLUCOSE BLD STRIP.AUTO-MCNC: 178 MG/DL (ref 65–117)
GLUCOSE BLD STRIP.AUTO-MCNC: 183 MG/DL (ref 65–117)
GLUCOSE SERPL-MCNC: 156 MG/DL (ref 65–100)
HCT VFR BLD AUTO: 25.9 % (ref 35–47)
HGB BLD-MCNC: 8 G/DL (ref 11.5–16)
MCH RBC QN AUTO: 29.5 PG (ref 26–34)
MCHC RBC AUTO-ENTMCNC: 30.9 G/DL (ref 30–36.5)
MCV RBC AUTO: 95.6 FL (ref 80–99)
NRBC # BLD: 0 K/UL (ref 0–0.01)
NRBC BLD-RTO: 0 PER 100 WBC
PLATELET # BLD AUTO: 425 K/UL (ref 150–400)
PMV BLD AUTO: 11.1 FL (ref 8.9–12.9)
POTASSIUM SERPL-SCNC: 3.8 MMOL/L (ref 3.5–5.1)
RBC # BLD AUTO: 2.71 M/UL (ref 3.8–5.2)
SERVICE CMNT-IMP: ABNORMAL
SODIUM SERPL-SCNC: 138 MMOL/L (ref 136–145)
WBC # BLD AUTO: 7.7 K/UL (ref 3.6–11)

## 2021-12-01 PROCEDURE — 85027 COMPLETE CBC AUTOMATED: CPT

## 2021-12-01 PROCEDURE — 74011636637 HC RX REV CODE- 636/637: Performed by: NURSE PRACTITIONER

## 2021-12-01 PROCEDURE — 65270000032 HC RM SEMIPRIVATE

## 2021-12-01 PROCEDURE — 80048 BASIC METABOLIC PNL TOTAL CA: CPT

## 2021-12-01 PROCEDURE — 74011250636 HC RX REV CODE- 250/636: Performed by: SURGERY

## 2021-12-01 PROCEDURE — 74011250637 HC RX REV CODE- 250/637: Performed by: INTERNAL MEDICINE

## 2021-12-01 PROCEDURE — 82962 GLUCOSE BLOOD TEST: CPT

## 2021-12-01 PROCEDURE — 74011250637 HC RX REV CODE- 250/637: Performed by: SURGERY

## 2021-12-01 PROCEDURE — 36415 COLL VENOUS BLD VENIPUNCTURE: CPT

## 2021-12-01 RX ORDER — METOPROLOL SUCCINATE 50 MG/1
50 TABLET, EXTENDED RELEASE ORAL DAILY
Status: DISCONTINUED | OUTPATIENT
Start: 2021-12-01 | End: 2021-12-07 | Stop reason: HOSPADM

## 2021-12-01 RX ADMIN — INSULIN LISPRO 2 UNITS: 100 INJECTION, SOLUTION INTRAVENOUS; SUBCUTANEOUS at 18:12

## 2021-12-01 RX ADMIN — Medication 10 ML: at 07:04

## 2021-12-01 RX ADMIN — Medication 10 ML: at 14:00

## 2021-12-01 RX ADMIN — INSULIN LISPRO 2 UNITS: 100 INJECTION, SOLUTION INTRAVENOUS; SUBCUTANEOUS at 13:58

## 2021-12-01 RX ADMIN — APIXABAN 2.5 MG: 2.5 TABLET, FILM COATED ORAL at 10:17

## 2021-12-01 RX ADMIN — LOPERAMIDE HYDROCHLORIDE 4 MG: 2 CAPSULE ORAL at 10:17

## 2021-12-01 RX ADMIN — LOPERAMIDE HYDROCHLORIDE 4 MG: 2 CAPSULE ORAL at 18:12

## 2021-12-01 RX ADMIN — APIXABAN 2.5 MG: 2.5 TABLET, FILM COATED ORAL at 18:12

## 2021-12-01 RX ADMIN — METOPROLOL SUCCINATE 50 MG: 50 TABLET, EXTENDED RELEASE ORAL at 10:16

## 2021-12-01 RX ADMIN — MEGESTROL ACETATE 20 MG: 40 TABLET ORAL at 10:17

## 2021-12-01 RX ADMIN — Medication 1 CAPSULE: at 10:17

## 2021-12-01 RX ADMIN — Medication 10 ML: at 21:47

## 2021-12-01 RX ADMIN — INSULIN LISPRO 2 UNITS: 100 INJECTION, SOLUTION INTRAVENOUS; SUBCUTANEOUS at 07:03

## 2021-12-01 RX ADMIN — DILTIAZEM HYDROCHLORIDE 240 MG: 240 CAPSULE, COATED, EXTENDED RELEASE ORAL at 10:17

## 2021-12-01 NOTE — PROGRESS NOTES
[facility] Adult  Hospitalist Group                                                                                          Hospitalist Progress Note  Gordo Avila MD  Answering service: [phone] from in house phone        Date of Service:  2021  NAME:  Aric Hickman  :  3/13/1933  MRN:  [id number]      Admission Summary:   Aric Hickman is a 80 y.o. female with pmh of Afib/flutter, HTN, hypothyroidism who presents with bowel ischemia now s/p resection by Dr. Leandra Matthew. Course complicated by Afib with RVR, and hyperglycemia     Interval history / Subjective:   Pt seen and examined. Feels , sugars are well controlled. I will sign off today. Please don't hesitate to call the hospitalist team of we can be of any help. Assessment & Plan:     #. Ischemic colitis with perforation s/p resection- Management per surgery  #. Anemia: acute on chronic- likely blood loss from GI tract.   - CT abdomen  without pneumoperitoneum, discrete fluid collection.  - Transfused 1 unit RBC . Monitor Hb- transfuse further if <7.    #. Diarrhea: frequent, fecal management tube placed. Started probiotics. PRN imodium  - could be related to large chunk of intestine resection. #. TPN induced hyperglycemia - DTC following.  - Patient doesn't have DM, BS will normalize when off TPN, and infection resolves. - Continue SSI, POCT glucose checks and hypoglycemia protocol per hospital policy  - PO intake improving, weaning down on TPN rate. Took off insulin from TPN- after hypoglycemic episode . Continue correction insulin.     #. Afib with RVR - HR improved- diltiazem and metoprolol- Cardiology following. monitor     Hospital Problems  Never Reviewed          Codes Class Noted POA    Lethargy ICD-10-CM: R53.83  ICD-9-CM: 780.79  Unknown Unknown        Feeding difficulties ICD-10-CM: R63.30  ICD-9-CM: 381. 3  Unknown Unknown        Goals of care, counseling/discussion ICD-10-CM: Z71.89  ICD-9-CM: V65.49  Unknown Unknown        Palliative care encounter ICD-10-CM: Z51.5  ICD-9-CM: V66.7  Unknown Unknown        Small bowel ischemia ([address] ICD-10-CM: K55.9  ICD-9-CM: 557.9  11/16/2021 Unknown            Review of Systems:   A comprehensive review of systems was negative except for that written in the HPI. Vital Signs:    Last 24hrs VS reviewed since prior progress note. Most recent are:  Visit Vitals  BP (!) 157/69 (BP 1 Location: Left arm, BP Patient Position: At rest)   Pulse 95   Temp 97.6 °F (36.4 °C)   Resp 18   Ht 5' 4\" (1.626 m)   Wt 79.3 kg (174 lb 13.2 oz)   SpO2 98%   BMI 30.01 kg/m²         Intake/Output Summary (Last 24 hours) at 12/1/2021 0958  Last data filed at 12/1/2021 0334  Gross per 24 hour   Intake --   Output 1050 ml   Net -1050 ml        Physical Examination:     I had a face to face encounter with this patient and independently examined them on 12/1/2021 as outlined below:    Constitutional:  No acute distress, cooperative, pleasant, frail, elderly female        Resp:  CTA bilaterally. No wheezing/rhonchi/rales. No accessory muscle use        GI:  Soft, non distended, hypoactive. no hepatosplenomegaly, incision c/d/i     Musculoskeletal:  No edema, warm    Neurologic:  Moves all extremities.  Answering questions appropriately       Data Review:    Review and/or order of clinical lab test  Review and/or order of tests in the radiology section of CPT  Review and/or order of tests in the medicine section of CPT    Labs:     Recent Labs     12/01/21 0014 11/30/21  0947   WBC 7.7 7.8   HGB 8.0* 8.6*   HCT 25.9* 27.3*   * 400     Recent Labs     12/01/21 0014 11/30/21  0947 11/29/21  0013    138 137   K 3.8 3.5 3.6   * 110* 110*   CO2 16* 17* 19*   BUN 29* 32* 39*   CREA 0.74 0.81 0.87   * 169* 88   CA 8.5 7.9* 7.9*   MG  --  2.1 2.0   PHOS  --  3.3 3.6     No results for input(s): ALT, AP, TBIL, TBILI, TP, ALB, GLOB, GGT, AML, LPSE in the last 72 hours.    No lab exists for component: SGOT, GPT, AMYP, HLPSE  No results for input(s): INR, PTP, APTT, INREXT, INREXT in the last 72 hours. No results for input(s): FE, TIBC, PSAT, FERR in the last 72 hours. No results found for: FOL, RBCF   No results for input(s): PH, PCO2, PO2 in the last 72 hours. No results for input(s): CPK, CKNDX, TROIQ in the last 72 hours.     No lab exists for component: CPKMB  No results found for: CHOL, CHOLX, CHLST, CHOLV, HDL, HDLP, LDL, LDLC, DLDLP, TGLX, TRIGL, TRIGP, CHHD, CHHDX  Lab Results   Component Value Date/Time    Glucose (POC) 178 (H) 12/01/2021 06:03 AM    Glucose (POC) 121 (H) 11/30/2021 11:55 PM    Glucose (POC) 222 (H) 11/30/2021 04:33 PM    Glucose (POC) 177 (H) 11/30/2021 11:52 AM    Glucose (POC) 227 (H) 11/30/2021 05:34 AM     Lab Results   Component Value Date/Time    Color YELLOW/STRAW 11/23/2021 05:05 PM    Appearance CLEAR 11/23/2021 05:05 PM    Specific gravity 1.015 11/23/2021 05:05 PM    pH (UA) 7.0 11/23/2021 05:05 PM    Protein 30 (A) 11/23/2021 05:05 PM    Glucose 250 (A) 11/23/2021 05:05 PM    Ketone Negative 11/23/2021 05:05 PM    Bilirubin Negative 11/23/2021 05:05 PM    Urobilinogen 0.2 11/23/2021 05:05 PM    Nitrites Negative 11/23/2021 05:05 PM    Leukocyte Esterase Negative 11/23/2021 05:05 PM    Epithelial cells FEW 11/23/2021 05:05 PM    Bacteria Negative 11/23/2021 05:05 PM    WBC 0-4 11/23/2021 05:05 PM    RBC 0-5 11/23/2021 05:05 PM         Medications Reviewed:     Current Facility-Administered Medications   Medication Dose Route Frequency    metoprolol succinate (TOPROL-XL) XL tablet 50 mg  50 mg Oral DAILY    loperamide (IMODIUM) capsule 4 mg  4 mg Oral BID    megestroL (MEGACE) tablet 20 mg  20 mg Oral DAILY    TPN ADULT - CENTRAL   IntraVENous CONTINUOUS    dextrose (D50W) injection syrg 12.5-25 g  12.5-25 g IntraVENous PRN    dilTIAZem ER (CARDIZEM CD) capsule 240 mg  240 mg Oral DAILY    insulin lispro (HUMALOG) injection SubCUTAneous Q6H    apixaban (ELIQUIS) tablet 2.5 mg  2.5 mg Oral BID    collagenase (SANTYL) 250 unit/gram ointment   Topical DAILY    L.acidophilus-paracasei-S.thermophil-bifidobacter (RISAQUAD) 8 billion cell capsule  1 Capsule Oral DAILY    0.9% sodium chloride infusion 250 mL  250 mL IntraVENous PRN    alteplase (CATHFLO) 1 mg in sterile water (preservative free) 1 mL injection  1 mg InterCATHeter PRN    alteplase (CATHFLO) 2 mg in sterile water (preservative free) 2 mL injection  2 mg InterCATHeter PRN    glucose chewable tablet 16 g  4 Tablet Oral PRN    dextrose (D50W) injection syrg 12.5-25 g  25-50 mL IntraVENous PRN    glucagon (GLUCAGEN) injection 1 mg  1 mg IntraMUSCular PRN    fat emulsion 20% (LIPOSYN, INTRAlipid) infusion 250 mL  250 mL IntraVENous Q MON, THU & SAT    acetaminophen (TYLENOL) suppository 650 mg  650 mg Rectal Q6H PRN    sodium chloride (NS) flush 5-40 mL  5-40 mL IntraVENous Q8H    sodium chloride (NS) flush 5-40 mL  5-40 mL IntraVENous PRN    HYDROmorphone (DILAUDID) injection 1 mg  1 mg IntraVENous Q4H PRN    naloxone (NARCAN) injection 0.4 mg  0.4 mg IntraVENous PRN    ondansetron (ZOFRAN) injection 4 mg  4 mg IntraVENous Q4H PRN     ______________________________________________________________________  EXPECTED LENGTH OF STAY: 10d 7h  ACTUAL LENGTH OF STAY:          16                 Sabas Guido MD

## 2021-12-01 NOTE — PROGRESS NOTES
Cardiology Progress Note                                        Admit Date: 11/15/2021    Assessment/Plan:     Persistent Afib; rate is acceptable; will increase BB some  HTN; not as well controlled; just increased regimen yesterday afternoon  Chronic AC; so far tolerating Eliquis well  Anemia; unchanged    Jesusita Montalvo is a 80 y.o. female with     PROBLEM LIST:  Patient Active Problem List    Diagnosis Date Noted    Lethargy     Feeding difficulties     Goals of care, counseling/discussion     Palliative care encounter     Small bowel ischemia ([address] 11/16/2021         Subjective:     Jesusita Montalvo reports none. Visit Vitals  BP (!) 172/87 (BP 1 Location: Left arm, BP Patient Position: At rest)   Pulse (!) 117   Temp 98.2 °F (36.8 °C)   Resp 20   Ht 5' 4\" (1.626 m)   Wt 174 lb 13.2 oz (79.3 kg)   SpO2 96%   BMI 30.01 kg/m²       Intake/Output Summary (Last 24 hours) at 12/1/2021 0750  Last data filed at 12/1/2021 0334  Gross per 24 hour   Intake 3009.9 ml   Output 1050 ml   Net 1959.9 ml       Objective:      Physical Exam:  HEENT: Perrla, EOMI  Neck: No JVD,  No thyroidmegaly  Resp: CTA bilaterally;  No wheezes or rales  CV: irregular s1s2 No murmur no s3  Abd:Soft, Nontender  Ext: No edema  Neuro: Alert and oriented; Nonfocal  Skin: Warm, Dry, Intact  Pulses: 2+ DP/PT/Rad      Telemetry: AFIB    Current Facility-Administered Medications   Medication Dose Route Frequency    loperamide (IMODIUM) capsule 4 mg  4 mg Oral BID    megestroL (MEGACE) tablet 20 mg  20 mg Oral DAILY    TPN ADULT - CENTRAL   IntraVENous CONTINUOUS    dextrose (D50W) injection syrg 12.5-25 g  12.5-25 g IntraVENous PRN    dilTIAZem ER (CARDIZEM CD) capsule 240 mg  240 mg Oral DAILY    insulin lispro (HUMALOG) injection   SubCUTAneous Q6H    apixaban (ELIQUIS) tablet 2.5 mg  2.5 mg Oral BID    metoprolol succinate (TOPROL-XL) XL tablet 25 mg  25 mg Oral DAILY    collagenase (SANTYL) 250 unit/gram ointment   Topical DAILY  L.acidophilus-paracasei-S.thermophil-bifidobacter (RISAQUAD) 8 billion cell capsule  1 Capsule Oral DAILY    0.9% sodium chloride infusion 250 mL  250 mL IntraVENous PRN    alteplase (CATHFLO) 1 mg in sterile water (preservative free) 1 mL injection  1 mg InterCATHeter PRN    alteplase (CATHFLO) 2 mg in sterile water (preservative free) 2 mL injection  2 mg InterCATHeter PRN    glucose chewable tablet 16 g  4 Tablet Oral PRN    dextrose (D50W) injection syrg 12.5-25 g  25-50 mL IntraVENous PRN    glucagon (GLUCAGEN) injection 1 mg  1 mg IntraMUSCular PRN    fat emulsion 20% (LIPOSYN, INTRAlipid) infusion 250 mL  250 mL IntraVENous Q MON, THU & SAT    acetaminophen (TYLENOL) suppository 650 mg  650 mg Rectal Q6H PRN    sodium chloride (NS) flush 5-40 mL  5-40 mL IntraVENous Q8H    sodium chloride (NS) flush 5-40 mL  5-40 mL IntraVENous PRN    HYDROmorphone (DILAUDID) injection 1 mg  1 mg IntraVENous Q4H PRN    naloxone (NARCAN) injection 0.4 mg  0.4 mg IntraVENous PRN    ondansetron (ZOFRAN) injection 4 mg  4 mg IntraVENous Q4H PRN         Data Review:   Labs:    Recent Results (from the past 24 hour(s))   METABOLIC PANEL, BASIC    Collection Time: 11/30/21  9:47 AM   Result Value Ref Range    Sodium 138 136 - 145 mmol/L    Potassium 3.5 3.5 - 5.1 mmol/L    Chloride 110 (H) 97 - 108 mmol/L    CO2 17 (L) 21 - 32 mmol/L    Anion gap 11 5 - 15 mmol/L    Glucose 169 (H) 65 - 100 mg/dL    BUN 32 (H) 6 - 20 MG/DL    Creatinine 0.81 0.55 - 1.02 MG/DL    BUN/Creatinine ratio 40 (H) 12 - 20      GFR est AA >60 >60 ml/min/1.73m2    GFR est non-AA >60 >60 ml/min/1.73m2    Calcium 7.9 (L) 8.5 - 10.1 MG/DL   CBC W/O DIFF    Collection Time: 11/30/21  9:47 AM   Result Value Ref Range    WBC 7.8 3.6 - 11.0 K/uL    RBC 2.90 (L) 3.80 - 5.20 M/uL    HGB 8.6 (L) 11.5 - 16.0 g/dL    HCT 27.3 (L) 35.0 - 47.0 %    MCV 94.1 80.0 - 99.0 FL    MCH 29.7 26.0 - 34.0 PG    MCHC 31.5 30.0 - 36.5 g/dL    RDW 16.2 (H) 11.5 - 14.5 %    PLATELET 876 151 - 254 K/uL    MPV 11.4 8.9 - 12.9 FL    NRBC 0.0 0  WBC    ABSOLUTE NRBC 0.00 0.00 - 0.01 K/uL   MAGNESIUM    Collection Time: 11/30/21  9:47 AM   Result Value Ref Range    Magnesium 2.1 1.6 - 2.4 mg/dL   PHOSPHORUS    Collection Time: 11/30/21  9:47 AM   Result Value Ref Range    Phosphorus 3.3 2.6 - 4.7 MG/DL   GLUCOSE, POC    Collection Time: 11/30/21 11:52 AM   Result Value Ref Range    Glucose (POC) 177 (H) 65 - 117 mg/dL    Performed by Marino Fleischer PCT    GLUCOSE, POC    Collection Time: 11/30/21  4:33 PM   Result Value Ref Range    Glucose (POC) 222 (H) 65 - 117 mg/dL    Performed by Bailey Martinez    GLUCOSE, POC    Collection Time: 11/30/21 11:55 PM   Result Value Ref Range    Glucose (POC) 121 (H) 65 - 117 mg/dL    Performed by Ana Duffy    CBC W/O DIFF    Collection Time: 12/01/21 12:14 AM   Result Value Ref Range    WBC 7.7 3.6 - 11.0 K/uL    RBC 2.71 (L) 3.80 - 5.20 M/uL    HGB 8.0 (L) 11.5 - 16.0 g/dL    HCT 25.9 (L) 35.0 - 47.0 %    MCV 95.6 80.0 - 99.0 FL    MCH 29.5 26.0 - 34.0 PG    MCHC 30.9 30.0 - 36.5 g/dL    RDW 16.2 (H) 11.5 - 14.5 %    PLATELET 899 (H) 629 - 400 K/uL    MPV 11.1 8.9 - 12.9 FL    NRBC 0.0 0  WBC    ABSOLUTE NRBC 0.00 0.00 - 4.56 K/uL   METABOLIC PANEL, BASIC    Collection Time: 12/01/21 12:14 AM   Result Value Ref Range    Sodium 138 136 - 145 mmol/L    Potassium 3.8 3.5 - 5.1 mmol/L    Chloride 113 (H) 97 - 108 mmol/L    CO2 16 (L) 21 - 32 mmol/L    Anion gap 9 5 - 15 mmol/L    Glucose 156 (H) 65 - 100 mg/dL    BUN 29 (H) 6 - 20 MG/DL    Creatinine 0.74 0.55 - 1.02 MG/DL    BUN/Creatinine ratio 39 (H) 12 - 20      GFR est AA >60 >60 ml/min/1.73m2    GFR est non-AA >60 >60 ml/min/1.73m2    Calcium 8.5 8.5 - 10.1 MG/DL   GLUCOSE, POC    Collection Time: 12/01/21  6:03 AM   Result Value Ref Range    Glucose (POC) 178 (H) 65 - 117 mg/dL    Performed by Micah Shah

## 2021-12-01 NOTE — PROGRESS NOTES
[facility] at [address] [facility] Surgery    POD #16    Subjective     Doing well, tolerating diet, BM seem less loose, TPN    Objective     Patient Vitals for the past 24 hrs:   Temp Pulse Resp BP SpO2   12/01/21 1203 -- (!) 105 -- -- --   12/01/21 1000 -- (!) 113 -- -- --   12/01/21 0850 97.6 °F (36.4 °C) 95 18 (!) 157/69 98 %   12/01/21 0556 -- (!) 117 -- -- --   12/01/21 0403 -- 99 -- -- --   12/01/21 0332 98.2 °F (36.8 °C) (!) 105 20 (!) 172/87 96 %   11/30/21 2200 -- 95 -- -- --   11/30/21 2002 -- 98 -- -- --   11/30/21 1844 98.2 °F (36.8 °C) 99 18 (!) 131/96 100 %   11/30/21 1800 -- 91 -- -- --   11/30/21 1438 97.5 °F (36.4 °C) 80 16 (!) 150/69 99 %   11/30/21 1400 -- 76 -- -- --       Date 11/30/21 0700 - 12/01/21 0659 12/01/21 0700 - 12/02/21 0659   Shift 6442-8223 0208-6234 24 Hour Total 8254-9773 6387-8792 24 Hour Total   INTAKE   P.O. 60  60        P. O. 60  60      I. V.(mL/kg/hr) 4014. 9(3.3)  2949.9(1.5)        Volume (lactated Ringers infusion) 825  825        Volume (0.9% sodium chloride infusion 250 mL) 0  0        Volume (piperacillin-tazobactam (ZOSYN) 3.375 g in 0.9% sodium chloride (MBP/ADV) 100 mL MBP) 200  200        Volume (fat emulsion 20% (LIPOSYN, INTRAlipid) infusion 250 mL) 1040.8  1040.8        Volume (TPN ADULT - CENTRAL) 884.1  884. 1      Shift Total(mL/kg) 8470. 9(40.5)  3009.9(38)      OUTPUT   Urine(mL/kg/hr)  550(0.6) 550(0.3)        Urine Occurrence(s) 1 x 2 x 3 x        Urine Output (mL) (External Urinary Catheter 11/28/21)  550 550      Emesis/NG output           Emesis Occurrence(s) 0 x  0 x      Drains  500 500        Output (ml) ([REMOVED] Fecal Management)  500 500      Stool           Stool Occurrence(s) 1 x 2 x 3 x      Shift Total(mL/kg)  1050(13.2) 1050(13.2)      NET 3009.9 -1050 1959.9      Weight (kg) 74.3 79.3 79.3 79.3 79.3 79.3       PE  Pulm - CTAB  CV - RRR  Abd - soft, ND, BS present, incision c/d/i    Labs  Recent Results (from the past 12 hour(s))   GLUCOSE, POC    Collection Time: 12/01/21  6:03 AM   Result Value Ref Range    Glucose (POC) 178 (H) 65 - 117 mg/dL    Performed by [address], POC    Collection Time: 12/01/21 12:05 PM   Result Value Ref Range    Glucose (POC) 183 (H) 65 - 117 mg/dL    Performed by Carola Saleh is a 80 y. o.yr old female s/p open R colectomy and extended small bowel resection    Plan     Doing well  Stopping TPN today, last bag running now  Still needs to increase PO intake  Hopefully she can get the fecal management tube out soon  Seems to be ok for SNF soon     Jerome Sloan MD

## 2021-12-02 LAB
GLUCOSE BLD STRIP.AUTO-MCNC: 105 MG/DL (ref 65–117)
SERVICE CMNT-IMP: NORMAL

## 2021-12-02 PROCEDURE — 74011000250 HC RX REV CODE- 250: Performed by: SURGERY

## 2021-12-02 PROCEDURE — 74011250637 HC RX REV CODE- 250/637: Performed by: INTERNAL MEDICINE

## 2021-12-02 PROCEDURE — 74011250636 HC RX REV CODE- 250/636: Performed by: INTERNAL MEDICINE

## 2021-12-02 PROCEDURE — 82962 GLUCOSE BLOOD TEST: CPT

## 2021-12-02 PROCEDURE — 74011000250 HC RX REV CODE- 250: Performed by: INTERNAL MEDICINE

## 2021-12-02 PROCEDURE — 77030020847 HC STATLOK BARD -A

## 2021-12-02 PROCEDURE — 65270000032 HC RM SEMIPRIVATE

## 2021-12-02 PROCEDURE — 74011250636 HC RX REV CODE- 250/636: Performed by: SURGERY

## 2021-12-02 PROCEDURE — 74011250637 HC RX REV CODE- 250/637: Performed by: SURGERY

## 2021-12-02 RX ADMIN — COLLAGENASE SANTYL: 250 OINTMENT TOPICAL at 09:27

## 2021-12-02 RX ADMIN — LOPERAMIDE HYDROCHLORIDE 4 MG: 2 CAPSULE ORAL at 18:08

## 2021-12-02 RX ADMIN — MEGESTROL ACETATE 20 MG: 40 TABLET ORAL at 08:27

## 2021-12-02 RX ADMIN — METOPROLOL SUCCINATE 50 MG: 50 TABLET, EXTENDED RELEASE ORAL at 08:27

## 2021-12-02 RX ADMIN — ALTEPLASE 2 MG: 2.2 INJECTION, POWDER, LYOPHILIZED, FOR SOLUTION INTRAVENOUS at 03:07

## 2021-12-02 RX ADMIN — APIXABAN 2.5 MG: 2.5 TABLET, FILM COATED ORAL at 18:08

## 2021-12-02 RX ADMIN — DILTIAZEM HYDROCHLORIDE 240 MG: 240 CAPSULE, COATED, EXTENDED RELEASE ORAL at 08:27

## 2021-12-02 RX ADMIN — I.V. FAT EMULSION 250 ML: 20 EMULSION INTRAVENOUS at 18:08

## 2021-12-02 RX ADMIN — Medication 1 CAPSULE: at 08:27

## 2021-12-02 RX ADMIN — Medication 10 ML: at 21:02

## 2021-12-02 RX ADMIN — APIXABAN 2.5 MG: 2.5 TABLET, FILM COATED ORAL at 08:27

## 2021-12-02 RX ADMIN — LOPERAMIDE HYDROCHLORIDE 4 MG: 2 CAPSULE ORAL at 08:27

## 2021-12-02 RX ADMIN — Medication 10 ML: at 06:10

## 2021-12-02 NOTE — PROGRESS NOTES
Problem: Pressure Injury - Risk of  Goal: *Prevention of pressure injury  Description: Document Brian Scale and appropriate interventions in the flowsheet. Outcome: Progressing Towards Goal  Note: Pressure Injury Interventions:  Sensory Interventions: Assess changes in LOC, Assess need for specialty bed    Moisture Interventions: Absorbent underpads, Apply protective barrier, creams and emollients, Internal/External fecal devices, Internal/External urinary devices, Moisture barrier    Activity Interventions: Assess need for specialty bed, Increase time out of bed, Pressure redistribution bed/mattress(bed type)    Mobility Interventions: Assess need for specialty bed, Chair cushion, HOB 30 degrees or less, Float heels, Pressure redistribution bed/mattress (bed type), Turn and reposition approx. every two hours(pillow and wedges)    Nutrition Interventions: Document food/fluid/supplement intake    Friction and Shear Interventions: Apply protective barrier, creams and emollients, Feet elevated on foot rest, HOB 30 degrees or less, Lift team/patient mobility team, Transferring/repositioning devices                Problem: Patient Education: Go to Patient Education Activity  Goal: Patient/Family Education  Outcome: Progressing Towards Goal     Problem: Falls - Risk of  Goal: *Absence of Falls  Description: Document Carson Fall Risk and appropriate interventions in the flowsheet.   Outcome: Progressing Towards Goal  Note: Fall Risk Interventions:  Mobility Interventions: Communicate number of staff needed for ambulation/transfer, Patient to call before getting OOB, Strengthening exercises (ROM-active/passive), Utilize walker, cane, or other assistive device    Mentation Interventions: Adequate sleep, hydration, pain control, Bed/chair exit alarm    Medication Interventions: Assess postural VS orthostatic hypotension, Evaluate medications/consider consulting pharmacy, Patient to call before getting OOB    Elimination Interventions: Call light in reach, Patient to call for help with toileting needs, Stay With Me (per policy), Toilet paper/wipes in reach, Toileting schedule/hourly rounds    History of Falls Interventions: Door open when patient unattended, Consult care management for discharge planning, Bed/chair exit alarm         Problem: Patient Education: Go to Patient Education Activity  Goal: Patient/Family Education  Outcome: Progressing Towards Goal     Problem: Discharge Planning  Goal: *Discharge to safe environment  Outcome: Progressing Towards Goal     Problem: Patient Education: Go to Patient Education Activity  Goal: Patient/Family Education  Outcome: Progressing Towards Goal     Problem: Nutrition Deficit  Goal: *Optimize nutritional status  Outcome: Progressing Towards Goal

## 2021-12-02 NOTE — PROGRESS NOTES
[facility] at [facility] Surgery    POD #17    Subjective     Doing well, tolerating some of the regualr diet, no N/V    Objective     Patient Vitals for the past 24 hrs:   Temp Pulse Resp BP SpO2   12/02/21 0918 99.1 °F (37.3 °C) 99 18 137/68 93 %   12/02/21 0600 -- 88 -- -- --   12/02/21 0400 -- 93 -- -- --   12/02/21 0352 97.9 °F (36.6 °C) (!) 101 16 135/75 94 %   12/02/21 0200 -- 79 -- -- --   12/01/21 2159 -- 92 -- -- --   12/01/21 2108 98.2 °F (36.8 °C) 93 18 131/68 97 %   12/01/21 2023 -- 92 -- -- --   12/01/21 1800 -- 86 -- -- --   12/01/21 1431 99.3 °F (37.4 °C) 94 18 134/82 100 %   12/01/21 1359 -- 96 -- -- --   12/01/21 1203 -- (!) 105 -- -- --   12/01/21 1000 -- (!) 113 -- -- --       Date 12/01/21 0700 - 12/02/21 0659 12/02/21 0700 - 12/03/21 0659   Shift 7618-8971 0708-2215 24 Hour Total 0741-4151 9542-9456 24 Hour Total   INTAKE   Shift Total(mL/kg)         OUTPUT   Urine(mL/kg/hr)           Urine Occurrence(s) 1 x  1 x      Stool           Stool Occurrence(s) 1 x  1 x      Shift Total(mL/kg)         NET         Weight (kg) 79.3 74.5 74.5 74.5 74.5 74.5       PE  Pulm - CTAB  CV - RRR  Abd - soft, ND, BS present, incisions c/d/i    Labs  Recent Results (from the past 12 hour(s))   GLUCOSE, POC    Collection Time: 12/01/21 11:27 PM   Result Value Ref Range    Glucose (POC) 161 (H) 65 - 117 mg/dL    Performed by [facility], POC    Collection Time: 12/02/21  5:39 AM   Result Value Ref Range    Glucose (POC) 105 65 - 117 mg/dL    Performed by Adriana Ventura RN          Assessment     Diane Thakur is a 80 y. o.yr old female s/p open right colectomy    Plan     Doing well today  Off TPN now  Seems about ready for DC to next phase of care whether SNF or other options  I will order the staples to be removed    Yu Whatley MD

## 2021-12-02 NOTE — DIABETES MGMT
Diabetes Management Team to sign off at this point as patient's blood glucose remains stable after TPN discontinued and now tolerating PO food diet. Please re-consult us if patient needs change. Thank you for including us in their care.     Signed By: PEREZ Espinal   Program for Diabetes Health    December 2, 2021

## 2021-12-02 NOTE — PROGRESS NOTES
TEREZA:  RUR: 14%    Disposition: Return to [facility] (If Home health is recommended, send orders to the facility and facility will coordinate services). UPDATE:  Disposition:  Patient's daughter desires SNF. Chart reviewed. Patient admitted here from [address] [address] [address] [facility], [address] [place] [address]. ([phone]). The patient's complaints were RLQ abdominal pain and nausea/vomiting. Adm dx: Small Bowel Ischemia    PMhx: hip fracture repair (3 months ago),  Atril Fib, Atrial Flutter, Edema, , HTN, Major Depressive Disorder, laparoscopic cholecystectomy. Per prior CM notes, the patient has a hx of prior stays at [address]  [facility]), [facility] and [facility]. Patient/Family unhappy with these agencies. Per IDR, the patient is off TPN (12/2/21). The patient is tolerating some of reg diet. No N/V. Per IDR, D/c planned possibly for Monday. CM called facility and asked that call be returned re planning for patient d/c. CM anticipating call from Don Horton, [address] [facility] or [place], [address] [facility]. [facility]. Facility fax number is [phone]. CM spoke with patient's daughter Cathryn Martin). CM introduced self and role. Daughter stated she was not informed of her mom being transferred to  [address]. CM advised that patient transferred to [address] on 11/30. She responded that she had been calling the 5th floor leaving messages for an update on her mom's progress and had no return calls. CM provided phone number to nurses' station and offered support. The patient has been at [facility] for approx 5 months. Prior to that, she lived alone in her home. Daughter states patient was receiving therapy at the [facility]- 2-3x week. Patient was wheelchair- to- walker with therapy staff. CM requesting orders for therapy evals for snf. CM Consult noted/completed. CM continuing to follow.     Ashley Campos, [address] [facility], [address] [place] [address]

## 2021-12-02 NOTE — PROGRESS NOTES
Bedside shift change report given to [facility] (oncoming nurse) by Jacques Hicks (offgoing nurse). Report included the following information SBAR.

## 2021-12-03 PROCEDURE — 99024 POSTOP FOLLOW-UP VISIT: CPT | Performed by: NURSE PRACTITIONER

## 2021-12-03 PROCEDURE — 74011250637 HC RX REV CODE- 250/637: Performed by: INTERNAL MEDICINE

## 2021-12-03 PROCEDURE — 97530 THERAPEUTIC ACTIVITIES: CPT

## 2021-12-03 PROCEDURE — 97110 THERAPEUTIC EXERCISES: CPT

## 2021-12-03 PROCEDURE — 97535 SELF CARE MNGMENT TRAINING: CPT

## 2021-12-03 PROCEDURE — 97161 PT EVAL LOW COMPLEX 20 MIN: CPT

## 2021-12-03 PROCEDURE — 65270000032 HC RM SEMIPRIVATE

## 2021-12-03 PROCEDURE — 97165 OT EVAL LOW COMPLEX 30 MIN: CPT

## 2021-12-03 PROCEDURE — 74011250636 HC RX REV CODE- 250/636: Performed by: SURGERY

## 2021-12-03 PROCEDURE — 74011250637 HC RX REV CODE- 250/637: Performed by: SURGERY

## 2021-12-03 RX ORDER — LOPERAMIDE HYDROCHLORIDE 2 MG/1
4 CAPSULE ORAL 4 TIMES DAILY
Status: DISCONTINUED | OUTPATIENT
Start: 2021-12-03 | End: 2021-12-07 | Stop reason: HOSPADM

## 2021-12-03 RX ORDER — LOPERAMIDE HYDROCHLORIDE 2 MG/1
4 CAPSULE ORAL 3 TIMES DAILY
Status: DISCONTINUED | OUTPATIENT
Start: 2021-12-03 | End: 2021-12-03

## 2021-12-03 RX ADMIN — LOPERAMIDE HYDROCHLORIDE 4 MG: 2 CAPSULE ORAL at 17:32

## 2021-12-03 RX ADMIN — APIXABAN 2.5 MG: 2.5 TABLET, FILM COATED ORAL at 10:17

## 2021-12-03 RX ADMIN — COLLAGENASE SANTYL: 250 OINTMENT TOPICAL at 17:33

## 2021-12-03 RX ADMIN — Medication 10 ML: at 17:33

## 2021-12-03 RX ADMIN — METOPROLOL SUCCINATE 50 MG: 50 TABLET, EXTENDED RELEASE ORAL at 10:17

## 2021-12-03 RX ADMIN — DILTIAZEM HYDROCHLORIDE 240 MG: 240 CAPSULE, COATED, EXTENDED RELEASE ORAL at 10:17

## 2021-12-03 RX ADMIN — Medication 1 CAPSULE: at 10:17

## 2021-12-03 RX ADMIN — LOPERAMIDE HYDROCHLORIDE 4 MG: 2 CAPSULE ORAL at 10:17

## 2021-12-03 RX ADMIN — APIXABAN 2.5 MG: 2.5 TABLET, FILM COATED ORAL at 17:32

## 2021-12-03 RX ADMIN — MEGESTROL ACETATE 20 MG: 40 TABLET ORAL at 10:17

## 2021-12-03 NOTE — PROGRESS NOTES
TEREZA:  RUR: 14%     Disposition:  SNF    CM spoke with patient's daughter to obtain choice. Referrals are being sent to the following facilities:  [facility] and [facility]. Transition of Care Plan:     The Plan for Transition of Care is related to the following treatment goals: SNF    The Patient and/or patient representative Zoya ([phone]) was provided with a choice of provider and agrees  with the discharge plan. Yes [x] No []    A Freedom of choice list was provided with basic dialogue that supports the patient's individualized plan of care/goals and shares the quality data associated with the providers. Yes [x] No []    CM continuing to follow. Yolanda Brewster, [address], [address]    Patient approved for admission at [address] [place] [address] (private room) and [facility]. [facility] does not have bed availability at this time and no pvt room. Both facilities require removal of the fecal management tube. Daughter (Zoya) has accepted bed at [address] [place] [address] pending d/c of fecal management tube.

## 2021-12-03 NOTE — PROGRESS NOTES
Spiritual Care Assessment/Progress Note  [facility]      NAME: Sixto Kerr      MRN: [id number]  AGE: 80 y.o. SEX: female  Episcopal Affiliation: Unknown   Language: Unknown     12/3/2021     Total Time (in minutes): 10     Spiritual Assessment begun in [facility] through conversation with:         []Patient        [x] Family    [] Friend(s)        Reason for Consult: Initial/Spiritual assessment, patient floor     Spiritual beliefs: (Please include comment if needed)     [x] Identifies with a roderick tradition:         [x] Supported by a roderick community:            [] Claims no spiritual orientation:           [] Seeking spiritual identity:                [] Adheres to an individual form of spirituality:           [] Not able to assess:                           Identified resources for coping:      [x] Prayer                               [] Music                  [] Guided Imagery     [x] Family/friends                 [] Pet visits     [] Devotional reading                         [] Unknown     [] Other:                                            Interventions offered during this visit: (See comments for more details)    Patient Interventions: Spiritual care volunteer support           Plan of Care:     [] Support spiritual and/or cultural needs    [] Support AMD and/or advance care planning process      [] Support grieving process   [] Coordinate Rites and/or Rituals    [] Coordination with community clergy   [] No spiritual needs identified at this time   [] Detailed Plan of Care below (See Comments)  [] Make referral to Music Therapy  [] Make referral to Pet Therapy     [] Make referral to Addiction services  [] Make referral to [facility]  [] Make referral to Spiritual Care Partner  [] No future visits requested        [x] Contact Spiritual Care for further referrals     Comments:  for initial visit. Pt was resting and did not wake to .  Please contact Spiritual Care Services for further support.      [address] [employer] [address] Colon, [facility]   [phone])

## 2021-12-03 NOTE — PROGRESS NOTES
Problem: Self Care Deficits Care Plan (Adult)  Goal: *Acute Goals and Plan of Care (Insert Text)  Description:   FUNCTIONAL STATUS PRIOR TO ADMISSION: Patient may not be a reliable historian but reports she was ambulatory with rollator and received assistance with bathing, dressing, and toileting. Note per case management note, patient required assistance with ADLs. HOME SUPPORT: Patient resided at [facility] with staff to provide assistance. Occupational Therapy Goals  Initiated 12/3/2021  1. Patient will perform grooming with supervision/set-up within 7 day(s). 2.  Patient will perform self-feeding with supervision/ set-up within 7 day(s). 3.  Patient will perform bathing with moderate assistance  within 7 day(s). 4.  Patient will perform upper body dressing with minimal assistance within 7 day(s). 5.  Patient will perform lower body dressing with minimal assistance within 7 day(s). 6.  Patient will perform toilet transfers with moderate assistance  within 7 day(s). 7.  Patient will perform all aspects of toileting with moderate assistance  within 7 day(s). 8.  Patient will participate in upper extremity therapeutic exercise/activities with supervision/set-up for 10 minutes within 7 day(s). 9.  Patient will utilize energy conservation techniques during functional activities with verbal cues within 7 day(s). Outcome: Not Met     OCCUPATIONAL THERAPY EVALUATION  Patient: April Reyes (31 y.o. female)  Date: 12/3/2021  Primary Diagnosis: Small bowel ischemia ([facility] [address] [K55.9]  Procedure(s) (LRB):  Laparoscopy Converted to Open, Right Colectomy, Extended Small Bowel Resection (N/A) 18 Days Post-Op   Precautions: fall       ASSESSMENT  Note patient admitted 11/15 for ischemic colitis and is POD 18 open colectomy/ small bowel resection. OT/ PT orders were placed 12/2.   Patient seen for OT evaluation today with debility, BUE edema (R>L), impaired coordination, impaired functional reach for UB and LB ADLs, impaired sitting balance, and impaired functional endurance. Achieved sitting EOB with x2 assist, maintained static sitting balance with UE support, and tolerated sitting for ~7 min. Performed BUE and BLE AROM exercises with quick fatigue after ~5 repetitions. Reported dizziness sitting EOB with BP sable after return to supine. Required total assistance for toileting rolling in bed. Patient is significantly below functional baseline. Recommend SNF at d/c. Current Level of Function Impacting Discharge (ADLs/self-care): maximum assistance rolling, maximum assistance x2 supine<>sit, total assistance for toileting. Infer overall minimum to maximum assistance UB ADLs and total assistance LB ADLs. Functional Outcome Measure: The patient scored 10/100 on the Barthel Index outcome measure which is indicative of ~90% impairment in functional performance. Patient will benefit from skilled therapy intervention to address the above noted impairments. PLAN :  Recommendations and Planned Interventions: self care training, functional mobility training, therapeutic exercise, balance training, therapeutic activities, endurance activities, patient education, home safety training, and family training/education    Frequency/Duration: Patient will be followed by occupational therapy 5 times a week to address goals. Recommendation for discharge: (in order for the patient to meet his/her long term goals)  Therapy up to 5 days/week in SNF setting    This discharge recommendation:  Has not yet been discussed the attending provider and/or case management         SUBJECTIVE:   Patient stated I'm dizzy.     OBJECTIVE DATA SUMMARY:   HISTORY:   Past Medical History:   Diagnosis Date    Atrial fibrillation ([facility] [address]     Atrial flutter ([facility] [address]     Edema     Hypertension     Hypokalemia     Hypothyroidism     Insomnia     Major depressive disorder     Pain     UTI (urinary tract infection)     Vitamin D deficiency History reviewed. No pertinent surgical history. Expanded or extensive additional review of patient history:     Home Situation  Home Environment: Assisted living  Support Systems: Assisted Living  Current DME Used/Available at Home: ck Olivera      EXAMINATION OF PERFORMANCE DEFICITS:  Cognitive/Behavioral Status:  Neurologic State: Alert; Confused  Orientation Level: Oriented to person; Oriented to place; Oriented to situation; Disoriented to time  Cognition: Follows commands             Skin: visible skin appears intact    Edema: none noted    Hearing: Auditory  Auditory Impairment: Hard of hearing, bilateral    Vision/Perceptual:    Tracking: Able to track left of midline; Able to track right of midline; Requires cues, head turns, or add eye shifts to track              Visual Fields:  (intact)       Acuity: Within Defined Limits; Able to read clock/calendar on wall without difficulty; Able to read employee name badge without difficulty         Range of Motion:    AROM: Generally decreased, functional                         Strength:    Strength: Generally decreased, functional                Coordination:  Coordination: Generally decreased, functional  Fine Motor Skills-Upper: Left Impaired; Right Impaired    Gross Motor Skills-Upper: Left Impaired; Right Impaired    Tone & Sensation:    Tone: Normal  Sensation: Intact                      Balance:  Sitting: Impaired  Sitting - Static: Good (unsupported)  Sitting - Dynamic: Fair (occasional)    Functional Mobility and Transfers for ADLs:  Bed Mobility:  Rolling: Maximum assistance  Supine to Sit: Maximum assistance; Assist x2  Sit to Supine: Maximum assistance; Assist x2    ADL Assessment:  Feeding: Minimum assistance (inferred)    Oral Facial Hygiene/Grooming: Minimum assistance (inferred)    Bathing: Maximum assistance (inferred d/t endurance, reach, coordination)    Upper Body Dressing: Maximum assistance (inferred)    Lower Body Dressing:  Total assistance (inferred for AROM, coordination, balance)    Toileting: Total assistance (for hygiene, rolling in bed)           Functional Measure:    Barthel Index:  Bathin  Bladder: 0  Bowels: 0  Groomin  Dressin  Feedin  Mobility: 0  Stairs: 0  Toilet Use: 0  Transfer (Bed to Chair and Back): 0  Total: 10/100      The Barthel ADL Index: Guidelines  1. The index should be used as a record of what a patient does, not as a record of what a patient could do. 2. The main aim is to establish degree of independence from any help, physical or verbal, however minor and for whatever reason. 3. The need for supervision renders the patient not independent. 4. A patient's performance should be established using the best available evidence. Asking the patient, friends/relatives and nurses are the usual sources, but direct observation and common sense are also important. However direct testing is not needed. 5. Usually the patient's performance over the preceding 24-48 hours is important, but occasionally longer periods will be relevant. 6. Middle categories imply that the patient supplies over 50 per cent of the effort. 7. Use of aids to be independent is allowed. Score Interpretation (from [address] [address])    Independent   60-79 Minimally independent   40-59 Partially dependent   20-39 Very dependent   <20 Totally dependent     -Jia Lane., Barthel, D.W. (1965). Functional evaluation: the Barthel Index. [address] [facility] ([address] (1997). The Barthel activities of daily living index: self-reporting versus actual performance in the old (> or = 75 years). Journal of [address] [place] [address] 45(7), 14 [facility], .RODRIGO, Maeve Vera., Raymundodo Fletcher. (1999). Measuring the change in disability after inpatient rehabilitation; comparison of the responsiveness of the Barthel Index and Functional [place] Measure.  Journal of Neurology, Neurosurgery, and Psychiatry, 66(4), 773-202. COURTNEY Garsia, JUNIOR Giang, & Jamari Evans M.A. (2004) Assessment of post-stroke quality of life in cost-effectiveness studies: The usefulness of the Barthel Index and the EuroQoL-5D. Quality of Life Research, 15, 448-97         Occupational Therapy Evaluation Charge Determination   History Examination Decision-Making   LOW Complexity : Brief history review  MEDIUM Complexity : 3-5 performance deficits relating to physical, cognitive , or psychosocial skils that result in activity limitations and / or participation restrictions MEDIUM Complexity : Patient may present with comorbidities that affect occupational performnce. Miniml to moderate modification of tasks or assistance (eg, physical or verbal ) with assesment(s) is necessary to enable patient to complete evaluation       Based on the above components, the patient evaluation is determined to be of the following complexity level: LOW   Pain Rating:  Patient reported b/l foot pain, improving with rest/ repositioning    Activity Tolerance:   Fair    After treatment patient left in no apparent distress:    Supine in bed and Call bell within reach    COMMUNICATION/EDUCATION:   The patients plan of care was discussed with: Physical therapist and Registered nurse. Home safety education was provided and the patient/caregiver indicated understanding., Patient/family have participated as able in goal setting and plan of care. , and Patient/family agree to work toward stated goals and plan of care. This patients plan of care is appropriate for delegation to [facility].     Thank you for this referral.  Evelene Schaumann, OT  Time Calculation: 31 mins

## 2021-12-03 NOTE — PROGRESS NOTES
General Surgery Daily Progress Note    Admit Date: 11/15/2021  Post-Operative Day: 18 Days Post-Op from Procedure(s):  Laparoscopy Converted to Open, Right Colectomy, Extended Small Bowel Resection     Subjective:     Last 24 hrs: pt says she is \"tireder than tired\" this morning; no pain; tolerating diet and having BMs       Objective:     Blood pressure (!) 148/73, pulse 69, temperature 97.9 °F (36.6 °C), resp. rate 17, height 5' 4\" (1.626 m), weight 170 lb 6.7 oz (77.3 kg), SpO2 93 %. Temp (24hrs), Av.2 °F (36.8 °C), Min:97.9 °F (36.6 °C), Max:98.9 °F (37.2 °C)      _____________________  Physical Exam:     Alert and Oriented, x3, in no acute distress. Cardiovascular: RRR, no peripheral edema  Abdomen: soft, flat, incision w/ steri strips intact      Assessment:   Active Problems:    Small bowel ischemia (HCC) (2021)      Lethargy ()      Feeding difficulties ()      Goals of care, counseling/discussion ()      Palliative care encounter ()            Plan:     D/c dispo per CM  PT/OT evals for SNF    Data Review:    Recent Labs     21  0014   WBC 7.7   HGB 8.0*   HCT 25.9*   *     Recent Labs     21  0014      K 3.8   *   CO2 16*   *   BUN 29*   CREA 0.74   CA 8.5     No results for input(s): AML, LPSE in the last 72 hours.         ______________________  Medications:    Current Facility-Administered Medications   Medication Dose Route Frequency    metoprolol succinate (TOPROL-XL) XL tablet 50 mg  50 mg Oral DAILY    loperamide (IMODIUM) capsule 4 mg  4 mg Oral BID    megestroL (MEGACE) tablet 20 mg  20 mg Oral DAILY    dextrose (D50W) injection syrg 12.5-25 g  12.5-25 g IntraVENous PRN    dilTIAZem ER (CARDIZEM CD) capsule 240 mg  240 mg Oral DAILY    apixaban (ELIQUIS) tablet 2.5 mg  2.5 mg Oral BID    collagenase (SANTYL) 250 unit/gram ointment   Topical DAILY    L.acidophilus-paracasei-S.thermophil-bifidobacter (RISAQUAD) 8 billion cell capsule  1 Capsule Oral DAILY    0.9% sodium chloride infusion 250 mL  250 mL IntraVENous PRN    alteplase (CATHFLO) 1 mg in sterile water (preservative free) 1 mL injection  1 mg InterCATHeter PRN    alteplase (CATHFLO) 2 mg in sterile water (preservative free) 2 mL injection  2 mg InterCATHeter PRN    glucose chewable tablet 16 g  4 Tablet Oral PRN    dextrose (D50W) injection syrg 12.5-25 g  25-50 mL IntraVENous PRN    glucagon (GLUCAGEN) injection 1 mg  1 mg IntraMUSCular PRN    fat emulsion 20% (LIPOSYN, INTRAlipid) infusion 250 mL  250 mL IntraVENous Q MON, THU & SAT    acetaminophen (TYLENOL) suppository 650 mg  650 mg Rectal Q6H PRN    sodium chloride (NS) flush 5-40 mL  5-40 mL IntraVENous Q8H    sodium chloride (NS) flush 5-40 mL  5-40 mL IntraVENous PRN    HYDROmorphone (DILAUDID) injection 1 mg  1 mg IntraVENous Q4H PRN    naloxone (NARCAN) injection 0.4 mg  0.4 mg IntraVENous PRN    ondansetron (ZOFRAN) injection 4 mg  4 mg IntraVENous Q4H PRN       Greg Nicole NP  12/3/2021      I have independently examined the patient and have reviewed the chart. I agree with the above plan. Pt doing better, still has loose BM and fecal management, increasing loparimide dose.     Aiden Sterling MD

## 2021-12-03 NOTE — PROGRESS NOTES
Problem: Mobility Impaired (Adult and Pediatric)  Goal: *Acute Goals and Plan of Care (Insert Text)  Description: FUNCTIONAL STATUS PRIOR TO ADMISSION: Patient was modified independent using a rolling walker for functional mobility. HOME SUPPORT PRIOR TO ADMISSION: The patient lived in an assisted living facility. Physical Therapy Goals  Initiated 12/3/2021  1. Patient will move from supine to sit and sit to supine  and roll side to side in bed with minimal assistance/contact guard assist within 7 day(s). 2.  Patient will transfer from bed to chair and chair to bed with moderate assistance  using the least restrictive device within 7 day(s). 3.  Patient will perform sit to stand with moderate assistance  within 7 day(s). Outcome: Progressing Towards Goal   PHYSICAL THERAPY EVALUATION  Patient: Tali Plaza (7 y.o. female)  Date: 12/3/2021  Primary Diagnosis: Small bowel ischemia ([facility] [address] [K55.9]  Procedure(s) (LRB):  Laparoscopy Converted to Open, Right Colectomy, Extended Small Bowel Resection (N/A) 18 Days Post-Op   Precautions:          ASSESSMENT  Based on the objective data described below, the patient presents with poor mobility, joint stiffness and significant global weakness after being admitted 11/15/21 with SOB and s/p open colectomy. Prior to admission, she was living in an assisted living facility and ambulating with her rollator. On exam, she is initially drowsy, but does awaken and is oriented. She demonstrates severe ankle DF tightness, lacking at least 20 degrees of DF in both ankles. Her extremities are weak throughout. She does have intention tremors in both UEs but was able to manage her own cup to drink. At this time, she still has an FMS for loose stools and RN reports it is leaking at times, as well. As her breakfast was waiting for her, had her work on pulling herself up in the bed and set her up with breakfast and working on some stretching of her ankles.   Given her ankle tightness, she is not going to be able to tolerate standing yet, but we will attempt sitting EOB later today if she is able. She reports that she cannot recall the last time she was out of bed. She is far below her baseline level of mobility and will require SNF level rehab once she is medically ready. We will continue to progress her activity as she is able to tolerate. Addendum: Patient co-treated with OT to work on sitting EOB. She needed mod assist to roll, max assist to come to sitting and then able to maintain her sitting balance independently with mild perturbations. While sitting, worked on LE stretching and strengthening. She did report some lightheadedness but not until she had been sitting for 15-20 minutes. /53 immediately after returning to supine. Positioned her on her side at the end of the session. Current Level of Function Impacting Discharge (mobility/balance): total assist    Functional Outcome Measure: The patient scored Total: 10/100 on the Barthel Index outcome measure which is indicative of being severely impaired in basic self-care. Other factors to consider for discharge: ambulatory prior to admission     Patient will benefit from skilled therapy intervention to address the above noted impairments. PLAN :  Recommendations and Planned Interventions: bed mobility training, transfer training, gait training, therapeutic exercises, patient and family training/education, and therapeutic activities      Frequency/Duration: Patient will be followed by physical therapy:  5 times a week to address goals.     Recommendation for discharge: (in order for the patient to meet his/her long term goals)  Therapy up to 5 days/week in SNF setting    This discharge recommendation:  Has been made in collaboration with the attending provider and/or case management    IF patient discharges home will need the following DME: to be determined (TBD)         SUBJECTIVE:   Patient stated I usually use Angelica Dai to walk. She is my three brice.     OBJECTIVE DATA SUMMARY:   HISTORY:    Past Medical History:   Diagnosis Date    Atrial fibrillation ([address]     Atrial flutter (HCC)     Edema     Hypertension     Hypokalemia     Hypothyroidism     Insomnia     Major depressive disorder     Pain     UTI (urinary tract infection)     Vitamin D deficiency    History reviewed. No pertinent surgical history. Personal factors and/or comorbidities impacting plan of care: as noted above    Home Situation  Home Environment: Assisted living  Support Systems: Assisted Living  Current DME Used/Available at Home: ck Choi    EXAMINATION/PRESENTATION/DECISION MAKING:   Critical Behavior:  Neurologic State: Alert  Orientation Level: Oriented X4  Cognition: Follows commands     Hearing: Auditory  Auditory Impairment: Hard of hearing, bilateral  Skin:  heels are intact, sacrum not visualized, but wound care note indicates sacral wound, heel protector boots in place  Edema: bilateral UE edema noted  Range Of Motion:  AROM:  (bilat ankles lacking 20 deg DF, otherwise decr but functiona) note genu valgus bilaterally, but ROM is intact                       Strength:    Strength:  (2/5 for UEs and LEs)                    Tone & Sensation:   Tone: Normal              Sensation: Intact               Coordination:  Coordination:  (tremors in UEs)  Vision:      Functional Mobility:  Bed Mobility:  Rolling: Maximum assistance  Supine to Sit:  (deferred)        Transfers:                             Balance:      Ambulation/Gait Training:                                                         Stairs:               Therapeutic Exercises:   AAROM to all extremities    Functional Measure:  Barthel Index:    Bathin  Bladder: 0  Bowels: 0  Groomin  Dressin  Feedin  Mobility: 0  Stairs: 0  Toilet Use: 0  Transfer (Bed to Chair and Back): 0  Total: 10/100       The Barthel ADL Index: Guidelines  1. The index should be used as a record of what a patient does, not as a record of what a patient could do. 2. The main aim is to establish degree of independence from any help, physical or verbal, however minor and for whatever reason. 3. The need for supervision renders the patient not independent. 4. A patient's performance should be established using the best available evidence. Asking the patient, friends/relatives and nurses are the usual sources, but direct observation and common sense are also important. However direct testing is not needed. 5. Usually the patient's performance over the preceding 24-48 hours is important, but occasionally longer periods will be relevant. 6. Middle categories imply that the patient supplies over 50 per cent of the effort. 7. Use of aids to be independent is allowed. Score Interpretation (from [address] [facility])    Independent   60-79 Minimally independent   40-59 Partially dependent   20-39 Very dependent   <20 Totally dependent     -Jia Lane., Barthel, DCelestinoW. (1965). Functional evaluation: the Barthel Index. [facility] ([address] (1997). The Barthel activities of daily living index: self-reporting versus actual performance in the old (> or = 75 years). Journal of [address] [place] [address] 457), 14 [facility], J.J.M., Deonte Motley., Kacy Serrano. (1999). Measuring the change in disability after inpatient rehabilitation; comparison of the responsiveness of the Barthel Index and Functional [place] Measure. Journal of Neurology, Neurosurgery, and Psychiatry, 66(4), 792-368. Liza Marroquin, N.J.A, DESI Giang.ADAM, & Melida Rene MSANDRA. (2004) Assessment of post-stroke quality of life in cost-effectiveness studies: The usefulness of the Barthel Index and the EuroQoL-5D.  Quality of Life Research, 15, 342-50        Physical Therapy Evaluation Charge Determination   History Examination Presentation Decision-Making   HIGH Complexity :3+ comorbidities / personal factors will impact the outcome/ POC  MEDIUM Complexity : 3 Standardized tests and measures addressing body structure, function, activity limitation and / or participation in recreation  LOW Complexity : Stable, uncomplicated  LOW Complexity : FOTO score of       Based on the above components, the patient evaluation is determined to be of the following complexity level: LOW     Pain Rating:  Some discomfort with ankle stretching    Activity Tolerance:   Fair    After treatment patient left in no apparent distress:   Supine in bed, Heels elevated for pressure relief, Call bell within reach, and Side rails x 3    COMMUNICATION/EDUCATION:   The patients plan of care was discussed with: Occupational therapist, Registered nurse, and Case management. Fall prevention education was provided and the patient/caregiver indicated understanding., Patient/family have participated as able in goal setting and plan of care. , and Patient/family agree to work toward stated goals and plan of care.     Thank you for this referral.  Ishan Patience, PT   Time Calculation: 24 mins

## 2021-12-04 PROCEDURE — 74011250637 HC RX REV CODE- 250/637: Performed by: INTERNAL MEDICINE

## 2021-12-04 PROCEDURE — 74011250636 HC RX REV CODE- 250/636: Performed by: SURGERY

## 2021-12-04 PROCEDURE — 65270000032 HC RM SEMIPRIVATE

## 2021-12-04 PROCEDURE — 74011250637 HC RX REV CODE- 250/637: Performed by: SURGERY

## 2021-12-04 RX ADMIN — COLLAGENASE SANTYL: 250 OINTMENT TOPICAL at 10:39

## 2021-12-04 RX ADMIN — Medication 10 ML: at 21:51

## 2021-12-04 RX ADMIN — LOPERAMIDE HYDROCHLORIDE 4 MG: 2 CAPSULE ORAL at 12:15

## 2021-12-04 RX ADMIN — MEGESTROL ACETATE 20 MG: 40 TABLET ORAL at 08:41

## 2021-12-04 RX ADMIN — LOPERAMIDE HYDROCHLORIDE 4 MG: 2 CAPSULE ORAL at 08:42

## 2021-12-04 RX ADMIN — Medication 10 ML: at 06:18

## 2021-12-04 RX ADMIN — LOPERAMIDE HYDROCHLORIDE 4 MG: 2 CAPSULE ORAL at 16:35

## 2021-12-04 RX ADMIN — Medication 30 ML: at 00:11

## 2021-12-04 RX ADMIN — DILTIAZEM HYDROCHLORIDE 240 MG: 240 CAPSULE, COATED, EXTENDED RELEASE ORAL at 08:41

## 2021-12-04 RX ADMIN — METOPROLOL SUCCINATE 50 MG: 50 TABLET, EXTENDED RELEASE ORAL at 08:42

## 2021-12-04 RX ADMIN — COLLAGENASE SANTYL: 250 OINTMENT TOPICAL at 16:35

## 2021-12-04 RX ADMIN — LOPERAMIDE HYDROCHLORIDE 4 MG: 2 CAPSULE ORAL at 00:11

## 2021-12-04 RX ADMIN — APIXABAN 2.5 MG: 2.5 TABLET, FILM COATED ORAL at 19:13

## 2021-12-04 RX ADMIN — APIXABAN 2.5 MG: 2.5 TABLET, FILM COATED ORAL at 08:42

## 2021-12-04 RX ADMIN — LOPERAMIDE HYDROCHLORIDE 4 MG: 2 CAPSULE ORAL at 21:50

## 2021-12-04 RX ADMIN — Medication 10 ML: at 12:15

## 2021-12-04 RX ADMIN — Medication 1 CAPSULE: at 08:41

## 2021-12-04 NOTE — PROGRESS NOTES
Progress Note    Patient: Diane Thakur MRN: [id number]  SSN: [id number]    YOB: 1933  Age: 80 y.o. Sex: female      Admit Date: 11/15/2021    19 Days Post-Op    Procedure:  Procedure(s):  Laparoscopy Converted to Open, Right Colectomy, Extended Small Bowel Resection    Subjective:     Patient is tolerating meals without difficulty; she denies any diarrhea overnight or this morning. Objective:     Visit Vitals  /78 (BP 1 Location: Left arm, BP Patient Position: At rest)   Pulse 82   Temp 98.2 °F (36.8 °C)   Resp 17   Ht 5' 4\" (1.626 m)   Wt 167 lb 1.7 oz (75.8 kg)   SpO2 97%   BMI 28.68 kg/m²       Temp (24hrs), Av °F (36.7 °C), Min:97.8 °F (36.6 °C), Max:98.2 °F (36.8 °C)      Physical Exam:    ABDOMEN: Nondistended, soft. Midline wound continues to heal without signs of infection. Appropriate incisional pain with palpation. Data Review: VS, I/O's      Assessment:     Hospital Problems  Never Reviewed          Codes Class Noted POA    Lethargy ICD-10-CM: R53.83  ICD-9-CM: 780.79  Unknown Unknown        Feeding difficulties ICD-10-CM: R63.30  ICD-9-CM: 904. 3  Unknown Unknown        Goals of care, counseling/discussion ICD-10-CM: Z71.89  ICD-9-CM: V65.49  Unknown Unknown        Palliative care encounter ICD-10-CM: Z51.5  ICD-9-CM: V66.7  Unknown Unknown        Small bowel ischemia ([facility] [address] ICD-10-CM: K55.9  ICD-9-CM: 557.9  2021 Unknown            Improving diarrhea with current bowel regimen. Plan/Recommendations/Medical Decision Making:     Continue current diet. DVT prophylaxis. Continue bowel regimen. To return to assisted living facility this coming week.

## 2021-12-05 LAB
ANION GAP SERPL CALC-SCNC: 9 MMOL/L (ref 5–15)
BUN SERPL-MCNC: 15 MG/DL (ref 6–20)
BUN/CREAT SERPL: 20 (ref 12–20)
CALCIUM SERPL-MCNC: 7.7 MG/DL (ref 8.5–10.1)
CHLORIDE SERPL-SCNC: 111 MMOL/L (ref 97–108)
CO2 SERPL-SCNC: 20 MMOL/L (ref 21–32)
CREAT SERPL-MCNC: 0.74 MG/DL (ref 0.55–1.02)
ERYTHROCYTE [DISTWIDTH] IN BLOOD BY AUTOMATED COUNT: 16.3 % (ref 11.5–14.5)
GLUCOSE SERPL-MCNC: 148 MG/DL (ref 65–100)
HCT VFR BLD AUTO: 24.7 % (ref 35–47)
HGB BLD-MCNC: 7.9 G/DL (ref 11.5–16)
MCH RBC QN AUTO: 29.3 PG (ref 26–34)
MCHC RBC AUTO-ENTMCNC: 32 G/DL (ref 30–36.5)
MCV RBC AUTO: 91.5 FL (ref 80–99)
NRBC # BLD: 0 K/UL (ref 0–0.01)
NRBC BLD-RTO: 0 PER 100 WBC
PLATELET # BLD AUTO: 334 K/UL (ref 150–400)
PMV BLD AUTO: 10.6 FL (ref 8.9–12.9)
POTASSIUM SERPL-SCNC: 3.2 MMOL/L (ref 3.5–5.1)
RBC # BLD AUTO: 2.7 M/UL (ref 3.8–5.2)
SODIUM SERPL-SCNC: 140 MMOL/L (ref 136–145)
WBC # BLD AUTO: 7.4 K/UL (ref 3.6–11)

## 2021-12-05 PROCEDURE — 36415 COLL VENOUS BLD VENIPUNCTURE: CPT

## 2021-12-05 PROCEDURE — 80048 BASIC METABOLIC PNL TOTAL CA: CPT

## 2021-12-05 PROCEDURE — 74011250637 HC RX REV CODE- 250/637: Performed by: SURGERY

## 2021-12-05 PROCEDURE — 85027 COMPLETE CBC AUTOMATED: CPT

## 2021-12-05 PROCEDURE — 74011250637 HC RX REV CODE- 250/637: Performed by: INTERNAL MEDICINE

## 2021-12-05 PROCEDURE — 65270000032 HC RM SEMIPRIVATE

## 2021-12-05 PROCEDURE — 74011250636 HC RX REV CODE- 250/636: Performed by: SURGERY

## 2021-12-05 RX ORDER — BISMUTH SUBSALICYLATE 262 MG/15ML
30 LIQUID ORAL EVERY 6 HOURS
Status: DISCONTINUED | OUTPATIENT
Start: 2021-12-05 | End: 2021-12-07 | Stop reason: HOSPADM

## 2021-12-05 RX ORDER — CHOLESTYRAMINE 4 G/4.8G
4 POWDER, FOR SUSPENSION ORAL
Status: DISCONTINUED | OUTPATIENT
Start: 2021-12-05 | End: 2021-12-07 | Stop reason: HOSPADM

## 2021-12-05 RX ADMIN — Medication 10 ML: at 05:58

## 2021-12-05 RX ADMIN — METOPROLOL SUCCINATE 50 MG: 50 TABLET, EXTENDED RELEASE ORAL at 09:17

## 2021-12-05 RX ADMIN — DILTIAZEM HYDROCHLORIDE 240 MG: 240 CAPSULE, COATED, EXTENDED RELEASE ORAL at 09:16

## 2021-12-05 RX ADMIN — MEGESTROL ACETATE 20 MG: 40 TABLET ORAL at 09:16

## 2021-12-05 RX ADMIN — Medication 30 ML: at 16:10

## 2021-12-05 RX ADMIN — COLLAGENASE SANTYL: 250 OINTMENT TOPICAL at 09:17

## 2021-12-05 RX ADMIN — LOPERAMIDE HYDROCHLORIDE 4 MG: 2 CAPSULE ORAL at 16:11

## 2021-12-05 RX ADMIN — LOPERAMIDE HYDROCHLORIDE 4 MG: 2 CAPSULE ORAL at 12:34

## 2021-12-05 RX ADMIN — Medication 1 CAPSULE: at 09:16

## 2021-12-05 RX ADMIN — APIXABAN 2.5 MG: 2.5 TABLET, FILM COATED ORAL at 09:16

## 2021-12-05 RX ADMIN — Medication 10 ML: at 21:51

## 2021-12-05 RX ADMIN — LOPERAMIDE HYDROCHLORIDE 4 MG: 2 CAPSULE ORAL at 09:16

## 2021-12-05 RX ADMIN — Medication 10 ML: at 12:35

## 2021-12-05 RX ADMIN — CHOLESTYRAMINE 4 G: 4 POWDER, FOR SUSPENSION ORAL at 16:11

## 2021-12-05 RX ADMIN — APIXABAN 2.5 MG: 2.5 TABLET, FILM COATED ORAL at 16:10

## 2021-12-05 RX ADMIN — LOPERAMIDE HYDROCHLORIDE 4 MG: 2 CAPSULE ORAL at 21:49

## 2021-12-05 RX ADMIN — CHOLESTYRAMINE 4 G: 4 POWDER, FOR SUSPENSION ORAL at 14:24

## 2021-12-05 NOTE — PROGRESS NOTES
[facility] Specialists at [facility] Surgery    POD #20    Subjective     Doing fine tolerating diet, awake and conversant, rectal tube still in place    Objective     Patient Vitals for the past 24 hrs:   Temp Pulse Resp BP SpO2   12/05/21 0732 97.4 °F (36.3 °C) 90 18 122/63 100 %   12/05/21 0123 97.5 °F (36.4 °C) 83 18 135/65 100 %   12/04/21 2154 97.7 °F (36.5 °C) 89 18 (!) 163/51 100 %       Date 12/04/21 0700 - 12/05/21 0659 12/05/21 0700 - 12/06/21 0659   Shift 0712-2795 6223-6233 24 Hour Total 3232-1311 3998-4314 24 Hour Total   INTAKE   Shift Total(mL/kg)         OUTPUT   Urine(mL/kg/hr)  600(0.7) 600(0.3)        Urine Voided  600 600      Drains         Output (ml) (Fecal Management)       Shift Total(mL/kg) 650(8.6) 1000(13.5) 1650(22.2)      NET -650 -1000 -1650      Weight (kg) 75.8 74.2 74.2 74.2 74.2 74.2       PE  Pulm - CTAB  CV - RRR  Abd -soft, nondistended, incisions clean dry and intact    Labs  No results found for this or any previous visit (from the past 12 hour(s)). Assessment     April Reyes is a 80 y. o.yr old female status post open right colectomy with extended small bowel resection    Plan     Doing better currently  Tolerating diet  She is maxed out on Imodium for diarrhea, will try Pepto-Bismol and Questran to see if that helps  May be tomorrow we will try to take out the rectal tube and see how she does without it  She can go to SNF once her diarrhea is improved some    Jessica Kerr MD

## 2021-12-06 LAB
COVID-19 RAPID TEST, COVR: NOT DETECTED
SOURCE, COVRS: NORMAL

## 2021-12-06 PROCEDURE — 74011250636 HC RX REV CODE- 250/636: Performed by: SURGERY

## 2021-12-06 PROCEDURE — 97110 THERAPEUTIC EXERCISES: CPT

## 2021-12-06 PROCEDURE — 87635 SARS-COV-2 COVID-19 AMP PRB: CPT

## 2021-12-06 PROCEDURE — 74011250637 HC RX REV CODE- 250/637: Performed by: INTERNAL MEDICINE

## 2021-12-06 PROCEDURE — 74011000250 HC RX REV CODE- 250: Performed by: SURGERY

## 2021-12-06 PROCEDURE — 97530 THERAPEUTIC ACTIVITIES: CPT

## 2021-12-06 PROCEDURE — 97535 SELF CARE MNGMENT TRAINING: CPT

## 2021-12-06 PROCEDURE — 74011250637 HC RX REV CODE- 250/637: Performed by: SURGERY

## 2021-12-06 PROCEDURE — 65270000032 HC RM SEMIPRIVATE

## 2021-12-06 RX ORDER — LOPERAMIDE HYDROCHLORIDE 2 MG/1
4 CAPSULE ORAL 4 TIMES DAILY
Qty: 80 CAPSULE | Refills: 2 | Status: SHIPPED | OUTPATIENT
Start: 2021-12-06 | End: 2021-12-16

## 2021-12-06 RX ORDER — DILTIAZEM HYDROCHLORIDE 240 MG/1
240 CAPSULE, COATED, EXTENDED RELEASE ORAL DAILY
Qty: 60 CAPSULE | Refills: 2 | Status: SHIPPED | OUTPATIENT
Start: 2021-12-06 | End: 2022-02-06 | Stop reason: DRUGHIGH

## 2021-12-06 RX ORDER — OXYCODONE AND ACETAMINOPHEN 5; 325 MG/1; MG/1
1 TABLET ORAL
Qty: 20 TABLET | Refills: 0 | Status: SHIPPED | OUTPATIENT
Start: 2021-12-06 | End: 2021-12-13

## 2021-12-06 RX ORDER — METOPROLOL SUCCINATE 50 MG/1
50 TABLET, EXTENDED RELEASE ORAL DAILY
Qty: 30 TABLET | Refills: 2 | Status: SHIPPED | OUTPATIENT
Start: 2021-12-06 | End: 2022-01-21

## 2021-12-06 RX ADMIN — APIXABAN 2.5 MG: 2.5 TABLET, FILM COATED ORAL at 17:52

## 2021-12-06 RX ADMIN — Medication 30 ML: at 13:23

## 2021-12-06 RX ADMIN — COLLAGENASE SANTYL: 250 OINTMENT TOPICAL at 10:50

## 2021-12-06 RX ADMIN — Medication 10 ML: at 17:53

## 2021-12-06 RX ADMIN — Medication 10 ML: at 21:35

## 2021-12-06 RX ADMIN — I.V. FAT EMULSION 250 ML: 20 EMULSION INTRAVENOUS at 22:09

## 2021-12-06 RX ADMIN — LOPERAMIDE HYDROCHLORIDE 4 MG: 2 CAPSULE ORAL at 13:23

## 2021-12-06 RX ADMIN — Medication 30 ML: at 00:09

## 2021-12-06 RX ADMIN — Medication 30 ML: at 05:38

## 2021-12-06 RX ADMIN — CHOLESTYRAMINE 4 G: 4 POWDER, FOR SUSPENSION ORAL at 17:52

## 2021-12-06 RX ADMIN — CHOLESTYRAMINE 4 G: 4 POWDER, FOR SUSPENSION ORAL at 13:23

## 2021-12-06 RX ADMIN — METOPROLOL SUCCINATE 50 MG: 50 TABLET, EXTENDED RELEASE ORAL at 10:46

## 2021-12-06 RX ADMIN — Medication 30 ML: at 17:52

## 2021-12-06 RX ADMIN — MEGESTROL ACETATE 20 MG: 40 TABLET ORAL at 10:47

## 2021-12-06 RX ADMIN — LOPERAMIDE HYDROCHLORIDE 4 MG: 2 CAPSULE ORAL at 21:34

## 2021-12-06 RX ADMIN — APIXABAN 2.5 MG: 2.5 TABLET, FILM COATED ORAL at 10:49

## 2021-12-06 RX ADMIN — LOPERAMIDE HYDROCHLORIDE 4 MG: 2 CAPSULE ORAL at 10:46

## 2021-12-06 RX ADMIN — DILTIAZEM HYDROCHLORIDE 240 MG: 240 CAPSULE, COATED, EXTENDED RELEASE ORAL at 10:47

## 2021-12-06 RX ADMIN — CHOLESTYRAMINE 4 G: 4 POWDER, FOR SUSPENSION ORAL at 07:23

## 2021-12-06 RX ADMIN — Medication 1 CAPSULE: at 10:46

## 2021-12-06 RX ADMIN — Medication 10 ML: at 05:38

## 2021-12-06 RX ADMIN — LOPERAMIDE HYDROCHLORIDE 4 MG: 2 CAPSULE ORAL at 17:52

## 2021-12-06 NOTE — PROGRESS NOTES
TEREZA:  RUR: 13%     Disposition:  SNF  [facility] [employer]    CM spoke with Dr Pereira earlier today. Fecal management tube out and no diarrhea. Patient is medically ready for d/c today. CM called facility and spoke with Juan Carlos. She inquired about previous snf stays. CM advised of previous stays (per the daughter) at [facility], [facility]. 12:13p  CM called back to facility asking for Juan Carlos. Message left with Telly Soto to have her return call. CM continuing to follow. ARAM Bolton, CRM  [phone]    1:42p  CM called [facility] . Admissions staff not available. Message left to return call.     2:26p  CM called [facility] . CM spoke with Juan Carlos. Facility is requesting rapid covid test results and d/c summary to be faxed to facility at [phone]. CM spoke with Roosevelt Hassan RN. Lab is waiting for another sample so test will have to be re-administered. CM called patient's daughter Harvinder Hawley to advise of d/c today. Daughter upset that she wasn't given advance notice. CM reminded daughter on conversation (12/3/21)  had with her along with conversation she  had with Dr. Pereira (12/3/21) re keeping patient over the weekend and plans for d/c today. Daughter was informed that patient will be admitted to room 406. CM arranging BLS transport. 3pm  CM received return call from patient's daughter Harvinder Jeong). She is refusing patient d/c to [address] [place] [address] due to bad reviews. CM asked where reviews had come from? She states she hd spoken with several friends and did not get good reviews. She is requesting referrals be sent to [facility]ending [facility] to [facility] and [facility]. CM notifying Dr. Pereira. [facility] has accepted patient. CM spoke with Dr. Pereira and he is in agreement with cancelling d/c today and re-scheduling for  Tuesday, 12/7/21    5:37pm  CM contacted patient's daughter Harvinder Jeong) to review plans for patient d/c on Tues to [facility]. Daughter is in agreement.       Medicare pt has received, reviewed, and signed 2nd IM letter informing them of their right to appeal the discharge. Signed copied has been placed on pt bedside chart. CM emailing letter to [facility] for her records at [email or web address].     Adriano Carr, [address], [address]

## 2021-12-06 NOTE — DISCHARGE SUMMARY
Physician Discharge Summary     Patient ID:  Get Ansari  [id number]  89 y.o.  3/13/1933    Admit Date: 11/15/2021    Discharge Date: 12/7/21    Admission Diagnoses: Small bowel ischemia [facility]) [K55.9]    Discharge Diagnoses: Active Problems:    Small bowel ischemia ([address] (11/16/2021)      Lethargy ()      Feeding difficulties ()      Goals of care, counseling/discussion ()      Palliative care encounter ()         Admission Condition: Poor    Discharge Condition: Fair    Procedure(s):    11/15/2021 - Procedure(s):  Laparoscopy Converted to Open, Right Colectomy, Extended Small Bowel Resection      Hospital Course: The patient was admitted post op. She initally had a difficult recovery. She was extremily fatigued after surgery an minimally responsive. She slowly improved but developed afib and was started on cardizem drip. She was on TPN while she was eating much. She eventually perked up and started to take PO and started working with PT. She was evenutally taken off the drip and converted to PO meds for afib. She started having loose BM after eating and needed a fecal manager for help. She needed maximal doses of imodium to help with this. The fecal tube was eventually removed. Her loose BM were from the major bowel resection and colectomy. She was ready for DC to SNF on POD 22. She is off all IV abx. Consults: Cardiology and Hospitalist    Significant Diagnostic Studies: none    Disposition: SNF    Patient Instructions:   Current Discharge Medication List      START taking these medications    Details   apixaban (ELIQUIS) 2.5 mg tablet Take 1 Tablet by mouth two (2) times a day. Qty: 30 Tablet, Refills: 3      loperamide (IMODIUM) 2 mg capsule Take 2 Capsules by mouth four (4) times daily for 10 days. Qty: 80 Capsule, Refills: 2      dilTIAZem ER (CARDIZEM CD) 240 mg capsule Take 1 Capsule by mouth daily.   Qty: 60 Capsule, Refills: 2      metoprolol succinate (TOPROL-XL) 50 mg XL tablet Take 1 Tablet by mouth daily. Qty: 30 Tablet, Refills: 2      oxyCODONE-acetaminophen (PERCOCET) 5-325 mg per tablet Take 1 Tablet by mouth every four (4) hours as needed for Pain for up to 7 days. Max Daily Amount: 6 Tablets. Qty: 20 Tablet, Refills: 0    Associated Diagnoses: Bowel perforation ([address] Small bowel ischemia (HCC)         CONTINUE these medications which have NOT CHANGED    Details   acetaminophen (TYLENOL) 325 mg tablet Take 325 mg by mouth two (2) times daily as needed for Pain. aspirin 81 mg chewable tablet Take 81 mg by mouth daily. carvediloL (COREG) 12.5 mg tablet Take  by mouth two (2) times daily (with meals). cephALEXin (KEFLEX) 500 mg capsule Take 500 mg by mouth four (4) times daily. furosemide (LASIX) 40 mg tablet Take 40 mg by mouth daily. melatonin 5 mg tablet Take  by mouth nightly. levothyroxine (SYNTHROID) 25 mcg tablet Take 25 mcg by mouth Daily (before breakfast). lisinopriL (PRINIVIL, ZESTRIL) 20 mg tablet Take 20 mg by mouth daily. magnesium oxide (MAG-OX) 400 mg tablet Take 400 mg by mouth daily. potassium chloride (KLOR-CON) 20 mEq pack Take 20 mEq by mouth two (2) times daily (with meals). sertraline (ZOLOFT) 50 mg tablet Take  by mouth daily. L.acidoph & parac-S.therm-Bifido (RisaQuad-2) 16 billion cell cap cap Take 1 Capsule by mouth daily. cholecalciferol (Vitamin D3) (1000 Units /25 mcg) tablet Take  by mouth daily. Hydrocolloid Dressing 4 X 5 \" bndg by Apply Externally route.              Activity: No heavy lifting for 2 weeks  Diet: Regular Diet  Wound Care: None needed    Follow-up with Peg Swanson MD in 2 week(s)  Follow-up tests/labs none    Signed:  Peg Swanson MD  12/6/2021  10:50 AM

## 2021-12-06 NOTE — PROGRESS NOTES
[facility] at [facility]    POD #21    Subjective     Doing better, rectal tube out last night, once had one BM since yesterday, no N/V, tolerating diet    Objective     Patient Vitals for the past 24 hrs:   Temp Pulse Resp BP SpO2   12/06/21 0947 97.4 °F (36.3 °C) (!) 110 18 (!) 157/83 99 %   12/06/21 0132 97.9 °F (36.6 °C) (!) 109 16 (!) 152/78 96 %   12/05/21 2152 97.9 °F (36.6 °C) -- 16 124/74 97 %   12/05/21 1431 97.6 °F (36.4 °C) 88 16 130/74 96 %       Date 12/05/21 0700 - 12/06/21 0659 12/06/21 0700 - 12/07/21 0659   Shift 3498-8080 2918-9430 24 Hour Total 6047-8526 2038-8189 24 Hour Total   INTAKE   Shift Total(mL/kg)         OUTPUT   Urine(mL/kg/hr)  750(0.8) 750(0.4)        Urine Voided  750 750        Urine Occurrence(s)  1 x 1 x      Stool           Stool Occurrence(s)  1 x 1 x      Shift Total(mL/kg)  750(10.1) 750(10.1)      NET  -750 -750      Weight (kg) 74.2 74.2 74.2 74.4 74.4 74.4       PE  Pulm - CTAB  CV - RRR  Abd - soft, ND, BS present, incision c/d/i    Labs  No results found for this or any previous visit (from the past 12 hour(s)). Assessment     Adrianne Lopez is a 80 y. o.yr old female s/p open right colectomy with extended small bowel resection    Plan     Doing much better now  Tolerating diet  BM less frequent, rectal tube out  Ready for DC to SNF today    Yury Amado MD

## 2021-12-06 NOTE — PROGRESS NOTES
Problem: Mobility Impaired (Adult and Pediatric)  Goal: *Acute Goals and Plan of Care (Insert Text)  Description: FUNCTIONAL STATUS PRIOR TO ADMISSION: Patient was modified independent using a rolling walker for functional mobility. HOME SUPPORT PRIOR TO ADMISSION: The patient lived in an assisted living facility. Physical Therapy Goals  Initiated 12/3/2021  1. Patient will move from supine to sit and sit to supine  and roll side to side in bed with minimal assistance/contact guard assist within 7 day(s). 2.  Patient will transfer from bed to chair and chair to bed with moderate assistance  using the least restrictive device within 7 day(s). 3.  Patient will perform sit to stand with moderate assistance  within 7 day(s). Outcome: Progressing Towards Goal   PHYSICAL THERAPY TREATMENT  Patient: Jesusita Montalvo (9 y.o. female)  Date: 12/6/2021  Diagnosis: Small bowel ischemia ([facility] [K55.9]   <principal problem not specified>  Procedure(s) (LRB):  Laparoscopy Converted to Open, Right Colectomy, Extended Small Bowel Resection (N/A) 21 Days Post-Op  Precautions:    Chart, physical therapy assessment, plan of care and goals were reviewed. ASSESSMENT  Patient continues with skilled PT services and is progressing towards goals. Pt received supine in bed and agreeable to therapy. Pt tolerated session well, but continues to be limited by decreased ROM (lacking bilateral dorsiflexion) and strength in LEs, decreased functional mobility, impaired seated balance, decreased tolerance to activity. Pt demonstrated improved ability to assume sitting EOB requiring only max A x 1. Pt tolerated seated EOB with supervision and hand hold support x 15 minutes. Noted pt with increased forward flexed trunk and head as pt fatigued. Pt assisted back to supine position and rolling for incontinence and wound care with RN. Pt is functioning well below baseline mobility status and will benefit from SNF placement.      Current Level of Function Impacting Discharge (mobility/balance): max A supine to sit, fair seated balance    Other factors to consider for discharge:          PLAN :  Patient continues to benefit from skilled intervention to address the above impairments. Continue treatment per established plan of care. to address goals. Recommendation for discharge: (in order for the patient to meet his/her long term goals)  Therapy up to 5 days/week in SNF setting    This discharge recommendation:  Has been made in collaboration with the attending provider and/or case management    IF patient discharges home will need the following DME: to be determined (TBD)       SUBJECTIVE:   Patient stated It's good to see you all.     OBJECTIVE DATA SUMMARY:   Critical Behavior:  Neurologic State: Confused  Orientation Level: Oriented to person  Cognition: Follows commands     Functional Mobility Training:  Bed Mobility:  Rolling: Maximum assistance  Supine to Sit: Maximum assistance; Assist x1  Sit to Supine: Maximum assistance; Assist x2  Scooting: Moderate assistance        Transfers:          Balance:  Sitting: Impaired  Sitting - Static: Fair (occasional)  Sitting - Dynamic: Fair (occasional)  Ambulation/Gait Training:            Therapeutic Exercises:   Passive plantar flexor stretching, ankle pumps, LAQ, seated hip flexion  Pain Rating:  Pt reported soreness during mobility    Activity Tolerance:   Fair    After treatment patient left in no apparent distress:   Supine in bed, Call bell within reach, and Side rails x 3    COMMUNICATION/COLLABORATION:   The patients plan of care was discussed with: Occupational therapist and Registered nurse.      Dora Mcdonnell, PT, DPT   Time Calculation: 42 mins

## 2021-12-06 NOTE — PROGRESS NOTES
Per report, pt's fecal management tube was accidentally removed yesterday evening & pt only had one BM since. Surgery notified and advised RN just to monitor pt.

## 2021-12-06 NOTE — PROGRESS NOTES
Problem: Self Care Deficits Care Plan (Adult)  Goal: *Acute Goals and Plan of Care (Insert Text)  Description:   FUNCTIONAL STATUS PRIOR TO ADMISSION: Patient may not be a reliable historian but reports she was ambulatory with rollator and received assistance with bathing, dressing, and toileting. Note per case management note, patient required assistance with ADLs. HOME SUPPORT: Patient resided at [facility] with staff to provide assistance. Occupational Therapy Goals  Initiated 12/3/2021  1. Patient will perform grooming with supervision/set-up within 7 day(s). 2.  Patient will perform self-feeding with supervision/ set-up within 7 day(s). 3.  Patient will perform bathing with moderate assistance  within 7 day(s). 4.  Patient will perform upper body dressing with minimal assistance within 7 day(s). 5.  Patient will perform lower body dressing with minimal assistance within 7 day(s). 6.  Patient will perform toilet transfers with moderate assistance  within 7 day(s). 7.  Patient will perform all aspects of toileting with moderate assistance  within 7 day(s). 8.  Patient will participate in upper extremity therapeutic exercise/activities with supervision/set-up for 10 minutes within 7 day(s). 9.  Patient will utilize energy conservation techniques during functional activities with verbal cues within 7 day(s). Outcome: Progressing Towards Goal    OCCUPATIONAL THERAPY TREATMENT  Patient: Savi Balderas (5 y.o. female)  Date: 12/6/2021  Diagnosis: [facility] [K55.9]   <principal problem not specified>  Procedure(s) (LRB):  Laparoscopy Converted to Open, Right Colectomy, Extended Small Bowel Resection (N/A) 21 Days Post-Op  Precautions:  fall  Chart, occupational therapy assessment, plan of care, and goals were reviewed.     ASSESSMENT  Patient presents with improving activity tolerance and mobility performance but remains limited by debility, BUE edema (R>L), impaired coordination, impaired b/l dorsiflexion (AROM and PROM), impaired functional reach for UB and LB ADLs, impaired sitting balance, and impaired functional endurance. Progressed to sit EOB with only x1 assist, with improving sitting balance (fair). Patient did not require UE support to maintain sitting balance today, which freed UE for grooming tasks and AROM exercises. Required total assistance for toileting rolling in bed after returning to supine. Not appropriate to attempt standing or contribute to OOB transfers yet. Patient remains significantly below functional baseline. Recommend SNF at d/c. Current Level of Function Impacting Discharge (ADLs/self-care): maximum assistance rolling, maximum assistance x1 supine-sit, total assistance for toileting, minimum assistance seated grooming. Infer overall minimum to maximum assistance UB ADLs and total assistance LB ADLs. PLAN :  Patient continues to benefit from skilled intervention to address the above impairments. Continue treatment per established plan of care to address goals. Recommendation for discharge: (in order for the patient to meet his/her long term goals)  Therapy up to 5 days/week in SNF setting    This discharge recommendation:  Has been made in collaboration with the attending provider and/or case management         SUBJECTIVE:   Patient stated I'm tired.     OBJECTIVE DATA SUMMARY:   Cognitive/Behavioral Status:  Neurologic State: Alert     Cognition: Follows commands  Perception: Appears intact          Functional Mobility and Transfers for ADLs:  Bed Mobility:  Rolling: Maximum assistance  Supine to Sit: Maximum assistance; Assist x1  Sit to Supine: Maximum assistance; Assist x2  Scooting:  Moderate assistance      Balance:  Sitting: Impaired  Sitting - Static: Fair (occasional)  Sitting - Dynamic: Fair (occasional)    ADL Intervention:       Grooming  Washing Face: Set-up  Brushing Teeth: Minimum assistance (help opening/ applying paste)  Brushing/Combing Hair: Set-up            Toileting  Bladder Hygiene: Total assistance (dependent) (rolling)  Bowel Hygiene: Total assistance (dependent) (rolling)  Clothing Management: Total assistance (dependent) (for brief change rolling)     Pain:  Patient did not report pain    Activity Tolerance:   Fair    After treatment patient left in no apparent distress:   Supine in bed, Patient positioned in R sidelying for pressure relief, and Call bell within reach    COMMUNICATION/COLLABORATION:   The patients plan of care was discussed with: Physical therapist and Registered nurse.      Stephanie Martinez OT  Time Calculation: 44 mins

## 2021-12-06 NOTE — DISCHARGE INSTRUCTIONS
[id number] [facility] Surgery at [address] [place] [address] Road Operative Instructions        Please call your surgeons  office for a 2 week follow-up appointment with Dr Prashant Sharma . Office address is: [address]/ [address], [address] [place] [address]  [phone]      Discharge Instructions: You may resume your usual diet as well as your usual medications. You are not cleared to drive if you are taking narcotic pain medication. If you are only using tylenol for pain and are comfortable sitting in a car, you may drive. No heavy lifting. No strenuous exercise. You are cleared to go up and down stairs. You may shower but no baths or swimming. Your incisions dont need any special care. You can wash them gently with  warm soap and water daily and pat them dry. Your stitches are under the skin and dont need to be removed. Ask you doctor when you can return to work. Call our office at  [phone] to make a 2 week follow up appointment. Call our office with any problems, questions or concerns. Call our office if you have any     Fevers of 101 or higher   Worsening pain  Nausea/Vomiting  Difficulty eating or drinking  Incisions that are red, open, draining or tender.

## 2021-12-06 NOTE — PROGRESS NOTES
TEREZA:  RUR: 14%     Disposition:  SNF  Patient accepted at [facility] (private room) 12/3/21. Chart reviewed. Per MD note 12/5/21, patient still having diarrhea. Plan for rectal tube removal today. CM called [facility]. Message left on  to have call returned. CM continuing to follow.     Adriano Granger, [address], [address] [place] [address]

## 2021-12-07 VITALS
WEIGHT: 162.04 LBS | TEMPERATURE: 98.1 F | RESPIRATION RATE: 18 BRPM | HEART RATE: 72 BPM | OXYGEN SATURATION: 95 % | BODY MASS INDEX: 27.66 KG/M2 | HEIGHT: 64 IN | DIASTOLIC BLOOD PRESSURE: 64 MMHG | SYSTOLIC BLOOD PRESSURE: 125 MMHG

## 2021-12-07 LAB
GLUCOSE BLD STRIP.AUTO-MCNC: 102 MG/DL (ref 65–117)
SERVICE CMNT-IMP: NORMAL

## 2021-12-07 PROCEDURE — 74011250637 HC RX REV CODE- 250/637: Performed by: PHYSICIAN ASSISTANT

## 2021-12-07 PROCEDURE — 74011250636 HC RX REV CODE- 250/636: Performed by: SURGERY

## 2021-12-07 PROCEDURE — 74011250637 HC RX REV CODE- 250/637: Performed by: INTERNAL MEDICINE

## 2021-12-07 PROCEDURE — 82962 GLUCOSE BLOOD TEST: CPT

## 2021-12-07 PROCEDURE — 74011250637 HC RX REV CODE- 250/637: Performed by: SURGERY

## 2021-12-07 RX ORDER — HYDROCODONE BITARTRATE AND ACETAMINOPHEN 5; 325 MG/1; MG/1
1 TABLET ORAL
Status: DISCONTINUED | OUTPATIENT
Start: 2021-12-07 | End: 2021-12-07 | Stop reason: HOSPADM

## 2021-12-07 RX ADMIN — Medication 30 ML: at 05:34

## 2021-12-07 RX ADMIN — CHOLESTYRAMINE 4 G: 4 POWDER, FOR SUSPENSION ORAL at 13:27

## 2021-12-07 RX ADMIN — Medication 10 ML: at 05:35

## 2021-12-07 RX ADMIN — APIXABAN 2.5 MG: 2.5 TABLET, FILM COATED ORAL at 09:56

## 2021-12-07 RX ADMIN — METOPROLOL SUCCINATE 50 MG: 50 TABLET, EXTENDED RELEASE ORAL at 09:56

## 2021-12-07 RX ADMIN — LOPERAMIDE HYDROCHLORIDE 4 MG: 2 CAPSULE ORAL at 09:56

## 2021-12-07 RX ADMIN — DILTIAZEM HYDROCHLORIDE 240 MG: 240 CAPSULE, COATED, EXTENDED RELEASE ORAL at 10:00

## 2021-12-07 RX ADMIN — COLLAGENASE SANTYL: 250 OINTMENT TOPICAL at 10:02

## 2021-12-07 RX ADMIN — ACETAMINOPHEN 650 MG: 650 SUPPOSITORY RECTAL at 13:26

## 2021-12-07 RX ADMIN — MEGESTROL ACETATE 20 MG: 40 TABLET ORAL at 10:01

## 2021-12-07 RX ADMIN — Medication 1 CAPSULE: at 10:03

## 2021-12-07 RX ADMIN — LOPERAMIDE HYDROCHLORIDE 4 MG: 2 CAPSULE ORAL at 13:26

## 2021-12-07 NOTE — PROGRESS NOTES
TEREZA:  RUR: 13%     Disposition:  SNF  ([facility])    Patient d/c today. CM spoke with Mihirjamaica Davila (admissions) earlier today. Facility would like to accept patient at 2pm as they have another patient admission earlier. CM faxing updated clinicals this morning. D/C summary and covid neg test faxed earlier today. CM contacted [facility] with transport request for [facility] confirmed ability to transport patient between 3-3:30pm.  If their staffing allows, possible transport at [address] [place] [address] at [facility] will call back to unit if transport 2pm request can be confirmed. CM spoke with Nickie(admissions) re multiple attempts (x3) to fax updated clinicals. Mihir Reef acknowledges that there may be a problem as she has not received any faxes. Mihir Davila is leaving early today and requests that clinicals be sent to Ever Tom RN at [email or web address].  CM emailing clinicals. Beatriz Gan, Rupinder, [address]    1:57p  Daughter neha called and informed of plans for patient d/c today. She is in agreement with d/c plans.

## 2021-12-07 NOTE — PROGRESS NOTES
Problem: Pressure Injury - Risk of  Goal: *Prevention of pressure injury  Description: Document Brian Scale and appropriate interventions in the flowsheet. Outcome: Resolved/Met     Problem: Patient Education: Go to Patient Education Activity  Goal: Patient/Family Education  Outcome: Resolved/Met     Problem: Falls - Risk of  Goal: *Absence of Falls  Description: Document Carson Fall Risk and appropriate interventions in the flowsheet.   Outcome: Resolved/Met     Problem: Patient Education: Go to Patient Education Activity  Goal: Patient/Family Education  Outcome: Resolved/Met     Problem: Discharge Planning  Goal: *Discharge to safe environment  Outcome: Resolved/Met     Problem: Patient Education: Go to Patient Education Activity  Goal: Patient/Family Education  Outcome: Resolved/Met     Problem: Surgical Pathway Post-Op Day 2 through Discharge  Goal: Off Pathway (Use only if patient is Off Pathway)  Outcome: Resolved/Met  Goal: Activity/Safety  Outcome: Resolved/Met  Goal: Medications  Outcome: Resolved/Met  Goal: Respiratory  Outcome: Resolved/Met  Goal: Treatments/Interventions/Procedures  Outcome: Resolved/Met  Goal: Psychosocial  Outcome: Resolved/Met  Goal: *No signs and symptoms of infection or wound complications  Outcome: Resolved/Met  Goal: *Optimal pain control at patient's stated goal  Outcome: Resolved/Met  Goal: *Adequate urinary output (equal to or greater than 30 milliliters/hour)  Outcome: Resolved/Met  Goal: *Hemodynamically stable  Outcome: Resolved/Met  Goal: *Tolerating diet  Outcome: Resolved/Met  Goal: *Demonstrates progressive activity  Outcome: Resolved/Met  Goal: *Lungs clear or at baseline  Outcome: Resolved/Met     Problem: Nutrition Deficit  Goal: *Optimize nutritional status  Outcome: Resolved/Met     Problem: Patient Education: Go to Patient Education Activity  Goal: Patient/Family Education  Outcome: Resolved/Met     Problem: Patient Education: Go to Patient Education Activity  Goal: Patient/Family Education  Outcome: Resolved/Met

## 2021-12-07 NOTE — PROGRESS NOTES
[facility] at [facility] Surgery    POD #22    Subjective     Doing well, no issues, tolerating diet, 1BM    Objective     Patient Vitals for the past 24 hrs:   Temp Pulse Resp BP SpO2   12/07/21 0325 97.8 °F (36.6 °C) 91 18 (!) 154/76 94 %   12/06/21 2221 97.8 °F (36.6 °C) 91 18 (!) 140/84 90 %   12/06/21 1928 97.5 °F (36.4 °C) 90 18 118/82 98 %   12/06/21 1554 97.8 °F (36.6 °C) 90 18 (!) 162/74 99 %   12/06/21 0947 97.4 °F (36.3 °C) (!) 110 18 (!) 157/83 99 %       Date 12/06/21 0700 - 12/07/21 0659 12/07/21 0700 - 12/08/21 0659   Shift 4267-7221 8862-7819 24 Hour Total 8397-8780 9028-2845 24 Hour Total   INTAKE   Shift Total(mL/kg)         OUTPUT   Urine(mL/kg/hr)  600(0.7) 600(0.3)        Urine Occurrence(s) 1 x  1 x        Urine Output (mL) (External Urinary Catheter 11/28/21)  600 600      Stool           Stool Occurrence(s) 1 x  1 x      Shift Total(mL/kg)  600(8.1) 600(8.1)      NET  -600 -600      Weight (kg) 74.4 74.4 74.4 74.4 74.4 74.4       PE  Pulm - CTAB  CV - RRR  Abd - soft, ND, BS present, NTTP    Labs  Recent Results (from the past 12 hour(s))   GLUCOSE, POC    Collection Time: 12/07/21  6:45 AM   Result Value Ref Range    Glucose (POC) 102 65 - 117 mg/dL    Performed by Sukhdev Starks is a 80 y. o.yr old female s/p open right colectomy with extended small bowel resection    Plan     Family wanted a different SNF, will be going to [facility] today  DC to SNF  Follow up in 2 wks    Jessee Leon MD

## 2021-12-07 NOTE — PROGRESS NOTES
Discharge education completed with pt. PICC line removed & report given to EMT & [facility]. Incontinence care performed & pt d/jazzmine with ambulance.

## 2021-12-07 NOTE — PROGRESS NOTES
Transition of Care Plan to SNF/Rehab    SNF/Rehab Transition:  Patient has been accepted to [facility] and meets criteria for admission. Patient will transported by Murjulio Wheatley expected to leave between 3p and 3:30p. Communication to Patient/Family:  Met with patient and spoke with daughter  and they are agreeable to the transition plan. Communication to SNF/Rehab:  Bedside RN, Wesly Chavez, has been notified to update the transition plan to the facility and call report (phone number [phone]). Discharge information has been updated on the AVS.     Discharge instructions to be fax'd to facility at [facility] # [phone]). Nursing Please include all hard scripts for controlled substances, med rec and dc summary, and AVS in packet. Reviewed and confirmed with facility, [facility], can manage the patient care needs for the following:     Mary Beth Garrett with (X) only those applicable:    Medication:  [x]  Medications will be available at the facility  []  IV Antibiotics   []  Controlled Substance - hard copy to be sent with patient   []  Weekly Labs   Documents:  [x] Hard RX  [x] MAR  [x] Kardex  [x] AVS  [x]Transfer Summary  [x]Discharge   Equipment:  []  CPAP/BiPAP  []  Wound Vacuum  []  Pearson or Urinary Device  []  PICC/Central Line  []  Nebulizer  []  Ventilator   Treatment:  []Isolation (for MRSA, VRE, etc.)  []Surgical Drain Management  []Tracheostomy Care  []Dressing Changes  []Dialysis with transportation and chair time . []PEG Care  []Oxygen  []Daily Weights for Heart Failure   Dietary:  []Any diet limitations  []Tube Feedings   []Total Parenteral Management (TPN)   Eligible for Medicaid Long Term Services and Supports  Yes:  [] Eligible for medical assistance or will become eligible within 180 days and UAI completed. [] Provider/Patient and/or support system has requested screening.   [] UAI copy provided to patient or responsible party, .  [] UAI unavailable at discharge will send once processed to SNF provider. [] UAI unavailable at discharged mailed to patient  No:   [] Private pay and is not financially eligible for Medicaid within the next 180 days. [] Reside out-of-state. [] A residents of a state owned/operated facility that is licensed  by [address] [employer] [facility] or [facility]  [] Enrollment in [facility] hospice services  [] [address] [place] [facility]  [] Patient /Family declines to have screening completed or provide financial information for screening     Financial Resources:  Medicaid    [] Initiated and application pending   [] Full coverage     Advanced Care Plan:  []Surrogate Decision Maker of Care  []POA  []Communicated Code Status  (DDNR\", \"Full\")    Other     CM continuing to follow.   Ryanne Ross, [address], [address]

## 2021-12-07 NOTE — WOUND CARE
WOCN Note:      Follow up assessment to sacrum and buttocks.     Assessed in room 624B. Chart reviewed. Admitted DX:  Small bowel ischemia ([facility] [address] [K55.9]       Past Medical History:   Diagnosis Date    Atrial fibrillation (HCC)      Atrial flutter (HCC)      Edema      Hypertension      Hypokalemia      Hypothyroidism      Insomnia      Major depressive disorder      Pain      UTI (urinary tract infection)      Vitamin D deficiency        Assessment:   Patient is alert, communicative and requires assist of 2 with repositioning. Bed: prius air mattress  Patient repositioned on left side with pillow. Heels offloaded with pillows. Heels intact without erythema.      1. POA Sacrum, Pressure Injury Unstagable: 5 x 9 x 0.2 cm; 100% yellow; scant serous exudate; no odor. Periwound with blanching red erythema.       Wound, Pressure Prevention & Skin Care Recommendations:    1. Minimize layers of linen/pads under patient to optimize support surface. 2.  Turn/reposition approximately every 2 hours and offload heels. 3.  Manage moisture/ Keep skin folds clean and dry.   4.  Specialty bed: Prius air mattress ordered via [employer]. Use only flat sheet and one incontinence pad.   5.  Sacrum:  Daily cleanse with saline; apply Santyl and saline moist gauze; cover with dry dressing.     Transition of Care:   Plan to follow as needed while admitted to hospital.     EILEEN HoustonN RN [facility] Inpatient Wound Care  Available on Perfect Serve  Pager [phone]  Office [phone]

## 2021-12-09 ENCOUNTER — TELEPHONE (OUTPATIENT)
Dept: SURGERY | Age: 86
End: 2021-12-09

## 2021-12-09 NOTE — TELEPHONE ENCOUNTER
I tried contacting [facility] to notify them that Dr. Rajeev Lara will not be in office at the time of the scheduled po and the patient's appt has been moved to 1:15. LVM to call back.

## 2021-12-20 NOTE — TELEPHONE ENCOUNTER
Second attempt at trying to reach someone to notify them that the patient's appointment has been moved to the afternoon.      LVADAM

## 2021-12-22 NOTE — TELEPHONE ENCOUNTER
I spoke with David Long at [facility] at the number ending in [facility] and notified him of the patient's appointment time change to 1  :15 on 12/29.

## 2021-12-29 ENCOUNTER — OFFICE VISIT (OUTPATIENT)
Dept: SURGERY | Age: 86
End: 2021-12-29
Payer: MEDICARE

## 2021-12-29 VITALS
DIASTOLIC BLOOD PRESSURE: 75 MMHG | HEART RATE: 94 BPM | TEMPERATURE: 99 F | SYSTOLIC BLOOD PRESSURE: 114 MMHG | RESPIRATION RATE: 18 BRPM | OXYGEN SATURATION: 96 %

## 2021-12-29 DIAGNOSIS — Z90.49 S/P RIGHT COLECTOMY: ICD-10-CM

## 2021-12-29 DIAGNOSIS — K90.9 DIARRHEA DUE TO MALABSORPTION: Primary | ICD-10-CM

## 2021-12-29 DIAGNOSIS — R19.7 DIARRHEA DUE TO MALABSORPTION: Primary | ICD-10-CM

## 2021-12-29 PROCEDURE — 99024 POSTOP FOLLOW-UP VISIT: CPT | Performed by: SURGERY

## 2021-12-29 NOTE — PROGRESS NOTES
Subjective:      Eden Diaz is a 80 y.o. female presents for postop care 6 wk(s) following open right colectomy and extended small bowel resection of 150 cm of small bowel. Overall she is doing well in rehab nursing care. Her biggest issue right now is having almost uncontrolled loose bowel movements and diarrhea. She is not quite sure if she is on Imodium. She otherwise does not have any abdominal pain. She is not ambulatory and spends all of her day in the bed. They are rolling her but she is also developing a small sacral decubitus ulcer. 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        Transmural perforation closed with sutures        Acute peritonitis        Appendix with distal fibrofatty obliteration of lumen        Proximal and distal bowel margins appear viable     Objective:     Visit Vitals  /75 (BP 1 Location: Right arm, BP Patient Position: Sitting, BP Cuff Size: Small adult)   Pulse 94   Temp 99 °F (37.2 °C) (Oral)   Resp 18   SpO2 96%       General:  alert, cooperative, no distress, appears stated age   Abdomen: soft, non-tender   Incision:   healing well, no drainage, no erythema, no hernia, no seroma, no swelling, no dehiscence, incision well approximated   Small stage II sacral decubitus ulcer, no surrounding erythema or infection  Assessment:     1. Eden Diaz is a 80 y.o. female who is s/p open right colectomy and extended small bowel resection of 150 cm of small bowel      Plan:     1. Her biggest issue right now appears to be the diarrhea. She had a large amount of small bowel removed and appears to have uncontrolled diarrhea. I have given them a prescription to take back to her nursing home for increased amounts of Imodium. Hopefully this will help some. Continue local wound care to the sacral decubitus ulcer in the nursing home.   She is going to the wound care clinic in a few days for evaluation of this. Her incision from the surgery is healing well  2. Follow-up with me in a few weeks to see how she is doing with the diarrhea and sacral decubitus ulcer. If for some reason the diarrhea continues to be uncontrolled then 1 consideration could be to do a diverting end colostomy. 3. Follow-up in 1 month(s)    Ms. Abbey Rodriguez has a reminder for a \"due or due soon\" health maintenance. I have asked that she contact her primary care provider for follow-up on this health maintenance.

## 2021-12-30 ENCOUNTER — DOCUMENTATION ONLY (OUTPATIENT)
Dept: SURGERY | Age: 86
End: 2021-12-30

## 2021-12-30 NOTE — PROGRESS NOTES
Faxed referral to [facility] with confirmation. The office will call patient to schedule appointment.

## 2022-01-06 ENCOUNTER — HOSPITAL ENCOUNTER (OUTPATIENT)
Dept: WOUND CARE | Age: 87
Discharge: HOME | End: 2022-01-06
Payer: MEDICARE

## 2022-01-06 VITALS
HEART RATE: 112 BPM | RESPIRATION RATE: 16 BRPM | DIASTOLIC BLOOD PRESSURE: 69 MMHG | SYSTOLIC BLOOD PRESSURE: 109 MMHG | TEMPERATURE: 97.3 F

## 2022-01-06 PROBLEM — L89.153 STAGE III PRESSURE ULCER OF SACRAL REGION (HCC): Status: ACTIVE | Noted: 2022-01-06

## 2022-01-06 PROCEDURE — 99203 OFFICE O/P NEW LOW 30 MIN: CPT

## 2022-01-06 PROCEDURE — 11042 DBRDMT SUBQ TIS 1ST 20SQCM/<: CPT

## 2022-01-06 NOTE — WOUND CARE
01/06/22 1330   Wound Sacrum 01/06/22   Date First Assessed/Time First Assessed: 01/06/22 1311   Present on Hospital Admission: Yes  Wound Approximate Age at First Assessment (Weeks): 4 weeks  Primary Wound Type: Pressure Injury  Location: Sacrum  Date of First Observation: 01/06/22   Wound Image    Wound Etiology Pressure Stage 3   Dressing Status Intact; New drainage noted; Old drainage noted   Cleansed Irrigated with saline   Wound Length (cm) 2 cm   Wound Width (cm) 2.8 cm   Wound Depth (cm) 1.1 cm   Wound Surface Area (cm^2) 5.6 cm^2   Wound Volume (cm^3) 6.16 cm^3   Distance Tunneling (cm) 1.8 cm   Direction of Tunnel 10 o'clock   Undermining Starts ___ O'Clock 12 o'clock   Undermining Ends ___ O'Clock 12 o'clock   Undermining Maximum Distance (cm) 1.5 cm   Wound Assessment [place]/red;Slough   Drainage Amount Moderate   Drainage Description Serosanguinous   Wound Odor None   Basia-Wound/Incision Assessment Blanchable erythema   Edges Defined edges   Wound Thickness Description Full thickness     Visit Vitals  /69 (BP 1 Location: Left upper arm, BP Patient Position: Sitting)   Pulse (!) 112   Temp 97.3 °F (36.3 °C)   Resp 16

## 2022-01-06 NOTE — DISCHARGE INSTRUCTIONS
Discharge Instructions/Wound Orders  [place] [address] [facility], [address]  Telephone: [phone]    NAME:  Sam Hill OF BIRTH:  3/13/1933  MEDICAL RECORD NUMBER:  [id number]  DATE:  1/6/2022  Wound Care Orders:    Sacrum: Avoid staying in 1 position, turn every 2 hours. Apply NS moistened gauze cover with sacral boarder foam. Change daily and PRN. Please apply for NPWT. Once approved, pack tunnel with white foam, and wound bed with black foam. Cover with drape and suction at -125mmHg continuous suction    Dietary:  [] Diet as tolerated: [] Calorie Diabetic Diet:Low carb and no Sugar [] No Added Salt:[x] Increase Protein BESSIE  [] Other:Limit the amount of liquid you are drinking and avoid drinking in between meals   Activity:  [x] Activity as tolerated:  [] Patient has no activity restrictions     [] Strict Bedrest: [] Remain off Work:     [] May return to full duty work:                                   [] Return to work with restrictions:             Return Appointment:  [x] Return Appointment: With Clinic MD  in  [facility])  [] Ordered tests:    Electronically signed Sarah Hoyt RN on 1/6/2022 at 1:50 PM     Gladys Xie 281: Should you experience any significant changes in your wound(s) or have questions about your wound care, please contact the [address] [place] [address] at [address] [place] [address] 8:00 am - 4:30. If you need help with your wound outside these hours and cannot wait until we are again available, contact your PCP or go to the hospital emergency room. PLEASE NOTE: IF YOU ARE UNABLE TO OBTAIN WOUND SUPPLIES, CONTINUE TO USE THE SUPPLIES YOU HAVE AVAILABLE UNTIL YOU ARE ABLE TO REACH US. IT IS MOST IMPORTANT TO KEEP THE WOUND COVERED AT ALL TIMES.      Physician Signature:_______________________    Date: ___________ Time:  ____________

## 2022-01-06 NOTE — WOUND CARE
[address]   Progress Note and Procedure Note     [address] RECORD NUMBER:  [id number]  AGE: 80 y.o. RACE WHITE/NON-  GENDER: female  : 3/13/1933  EPISODE DATE:  2022    Subjective:   41-year-old nondiabetic female underwent bowel resection for ischemic disease in late . She apparently developed a pressure injury to the sacral area during that hospitalization, and subsequently she was transferred to a nursing facility. Unfortunately, she has frequent loose bowel movements following her bowel resection, and she was told according to her daughter by the surgeon that this would be status quo from here forward. Apparently, 2, they discussed the possible need in the future for diversion of the fecal stream with a colostomy. Patient complains of pain in the area of the sacrum. No report of fever. Chief Complaint   Patient presents with    Follow-up         HISTORY of PRESENT ILLNESS HPI    Timmy Davis is a 80 y.o. female who presents today for wound/ulcer evaluation. History of Wound Context: sacrum  Wound/Ulcer Pain Timing/Severity: moderate  Quality of pain: sharp  Severity:  4 / 10   Modifying Factors: None  Associated Signs/Symptoms: none    Ulcer Identification:  Ulcer Type: pressure    Contributing Factors: incontinence of stool    Wound: sacrum         PAST MEDICAL HISTORY    Past Medical History:   Diagnosis Date    Atrial fibrillation (HCC)     Atrial flutter (HCC)     Edema     Hypertension     Hypokalemia     Hypothyroidism     Insomnia     Major depressive disorder     Pain     UTI (urinary tract infection)     Vitamin D deficiency         PAST SURGICAL HISTORY    Past Surgical History:   Procedure Laterality Date    HX APPENDECTOMY      approx age 21    HX GI  2021    bowel resection    HX ORTHOPAEDIC  2021    R hip        FAMILY HISTORY    History reviewed. No pertinent family history.     SOCIAL HISTORY    Social History     Tobacco Use    Smoking status: Never Smoker    Smokeless tobacco: Never Used   Vaping Use    Vaping Use: Never used   Substance Use Topics    Alcohol use: Not Currently     Alcohol/week: 3.0 standard drinks     Types: 3 Glasses of wine per week    Drug use: Never       ALLERGIES    Allergies   Allergen Reactions    Codeine Unknown (comments)    Gluten Unknown (comments)    Sulfa (Sulfonamide Antibiotics) Unknown (comments)       MEDICATIONS    Current Outpatient Medications on File Prior to Encounter   Medication Sig Dispense Refill    apixaban (ELIQUIS) 2.5 mg tablet Take 1 Tablet by mouth two (2) times a day. 30 Tablet 3    dilTIAZem ER (CARDIZEM CD) 240 mg capsule Take 1 Capsule by mouth daily. 60 Capsule 2    metoprolol succinate (TOPROL-XL) 50 mg XL tablet Take 1 Tablet by mouth daily. 30 Tablet 2    acetaminophen (TYLENOL) 325 mg tablet Take 325 mg by mouth two (2) times daily as needed for Pain. (Patient not taking: Reported on 12/29/2021)      aspirin 81 mg chewable tablet Take 81 mg by mouth daily.  carvediloL (COREG) 12.5 mg tablet Take  by mouth two (2) times daily (with meals).  cephALEXin (KEFLEX) 500 mg capsule Take 500 mg by mouth four (4) times daily.  furosemide (LASIX) 40 mg tablet Take 40 mg by mouth daily.  melatonin 5 mg tablet Take  by mouth nightly.  levothyroxine (SYNTHROID) 25 mcg tablet Take 25 mcg by mouth Daily (before breakfast).  lisinopriL (PRINIVIL, ZESTRIL) 20 mg tablet Take 20 mg by mouth daily.  magnesium oxide (MAG-OX) 400 mg tablet Take 400 mg by mouth daily.  potassium chloride (KLOR-CON) 20 mEq pack Take 20 mEq by mouth two (2) times daily (with meals).  sertraline (ZOLOFT) 50 mg tablet Take  by mouth daily.  L.acidoph & parac-S.therm-Bifido (RisaQuad-2) 16 billion cell cap cap Take 1 Capsule by mouth daily.       cholecalciferol (Vitamin D3) (1000 Units /25 mcg) tablet Take  by mouth daily.      Hydrocolloid Dressing 4 X 5 \" bndg by Apply Externally route. No current facility-administered medications on file prior to encounter. REVIEW OF SYSTEMS    Pertinent items are noted in HPI. Objective:     Visit Vitals  /69 (BP 1 Location: Left upper arm, BP Patient Position: Sitting)   Pulse (!) 112   Temp 97.3 °F (36.3 °C)   Resp 16       Wt Readings from Last 3 Encounters:   12/07/21 73.5 kg (162 lb 0.6 oz)       PHYSICAL EXAM  There is a deep sacral wound with tunneling at 12:00 and circumferential undermining, chronic tissue was debrided, there is no obvious evidence of infection. Pertinent items are noted in HPI. 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 Begin daily wet-to-dry dressing changes with normal saline  2. Plan to begin treatment with a wound VAC soon as possible  3. [place] protein supplementation  4. The patient needs to discuss fecal diversion as she has 10 bowel movements a day contaminating her sacral area wound  5. Frequent turning and repositioning; patient is on air mattress      Treatment Note please see attached Discharge Instructions    Written patient dismissal instructions given to patient or POA. Electronically signed by Gerald Meek MD on 1/6/2022 at 2:10 PM              Debridement Wound Care        Problem List Items Addressed This Visit     None          Procedure Note  Indications:  Based on my examination of this patient's wound(s)/ulcer(s) today, debridement is required to promote healing and evaluate the wound base.     Performed by: Gerald Meek MD    Consent obtained: Yes    Time out taken: Yes    Debridement: Excisional    Using curette the wound(s)/ulcer(s) was/were sharply debrided down through and including the removal of subcutaneous tissue    Devitalized Tissue Debrided: slough and necrotic/eschar    Pre Debridement Measurements:  Are located in the Wound/Ulcer Documentation Flow Sheet    Pressure ulcer, Stage 3    Wound/Ulcer #: 1    Post Debridement Measurements:  Wound/Ulcer Descriptions are Pre Debridement except measurements:    Wound Anterior; Left; Mid (Active)   Number of days: 51       Wound Gluteal fold/cleft 2nd degree pressure sore (Active)   Number of days: 49       Wound Sacrum Mid; Posterior (Active)   Number of days: 40       Wound Sacrum 01/06/22 (Active)   Wound Image   01/06/22 1330   Wound Etiology Pressure Stage 3 01/06/22 1330   Dressing Status Intact; New drainage noted; Old drainage noted 01/06/22 1330   Cleansed Irrigated with saline 01/06/22 1330   Wound Length (cm) 2 cm 01/06/22 1330   Wound Width (cm) 2.8 cm 01/06/22 1330   Wound Depth (cm) 1.1 cm 01/06/22 1330   Wound Surface Area (cm^2) 5.6 cm^2 01/06/22 1330   Wound Volume (cm^3) 6.16 cm^3 01/06/22 1330   Distance Tunneling (cm) 1.8 cm 01/06/22 1330   Direction of Tunnel 10 o'clock 01/06/22 1330   Undermining Starts ___ O'Clock 12 o'clock 01/06/22 1330   Undermining Ends ___ O'Clock 12 o'clock 01/06/22 1330   Undermining Maximum Distance (cm) 1.5 cm 01/06/22 1330   Wound Assessment Pink/red;Slough 01/06/22 1330   Drainage Amount Moderate 01/06/22 1330   Drainage Description Serosanguinous 01/06/22 1330   Wound Odor None 01/06/22 1330   Basia-Wound/Incision Assessment Blanchable erythema 01/06/22 1330   Edges Defined edges 01/06/22 1330   Wound Thickness Description Full thickness 01/06/22 1330   Number of days: 0       Incision 11/15/21 Abdomen (Active)   Number of days: 52        Total Surface Area Debrided:  5.6 sq cm     Estimated Blood Loss:  Minimal     Hemostasis Achieved: Pressure    Procedural Pain: 2 / 10     Post Procedural Pain: 1 / 10     Response to treatment: Well tolerated by patient

## 2022-01-10 RX ORDER — OXYCODONE AND ACETAMINOPHEN 2.5; 325 MG/1; MG/1
1 TABLET ORAL
Status: ON HOLD | COMMUNITY
End: 2022-01-21 | Stop reason: SDUPTHER

## 2022-01-10 RX ORDER — CHOLESTYRAMINE 4 G/9G
4 POWDER, FOR SUSPENSION ORAL
COMMUNITY
End: 2022-02-06

## 2022-01-10 RX ORDER — DIPHENOXYLATE HYDROCHLORIDE AND ATROPINE SULFATE 2.5; .025 MG/1; MG/1
2 TABLET ORAL
Status: ON HOLD | COMMUNITY
End: 2022-01-21 | Stop reason: SDUPTHER

## 2022-01-10 RX ORDER — NYSTATIN 100000 U/G
1 CREAM TOPICAL
COMMUNITY

## 2022-01-10 RX ORDER — CITALOPRAM 20 MG/1
20 TABLET, FILM COATED ORAL DAILY
COMMUNITY

## 2022-01-10 RX ORDER — DEXTROMETHORPHAN HYDROBROMIDE, GUAIFENESIN 5; 100 MG/5ML; MG/5ML
650 LIQUID ORAL
COMMUNITY

## 2022-01-10 RX ORDER — MESALAMINE 0.38 G/1
1.5 CAPSULE, EXTENDED RELEASE ORAL DAILY
COMMUNITY
End: 2022-03-20

## 2022-01-18 ENCOUNTER — HOSPITAL ENCOUNTER (INPATIENT)
Age: 87
LOS: 1 days | Discharge: NURSING, REHAB OR LONG-TERM CARE | DRG: 640 | End: 2022-01-21
Attending: EMERGENCY MEDICINE | Admitting: HOSPITALIST
Payer: MEDICARE

## 2022-01-18 ENCOUNTER — HOSPITAL ENCOUNTER (OUTPATIENT)
Dept: WOUND CARE | Age: 87
Discharge: HOME | End: 2022-01-18
Payer: MEDICARE

## 2022-01-18 VITALS — SYSTOLIC BLOOD PRESSURE: 133 MMHG | HEART RATE: 98 BPM | TEMPERATURE: 97.6 F | DIASTOLIC BLOOD PRESSURE: 67 MMHG

## 2022-01-18 DIAGNOSIS — L89.40 PRESSURE INJURY OF SKIN OF CONTIGUOUS REGION INVOLVING BACK, BUTTOCK, AND HIP, UNSPECIFIED INJURY STAGE, UNSPECIFIED LATERALITY: Primary | ICD-10-CM

## 2022-01-18 DIAGNOSIS — R62.7 FAILURE TO THRIVE IN ADULT: ICD-10-CM

## 2022-01-18 DIAGNOSIS — K55.9 SMALL BOWEL ISCHEMIA (HCC): ICD-10-CM

## 2022-01-18 DIAGNOSIS — K59.1 FUNCTIONAL DIARRHEA: ICD-10-CM

## 2022-01-18 DIAGNOSIS — R63.4 WEIGHT LOSS: ICD-10-CM

## 2022-01-18 LAB
ALBUMIN SERPL-MCNC: 2.7 G/DL (ref 3.5–5)
ALBUMIN/GLOB SERPL: 0.6 (ref 1.1–2.2)
ALP SERPL-CCNC: 215 U/L (ref 45–117)
ALT SERPL-CCNC: 103 U/L (ref 12–78)
ANION GAP SERPL CALC-SCNC: 5 MMOL/L (ref 5–15)
AST SERPL-CCNC: 79 U/L (ref 15–37)
BASOPHILS # BLD: 0.1 K/UL (ref 0–0.1)
BASOPHILS NFR BLD: 1 % (ref 0–1)
BILIRUB SERPL-MCNC: 0.4 MG/DL (ref 0.2–1)
BUN SERPL-MCNC: 56 MG/DL (ref 6–20)
BUN/CREAT SERPL: 40 (ref 12–20)
CALCIUM SERPL-MCNC: 9.9 MG/DL (ref 8.5–10.1)
CHLORIDE SERPL-SCNC: 105 MMOL/L (ref 97–108)
CO2 SERPL-SCNC: 24 MMOL/L (ref 21–32)
COMMENT, HOLDF: NORMAL
CREAT SERPL-MCNC: 1.39 MG/DL (ref 0.55–1.02)
DIFFERENTIAL METHOD BLD: ABNORMAL
EOSINOPHIL # BLD: 0.5 K/UL (ref 0–0.4)
EOSINOPHIL NFR BLD: 6 % (ref 0–7)
ERYTHROCYTE [DISTWIDTH] IN BLOOD BY AUTOMATED COUNT: 16.9 % (ref 11.5–14.5)
GLOBULIN SER CALC-MCNC: 4.3 G/DL (ref 2–4)
GLUCOSE SERPL-MCNC: 118 MG/DL (ref 65–100)
HCT VFR BLD AUTO: 39.4 % (ref 35–47)
HGB BLD-MCNC: 12 G/DL (ref 11.5–16)
IMM GRANULOCYTES # BLD AUTO: 0 K/UL (ref 0–0.04)
IMM GRANULOCYTES NFR BLD AUTO: 0 % (ref 0–0.5)
LYMPHOCYTES # BLD: 3.1 K/UL (ref 0.8–3.5)
LYMPHOCYTES NFR BLD: 33 % (ref 12–49)
MAGNESIUM SERPL-MCNC: 1.6 MG/DL (ref 1.6–2.4)
MCH RBC QN AUTO: 28.1 PG (ref 26–34)
MCHC RBC AUTO-ENTMCNC: 30.5 G/DL (ref 30–36.5)
MCV RBC AUTO: 92.3 FL (ref 80–99)
MONOCYTES # BLD: 0.6 K/UL (ref 0–1)
MONOCYTES NFR BLD: 6 % (ref 5–13)
NEUTS SEG # BLD: 4.9 K/UL (ref 1.8–8)
NEUTS SEG NFR BLD: 54 % (ref 32–75)
NRBC # BLD: 0 K/UL (ref 0–0.01)
NRBC BLD-RTO: 0 PER 100 WBC
PLATELET # BLD AUTO: 306 K/UL (ref 150–400)
PMV BLD AUTO: 11.6 FL (ref 8.9–12.9)
POTASSIUM SERPL-SCNC: 5.5 MMOL/L (ref 3.5–5.1)
PROT SERPL-MCNC: 7 G/DL (ref 6.4–8.2)
RBC # BLD AUTO: 4.27 M/UL (ref 3.8–5.2)
SAMPLES BEING HELD,HOLD: NORMAL
SODIUM SERPL-SCNC: 134 MMOL/L (ref 136–145)
WBC # BLD AUTO: 9.2 K/UL (ref 3.6–11)

## 2022-01-18 PROCEDURE — 99284 EMERGENCY DEPT VISIT MOD MDM: CPT

## 2022-01-18 PROCEDURE — 85025 COMPLETE CBC W/AUTO DIFF WBC: CPT

## 2022-01-18 PROCEDURE — 0HB6XZZ EXCISION OF BACK SKIN, EXTERNAL APPROACH: ICD-10-PCS | Performed by: HOSPITALIST

## 2022-01-18 PROCEDURE — 11043 DBRDMT MUSC&/FSCA 1ST 20/<: CPT

## 2022-01-18 PROCEDURE — 87177 OVA AND PARASITES SMEARS: CPT

## 2022-01-18 PROCEDURE — 87324 CLOSTRIDIUM AG IA: CPT

## 2022-01-18 PROCEDURE — 83735 ASSAY OF MAGNESIUM: CPT

## 2022-01-18 PROCEDURE — 80053 COMPREHEN METABOLIC PANEL: CPT

## 2022-01-18 PROCEDURE — 96361 HYDRATE IV INFUSION ADD-ON: CPT

## 2022-01-18 PROCEDURE — 0HB6XZZ EXCISION OF BACK SKIN, EXTERNAL APPROACH: ICD-10-PCS

## 2022-01-18 PROCEDURE — 96360 HYDRATION IV INFUSION INIT: CPT

## 2022-01-18 NOTE — PROGRESS NOTES
Wound Anterior; Left; Mid (Active)   Number of days: 63       Wound Gluteal fold/cleft 2nd degree pressure sore (Active)   Number of days: 61       Wound Sacrum Mid; Posterior (Active)   Number of days: 52       Wound Sacrum 01/06/22 (Active)   Wound Image   01/06/22 1330   Wound Etiology Pressure Stage 3 01/06/22 1330   Dressing Status Intact; New drainage noted; Old drainage noted 01/06/22 1330   Cleansed Irrigated with saline 01/06/22 1330   Dressing/Treatment Moist to dry; Foam 01/06/22 1330   Wound Length (cm) 1.5 cm 01/18/22 1318   Wound Width (cm) 2 cm 01/18/22 1318   Wound Depth (cm) 1 cm 01/18/22 1318   Wound Surface Area (cm^2) 3 cm^2 01/18/22 1318   Change in Wound Size % 46.43 01/18/22 1318   Wound Volume (cm^3) 3 cm^3 01/18/22 1318   Wound Healing % 51 01/18/22 1318   Distance Tunneling (cm) 1.8 cm 01/06/22 1330   Direction of Tunnel 10 o'clock 01/06/22 1330   Undermining Starts ___ O'Clock 10 o'clock 01/18/22 1318   Undermining Ends ___ O'Clock 1 o'clock 01/18/22 1318   Undermining Maximum Distance (cm) 3.5 cm 01/18/22 1318   Wound Assessment Pink/red 01/18/22 1318   Drainage Amount Moderate 01/18/22 1318   Drainage Description Serosanguinous 01/18/22 1318   Wound Odor None 01/18/22 1318   Basia-Wound/Incision Assessment Blanchable erythema 01/18/22 1318   Edges Epibole (rolled edges) 01/18/22 1318   Wound Thickness Description Full thickness 01/18/22 1318   Number of days: 12       Incision 11/15/21 Abdomen (Active)   Number of days: 64       .       I have noted, and reviewed today's data for this patient in [facility] and concur with same. The focused physical exam, other physical findings, Medical history, Review of Symptoms and Medications today remains unchanged except as noted below.      Patient notes today: patient getting wet to dry dressings since last visit, no wound vac, Notes chronic diarrhea since move to NH and [facility] from 144 to 111  Lesion/Wound, focused exam on Presentation today: buttocks/perineum generalized redness, some maceration, pre sacral decub through to bone surface, slough over all open . Procedure:   Wound # pressure ulcer. Procedure name: sharp excisional debridement. Anaesthesia: Lidocaine; topical    Description: using a sharp curette I excised all  Non viable tissue to effect a clean bleedgin base. Tissue Level/depth of debridement: fascia. Post debridement dimensions changed as noted:    Depth, add 1.0 mm;    Width, add 0.0 mm   Length, add 0.0 mm. Blood Loss: 3 CCs. Bleeding abated post treatment . Post Procedure Condition/ Diagnosis: diarrhea wt. Loss  Progressive decub. Follow up and Future plans today: to ED spoke with Dr. Laron Perez at [facility] w/u wt loss, C Diff. If not admitted plan every other day aquacel, get wound vac  Specimens: . Patient Counseled regarding/Discussed: as above, . Clinical Considerations: alert to infection, eval for wt loss. Dx Codes: N62.335.

## 2022-01-18 NOTE — WOUND CARE
01/18/22 1318   Wound Sacrum 01/06/22   Date First Assessed/Time First Assessed: 01/06/22 1311   Present on Hospital Admission: Yes  Wound Approximate Age at First Assessment (Weeks): 4 weeks  Primary Wound Type: Pressure Injury  Location: Sacrum  Date of First Observation: 01/06/22   Wound Length (cm) 1.5 cm   Wound Width (cm) 2 cm   Wound Depth (cm) 1 cm   Wound Surface Area (cm^2) 3 cm^2   Change in Wound Size % 46.43   Wound Volume (cm^3) 3 cm^3   Wound Healing % 51   Undermining Starts ___ O'Clock 10 o'clock   Undermining Ends ___ O'Clock 1 o'clock   Undermining Maximum Distance (cm) 3.5 cm  (at 10 o'clock)   Wound Assessment Pink/red   Drainage Amount Moderate   Drainage Description Serosanguinous   Wound Odor None   Basia-Wound/Incision Assessment Blanchable erythema   Edges Epibole (rolled edges)   Wound Thickness Description Full thickness

## 2022-01-18 NOTE — ED TRIAGE NOTES
Pt comes to ED from [address] [place] [address] [facility] for concerns for c-diff due to pt having diarrhea. She reports having \"constant diarrhea. \" She reports having a sacral wound. Pt presents A&Ox4. Placed on monitor x3.

## 2022-01-18 NOTE — DISCHARGE INSTRUCTIONS
Discharge Instructions/Wound [address] [facility] [address]  [facility], [address]  Phone: [phone] Fax: [phone]    NAME:  Marcy Ontiveros  YOB: 1933  MEDICAL RECORD NUMBER:  [id number]  DATE:  1/18/2022  WOUND CARE ORDERS:  Sacrum:   Baby shampoo cleanse, leave lather on for 3 minutes, rinse and pat dry. Place aquacel Ag to wound bed. Cover with foam border. Change every other day or more often if soiled. [place] -Please apply for NPWT. Once approved, pack tunnel with white foam, and wound bed with black foam. Cover with drape and suction at -125mmHg continuous suction. Change three times a week. TREATMENT ORDERS:  Turn/reposition every 2 hours when in bed, avoid direct pressure on wound site. When sitting, shift position or do seat lifts every 15 minutes. Limit side lying to 30 degree tilt. Limit HOB elevation to 30 degrees. Use speciality pressure relief cushion, mattress as appropriate. Follow diet as prescribed:  [x] Diet as tolerated: [] Calorie Diabetic Diet:Low carb and no Sugar [] No Added Salt:[x] Increase Protein: [] Other:Limit the amount of liquid you are drinking and avoid drinking in between meals              Return Appointment:  [x] Return Appointment: Please go to ER for chronic diarrhea and weight loss. Follow up with Dr Dan Listen in [facility])  [] Ordered tests:    Electronically signed Varun Dangelo RN on 1/18/2022 at 1:45 PM     [address] [place] [address] Information: Should you experience any significant changes in your wound(s) or have questions about your wound care, please contact the [facility] at [address] [place] [address] 8:00 am - 4:30. If you need help with your wound outside these hours and cannot wait until we are again available, contact your PCP or go to the hospital emergency room.      PLEASE NOTE: IF YOU ARE UNABLE TO OBTAIN WOUND SUPPLIES, CONTINUE TO USE THE SUPPLIES YOU HAVE AVAILABLE UNTIL YOU ARE ABLE TO REACH US. IT IS MOST IMPORTANT TO KEEP THE WOUND COVERED AT ALL TIMES.      Physician Signature:_______________________    Date: ___________ Time:  ____________

## 2022-01-19 ENCOUNTER — APPOINTMENT (OUTPATIENT)
Dept: CT IMAGING | Age: 87
DRG: 640 | End: 2022-01-19
Attending: HOSPITALIST
Payer: MEDICARE

## 2022-01-19 PROBLEM — E86.0 DEHYDRATION: Status: ACTIVE | Noted: 2022-01-19

## 2022-01-19 PROBLEM — R19.7 DIARRHEA: Status: ACTIVE | Noted: 2022-01-19

## 2022-01-19 LAB
ANION GAP SERPL CALC-SCNC: 8 MMOL/L (ref 5–15)
BUN SERPL-MCNC: 57 MG/DL (ref 6–20)
BUN/CREAT SERPL: 49 (ref 12–20)
CALCIUM SERPL-MCNC: 9.7 MG/DL (ref 8.5–10.1)
CHLORIDE SERPL-SCNC: 107 MMOL/L (ref 97–108)
CO2 SERPL-SCNC: 22 MMOL/L (ref 21–32)
COMMENT, HOLDF: NORMAL
CREAT SERPL-MCNC: 1.16 MG/DL (ref 0.55–1.02)
GLUCOSE SERPL-MCNC: 83 MG/DL (ref 65–100)
LACTATE SERPL-SCNC: 0.6 MMOL/L (ref 0.4–2)
POTASSIUM SERPL-SCNC: 4.4 MMOL/L (ref 3.5–5.1)
SAMPLES BEING HELD,HOLD: NORMAL
SODIUM SERPL-SCNC: 137 MMOL/L (ref 136–145)

## 2022-01-19 PROCEDURE — 87324 CLOSTRIDIUM AG IA: CPT

## 2022-01-19 PROCEDURE — 83605 ASSAY OF LACTIC ACID: CPT

## 2022-01-19 PROCEDURE — 74011250636 HC RX REV CODE- 250/636: Performed by: HOSPITALIST

## 2022-01-19 PROCEDURE — 74176 CT ABD & PELVIS W/O CONTRAST: CPT

## 2022-01-19 PROCEDURE — 74011000250 HC RX REV CODE- 250: Performed by: HOSPITALIST

## 2022-01-19 PROCEDURE — 96361 HYDRATE IV INFUSION ADD-ON: CPT

## 2022-01-19 PROCEDURE — 96360 HYDRATION IV INFUSION INIT: CPT

## 2022-01-19 PROCEDURE — G0378 HOSPITAL OBSERVATION PER HR: HCPCS

## 2022-01-19 PROCEDURE — 80048 BASIC METABOLIC PNL TOTAL CA: CPT

## 2022-01-19 PROCEDURE — 74011250636 HC RX REV CODE- 250/636: Performed by: EMERGENCY MEDICINE

## 2022-01-19 PROCEDURE — 36415 COLL VENOUS BLD VENIPUNCTURE: CPT

## 2022-01-19 RX ORDER — ONDANSETRON 2 MG/ML
4 INJECTION INTRAMUSCULAR; INTRAVENOUS
Status: DISCONTINUED | OUTPATIENT
Start: 2022-01-19 | End: 2022-01-21 | Stop reason: HOSPADM

## 2022-01-19 RX ORDER — SODIUM CHLORIDE 0.9 % (FLUSH) 0.9 %
5-40 SYRINGE (ML) INJECTION EVERY 8 HOURS
Status: DISCONTINUED | OUTPATIENT
Start: 2022-01-19 | End: 2022-01-21 | Stop reason: HOSPADM

## 2022-01-19 RX ORDER — SODIUM CHLORIDE 0.9 % (FLUSH) 0.9 %
5-40 SYRINGE (ML) INJECTION AS NEEDED
Status: DISCONTINUED | OUTPATIENT
Start: 2022-01-19 | End: 2022-01-21 | Stop reason: HOSPADM

## 2022-01-19 RX ORDER — ONDANSETRON 4 MG/1
4 TABLET, ORALLY DISINTEGRATING ORAL
Status: DISCONTINUED | OUTPATIENT
Start: 2022-01-19 | End: 2022-01-21 | Stop reason: HOSPADM

## 2022-01-19 RX ORDER — SODIUM CHLORIDE 9 MG/ML
125 INJECTION, SOLUTION INTRAVENOUS CONTINUOUS
Status: DISPENSED | OUTPATIENT
Start: 2022-01-19 | End: 2022-01-20

## 2022-01-19 RX ORDER — ACETAMINOPHEN 325 MG/1
650 TABLET ORAL
Status: DISCONTINUED | OUTPATIENT
Start: 2022-01-19 | End: 2022-01-21 | Stop reason: HOSPADM

## 2022-01-19 RX ORDER — ACETAMINOPHEN 650 MG/1
650 SUPPOSITORY RECTAL
Status: DISCONTINUED | OUTPATIENT
Start: 2022-01-19 | End: 2022-01-21 | Stop reason: HOSPADM

## 2022-01-19 RX ADMIN — SODIUM CHLORIDE, PRESERVATIVE FREE 10 ML: 5 INJECTION INTRAVENOUS at 09:31

## 2022-01-19 RX ADMIN — SODIUM CHLORIDE 125 ML/HR: 9 INJECTION, SOLUTION INTRAVENOUS at 09:31

## 2022-01-19 RX ADMIN — SODIUM CHLORIDE 1000 ML: 900 INJECTION, SOLUTION INTRAVENOUS at 07:10

## 2022-01-19 NOTE — ED NOTES
12:50 AM  Was informed that patient is not being admitted. Spoke to Dr. Elizabeth Nguyen who states that the patient does not need admission. He does not feel that she is dehydrated or having diarrhea. He states the wound is chronic and that she has wound care at her facility. He will provide her IV hydration for her acute kidney injury and recheck her labs. He wants the patient to be discharged.

## 2022-01-19 NOTE — H&P
HISTORY AND PHYSICAL  Princess Cooper MD        PCP: Kaushal Le MD    Please note that this dictation was completed with [employer], the computer voice recognition software. Quite often unanticipated grammatical, syntax, homophones, and other interpretive errors are inadvertently transcribed by the computer software. Please disregard these errors. Please excuse any errors that have escaped final proofreading. C/C:  Patient was referred from outpatient [facility] center for evaluation of diarrhea and weight loss. HPI  Source of history: Although patient is alert and oriented x3, she is however not good historian and when asked why she is here had a story revolves around her hip fracture and surgery which had happened I think last year. When I told her that they send her for evaluation of the diarrhea, she admitted she has been having diarrhea chronically that she has been seen by GI Dr. Lexi Smith, she thinks she has had EGD and colonoscopy and no specific diagnosis she is aware of. She denied nausea, abdominal pain, distention, fever or chills. She denied cough or shortness of breath or any respiratory symptoms. She tells me she uses a wheelchair, lives at the [address] [place] [address]. Upon evaluation in the ED, initial BP was 94/54. Pertinent lab sodium 134, potassium 5.5, BUN 56, creatinine 1.39, , AST 79. Repeat BUN 57 creatinine 1.16 this morning liter of normal saline bolus last night. .  Stool studies including C. difficile pending. Night hospitalist was consulted  but didn't feel patient needed admission, recommended discharge after hydration. Different ER doctors who assumed her care through the night felt patient needs to be admitted for CURT and dehydration and were requested for admission this morning. I have talked with Dr. Dona Welch this morning, he felt he needs to be admitted due to CURT and dehydration.   PMH/PSH:  Past Medical History:   Diagnosis Date    Atrial fibrillation (HCC)     Atrial flutter (HCC)     Edema     Hypertension     Hypokalemia     Hypothyroidism     Insomnia     Major depressive disorder     Pain     UTI (urinary tract infection)     Vitamin D deficiency      Past Surgical History:   Procedure Laterality Date    HX APPENDECTOMY      approx age 21    HX GI  12/01/2021    bowel resection    HX ORTHOPAEDIC  05/01/2021    R hip        Home meds:   Prior to Admission medications    Medication Sig Start Date End Date Taking? Authorizing Provider   acetaminophen (TYLENOL) 650 mg TbER Take 650 mg by mouth two (2) times daily as needed for Pain. Indications: fever, pain    Provider, Historical   L.acid,para-B. bifidum-S.therm (RISAQUAD) 8 billion cell cap cap Take 1 Capsule by mouth daily. Provider, Historical   mesalamine ER (APRISO) 0.375 gram 24 hour capsule Take 1.5 g by mouth daily. Provider, Historical   cholestyramine-sucrose (Questran) 4 gram powder Take 4 g by mouth three (3) times daily (with meals). Provider, Historical   diphenoxylate-atropine (LomotiL) 2.5-0.025 mg per tablet Take 2 Tablets by mouth four (4) times daily as needed for Diarrhea. Provider, Historical   nystatin (MYCOSTATIN) topical cream Apply  to affected area two (2) times daily as needed for Skin Irritation. Provider, Historical   citalopram (CeleXA) 40 mg tablet Take 40 mg by mouth daily. Provider, Historical   therapeutic multivitamin-minerals (THERAGRAN-M) tablet Take 1 Tablet by mouth daily. Provider, Historical   oxyCODONE-acetaminophen (PERCOCET) 2.5-325 mg per tablet Take 1 Tablet by mouth every four (4) hours as needed for Pain. Provider, Historical   apixaban (ELIQUIS) 2.5 mg tablet Take 1 Tablet by mouth two (2) times a day. 12/6/21   Era Weeks MD   dilTIAZem ER (CARDIZEM CD) 240 mg capsule Take 1 Capsule by mouth daily. 12/6/21   Era Weeks MD   metoprolol succinate (TOPROL-XL) 50 mg XL tablet Take 1 Tablet by mouth daily. 12/6/21   Crystal Rollins MD   aspirin 81 mg chewable tablet Take 81 mg by mouth daily. Helene Powers MD   carvediloL (COREG) 12.5 mg tablet Take  by mouth two (2) times daily (with meals). Helene Powers MD   melatonin 5 mg tablet Take  by mouth nightly. Helene Powers MD   levothyroxine (SYNTHROID) 25 mcg tablet Take 25 mcg by mouth Daily (before breakfast). Helene Powers MD   lisinopriL (PRINIVIL, ZESTRIL) 20 mg tablet Take 20 mg by mouth daily. Helene Powers MD   magnesium oxide (MAG-OX) 400 mg tablet Take 400 mg by mouth daily. Helene Powers MD   potassium chloride (KLOR-CON) 20 mEq pack Take 20 mEq by mouth two (2) times daily (with meals). Helene Powers MD   cholecalciferol (Vitamin D3) (1000 Units /25 mcg) tablet Take  by mouth daily. Helene Powers MD   Hydrocolloid Dressing 4 X 5 \" bndg by Apply Externally route. Helene Powers MD       Allergies: Allergies   Allergen Reactions    Codeine Unknown (comments)    Gluten Unknown (comments)    Sulfa (Sulfonamide Antibiotics) Unknown (comments)       FH:  No family history on file. SH:  Social History     Tobacco Use    Smoking status: Never Smoker    Smokeless tobacco: Never Used   Substance Use Topics    Alcohol use: Not Currently     Alcohol/week: 3.0 standard drinks     Types: 3 Glasses of wine per week       ROS: A comprehensive review of systems was negative except for that written in the HPI. PHYSICAL EXAM:    General:           Alert and oriented x3. She knows she is at [facility], that the month is January and the year is 2022 thinks the date the 17th, she knew the current president. HEENT:            Nonicteric, no pallor. Dry buccal mucosa  Neck:               Supple, symmetrical,  thyroid: non tender  Lungs:             Clear to auscultation bilaterally. No Wheezing or Rhonchi. No rales. Chest wall:      No tenderness  No Accessory muscle use.   Heart:              Regular  rhythm,  No  murmur   No edema  Abdomen: Healed midline surgical scar. Abdomen is flat. Normoactive. Soft. Nontender with superficial and deep palpation. Rectal exam deferred. Extremities:      Dry, no edema. Skin:     Stage III sacral wound, no purulent discharge or surrounding erythema. Neurologic:     Alert and oriented to PPT, CNII-XII intact. Motor and sensory exam grossly intact. Labs/Imaging:  Available labs and imaging studies reviewed. Assessment & Plan: This is an 42-year-old female who was sent from [facility] for evaluation of diarrhea and weight loss. Patient provides history of chronic diarrhea and has had GI evaluation, no specific diagnosis she is aware of. Patient had small bowel resection and right hemicolectomy in November 2021 for bowel ischemia. She was said to have loose bowel movement after eating and needed fecal management with rectal tube and maximum dose of Imodium during that hospitalization. Dehydration and CURT due to diarrhea. Patient had small bowel resection and hemicolectomy in November 2021 for bowel ischemia  --Admit for observation. -- She had a bolus of 1 L of saline, continue IV fluids normal saline 100 mL/h, creatinine improving.  -- Stool studies including C. difficile pending., Maintain enteric contact isolation in the meantime  -- Obtain CT of the abdomen pelvis without contrast.  -- If no diagnosis is made and diarrhea persisted, may consult GI    Elevated LFTs likely due to dehydration: Monitor while hydrating  Mild hyperkalemia and hyponatremia due to dehydration: These have improved after fluid bolus  Stage III sacral ulcer, does not look infected: Consult wound care    Paroxysmal atrial fibrillation, rate controlled: Continue apixaban. Hold diltiazem and metoprolol due to dehydration and soft BP  Hypothyroidism: Continue levothyroxine. Chronic hypertension, relatively hypotensive on presentation due to dehydration: Hold antihypertensives and diuretics for now.   Patient's Baseline: Wheelchair  DVT ppx: Apixaban  Code status: DNR, patient is alert and oriented x3 and she wished to be DNR. Wanted to confirm with her daughter/MPOA, called and left a voice message with my callback number. Disposition: Back to [address] [facility] in [facility] 2 days.                 Signed By: Jacqui Morgan MD     January 19, 2022

## 2022-01-19 NOTE — ED NOTES
pt continues to have mx loose/ seedy BM. pt currently incontinent to stool. pt linens changed.  pt bottom coated in zinc cream, new dressings applied to sacral area

## 2022-01-19 NOTE — PROGRESS NOTES
1/19/2022 -   TRANSITIONS OF CARE PLAN:   1. RUR: N/A; OBS - RRAT: 11  2. DESTINATION: TBD - likely [facility]  3. TRANSPORT: BLS  4. NEEDS FOR DISCHARGE: TBD - possible rapid covid test  5. ANTICIPATED FOLLOW UPS: PCP, Heidi Babcock,   6. ONGOING INPATIENT NEEDS: IVF, Stool Studies pending, ABD CT, possible GI Consult, likely Wound Care Consult, will need PT/OT evals for return to [facility]    Anticipated Discharge is: 24-48 Hours    Reason for Admission:  Diarrhea, Dehydration                   RUR Score:          N/A; OBS - RRAT: 11           Plan for utilizing home health:   TBD       PCP: First and Last name:  Stevan Cole MD     Name of Practice:    Are you a current patient: Yes/No:    Approximate date of last visit:    Can you participate in a virtual visit with your PCP:                     Current Advanced Directive/Advance Care Plan: DNR      Healthcare Decision Maker:   Click here to complete [employer] including selection of the Healthcare Decision Maker Relationship (ie \"Primary\")             Primary Decision Maker: Nuvia Iliaan - Daughter - [phone]    Secondary Decision Maker: Malachi Vladislav - Son - [phone]    Supplemental (Other) Decision Maker: Adebayo Reveles - Son - [phone]                  Transition of Care Plan:   CM notes that per 1/19 attending note, patient is not a good historian. CM contacted patient's daughter Lor Vargasua: [phone]). At baseline, patient is a resident of [address] [place] [address]; Italia Cyr, Assistant DON: [phone]). Patient has been a resident for 6-7 months. Patient presented to [facility] from [facility]. Per daughter, plan should be for the patient to return to [facility]. At baseline, patient requires at minimum standby assistance with all ADLs. Home DME includes: wheelchair    Patient has hx of Rehab at [facility], [address] [place] [address],[address], [facility].  and Rehab    Pharmacy preference [facility]    Additional support includes: daughter, who lives locally, one son in [place] and one son in [place]    Patient has been fully vaccinated via the [address] vaccine, with the second dose having been received in November. Patient has an AMD that indicates daughter Stanislav Ibarra as Memo Quiet and son Merari Brito as [id number]. Likely disposition is for discharge to [facility] with BLS transport, pending progression and input from patient's medical team.     CM submitted referral to [facility] via [facility]. Transition of Care Plan:     The Plan for Transition of Care is related to the following treatment goals: return to [facility]    The Patient and/or patient representative maricruz Simons was provided with a choice of provider and agrees  with the discharge plan. Yes [x] No []    A Freedom of choice list was provided with basic dialogue that supports the patient's individualized plan of care/goals and shares the quality data associated with the providers. Yes [x] No []      CM Team to continue following for any additional TEREZA needs. Medicare Outpatient Observation Notice (MOON) provided to patient/representative with verbal explanation of the notice. Time allotted for questions regarding the notice. Patient /representative provided a completed copy of the MOON notice. Copy placed on bedside chart. Care Management Interventions  PCP Verified by CM:  Yes  Palliative Care Criteria Met (RRAT>21 & CHF Dx)?: No  Mode of Transport at Discharge: BLS  Transition of Care Consult (CM Consult): Discharge Planning  MyChart Signup: No  Discharge Durable Medical Equipment: No (has wheelchair)  Health Maintenance Reviewed: Yes (cm spoke with patient's daughter by phone)  Physical Therapy Consult: No  Occupational Therapy Consult: No  Speech Therapy Consult: No  Support Systems: Child(grisel),Other Family Member(s)  Confirm Follow Up Transport: Family  The Plan for Transition of Care is Related to the Following Treatment Goals : return to [facility] to complete SNF rehab  The Patient and/or Patient Representative was Provided with a Choice of Provider and Agrees with the Discharge Plan?: Yes  Name of the Patient Representative Who was Provided with a Choice of Provider and Agrees with the Discharge Plan: daughter/mPOGENA Farrell 62 of Choice List was Provided with Basic Dialogue that Supports the Patient's Individualized Plan of Care/Goals, Treatment Preferences and Shares the Quality Data Associated with the Providers?: Yes   Resource Information Provided?: No  Discharge Location  Patient Expects to be Discharged to[de-identified] Skilled nursing facility [facility])  CRM: Sol Olivo, MPH, [facility]; Z: [phone]

## 2022-01-19 NOTE — PROGRESS NOTES
Her daughter and Igor Abreu returned my call,reviewed past and current medical history with her and updated her of the care plan. She confirmed code status as DNR.

## 2022-01-19 NOTE — ED NOTES
Bedside and Verbal shift change report given to Adalberto Christiansen RN (oncoming nurse) by Roney Owens RN (offgoing nurse). Report included the following information SBAR, ED Summary, Intake/Output, Recent Results and Cardiac Rhythm a-fib.

## 2022-01-19 NOTE — PROGRESS NOTES
20-year-old female with history of hypothyroidism, hypertension, atrial fibrillation on Eliquis who presents to hospital from wound care center for evaluation of diarrhea and failure to thrive. Hospitalist service consulted for admission of these conditions. Patient is seen and examined at bedside. Chart is reviewed. Medication review shows patient previously required question for persistent loose stools. However, she has had no diarrhea. Nursing staff reports soft formed stools. Patient states and chart review confirms she was recently discharged from hospital 1 month ago to rehab after bowel resection for ischemia. She reports difficulty with ambulation with frequent falls. She does not feel she is getting appropriate care at her rehab facility due to staff shortages. No admission needs are noted at this time and patient can be discharged back to her rehab facility. Can be volume resuscitated in ED prior to discharge. If additional stool testing is required, this can be performed at her facility. She can continue routine wound care and rehabilitation at her facility. This was communicated to ER Dr. Madeleine Sosa.

## 2022-01-19 NOTE — ED NOTES
I have seen the patient in reviewed the lab results. I feel like the patient needs to be admitted for the diarrhea and significant CURT.

## 2022-01-20 LAB
ALBUMIN SERPL-MCNC: 2.1 G/DL (ref 3.5–5)
ALBUMIN/GLOB SERPL: 0.6 (ref 1.1–2.2)
ALP SERPL-CCNC: 158 U/L (ref 45–117)
ALT SERPL-CCNC: 60 U/L (ref 12–78)
ANION GAP SERPL CALC-SCNC: 4 MMOL/L (ref 5–15)
AST SERPL-CCNC: 30 U/L (ref 15–37)
BASOPHILS # BLD: 0.1 K/UL (ref 0–0.1)
BASOPHILS NFR BLD: 1 % (ref 0–1)
BILIRUB SERPL-MCNC: 0.2 MG/DL (ref 0.2–1)
BUN SERPL-MCNC: 38 MG/DL (ref 6–20)
BUN/CREAT SERPL: 43 (ref 12–20)
C DIFF GDH STL QL: NEGATIVE
C DIFF TOX A+B STL QL IA: NEGATIVE
CALCIUM SERPL-MCNC: 9.1 MG/DL (ref 8.5–10.1)
CHLORIDE SERPL-SCNC: 115 MMOL/L (ref 97–108)
CO2 SERPL-SCNC: 20 MMOL/L (ref 21–32)
CREAT SERPL-MCNC: 0.88 MG/DL (ref 0.55–1.02)
DIFFERENTIAL METHOD BLD: ABNORMAL
EOSINOPHIL # BLD: 0.3 K/UL (ref 0–0.4)
EOSINOPHIL NFR BLD: 4 % (ref 0–7)
ERYTHROCYTE [DISTWIDTH] IN BLOOD BY AUTOMATED COUNT: 16.7 % (ref 11.5–14.5)
GLOBULIN SER CALC-MCNC: 3.6 G/DL (ref 2–4)
GLUCOSE SERPL-MCNC: 110 MG/DL (ref 65–100)
HCT VFR BLD AUTO: 36.6 % (ref 35–47)
HGB BLD-MCNC: 11.2 G/DL (ref 11.5–16)
IMM GRANULOCYTES # BLD AUTO: 0.1 K/UL (ref 0–0.04)
IMM GRANULOCYTES NFR BLD AUTO: 1 % (ref 0–0.5)
INTERPRETATION: NORMAL
LYMPHOCYTES # BLD: 2.5 K/UL (ref 0.8–3.5)
LYMPHOCYTES NFR BLD: 29 % (ref 12–49)
MCH RBC QN AUTO: 28.1 PG (ref 26–34)
MCHC RBC AUTO-ENTMCNC: 30.6 G/DL (ref 30–36.5)
MCV RBC AUTO: 91.7 FL (ref 80–99)
MONOCYTES # BLD: 0.6 K/UL (ref 0–1)
MONOCYTES NFR BLD: 7 % (ref 5–13)
NEUTS SEG # BLD: 5 K/UL (ref 1.8–8)
NEUTS SEG NFR BLD: 58 % (ref 32–75)
NRBC # BLD: 0 K/UL (ref 0–0.01)
NRBC BLD-RTO: 0 PER 100 WBC
O+P SPEC MICRO: NORMAL
O+P STL CONC: NORMAL
PLATELET # BLD AUTO: 261 K/UL (ref 150–400)
PMV BLD AUTO: 11.2 FL (ref 8.9–12.9)
POTASSIUM SERPL-SCNC: 3.6 MMOL/L (ref 3.5–5.1)
PROT SERPL-MCNC: 5.7 G/DL (ref 6.4–8.2)
RBC # BLD AUTO: 3.99 M/UL (ref 3.8–5.2)
SODIUM SERPL-SCNC: 139 MMOL/L (ref 136–145)
SPECIMEN SOURCE: NORMAL
WBC # BLD AUTO: 8.6 K/UL (ref 3.6–11)

## 2022-01-20 PROCEDURE — 74011000250 HC RX REV CODE- 250: Performed by: HOSPITALIST

## 2022-01-20 PROCEDURE — 80053 COMPREHEN METABOLIC PANEL: CPT

## 2022-01-20 PROCEDURE — 74011250636 HC RX REV CODE- 250/636: Performed by: HOSPITALIST

## 2022-01-20 PROCEDURE — G0378 HOSPITAL OBSERVATION PER HR: HCPCS

## 2022-01-20 PROCEDURE — 36415 COLL VENOUS BLD VENIPUNCTURE: CPT

## 2022-01-20 PROCEDURE — 85025 COMPLETE CBC W/AUTO DIFF WBC: CPT

## 2022-01-20 PROCEDURE — 74011250637 HC RX REV CODE- 250/637: Performed by: FAMILY MEDICINE

## 2022-01-20 RX ORDER — CARVEDILOL 12.5 MG/1
12.5 TABLET ORAL 2 TIMES DAILY WITH MEALS
Status: DISCONTINUED | OUTPATIENT
Start: 2022-01-20 | End: 2022-01-21 | Stop reason: HOSPADM

## 2022-01-20 RX ORDER — CITALOPRAM 20 MG/1
40 TABLET, FILM COATED ORAL DAILY
Status: DISCONTINUED | OUTPATIENT
Start: 2022-01-20 | End: 2022-01-21 | Stop reason: HOSPADM

## 2022-01-20 RX ORDER — LANOLIN ALCOHOL/MO/W.PET/CERES
5 CREAM (GRAM) TOPICAL
Status: DISCONTINUED | OUTPATIENT
Start: 2022-01-20 | End: 2022-01-21 | Stop reason: HOSPADM

## 2022-01-20 RX ORDER — LEVOTHYROXINE SODIUM 25 UG/1
25 TABLET ORAL
Status: DISCONTINUED | OUTPATIENT
Start: 2022-01-21 | End: 2022-01-21 | Stop reason: HOSPADM

## 2022-01-20 RX ORDER — DILTIAZEM HYDROCHLORIDE 240 MG/1
240 CAPSULE, COATED, EXTENDED RELEASE ORAL DAILY
Status: DISCONTINUED | OUTPATIENT
Start: 2022-01-20 | End: 2022-01-21 | Stop reason: HOSPADM

## 2022-01-20 RX ORDER — OXYCODONE AND ACETAMINOPHEN 5; 325 MG/1; MG/1
1 TABLET ORAL
Status: DISCONTINUED | OUTPATIENT
Start: 2022-01-20 | End: 2022-01-21 | Stop reason: HOSPADM

## 2022-01-20 RX ORDER — GUAIFENESIN 100 MG/5ML
81 LIQUID (ML) ORAL DAILY
Status: DISCONTINUED | OUTPATIENT
Start: 2022-01-20 | End: 2022-01-21 | Stop reason: HOSPADM

## 2022-01-20 RX ORDER — MELATONIN
1000 DAILY
Status: DISCONTINUED | OUTPATIENT
Start: 2022-01-20 | End: 2022-01-21 | Stop reason: HOSPADM

## 2022-01-20 RX ORDER — CHOLESTYRAMINE 4 G/4.8G
4 POWDER, FOR SUSPENSION ORAL
Status: DISCONTINUED | OUTPATIENT
Start: 2022-01-20 | End: 2022-01-21 | Stop reason: HOSPADM

## 2022-01-20 RX ORDER — LISINOPRIL 20 MG/1
20 TABLET ORAL DAILY
Status: DISCONTINUED | OUTPATIENT
Start: 2022-01-21 | End: 2022-01-21 | Stop reason: HOSPADM

## 2022-01-20 RX ORDER — DIPHENOXYLATE HYDROCHLORIDE AND ATROPINE SULFATE 2.5; .025 MG/1; MG/1
2 TABLET ORAL
Status: DISCONTINUED | OUTPATIENT
Start: 2022-01-20 | End: 2022-01-21 | Stop reason: HOSPADM

## 2022-01-20 RX ORDER — LANOLIN ALCOHOL/MO/W.PET/CERES
400 CREAM (GRAM) TOPICAL DAILY
Status: DISCONTINUED | OUTPATIENT
Start: 2022-01-21 | End: 2022-01-21 | Stop reason: HOSPADM

## 2022-01-20 RX ADMIN — DILTIAZEM HYDROCHLORIDE 240 MG: 240 CAPSULE, COATED, EXTENDED RELEASE ORAL at 12:27

## 2022-01-20 RX ADMIN — SODIUM CHLORIDE 125 ML/HR: 9 INJECTION, SOLUTION INTRAVENOUS at 05:19

## 2022-01-20 RX ADMIN — APIXABAN 2.5 MG: 2.5 TABLET, FILM COATED ORAL at 22:43

## 2022-01-20 RX ADMIN — ASPIRIN 81 MG CHEWABLE TABLET 81 MG: 81 TABLET CHEWABLE at 12:27

## 2022-01-20 RX ADMIN — SODIUM CHLORIDE, PRESERVATIVE FREE 10 ML: 5 INJECTION INTRAVENOUS at 22:44

## 2022-01-20 RX ADMIN — Medication 4.5 MG: at 22:43

## 2022-01-20 RX ADMIN — CITALOPRAM HYDROBROMIDE 40 MG: 20 TABLET ORAL at 12:27

## 2022-01-20 RX ADMIN — APIXABAN 2.5 MG: 2.5 TABLET, FILM COATED ORAL at 12:27

## 2022-01-20 RX ADMIN — OXYCODONE AND ACETAMINOPHEN 1 TABLET: 5; 325 TABLET ORAL at 13:24

## 2022-01-20 RX ADMIN — CARVEDILOL 12.5 MG: 12.5 TABLET, FILM COATED ORAL at 17:13

## 2022-01-20 RX ADMIN — SODIUM CHLORIDE, PRESERVATIVE FREE 10 ML: 5 INJECTION INTRAVENOUS at 13:32

## 2022-01-20 RX ADMIN — Medication 1000 UNITS: at 12:27

## 2022-01-20 NOTE — PROGRESS NOTES
Physical Therapy - defer  Order received, chart reviewed, RN cleared for activity. Received pt resting in bed with her right side off loaded. Introduced self and explained the purpose of today's session. Patient declined therapy assessment stating \"I have therapy around me all the time. That's not why I'm here. I have another problem (referring to her sacral wound). I'm only going to be here for another day or two. \"   Encouraged pt to allow nsg to assist her to the chair to allow her to sit up for meals. Will defer therapy assessment per pt request.  Informed RN. Of note, patient informed PT she resides at the [facility] where staff \"do everything for me\", she ambulated with a three wheel rolling walker, has had numerous falls. She reports ambulating \"some\" with therapy since being at [facility]. She then asked Nir Boudreaux are you asking me all these questions? \"

## 2022-01-20 NOTE — PROGRESS NOTES
TRANSFER - OUT REPORT:    Verbal report given to Monroe Clement (name) on Edison Linares  being transferred to  (unit) for routine progression of care       Report consisted of patients Situation, Background, Assessment and   Recommendations(SBAR). Information from the following report(s) SBAR, Kardex, Intake/Output, MAR and Recent Results was reviewed with the receiving nurse. Lines:   Peripheral IV 01/18/22 Left Wrist (Active)   Site Assessment Clean, dry, & intact 01/18/22 1538   Phlebitis Assessment 0 01/18/22 1538   Infiltration Assessment 0 01/18/22 1538   Dressing Status Clean, dry, & intact 01/18/22 1538   Dressing Type Transparent 01/18/22 1538   Hub Color/Line Status Blue 01/18/22 1538        Opportunity for questions and clarification was provided.

## 2022-01-20 NOTE — PROGRESS NOTES
[address] [facility] Adult  Hospitalist Group                                                                                          Hospitalist Progress Note  Juany Valencia MD  Answering service: [phone] OR [phone] from in house phone        Date of Service:  2022  NAME:  Bennie Diaz  :  3/13/1933  MRN:  [id number]      Admission Summary:     Patient presents with dehydration, has chronic diarrhea since her hemicolectomy, that was done for ischemic colitis. Patient was admitted to general surgery service at that time and patient was discharged to rehab 2021. Patient was admitted for management of dehydration and evaluation of diarrhea. Interval history / Subjective:       Patient complaining of some abdominal pain which is not new and has been using Percocet since her surgery.        Assessment & Plan:     CURT  -Mild elevated creatinine on presentation likely prerenal from GI fluid loss  -Creatinine has returned to normal after given IV fluid  -Now IV fluid discontinued, encourage p.o. fluid intake  -Monitor renal function    Chronic diarrhea  -Per discharge note from general surgery, patient's loose bowel movements due to major bowel resection and colectomy  -Stool for C. difficile was negative this admission, stool for ova and parasites pending  -Resume Questran as well as Imodium  -GI consulted for recommendation for further medical management of her chronic diarrhea    Elevated transaminases  -Likely due to dehydration  -Overall improving    Hyperkalemia  -Now resolved    Irritable bowel syndrome  -Patient and family does report history of irritable bowel syndrome  -GI consulted for further evaluation    Proximal atrial fibrillation  -Continue Eliquis and diltiazem    Hypertension  -Continue Coreg, lisinopril and diltiazem    Hypothyroidism  -Continue Synthroid    Code status: DNR  DVT prophylaxis: Eliquis    Care Plan discussed with: Patient/Family  Anticipated Disposition: SNF/LTC  Anticipated Discharge: 24 hours to 48 hours     Discussed with patient's daughter present at bedside. Hospital Problems  Date Reviewed: 12/29/2021          Codes Class Noted POA    Diarrhea ICD-10-CM: R19.7  ICD-9-CM: 787.91  1/19/2022 Unknown        Dehydration ICD-10-CM: E86.0  ICD-9-CM: 276.51  1/19/2022 Unknown                Review of Systems:   A comprehensive review of systems was negative except for that written in the HPI. Vital Signs:    Last 24hrs VS reviewed since prior progress note. Most recent are:  Visit Vitals  /75   Pulse 81   Temp 98.1 °F (36.7 °C)   Resp 18   SpO2 99%       No intake or output data in the 24 hours ending 01/20/22 1437     Physical Examination:     I had a face to face encounter with this patient and independently examined them on 1/20/2022 as outlined below:          Constitutional:  No acute distress, cooperative, pleasant    ENT:  Oral mucosa moist, oropharynx benign. Resp:  CTA bilaterally. No wheezing/rhonchi/rales. No accessory muscle use   CV:  Regular rhythm, normal rate, no murmurs, gallops, rubs    GI:  Soft, non distended, non tender. normoactive bowel sounds, no hepatosplenomegaly     Musculoskeletal:  No edema, warm, 2+ pulses throughout    Neurologic:  Moves all extremities. AAOx3, CN II-XII reviewed            Data Review:    Review and/or order of clinical lab test      Labs:     Recent Labs     01/20/22  0505 01/18/22  1523   WBC 8.6 9.2   HGB 11.2* 12.0   HCT 36.6 39.4    306     Recent Labs     01/20/22  0505 01/19/22  0631 01/18/22  1523    137 134*   K 3.6 4.4 5.5*   * 107 105   CO2 20* 22 24   BUN 38* 57* 56*   CREA 0.88 1.16* 1.39*   * 83 118*   CA 9.1 9.7 9.9   MG  --   --  1.6     Recent Labs     01/20/22  0505 01/18/22  1523   ALT 60 103*   * 215*   TBILI 0.2 0.4   TP 5.7* 7.0   ALB 2.1* 2.7*   GLOB 3.6 4.3*     No results for input(s): INR, PTP, APTT, INREXT in the last 72 hours.    No results for input(s): FE, TIBC, PSAT, FERR in the last 72 hours. No results found for: FOL, RBCF   No results for input(s): PH, PCO2, PO2 in the last 72 hours. No results for input(s): CPK, CKNDX, TROIQ in the last 72 hours.     No lab exists for component: CPKMB  No results found for: CHOL, CHOLX, CHLST, CHOLV, HDL, HDLP, LDL, LDLC, DLDLP, TGLX, TRIGL, TRIGP, CHHD, CHHDX  Lab Results   Component Value Date/Time    Glucose (POC) 102 12/07/2021 06:45 AM    Glucose (POC) 105 12/02/2021 05:39 AM    Glucose (POC) 161 (H) 12/01/2021 11:27 PM    Glucose (POC) 170 (H) 12/01/2021 04:38 PM    Glucose (POC) 183 (H) 12/01/2021 12:05 PM     Lab Results   Component Value Date/Time    Color YELLOW/STRAW 11/23/2021 05:05 PM    Appearance CLEAR 11/23/2021 05:05 PM    Specific gravity 1.015 11/23/2021 05:05 PM    pH (UA) 7.0 11/23/2021 05:05 PM    Protein 30 (A) 11/23/2021 05:05 PM    Glucose 250 (A) 11/23/2021 05:05 PM    Ketone Negative 11/23/2021 05:05 PM    Bilirubin Negative 11/23/2021 05:05 PM    Urobilinogen 0.2 11/23/2021 05:05 PM    Nitrites Negative 11/23/2021 05:05 PM    Leukocyte Esterase Negative 11/23/2021 05:05 PM    Epithelial cells FEW 11/23/2021 05:05 PM    Bacteria Negative 11/23/2021 05:05 PM    WBC 0-4 11/23/2021 05:05 PM    RBC 0-5 11/23/2021 05:05 PM         Medications Reviewed:     Current Facility-Administered Medications   Medication Dose Route Frequency    apixaban (ELIQUIS) tablet 2.5 mg  2.5 mg Oral BID    aspirin chewable tablet 81 mg  81 mg Oral DAILY    carvediloL (COREG) tablet 12.5 mg  12.5 mg Oral BID WITH MEALS    cholecalciferol (VITAMIN D3) (1000 Units /25 mcg) tablet 1,000 Units  1,000 Units Oral DAILY    cholestyramine-aspartame (QUESTRAN LIGHT) packet 4 g  4 g Oral TID WITH MEALS    citalopram (CELEXA) tablet 40 mg  40 mg Oral DAILY    dilTIAZem ER (CARDIZEM CD) capsule 240 mg  240 mg Oral DAILY    diphenoxylate-atropine (LOMOTIL) tablet 2 Tablet  2 Tablet Oral QID PRN    [START ON 1/21/2022] levothyroxine (SYNTHROID) tablet 25 mcg  25 mcg Oral ACB    [START ON 1/21/2022] lisinopriL (PRINIVIL, ZESTRIL) tablet 20 mg  20 mg Oral DAILY    [START ON 1/21/2022] magnesium oxide (MAG-OX) tablet 400 mg  400 mg Oral DAILY    melatonin tablet 4.5 mg  4.5 mg Oral QHS    [START ON 1/21/2022] mesalamine (PENTASA) CR capsule 500 mg  500 mg Oral TID    oxyCODONE-acetaminophen (PERCOCET) 5-325 mg per tablet 1 Tablet  1 Tablet Oral Q6H PRN    sodium chloride (NS) flush 5-40 mL  5-40 mL IntraVENous Q8H    sodium chloride (NS) flush 5-40 mL  5-40 mL IntraVENous PRN    acetaminophen (TYLENOL) tablet 650 mg  650 mg Oral Q6H PRN    Or    acetaminophen (TYLENOL) suppository 650 mg  650 mg Rectal Q6H PRN    ondansetron (ZOFRAN ODT) tablet 4 mg  4 mg Oral Q8H PRN    Or    ondansetron (ZOFRAN) injection 4 mg  4 mg IntraVENous Q6H PRN     ______________________________________________________________________  EXPECTED LENGTH OF STAY: - - -  ACTUAL LENGTH OF STAY:          0                 Natan Barron MD

## 2022-01-21 VITALS
OXYGEN SATURATION: 99 % | SYSTOLIC BLOOD PRESSURE: 124 MMHG | DIASTOLIC BLOOD PRESSURE: 69 MMHG | HEART RATE: 90 BPM | RESPIRATION RATE: 18 BRPM | TEMPERATURE: 97.3 F

## 2022-01-21 LAB
ANION GAP SERPL CALC-SCNC: 4 MMOL/L (ref 5–15)
BUN SERPL-MCNC: 30 MG/DL (ref 6–20)
BUN/CREAT SERPL: 31 (ref 12–20)
CALCIUM SERPL-MCNC: 8.9 MG/DL (ref 8.5–10.1)
CHLORIDE SERPL-SCNC: 116 MMOL/L (ref 97–108)
CO2 SERPL-SCNC: 21 MMOL/L (ref 21–32)
COVID-19 RAPID TEST, COVR: NOT DETECTED
CREAT SERPL-MCNC: 0.96 MG/DL (ref 0.55–1.02)
GLUCOSE SERPL-MCNC: 108 MG/DL (ref 65–100)
POTASSIUM SERPL-SCNC: 3.7 MMOL/L (ref 3.5–5.1)
SODIUM SERPL-SCNC: 141 MMOL/L (ref 136–145)
SOURCE, COVRS: NORMAL

## 2022-01-21 PROCEDURE — G0378 HOSPITAL OBSERVATION PER HR: HCPCS

## 2022-01-21 PROCEDURE — 87635 SARS-COV-2 COVID-19 AMP PRB: CPT

## 2022-01-21 PROCEDURE — 65270000029 HC RM PRIVATE

## 2022-01-21 PROCEDURE — 36415 COLL VENOUS BLD VENIPUNCTURE: CPT

## 2022-01-21 PROCEDURE — 74011000250 HC RX REV CODE- 250: Performed by: HOSPITALIST

## 2022-01-21 PROCEDURE — 74011250637 HC RX REV CODE- 250/637: Performed by: FAMILY MEDICINE

## 2022-01-21 PROCEDURE — 80048 BASIC METABOLIC PNL TOTAL CA: CPT

## 2022-01-21 PROCEDURE — 97116 GAIT TRAINING THERAPY: CPT

## 2022-01-21 PROCEDURE — 97162 PT EVAL MOD COMPLEX 30 MIN: CPT

## 2022-01-21 RX ORDER — OXYCODONE AND ACETAMINOPHEN 2.5; 325 MG/1; MG/1
1 TABLET ORAL
Qty: 15 TABLET | Refills: 0 | Status: SHIPPED | OUTPATIENT
Start: 2022-01-21 | End: 2022-01-28

## 2022-01-21 RX ORDER — DIPHENOXYLATE HYDROCHLORIDE AND ATROPINE SULFATE 2.5; .025 MG/1; MG/1
2 TABLET ORAL
Qty: 30 TABLET | Refills: 0 | Status: SHIPPED | OUTPATIENT
Start: 2022-01-21 | End: 2022-02-06 | Stop reason: DRUGHIGH

## 2022-01-21 RX ADMIN — SODIUM CHLORIDE, PRESERVATIVE FREE 10 ML: 5 INJECTION INTRAVENOUS at 14:00

## 2022-01-21 RX ADMIN — CHOLESTYRAMINE 4 G: 4 POWDER, FOR SUSPENSION ORAL at 11:23

## 2022-01-21 RX ADMIN — DILTIAZEM HYDROCHLORIDE 240 MG: 240 CAPSULE, COATED, EXTENDED RELEASE ORAL at 08:41

## 2022-01-21 RX ADMIN — CARVEDILOL 12.5 MG: 12.5 TABLET, FILM COATED ORAL at 07:01

## 2022-01-21 RX ADMIN — DIPHENOXYLATE HYDROCHLORIDE AND ATROPINE SULFATE 2 TABLET: 2.5; .025 TABLET ORAL at 08:49

## 2022-01-21 RX ADMIN — Medication 400 MG: at 11:27

## 2022-01-21 RX ADMIN — CITALOPRAM HYDROBROMIDE 40 MG: 20 TABLET ORAL at 08:41

## 2022-01-21 RX ADMIN — LISINOPRIL 20 MG: 20 TABLET ORAL at 08:41

## 2022-01-21 RX ADMIN — SODIUM CHLORIDE, PRESERVATIVE FREE 10 ML: 5 INJECTION INTRAVENOUS at 07:02

## 2022-01-21 RX ADMIN — APIXABAN 2.5 MG: 2.5 TABLET, FILM COATED ORAL at 08:41

## 2022-01-21 RX ADMIN — Medication 1000 UNITS: at 08:41

## 2022-01-21 RX ADMIN — CHOLESTYRAMINE 4 G: 4 POWDER, FOR SUSPENSION ORAL at 07:01

## 2022-01-21 RX ADMIN — OXYCODONE AND ACETAMINOPHEN 1 TABLET: 5; 325 TABLET ORAL at 08:41

## 2022-01-21 RX ADMIN — ASPIRIN 81 MG CHEWABLE TABLET 81 MG: 81 TABLET CHEWABLE at 08:41

## 2022-01-21 RX ADMIN — MESALAMINE 500 MG: 250 CAPSULE ORAL at 08:41

## 2022-01-21 RX ADMIN — LEVOTHYROXINE SODIUM 25 MCG: 0.03 TABLET ORAL at 07:02

## 2022-01-21 NOTE — DISCHARGE SUMMARY
Discharge Summary       PATIENT ID: Thomas Lemus  MRN: [id number]   YOB: 1933    DATE OF ADMISSION: 1/18/2022  3:11 PM    DATE OF DISCHARGE: 1/21/2022   PRIMARY CARE PROVIDER: Meghana Khan MD     ATTENDING PHYSICIAN: Tushar Castro  DISCHARGING PROVIDER: Susan Camacho MD    To contact this individual call [phone] and ask the  to page. If unavailable ask to be transferred the Adult Hospitalist Department. CONSULTATIONS: None    PROCEDURES/SURGERIES: * No surgery found *    DISCHARGE DIAGNOSES:     CURT  Abnormalities  Hyperkalemia  Chronic diarrhea  Paroxysmal atrial fibrillation   Hypertension  Hypothyroidism    ADMISSION SUMMARY AND HOSPITAL COURSE:     HPI  Although patient is alert and oriented x3, she is however not good historian and when asked why she is here had a story revolves around her hip fracture and surgery which had happened I think last year. When I told her that they send her for evaluation of the diarrhea, she admitted she has been having diarrhea chronically that she has been seen by GI Dr. Adriel Hale, she thinks she has had EGD and colonoscopy and no specific diagnosis she is aware of. She denied nausea, abdominal pain, distention, fever or chills. She denied cough or shortness of breath or any respiratory symptoms. She tells me she uses a wheelchair, lives at the [address] [place] [address]. Upon evaluation in the ED, initial BP was 94/54. Pertinent lab sodium 134, potassium 5.5, BUN 56, creatinine 1.39, , AST 79. Repeat BUN 57 creatinine 1.16 this morning liter of normal saline bolus last night. Hospital Course  Patient presents with CURT likely from prerenal cause from GI fluid loss. Patient's renal function returns to normal after IV fluid replacement. Patient's IV fluid has been turned off the next day, and patient continues on p.o. hydration. Creatinine remained stable for the next 24 hours.   Patient has chronic diarrhea since her bowel surgery for bowel ischemia. Patient had laparoscopy, converted to open, right colectomy with extended small bowel resection on 11/15/2021. Patient follows with GI as outpatient. Patient on Questran as well as Lomotil. Patient stool studies including C. difficile as well as ova and parasites were negative this admission. CT abdomen pelvis shows post right colectomy. Inflammation in the fat in the right paracolic gutter at the level of the anastomosis. Case was discussed with GI, their impression was the finding on the CT is probably postoperative changes. Recommended follow-up GI as outpatient and probable repeat interval CT. But nothing much to offer from their standpoint. DISCHARGE DIAGNOSES / PLAN:          BMI: There is no height or weight on file to calculate BMI. . This patient: Meets criteria for overweight given BMI >/= 25 and < 30 due to excess calories/nutritional. Weight loss and lifestyle modifications should be encouraged as an outpatient. PENDING TEST RESULTS:   At the time of discharge the following test results are still pending: None     ADDITIONAL CARE RECOMMENDATIONS:     Follow-up with GI in 2 to 3 weeks for reevaluation. NOTIFY YOUR PHYSICIAN FOR ANY OF THE FOLLOWING:   Fever over 101 degrees for 24 hours. Chest pain, shortness of breath, fever, chills, nausea, vomiting, diarrhea, change in mentation, falling, weakness, bleeding. Severe pain or pain not relieved by medications, as well as any other signs or symptoms that you may have questions about.     FOLLOW UP APPOINTMENTS:    Follow-up Information     Follow up With Specialties Details Why Contact Info    Yunior Lemon MD Family Medicine   [address] [id number] [phone]               DIET: Cardiac Diet    ACTIVITY: PT/OT Eval and Treat    EQUIPMENT needed: None    DISCHARGE MEDICATIONS:  Current Discharge Medication List      CONTINUE these medications which have NOT CHANGED    Details   acetaminophen (TYLENOL) 650 mg TbER Take 650 mg by mouth two (2) times daily as needed for Pain. Indications: fever, pain      L.acid,para-B. bifidum-S.therm (RISAQUAD) 8 billion cell cap cap Take 1 Capsule by mouth daily. mesalamine ER (APRISO) 0.375 gram 24 hour capsule Take 1.5 g by mouth daily. cholestyramine-sucrose (Questran) 4 gram powder Take 4 g by mouth three (3) times daily (with meals). diphenoxylate-atropine (LomotiL) 2.5-0.025 mg per tablet Take 2 Tablets by mouth four (4) times daily as needed for Diarrhea. nystatin (MYCOSTATIN) topical cream Apply  to affected area two (2) times daily as needed for Skin Irritation. citalopram (CeleXA) 40 mg tablet Take 40 mg by mouth daily. therapeutic multivitamin-minerals (THERAGRAN-M) tablet Take 1 Tablet by mouth daily. oxyCODONE-acetaminophen (PERCOCET) 2.5-325 mg per tablet Take 1 Tablet by mouth every four (4) hours as needed for Pain. apixaban (ELIQUIS) 2.5 mg tablet Take 1 Tablet by mouth two (2) times a day. Qty: 30 Tablet, Refills: 3      dilTIAZem ER (CARDIZEM CD) 240 mg capsule Take 1 Capsule by mouth daily. Qty: 60 Capsule, Refills: 2      aspirin 81 mg chewable tablet Take 81 mg by mouth daily. carvediloL (COREG) 12.5 mg tablet Take  by mouth two (2) times daily (with meals). melatonin 5 mg tablet Take  by mouth nightly. levothyroxine (SYNTHROID) 25 mcg tablet Take 25 mcg by mouth Daily (before breakfast). lisinopriL (PRINIVIL, ZESTRIL) 20 mg tablet Take 20 mg by mouth daily. magnesium oxide (MAG-OX) 400 mg tablet Take 400 mg by mouth daily. potassium chloride (KLOR-CON) 20 mEq pack Take 20 mEq by mouth two (2) times daily (with meals). cholecalciferol (Vitamin D3) (1000 Units /25 mcg) tablet Take  by mouth daily. Hydrocolloid Dressing 4 X 5 \" bndg by Apply Externally route.          STOP taking these medications       metoprolol succinate (TOPROL-XL) 50 mg XL tablet Comments:   Reason for Stopping:               DISPOSITION:    Home With:   OT  PT  HH  RN       Long term SNF/Inpatient Rehab    Independent/assisted living    Hospice    Other:       PATIENT CONDITION AT DISCHARGE:     Functional status    Poor     Deconditioned     Independent      Cognition     Lucid     Forgetful     Dementia      Catheters/lines (plus indication)    Pearson     PICC     PEG     None      Code status     Full code     DNR      PHYSICAL EXAMINATION AT DISCHARGE:  General:          Alert, cooperative, no distress, appears stated age. HEENT:           Atraumatic, anicteric sclerae, pink conjunctivae                          No oral ulcers, mucosa moist, throat clear, dentition fair  Neck:               Supple, symmetrical  Lungs:             Clear to auscultation bilaterally. No Wheezing or Rhonchi. No rales. Chest wall:      No tenderness  No Accessory muscle use. Heart:              Regular  rhythm,  No  murmur   No edema  Abdomen:        Soft, non-tender. Not distended. Bowel sounds normal  Extremities:     No cyanosis. No clubbing,                            Skin turgor normal, Capillary refill normal  Skin:                Not pale. Not Jaundiced  No rashes   Psych:             Not anxious or agitated. 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 3  Unknown - Present        Goals of care, counseling/discussion ICD-10-CM: Z71.89  ICD-9-CM: V65.49  Unknown - Present        Palliative care encounter ICD-10-CM: Z51.5  ICD-9-CM: V66.7  Unknown - Present        Small bowel ischemia ([address] ICD-10-CM: K55.9  ICD-9-CM: 557.9  11/16/2021 - Present              Greater than 60 minutes were spent with the patient on counseling and coordination of care    Signed:   Mohamud Rodriguez MD  1/21/2022  1:57 PM

## 2022-01-21 NOTE — PROGRESS NOTES
Transition of Care Plan   RUR- 19%    DISPOSITION: The disposition plan is [facility]   o Call report: [phone]/ BN#[id number]   F/U with PCP/Specialist     Transport: [facility]; 3:15pm     Transition of Care Plan to SNF/Rehab    SNF/Rehab Transition:  Patient has been accepted to [facility] and meets criteria for admission. Patient will transported by [facility] and expected to leave at 3:15pm.    Communication to Patient/Family:  Met with patient and daughter and they are agreeable to the transition plan. Communication to SNF/Rehab:  Bedside RN, Stcay Clarke, has been notified to update the transition plan to the facility and call report (phone number [phone]  Discharge information has been updated on the AVS.       Nursing Please include all hard scripts for controlled substances, med rec and dc summary, and AVS in packet. Reviewed and confirmed with facility,, can manage the patient care needs for the following:     Mily  with (X) only those applicable:    Medication:  [x]  Medications will be available at the facility  []  IV Antibiotics   []  Controlled Substance - hard copy to be sent with patient   []  Weekly Labs   Documents:  [x] Hard RX  [x] MAR  [x] Kardex  [x] AVS  [x]Transfer Summary  [x]Discharge   Equipment:  []  CPAP/BiPAP  []  Wound Vacuum  []  Pearson or Urinary Device  []  PICC/Central Line  []  Nebulizer  []  Ventilator   Treatment:  []Isolation (for MRSA, VRE, etc.)  []Surgical Drain Management  []Tracheostomy Care  []Dressing Changes  []Dialysis with transportation and chair time. []PEG Care  []Oxygen  []Daily Weights for Heart Failure   Dietary:  []Any diet limitations  []Tube Feedings   []Total Parenteral Management (TPN)   Eligible for Medicaid Long Term Services and Supports  Yes:  [] Eligible for medical assistance or will become eligible within 180 days and UAI completed. [] Provider/Patient and/or support system has requested screening.   [] UAI copy provided to patient or responsible party,.  [] UAI unavailable at discharge will send once processed to SNF provider. [] UAI unavailable at discharged mailed to patient  No:   [x] Private pay and is not financially eligible for Medicaid within the next 180 days. [] Reside out-of-state. [] A residents of a state owned/operated facility that is licensed  by [employer] or [facility]  [] Enrollment in [facility]  [] [address] [place] [address]  [] Patient /Family declines to have screening completed or provide financial information for screening     Financial Resources:  Medicaid    [] Initiated and application pending   [] Full coverage     Advanced Care Plan:  []Surrogate Decision Maker of Care  []POA  [x]Communicated Code Status (DDNR\",\")    Other     Medicare pt has received, reviewed, and signed 2nd IM letter informing them of their right to appeal the discharge. Signed copy has been placed on pt bedside chart.   CM: [address]. MSW,   [phone]

## 2022-01-21 NOTE — DISCHARGE INSTRUCTIONS
Patient Education        Dehydration: Care Instructions  Your Care Instructions  Dehydration happens when your body loses too much fluid. This might happen when you do not drink enough water or you lose large amounts of fluids from your body because of diarrhea, vomiting, or sweating. Severe dehydration can be life-threatening. Water and minerals called electrolytes help put your body fluids back in balance. Learn the early signs of fluid loss, and drink more fluids to prevent dehydration. Follow-up care is a key part of your treatment and safety. Be sure to make and go to all appointments, and call your doctor if you are having problems. It's also a good idea to know your test results and keep a list of the medicines you take. How can you care for yourself at home? · Drink plenty of fluids. Choose water and other clear liquids until you feel better. If you have kidney, heart, or liver disease and have to limit fluids, talk with your doctor before you increase the amount of fluids you drink. · If you do not feel like eating or drinking, try taking small sips of water, sports drinks, or other rehydration drinks. · Get plenty of rest.  To prevent dehydration  · Add more fluids to your diet and daily routine, unless your doctor has told you not to. · During hot weather, drink more fluids. Drink even more fluids if you exercise a lot. Stay away from drinks with alcohol or caffeine. · Watch for the symptoms of dehydration. These include:  ? A dry, sticky mouth. ? Not much urine. ? Dry and sunken eyes. ? Feeling very tired. · Learn what problems can lead to dehydration. These include:  ? Diarrhea, fever, and vomiting. ? Any illness with a fever, such as pneumonia or the flu. ? Activities that cause heavy sweating, such as endurance races and heavy outdoor work in hot or humid weather. ? Alcohol or drug use or problems caused by quitting their use (withdrawal). ?  Certain medicines, such as cold and allergy pills (antihistamines), diet pills (diuretics), and laxatives. ? Certain diseases, such as diabetes, cancer, and heart or kidney disease. When should you call for help? Call 911 anytime you think you may need emergency care. For example, call if:    · You passed out (lost consciousness). Call your doctor now or seek immediate medical care if:    · You are confused and cannot think clearly.     · You are dizzy or lightheaded, or you feel like you may faint.     · You have signs of needing more fluids. You have sunken eyes, a dry mouth, and you pass only a little urine.     · You cannot keep fluids down. Watch closely for changes in your health, and be sure to contact your doctor if:    · You are not making tears.     · Your skin is very dry and sags slowly back into place after you pinch it.     · Your mouth and eyes are very dry. Where can you learn more? Go to http://www.gray.com/  Enter Q814 in the search box to learn more about \"Dehydration: Care Instructions. \"  Current as of: July 1, 2021               Content Version: 13.0  © 2631-4948 [employer]. Care instructions adapted under license by [employer] (which disclaims liability or warranty for this information). If you have questions about a medical condition or this instruction, always ask your healthcare professional. [address] any warranty or liability for your use of this information.

## 2022-01-21 NOTE — PROGRESS NOTES
Problem: Mobility Impaired (Adult and Pediatric)  Goal: *Acute Goals and Plan of Care (Insert Text)  Description: FUNCTIONAL STATUS PRIOR TO ADMISSION: Patient admitted from [facility] but previously was a resident of [address] [place] [address]. Patient yassine historian with timeline but notes she \"needed help with everything\", typically uses w/c for mobility. Does reports being able to transfer to/from bed-w/c and w/c-toilet without assistance when she was at her [facility]. Physical Therapy Goals  Initiated 1/21/2022  1. Patient will move from supine to sit and sit to supine , scoot up and down, and roll side to side in bed with supervision/set-up within 7 day(s). 2.  Patient will transfer from bed to chair and chair to bed with minimal assistance/contact guard assist using the least restrictive device within 7 day(s). 3.  Patient will perform sit to stand with minimal assistance/contact guard assist within 7 day(s). 4.  Patient will ambulate with minimal assistance/contact guard assist for 5 feet with the least restrictive device within 7 day(s). Outcome: Not Met   PHYSICAL THERAPY EVALUATION  Patient: Susan Babin (89 y.o. female)  Date: 1/21/2022  Primary Diagnosis: Diarrhea [R19.7]  Dehydration [E86.0]        Precautions:   Fall      ASSESSMENT  Based on the objective data described below, the patient presents with generalized weakness, limited activity tolerance, impaired standing balance and unsteady gait  likely mildly below her typical baseline functional mobility level s/p admission for diarrhea. Patient yassine historian on timeline of recent admissions, reports many hospitalizations this year. Admitted here from SNF but usually resides at [facility], where she reports using a w/c or three point rollator and requiring assistance for most ADLs. Today, she was able to get to EOB mostly herself and demos good sitting balance.   Needs MOD A to come to standing and exhibits significant posteriorlateral lean to R, difficult to correct or maintain after PT manually corrects. Improved with sidestepping and then forward gait to chair but still very unsteady. Left up in chair with alarm and needs in place, waffle cushion in chair for pressure relief. Anticipate patient is near most recent baseline level but will benefit from return to SNF at d/c to maximize independence prior to return to her skilled nursing. Current Level of Function Impacting Discharge (mobility/balance): MOD A x 1-2, bed to chair    Functional Outcome Measure: The patient scored Total: 20/100 on the Barthel Index outcome measure which is indicative of being totally dependent in basic self-care. Other factors to consider for discharge: admitted from SNF     Patient will benefit from skilled therapy intervention to address the above noted impairments. PLAN :  Recommendations and Planned Interventions: bed mobility training, transfer training, gait training, therapeutic exercises, neuromuscular re-education, patient and family training/education, and therapeutic activities      Frequency/Duration: Patient will be followed by physical therapy:  3 times a week to address goals. Recommendation for discharge: (in order for the patient to meet his/her long term goals)  Therapy up to 5 days/week in SNF setting    This discharge recommendation:  Has been made in collaboration with the attending provider and/or case management    IF patient discharges home will need the following DME: to be determined (TBD)         SUBJECTIVE:   Patient stated I don't get up and do too much lately.     OBJECTIVE DATA SUMMARY:   HISTORY:    Past Medical History:   Diagnosis Date    Atrial fibrillation ([address]     Atrial flutter ([facility] [address]     Edema     Hypertension     Hypokalemia     Hypothyroidism     Insomnia     Major depressive disorder     Pain     UTI (urinary tract infection)     Vitamin D deficiency      Past Surgical History:   Procedure Laterality Date    HX APPENDECTOMY      approx age 21    HX GI  12/01/2021    bowel resection    HX ORTHOPAEDIC  05/01/2021    R hip        Personal factors and/or comorbidities impacting plan of care:     Home Situation  Home Environment: Rehabilitation facility  Living Alone: No  Support Systems: Skilled Nursing Facility,Child(grisel)  Patient Expects to be Discharged to[de-identified] Skilled nursing facility  Current DME Used/Available at Home: Pedro Janus, rollator,Wheelchair    EXAMINATION/PRESENTATION/DECISION MAKING:   Critical Behavior:  Neurologic State: Alert  Orientation Level: Oriented to person,Oriented to place,Oriented to situation,Disoriented to time  Cognition: Appropriate decision making,Appropriate safety awareness     Hearing:       Range Of Motion:  AROM: Generally decreased, functional                       Strength:    Strength: Generally decreased, functional                    Tone & Sensation:   Tone: Normal              Sensation: Intact               Coordination:  Coordination: Within functional limits  Vision:      Functional Mobility:  Bed Mobility:  Rolling: Stand-by assistance  Supine to Sit: Contact guard assistance; Adaptive equipment; Additional time     Scooting: Stand-by assistance; Additional time (at EOB)  Transfers:  Sit to Stand: Moderate assistance  Stand to Sit: Minimum assistance        Bed to Chair: Moderate assistance              Balance:   Sitting: Intact  Standing: Impaired  Standing - Static: Fair;Poor;Constant support  Standing - Dynamic : Fair;Poor;Constant support  Ambulation/Gait Training:  Distance (ft): 4 Feet (ft)  Assistive Device: Gait belt;Walker, rolling  Ambulation - Level of Assistance: Moderate assistance;Assist x2        Gait Abnormalities: Decreased step clearance;Trunk sway increased; Shuffling gait        Base of Support: Center of gravity altered;Narrowed     Speed/Lizette: Slow;Shuffled  Step Length: Right shortened;Left shortened                   Functional Measure:  Barthel Index:    Bathin  Bladder: 0  Bowels: 5  Groomin  Dressin  Feeding: 10  Mobility: 0  Stairs: 0  Toilet Use: 0  Transfer (Bed to Chair and Back): 5  Total: 20/100       The Barthel ADL Index: Guidelines  1. The index should be used as a record of what a patient does, not as a record of what a patient could do. 2. The main aim is to establish degree of independence from any help, physical or verbal, however minor and for whatever reason. 3. The need for supervision renders the patient not independent. 4. A patient's performance should be established using the best available evidence. Asking the patient, friends/relatives and nurses are the usual sources, but direct observation and common sense are also important. However direct testing is not needed. 5. Usually the patient's performance over the preceding 24-48 hours is important, but occasionally longer periods will be relevant. 6. Middle categories imply that the patient supplies over 50 per cent of the effort. 7. Use of aids to be independent is allowed. Score Interpretation (from [address] [address])    Independent   60-79 Minimally independent   40-59 Partially dependent   20-39 Very dependent   <20 Totally dependent     -Jia Lane., Barthel, D.W. (1965). Functional evaluation: the Barthel Index. [facility] ([address] (1997). The Barthel activities of daily living index: self-reporting versus actual performance in the old (> or = 75 years). Journal of [address] [place] [address] 45(7), [facility], J.J.M.F, Samantha Cullen., Merari Dumont. (1999). Measuring the change in disability after inpatient rehabilitation; comparison of the responsiveness of the Barthel Index and Functional Kingsley Measure. Journal of Neurology, Neurosurgery, and Psychiatry, 66(4), 642-406.   -Yokasta Anderson, N.J.A, DESI Giang.ADAM, & Jodie Rodriguez MCelestinoA. (2004) Assessment of post-stroke quality of life in cost-effectiveness studies: The usefulness of the Barthel Index and the EuroQoL-5D. Quality of Life Research, 15, 546-63        Physical Therapy Evaluation Charge Determination   History Examination Presentation Decision-Making   HIGH Complexity :3+ comorbidities / personal factors will impact the outcome/ POC  MEDIUM Complexity : 3 Standardized tests and measures addressing body structure, function, activity limitation and / or participation in recreation  HIGH Complexity : Unstable and unpredictable characteristics  Other outcome measures Barthel 20  HIGH       Based on the above components, the patient evaluation is determined to be of the following complexity level: MEDIUM    Pain Rating:  None reported    Activity Tolerance:   Fair and requires rest breaks    After treatment patient left in no apparent distress:   Sitting in chair, Call bell within reach, and Bed / chair alarm activated    COMMUNICATION/EDUCATION:   The patients plan of care was discussed with: Occupational therapist, Registered nurse, and Case management. Fall prevention education was provided and the patient/caregiver indicated understanding. and Patient/family agree to work toward stated goals and plan of care.     Thank you for this referral.  Ortiz Corral, PT   Time Calculation: 24 mins

## 2022-02-01 ENCOUNTER — HOSPITAL ENCOUNTER (OUTPATIENT)
Dept: WOUND CARE | Age: 87
Discharge: HOME | End: 2022-02-01
Payer: MEDICARE

## 2022-02-01 VITALS — DIASTOLIC BLOOD PRESSURE: 50 MMHG | TEMPERATURE: 97.7 F | SYSTOLIC BLOOD PRESSURE: 77 MMHG | RESPIRATION RATE: 16 BRPM

## 2022-02-01 PROCEDURE — 11043 DBRDMT MUSC&/FSCA 1ST 20/<: CPT

## 2022-02-01 NOTE — WOUND CARE
02/01/22 1308   Wound Sacrum 01/06/22   Date First Assessed/Time First Assessed: 01/06/22 1311   Present on Hospital Admission: Yes  Wound Approximate Age at First Assessment (Weeks): 4 weeks  Primary Wound Type: Pressure Injury  Location: Sacrum  Date of First Observation: 01/06/22   Wound Image    Wound Etiology Pressure Stage 3   Dressing Status Intact; New drainage noted   Cleansed Irrigated with saline   Wound Length (cm) 1.1 cm   Wound Width (cm) 1 cm   Wound Surface Area (cm^2) 1.1 cm^2   Change in Wound Size % 80.36   Distance Tunneling (cm) 2.1 cm   Direction of Tunnel 12 o'clock   Undermining Starts ___ O'Clock 12 o'clock   Undermining Ends ___ O'Clock 3 o'clock   Undermining Maximum Distance (cm) 1.5 cm   Wound Assessment Pink/red   Drainage Amount Moderate   Drainage Description Serosanguinous   Wound Odor None   Basia-Wound/Incision Assessment Blanchable erythema;Fragile   Edges Flat/open edges   Wound Thickness Description Full thickness   Pain 1   Pain Scale 1 Numeric (0 - 10)   Pain Intensity 1 0     Visit Vitals  BP (!) 77/50 (BP 1 Location: Left upper arm, BP Patient Position: At rest)   Temp 97.7 °F (36.5 °C)   Resp 16

## 2022-02-01 NOTE — PROGRESS NOTES
Wound Anterior; Left; Mid (Active)   Number of days: 77       Wound Gluteal fold/cleft 2nd degree pressure sore (Active)   Number of days: 75       Wound Sacrum Mid; Posterior (Active)   Number of days: 66       Wound Sacrum 01/06/22 (Active)   Wound Image   02/01/22 1308   Wound Etiology Pressure Stage 3 02/01/22 1308   Dressing Status Intact; New drainage noted 02/01/22 1308   Cleansed Irrigated with saline 02/01/22 1308   Dressing/Treatment Hydrofiber Ag; Foam 02/01/22 1410   Wound Length (cm) 1.1 cm 02/01/22 1308   Wound Width (cm) 1 cm 02/01/22 1308   Wound Depth (cm) 1 cm 01/18/22 1318   Wound Surface Area (cm^2) 1.1 cm^2 02/01/22 1308   Change in Wound Size % 80.36 02/01/22 1308   Wound Volume (cm^3) 3 cm^3 01/18/22 1318   Wound Healing % 51 01/18/22 1318   Distance Tunneling (cm) 2.1 cm 02/01/22 1308   Direction of Tunnel 12 o'clock 02/01/22 1308   Undermining Starts ___ O'Clock 12 o'clock 02/01/22 1308   Undermining Ends ___ O'Clock 3 o'clock 02/01/22 1308   Undermining Maximum Distance (cm) 1.5 cm 02/01/22 1308   Wound Assessment Pink/red 02/01/22 1308   Drainage Amount Moderate 02/01/22 1308   Drainage Description Serosanguinous 02/01/22 1308   Wound Odor None 02/01/22 1308   Basia-Wound/Incision Assessment Blanchable erythema;Fragile 02/01/22 1308   Edges Flat/open edges 02/01/22 1308   Wound Thickness Description Full thickness 02/01/22 1308   Number of days: 26       Incision 11/15/21 Abdomen (Active)   Number of days: 78       . I have noted, and reviewed today's data for this patient in [facility] and concur with same. The focused physical exam, other physical findings, Medical history, Review of Symptoms and Medications today remains unchanged except as noted below. Patient notes today: no wound vac (nursing home says diarrhea), patient not eating. In hospital layed in ER for 2 days, admitted, C, Diff neg.    Lesion/Wound, focused exam on Presentation today: pre sacral ulcer some larger, deep through fascia, pink edges, no bone exposed. Procedure:   Wound # sacral decub. Procedure name: sharp excisional debridement. Anaesthesia: Lidocaine; topical    Description: using a sharp curette I excised all non viable tissue to effect a clean bleeding base. Tissue Level/depth of debridement: fascia. Post debridement dimensions changed as noted:    Depth, add 1.0 mm;    Width, add 0.0 mm   Length, add 0.0 mm. Blood Loss: 2 CCs. Bleeding abated post treatment . Post Procedure Condition/ Diagnosis: no pain. Follow up and Future plans today: RTC 2 week, needs to have wound vac, aggressive wound care. Specimens: 0. Patient Counseled regarding/Discussed: patient in probable protein malnutrition, little progress with out wound vac. Clinical Considerations: alert to  Infection, best off loading, adequate nutrition. Dx Codes: N78.793.

## 2022-02-01 NOTE — DISCHARGE INSTRUCTIONS
Discharge Instructions/Wound [facility]  [place], [address]  Phone: [phone] Fax: [phone]    NAME:  Edison Linares  YOB: 1933  MEDICAL RECORD NUMBER:  [id number]  DATE:  2/1/2022  WOUND CARE ORDERS:  Sacrum:   WHILE WAITING FOR WOUND VAC: Baby shampoo cleanse, leave lather on for 3 minutes, rinse and pat dry. Place aquacel Ag to wound bed. Cover with foam border. Change every other day or more often if soiled.    [place] -Please apply for NPWT. Once approved, pack tunnel with white foam, and wound bed with black foam. Cover with drape and suction at -125mmHg continuous suction. Change three times a week. Please follow wound care orders as directed. No dressing was in place. TREATMENT ORDERS:  Turn/reposition every 2 hours when in bed, avoid direct pressure on wound site. When sitting, shift position or do seat lifts every 15 minutes. Limit side lying to 30 degree tilt. Limit HOB elevation to 30 degrees. Use speciality pressure relief cushion, mattress as appropriate. Follow diet as prescribed:  [x] Diet as tolerated: [] Calorie Diabetic Diet:Low carb and no Sugar [] No Added Salt:[x] Increase Protein: [] Other:Limit the amount of liquid you are drinking and avoid drinking in between meals              Return Appointment:  [x] Return Appointment: With Dr Rizwana Dc in 2 weeks. [] Ordered tests:    Electronically signed Suzy Domingo RN on 2/1/2022 at 1:32 PM     Gladys Xie [address]: Should you experience any significant changes in your wound(s) or have questions about your wound care, please contact the [facility] at [address] [place] [address] 8:00 am - 4:30. If you need help with your wound outside these hours and cannot wait until we are again available, contact your PCP or go to the hospital emergency room.      PLEASE NOTE: IF YOU ARE UNABLE TO OBTAIN WOUND SUPPLIES, CONTINUE TO USE THE SUPPLIES YOU HAVE AVAILABLE UNTIL YOU ARE ABLE TO REACH US. IT IS MOST IMPORTANT TO KEEP THE WOUND COVERED AT ALL TIMES.      Physician Signature:_______________________    Date: ___________ Time:  ____________

## 2022-02-01 NOTE — WOUND CARE
02/01/22 1308   Wound Sacrum 01/06/22   Date First Assessed/Time First Assessed: 01/06/22 1311   Present on Hospital Admission: Yes  Wound Approximate Age at First Assessment (Weeks): 4 weeks  Primary Wound Type: Pressure Injury  Location: Sacrum  Date of First Observation: 01/06/22   Wound Image    Wound Etiology Pressure Stage 3   Dressing Status Intact; New drainage noted  (Removed hydorcolloid duoderm)   Cleansed Irrigated with saline   Wound Length (cm) 1.1 cm   Wound Width (cm) 1 cm   Wound Surface Area (cm^2) 1.1 cm^2   Change in Wound Size % 80.36   Distance Tunneling (cm) 2.1 cm   Direction of Tunnel 12 o'clock   Undermining Starts ___ O'Clock 12 o'clock   Undermining Ends ___ O'Clock 3 o'clock   Undermining Maximum Distance (cm) 1.5 cm   Wound Assessment Pink/red   Drainage Amount Moderate   Drainage Description Serosanguinous   Wound Odor None   Basia-Wound/Incision Assessment Blanchable erythema;Fragile   Edges Flat/open edges   Wound Thickness Description Full thickness   Pain 1   Pain Scale 1 Numeric (0 - 10)   Pain Intensity 1 0     Visit Vitals  BP (!) 77/50 (BP 1 Location: Left upper arm, BP Patient Position: At rest)   Temp 97.7 °F (36.5 °C)   Resp 16

## 2022-02-06 ENCOUNTER — APPOINTMENT (OUTPATIENT)
Dept: ULTRASOUND IMAGING | Age: 87
DRG: 871 | End: 2022-02-06
Attending: NURSE PRACTITIONER
Payer: MEDICARE

## 2022-02-06 ENCOUNTER — APPOINTMENT (OUTPATIENT)
Dept: GENERAL RADIOLOGY | Age: 87
DRG: 871 | End: 2022-02-06
Attending: EMERGENCY MEDICINE
Payer: MEDICARE

## 2022-02-06 ENCOUNTER — HOSPITAL ENCOUNTER (INPATIENT)
Age: 87
LOS: 4 days | Discharge: NURSING, REHAB OR LONG-TERM CARE | DRG: 871 | End: 2022-02-10
Attending: EMERGENCY MEDICINE | Admitting: ANESTHESIOLOGY
Payer: MEDICARE

## 2022-02-06 DIAGNOSIS — N28.9 ACUTE RENAL INSUFFICIENCY: ICD-10-CM

## 2022-02-06 DIAGNOSIS — R63.4 WEIGHT LOSS: ICD-10-CM

## 2022-02-06 DIAGNOSIS — E87.5 ACUTE HYPERKALEMIA: ICD-10-CM

## 2022-02-06 DIAGNOSIS — Z71.89 GOALS OF CARE, COUNSELING/DISCUSSION: ICD-10-CM

## 2022-02-06 DIAGNOSIS — Z51.5 PALLIATIVE CARE ENCOUNTER: ICD-10-CM

## 2022-02-06 DIAGNOSIS — N30.00 ACUTE CYSTITIS WITHOUT HEMATURIA: Primary | ICD-10-CM

## 2022-02-06 DIAGNOSIS — R63.30 FEEDING DIFFICULTIES: ICD-10-CM

## 2022-02-06 PROBLEM — N39.0 UTI (URINARY TRACT INFECTION): Status: ACTIVE | Noted: 2022-02-06

## 2022-02-06 PROBLEM — A41.9 SEPSIS (HCC): Status: ACTIVE | Noted: 2022-02-06

## 2022-02-06 LAB
ALBUMIN SERPL-MCNC: 2.5 G/DL (ref 3.5–5)
ALBUMIN/GLOB SERPL: 0.6 {RATIO} (ref 1.1–2.2)
ALP SERPL-CCNC: 134 U/L (ref 45–117)
ALT SERPL-CCNC: 44 U/L (ref 12–78)
ANION GAP SERPL CALC-SCNC: 5 MMOL/L (ref 5–15)
ANION GAP SERPL CALC-SCNC: 5 MMOL/L (ref 5–15)
ANION GAP SERPL CALC-SCNC: 8 MMOL/L (ref 5–15)
APPEARANCE UR: ABNORMAL
ARTERIAL PATENCY WRIST A: POSITIVE
ARTERIAL PATENCY WRIST A: POSITIVE
AST SERPL-CCNC: 28 U/L (ref 15–37)
BACTERIA URNS QL MICRO: ABNORMAL /HPF
BASE DEFICIT BLD-SCNC: 8.8 MMOL/L
BASE DEFICIT BLDV-SCNC: 9.8 MMOL/L
BASOPHILS # BLD: 0.1 K/UL (ref 0–0.1)
BASOPHILS NFR BLD: 1 % (ref 0–1)
BDY SITE: ABNORMAL
BDY SITE: ABNORMAL
BILIRUB SERPL-MCNC: 0.2 MG/DL (ref 0.2–1)
BILIRUB UR QL: NEGATIVE
BUN SERPL-MCNC: 133 MG/DL (ref 6–20)
BUN SERPL-MCNC: 141 MG/DL (ref 6–20)
BUN SERPL-MCNC: 160 MG/DL (ref 6–20)
BUN/CREAT SERPL: 34 (ref 12–20)
BUN/CREAT SERPL: 41 (ref 12–20)
BUN/CREAT SERPL: 41 (ref 12–20)
CALCIUM SERPL-MCNC: 7.7 MG/DL (ref 8.5–10.1)
CALCIUM SERPL-MCNC: 7.8 MG/DL (ref 8.5–10.1)
CALCIUM SERPL-MCNC: 9.8 MG/DL (ref 8.5–10.1)
CALCULATED R AXIS, ECG10: 49 DEGREES
CALCULATED T AXIS, ECG11: 88 DEGREES
CHLORIDE SERPL-SCNC: 105 MMOL/L (ref 97–108)
CHLORIDE SERPL-SCNC: 115 MMOL/L (ref 97–108)
CHLORIDE SERPL-SCNC: 116 MMOL/L (ref 97–108)
CO2 SERPL-SCNC: 18 MMOL/L (ref 21–32)
CO2 SERPL-SCNC: 19 MMOL/L (ref 21–32)
CO2 SERPL-SCNC: 20 MMOL/L (ref 21–32)
COLOR UR: ABNORMAL
COMMENT, HOLDF: NORMAL
COVID-19 RAPID TEST, COVR: NOT DETECTED
CREAT SERPL-MCNC: 3.28 MG/DL (ref 0.55–1.02)
CREAT SERPL-MCNC: 3.48 MG/DL (ref 0.55–1.02)
CREAT SERPL-MCNC: 4.67 MG/DL (ref 0.55–1.02)
DIAGNOSIS, 93000: NORMAL
DIFFERENTIAL METHOD BLD: ABNORMAL
EOSINOPHIL # BLD: 0.1 K/UL (ref 0–0.4)
EOSINOPHIL NFR BLD: 1 % (ref 0–7)
EPITH CASTS URNS QL MICRO: ABNORMAL /LPF
ERYTHROCYTE [DISTWIDTH] IN BLOOD BY AUTOMATED COUNT: 17 % (ref 11.5–14.5)
ERYTHROCYTE [DISTWIDTH] IN BLOOD BY AUTOMATED COUNT: 17.2 % (ref 11.5–14.5)
GAS FLOW.O2 O2 DELIVERY SYS: ABNORMAL L/MIN
GAS FLOW.O2 O2 DELIVERY SYS: ABNORMAL L/MIN
GLOBULIN SER CALC-MCNC: 4.2 G/DL (ref 2–4)
GLUCOSE BLD STRIP.AUTO-MCNC: 75 MG/DL (ref 65–117)
GLUCOSE BLD STRIP.AUTO-MCNC: 78 MG/DL (ref 65–117)
GLUCOSE SERPL-MCNC: 123 MG/DL (ref 65–100)
GLUCOSE SERPL-MCNC: 149 MG/DL (ref 65–100)
GLUCOSE SERPL-MCNC: 92 MG/DL (ref 65–100)
GLUCOSE UR STRIP.AUTO-MCNC: NEGATIVE MG/DL
HCO3 BLD-SCNC: 16.6 MMOL/L (ref 22–26)
HCO3 BLDV-SCNC: 17.6 MMOL/L (ref 23–28)
HCT VFR BLD AUTO: 32.8 % (ref 35–47)
HCT VFR BLD AUTO: 40.3 % (ref 35–47)
HGB BLD-MCNC: 10.2 G/DL (ref 11.5–16)
HGB BLD-MCNC: 12.5 G/DL (ref 11.5–16)
HGB UR QL STRIP: ABNORMAL
IMM GRANULOCYTES # BLD AUTO: 0.1 K/UL (ref 0–0.04)
IMM GRANULOCYTES NFR BLD AUTO: 1 % (ref 0–0.5)
KETONES UR QL STRIP.AUTO: NEGATIVE MG/DL
LACTATE SERPL-SCNC: 1.1 MMOL/L (ref 0.4–2)
LEUKOCYTE ESTERASE UR QL STRIP.AUTO: ABNORMAL
LYMPHOCYTES # BLD: 2.6 K/UL (ref 0.8–3.5)
LYMPHOCYTES NFR BLD: 27 % (ref 12–49)
MCH RBC QN AUTO: 28.5 PG (ref 26–34)
MCH RBC QN AUTO: 28.9 PG (ref 26–34)
MCHC RBC AUTO-ENTMCNC: 31 G/DL (ref 30–36.5)
MCHC RBC AUTO-ENTMCNC: 31.1 G/DL (ref 30–36.5)
MCV RBC AUTO: 92 FL (ref 80–99)
MCV RBC AUTO: 92.9 FL (ref 80–99)
MONOCYTES # BLD: 0.5 K/UL (ref 0–1)
MONOCYTES NFR BLD: 6 % (ref 5–13)
NEUTS SEG # BLD: 6.3 K/UL (ref 1.8–8)
NEUTS SEG NFR BLD: 64 % (ref 32–75)
NITRITE UR QL STRIP.AUTO: NEGATIVE
NRBC # BLD: 0 K/UL (ref 0–0.01)
NRBC # BLD: 0 K/UL (ref 0–0.01)
NRBC BLD-RTO: 0 PER 100 WBC
NRBC BLD-RTO: 0 PER 100 WBC
PCO2 BLD: 33.2 MMHG (ref 35–45)
PCO2 BLDV: 43.8 MMHG (ref 41–51)
PH BLD: 7.31 [PH] (ref 7.35–7.45)
PH BLDV: 7.21 [PH] (ref 7.32–7.42)
PH UR STRIP: 5 [PH] (ref 5–8)
PLATELET # BLD AUTO: 271 K/UL (ref 150–400)
PLATELET # BLD AUTO: 373 K/UL (ref 150–400)
PMV BLD AUTO: 11.5 FL (ref 8.9–12.9)
PMV BLD AUTO: 11.7 FL (ref 8.9–12.9)
PO2 BLD: 142 MMHG (ref 80–100)
PO2 BLDV: 25 MMHG (ref 25–40)
POTASSIUM SERPL-SCNC: 5.2 MMOL/L (ref 3.5–5.1)
POTASSIUM SERPL-SCNC: 5.4 MMOL/L (ref 3.5–5.1)
POTASSIUM SERPL-SCNC: 6.8 MMOL/L (ref 3.5–5.1)
PROT SERPL-MCNC: 6.7 G/DL (ref 6.4–8.2)
PROT UR STRIP-MCNC: 30 MG/DL
Q-T INTERVAL, ECG07: 334 MS
QRS DURATION, ECG06: 90 MS
QTC CALCULATION (BEZET), ECG08: 449 MS
RBC # BLD AUTO: 3.53 M/UL (ref 3.8–5.2)
RBC # BLD AUTO: 4.38 M/UL (ref 3.8–5.2)
RBC #/AREA URNS HPF: ABNORMAL /HPF (ref 0–5)
SAMPLES BEING HELD,HOLD: NORMAL
SAO2 % BLD: 99 % (ref 92–97)
SAO2 % BLDV: 34.8 % (ref 65–88)
SERVICE CMNT-IMP: ABNORMAL
SERVICE CMNT-IMP: NORMAL
SERVICE CMNT-IMP: NORMAL
SODIUM SERPL-SCNC: 132 MMOL/L (ref 136–145)
SODIUM SERPL-SCNC: 138 MMOL/L (ref 136–145)
SODIUM SERPL-SCNC: 141 MMOL/L (ref 136–145)
SOURCE, COVRS: NORMAL
SP GR UR REFRACTOMETRY: 1.01 (ref 1–1.03)
SPECIMEN TYPE: ABNORMAL
SPECIMEN TYPE: ABNORMAL
TROPONIN-HIGH SENSITIVITY: 11 NG/L (ref 0–51)
UROBILINOGEN UR QL STRIP.AUTO: 0.2 EU/DL (ref 0.2–1)
VENTRICULAR RATE, ECG03: 109 BPM
WBC # BLD AUTO: 7.5 K/UL (ref 3.6–11)
WBC # BLD AUTO: 9.6 K/UL (ref 3.6–11)
WBC URNS QL MICRO: >100 /HPF (ref 0–4)

## 2022-02-06 PROCEDURE — 74011636637 HC RX REV CODE- 636/637: Performed by: EMERGENCY MEDICINE

## 2022-02-06 PROCEDURE — 83605 ASSAY OF LACTIC ACID: CPT

## 2022-02-06 PROCEDURE — 87186 SC STD MICRODIL/AGAR DIL: CPT

## 2022-02-06 PROCEDURE — C1751 CATH, INF, PER/CENT/MIDLINE: HCPCS

## 2022-02-06 PROCEDURE — 82962 GLUCOSE BLOOD TEST: CPT

## 2022-02-06 PROCEDURE — 99285 EMERGENCY DEPT VISIT HI MDM: CPT

## 2022-02-06 PROCEDURE — 74011250636 HC RX REV CODE- 250/636: Performed by: EMERGENCY MEDICINE

## 2022-02-06 PROCEDURE — 96374 THER/PROPH/DIAG INJ IV PUSH: CPT

## 2022-02-06 PROCEDURE — 85025 COMPLETE CBC W/AUTO DIFF WBC: CPT

## 2022-02-06 PROCEDURE — 96365 THER/PROPH/DIAG IV INF INIT: CPT

## 2022-02-06 PROCEDURE — 80053 COMPREHEN METABOLIC PANEL: CPT

## 2022-02-06 PROCEDURE — 36600 WITHDRAWAL OF ARTERIAL BLOOD: CPT

## 2022-02-06 PROCEDURE — 77030005513 HC CATH URETH FOL11 MDII -B

## 2022-02-06 PROCEDURE — 81001 URINALYSIS AUTO W/SCOPE: CPT

## 2022-02-06 PROCEDURE — 82803 BLOOD GASES ANY COMBINATION: CPT

## 2022-02-06 PROCEDURE — 87086 URINE CULTURE/COLONY COUNT: CPT

## 2022-02-06 PROCEDURE — P9045 ALBUMIN (HUMAN), 5%, 250 ML: HCPCS | Performed by: ANESTHESIOLOGY

## 2022-02-06 PROCEDURE — 74011000250 HC RX REV CODE- 250: Performed by: EMERGENCY MEDICINE

## 2022-02-06 PROCEDURE — 80048 BASIC METABOLIC PNL TOTAL CA: CPT

## 2022-02-06 PROCEDURE — 71045 X-RAY EXAM CHEST 1 VIEW: CPT

## 2022-02-06 PROCEDURE — 87040 BLOOD CULTURE FOR BACTERIA: CPT

## 2022-02-06 PROCEDURE — 74011250636 HC RX REV CODE- 250/636: Performed by: ANESTHESIOLOGY

## 2022-02-06 PROCEDURE — 84484 ASSAY OF TROPONIN QUANT: CPT

## 2022-02-06 PROCEDURE — 87635 SARS-COV-2 COVID-19 AMP PRB: CPT

## 2022-02-06 PROCEDURE — 65610000006 HC RM INTENSIVE CARE

## 2022-02-06 PROCEDURE — 36415 COLL VENOUS BLD VENIPUNCTURE: CPT

## 2022-02-06 PROCEDURE — 76770 US EXAM ABDO BACK WALL COMP: CPT

## 2022-02-06 PROCEDURE — 96375 TX/PRO/DX INJ NEW DRUG ADDON: CPT

## 2022-02-06 PROCEDURE — 93005 ELECTROCARDIOGRAM TRACING: CPT

## 2022-02-06 PROCEDURE — 85027 COMPLETE CBC AUTOMATED: CPT

## 2022-02-06 PROCEDURE — 74011000250 HC RX REV CODE- 250: Performed by: ANESTHESIOLOGY

## 2022-02-06 PROCEDURE — 87077 CULTURE AEROBIC IDENTIFY: CPT

## 2022-02-06 RX ORDER — MAGNESIUM CHLORIDE 70 MG
64 TABLET, DELAYED RELEASE (ENTERIC COATED) ORAL 2 TIMES DAILY
COMMUNITY

## 2022-02-06 RX ORDER — PROMETHAZINE HYDROCHLORIDE 25 MG/1
25 TABLET ORAL
COMMUNITY

## 2022-02-06 RX ORDER — DIPHENOXYLATE HYDROCHLORIDE AND ATROPINE SULFATE 2.5; .025 MG/1; MG/1
2 TABLET ORAL 4 TIMES DAILY
COMMUNITY

## 2022-02-06 RX ORDER — SODIUM BICARBONATE 84 MG/ML
50 INJECTION, SOLUTION INTRAVENOUS ONCE
Status: COMPLETED | OUTPATIENT
Start: 2022-02-06 | End: 2022-02-06

## 2022-02-06 RX ORDER — ALBUMIN HUMAN 50 G/1000ML
25 SOLUTION INTRAVENOUS ONCE
Status: COMPLETED | OUTPATIENT
Start: 2022-02-06 | End: 2022-02-07

## 2022-02-06 RX ORDER — SODIUM CHLORIDE 0.9 % (FLUSH) 0.9 %
5-40 SYRINGE (ML) INJECTION AS NEEDED
Status: DISCONTINUED | OUTPATIENT
Start: 2022-02-06 | End: 2022-02-10 | Stop reason: HOSPADM

## 2022-02-06 RX ORDER — ONDANSETRON 4 MG/1
4 TABLET, ORALLY DISINTEGRATING ORAL
Status: DISCONTINUED | OUTPATIENT
Start: 2022-02-06 | End: 2022-02-10 | Stop reason: HOSPADM

## 2022-02-06 RX ORDER — DEXTROSE 50 % IN WATER (D50W) INTRAVENOUS SYRINGE
25 ONCE
Status: COMPLETED | OUTPATIENT
Start: 2022-02-06 | End: 2022-02-06

## 2022-02-06 RX ORDER — ONDANSETRON 2 MG/ML
4 INJECTION INTRAMUSCULAR; INTRAVENOUS
Status: DISCONTINUED | OUTPATIENT
Start: 2022-02-06 | End: 2022-02-10 | Stop reason: HOSPADM

## 2022-02-06 RX ORDER — ACETAMINOPHEN 650 MG/1
650 SUPPOSITORY RECTAL
Status: DISCONTINUED | OUTPATIENT
Start: 2022-02-06 | End: 2022-02-10 | Stop reason: HOSPADM

## 2022-02-06 RX ORDER — NOREPINEPHRINE BITARTRATE/D5W 8 MG/250ML
.5-2 PLASTIC BAG, INJECTION (ML) INTRAVENOUS
Status: DISPENSED | OUTPATIENT
Start: 2022-02-06 | End: 2022-02-07

## 2022-02-06 RX ORDER — DILTIAZEM HYDROCHLORIDE 120 MG/1
120 CAPSULE, COATED, EXTENDED RELEASE ORAL DAILY
Status: ON HOLD | COMMUNITY
End: 2022-02-10 | Stop reason: SDUPTHER

## 2022-02-06 RX ORDER — LEVOFLOXACIN 5 MG/ML
750 INJECTION, SOLUTION INTRAVENOUS ONCE
Status: COMPLETED | OUTPATIENT
Start: 2022-02-06 | End: 2022-02-06

## 2022-02-06 RX ORDER — OXYCODONE AND ACETAMINOPHEN 2.5; 325 MG/1; MG/1
1 TABLET ORAL
COMMUNITY
End: 2022-03-20

## 2022-02-06 RX ORDER — ACETAMINOPHEN 325 MG/1
650 TABLET ORAL
Status: DISCONTINUED | OUTPATIENT
Start: 2022-02-06 | End: 2022-02-10 | Stop reason: HOSPADM

## 2022-02-06 RX ORDER — SODIUM CHLORIDE 0.9 % (FLUSH) 0.9 %
5-40 SYRINGE (ML) INJECTION EVERY 8 HOURS
Status: DISCONTINUED | OUTPATIENT
Start: 2022-02-06 | End: 2022-02-10 | Stop reason: HOSPADM

## 2022-02-06 RX ORDER — COLESTIPOL HYDROCHLORIDE 5 G/5G
5 GRANULE, FOR SUSPENSION ORAL 2 TIMES DAILY
COMMUNITY

## 2022-02-06 RX ORDER — POLYETHYLENE GLYCOL 3350 17 G/17G
17 POWDER, FOR SOLUTION ORAL DAILY PRN
Status: DISCONTINUED | OUTPATIENT
Start: 2022-02-06 | End: 2022-02-10 | Stop reason: HOSPADM

## 2022-02-06 RX ORDER — SODIUM CHLORIDE 9 MG/ML
150 INJECTION, SOLUTION INTRAVENOUS CONTINUOUS
Status: DISCONTINUED | OUTPATIENT
Start: 2022-02-06 | End: 2022-02-07

## 2022-02-06 RX ADMIN — WATER 2 G: 1 INJECTION INTRAMUSCULAR; INTRAVENOUS; SUBCUTANEOUS at 12:32

## 2022-02-06 RX ADMIN — Medication 5 UNITS: at 12:41

## 2022-02-06 RX ADMIN — SODIUM CHLORIDE 150 ML/HR: 9 INJECTION, SOLUTION INTRAVENOUS at 12:44

## 2022-02-06 RX ADMIN — PHENYLEPHRINE HYDROCHLORIDE 100 MCG/MIN: 10 INJECTION INTRAVENOUS at 21:47

## 2022-02-06 RX ADMIN — SODIUM CHLORIDE 1000 ML: 9 INJECTION, SOLUTION INTRAVENOUS at 10:57

## 2022-02-06 RX ADMIN — LEVOFLOXACIN 750 MG: 750 INJECTION, SOLUTION INTRAVENOUS at 12:43

## 2022-02-06 RX ADMIN — SODIUM CHLORIDE, PRESERVATIVE FREE 10 ML: 5 INJECTION INTRAVENOUS at 16:31

## 2022-02-06 RX ADMIN — DEXTROSE MONOHYDRATE 25 G: 25 INJECTION, SOLUTION INTRAVENOUS at 12:32

## 2022-02-06 RX ADMIN — SODIUM CHLORIDE 500 ML: 9 INJECTION, SOLUTION INTRAVENOUS at 12:53

## 2022-02-06 RX ADMIN — SODIUM CHLORIDE, PRESERVATIVE FREE 10 ML: 5 INJECTION INTRAVENOUS at 22:00

## 2022-02-06 RX ADMIN — SODIUM CHLORIDE 1000 ML: 9 INJECTION, SOLUTION INTRAVENOUS at 11:46

## 2022-02-06 RX ADMIN — SODIUM CHLORIDE 1000 ML: 9 INJECTION, SOLUTION INTRAVENOUS at 12:42

## 2022-02-06 RX ADMIN — ALBUMIN (HUMAN) 25 G: 12.5 INJECTION, SOLUTION INTRAVENOUS at 20:19

## 2022-02-06 RX ADMIN — ALBUMIN (HUMAN) 25 G: 12.5 INJECTION, SOLUTION INTRAVENOUS at 17:07

## 2022-02-06 RX ADMIN — SODIUM BICARBONATE 50 MEQ: 84 INJECTION, SOLUTION INTRAVENOUS at 12:31

## 2022-02-06 RX ADMIN — PHENYLEPHRINE HYDROCHLORIDE 30 MCG/MIN: 10 INJECTION INTRAVENOUS at 16:46

## 2022-02-06 RX ADMIN — SODIUM CHLORIDE 150 ML/HR: 9 INJECTION, SOLUTION INTRAVENOUS at 20:17

## 2022-02-06 NOTE — PROGRESS NOTES
[facility] Adult  Hospitalist Group      Ge Poe is a 80 y.o. female with pmh significant for A.fib,HTN,hypokalemia, sacral wounds, failure to thrive and hypothyroidism who presents with 2 week history of decreased po intake progressive generalized weakness and hypotension. Patient is A&O x 3 during assessment, but is a poor historian and relays she was brought to the hospital for a fall. Patient endorses she is experiencing chronic diarrhea which she was had for the past year. Patient reports she just feels weak and has a dry mouth. Patient denies nausea, abdominal pain, distention, fever, sob or chills. Patent endorses she has only received one does of the Covid-19 vaccine. Patient [facility] at [facility]  Evaluation in the ED is significant for initial BP of 74/49, patient remains hypotensive after 3.5 liters. Lab significant for K- 6.8, , potassium 6.8, creatinine- 4.67. ICU consulted to assess for upgrade due to persistent hypotension, critical care will assume care of patient.

## 2022-02-06 NOTE — ED TRIAGE NOTES
Pt arrives with EMS from [facility] for increased weakness. Bp 15M systolic on arrival. Hx sacral wounds and failure to thrive. Per EMS, pt has been receiving IV fluids for poor PO intake.

## 2022-02-06 NOTE — ED PROVIDER NOTES
Ms. Varsha Jeffers is an 81yo female who presents to the ER with generalized weakness and low blood pressure. Apparently, she has not been able to take much by mouth recently. She has been receiving IV fluids for dehydration. Patient was noted to be hypertensive today, so she was sent to the ER. Patient has no complaints. She denies any pain currently. Patient's history is limited by her baseline mental status. Past Medical History:   Diagnosis Date    Atrial fibrillation (HCC)     Atrial flutter (HCC)     Edema     Hypertension     Hypokalemia     Hypothyroidism     Insomnia     Major depressive disorder     Pain     UTI (urinary tract infection)     Vitamin D deficiency        Past Surgical History:   Procedure Laterality Date    HX APPENDECTOMY      approx age 21    HX GI  12/01/2021    bowel resection    HX ORTHOPAEDIC  05/01/2021    R hip          No family history on file.     Social History     Socioeconomic History    Marital status:      Spouse name: Not on file    Number of children: Not on file    Years of education: Not on file    Highest education level: Not on file   Occupational History    Not on file   Tobacco Use    Smoking status: Never Smoker    Smokeless tobacco: Never Used   Vaping Use    Vaping Use: Never used   Substance and Sexual Activity    Alcohol use: Not Currently     Alcohol/week: 3.0 standard drinks     Types: 3 Glasses of wine per week    Drug use: Never    Sexual activity: Not on file   Other Topics Concern     Service Not Asked    Blood Transfusions Not Asked    Caffeine Concern Not Asked    Occupational Exposure Not Asked    Hobby Hazards Not Asked    Sleep Concern Not Asked    Stress Concern Not Asked    Weight Concern Not Asked    Special Diet Not Asked    Back Care Not Asked    Exercise Not Asked    Bike Helmet Not Asked   [address] Not Asked    Self-Exams Not Asked   Social History Narrative    Not on file Social Determinants of Health     Financial Resource Strain:     Difficulty of Paying Living Expenses: Not on file   Food Insecurity:     Worried About Running Out of Food in the Last Year: Not on file    Florentino of Food in the Last Year: Not on file   Transportation Needs:     Lack of Transportation (Medical): Not on file    Lack of Transportation (Non-Medical): Not on file   Physical Activity:     Days of Exercise per Week: Not on file    Minutes of Exercise per Session: Not on file   Stress:     Feeling of Stress : Not on file   Social Connections:     Frequency of Communication with Friends and Family: Not on file    Frequency of Social Gatherings with Friends and Family: Not on file    Attends Pentecost[employer]ism Services: Not on file    Active Member of [address] [place] [address] or Organizations: Not on file    Attends Club or Organization Meetings: Not on file    Marital Status: Not on file   Intimate Partner Violence:     Fear of Current or Ex-Partner: Not on file    Emotionally Abused: Not on file    Physically Abused: Not on file    Sexually Abused: Not on file   Housing Stability:     Unable to Pay for Housing in the Last Year: Not on file    Number of [place] in the Last Year: Not on file    Unstable Housing in the Last Year: Not on file         ALLERGIES: Codeine, Gluten, and Sulfa (sulfonamide antibiotics)    Review of Systems   Unable to perform ROS: Dementia   Constitutional: Positive for fatigue. Vitals:    02/06/22 1035 02/06/22 1040   BP: (!) 74/49 (!) 60/30   Pulse: (!) 117    Resp: 24    Temp: 97 °F (36.1 °C)             Physical Exam     Vital signs reviewed. Nursing notes reviewed.     Const:  No acute distress, well developed, well nourished, blows kisses and saysd \"thank you\" while speaking to the staff in the room  Head:  Atraumatic, normocephalic  Eyes:  PERRL, conjunctiva normal, no scleral icterus  Neck:  Supple, trachea midline  Cardiovascular: Tachycardic, irregularly irregular  Resp:  No resp distress, no increased work of breathing,  Abd:  Soft, non-tender, non-distended, no rebound, no guarding  MSK:  No pedal edema, normal ROM  Neuro:  Alert and awake, no cranial nerve defect  Skin:  Warm, dry, intact  Psych: Pleasant and cooperative          MDM  Number of Diagnoses or Management Options     Amount and/or Complexity of Data Reviewed  Clinical lab tests: ordered and reviewed  Tests in the radiology section of CPT®: ordered and reviewed  Review and summarize past medical records: yes    Patient Progress  Patient progress: stable         Procedures    Perfect Serve Consult for Admission  12:35 PM    ED Room Number: ER20/20  Patient Name and age:  Andry Garces 80 y.o.  female  Working Diagnosis:   1. Acute cystitis without hematuria    2. Acute renal insufficiency    3. Acute hyperkalemia        COVID-19 Suspicion:  no  Sepsis present:  yes  Reassessment needed: yes  Code Status:  Do Not Resuscitate  Readmission: yes  Isolation Requirements:  no  Recommended Level of Care:  step down  Department:[facility] Adult ED - 21   Other:  Hypotensive, but BP improved with IVF. Most recent pressure was 96/70s       Total critical care time spent exclusive of procedures:  35 min. Ms. Shay Velez is an 81yo female who presents to the ER with complaints of generalized fatigue. Pt. Found to be hypotensive. Pt. Apparently hasn't been taking much PO recently and has required IVF due to dehydration. Pt. Found to be in renal failure with hyperkalemia. No EKG changes. Pt.  To be evaluated for admission by the hospitalist.

## 2022-02-06 NOTE — PROGRESS NOTES
Admission Medication Reconciliation:    Information obtained from:  patient's transfer paper from facility   RxQuery data available¹: Yes    Comments/Recommendations: Updated PTA meds/reviewed patient's allergies. 1)  Used transfer paper work from [address] [place] [address]. 2)  Medication changes (since last review): Added  - Percocet  -Phenergan   -Colestipol     Adjusted  - Lomotil (was 2 tab q6hrs PRN, now takes 2 tab q6hrs)  -doses for Vitamin D, Nystatin cream,melatonin   - Celexa (was 40mg every day, now 20mg every day)  -Diltiazem cap EX (was 240mg every day, now 120mg every day)  -Klor-Con (was 20mEq BID, now 20mEq every day)    Removed  - Levothyroxine, lisinopril, Cholestyramin,     3) Patient took morning medications. Last medication doses are entered as shown below. ¹RxQuery pharmacy benefit data reflects medications filled and processed through the patient's insurance, however   this data does NOT capture whether the medication was picked up or is currently being taken by the patient. Allergies:  Codeine, Gluten, and Sulfa (sulfonamide antibiotics)    Significant PMH/Disease States:   Past Medical History:   Diagnosis Date    Atrial fibrillation (HCC)     Atrial flutter (HCC)     Edema     Hypertension     Hypokalemia     Hypothyroidism     Insomnia     Major depressive disorder     Pain     UTI (urinary tract infection)     Vitamin D deficiency      Chief Complaint for this Admission:    Chief Complaint   Patient presents with    Lethargy    Hypotension     Prior to Admission Medications:   Prior to Admission Medications   Prescriptions Last Dose Informant Patient Reported? Taking? Hydrocolloid Dressing 4 X 5 \" bndg   Yes No   Sig: by Apply Externally route. L.acid,para-B. bifidum-S.therm (RISAQUAD) 8 billion cell cap cap 2/6/2022 at Unknown time  Yes Yes   Sig: Take 1 Capsule by mouth daily.    acetaminophen (TYLENOL) 650 mg TbER 1/28/2022  Yes Yes   Sig: Take 650 mg by mouth two (2) times daily as needed for Pain. Indications: fever, pain   apixaban (ELIQUIS) 2.5 mg tablet 2/6/2022 at Unknown time  No Yes   Sig: Take 1 Tablet by mouth two (2) times a day. aspirin 81 mg chewable tablet 2/6/2022 at Unknown time  Yes Yes   Sig: Take 81 mg by mouth daily. carvediloL (COREG) 12.5 mg tablet 2/6/2022 at Unknown time  Yes Yes   Sig: Take 12.5 mg by mouth two (2) times daily (with meals). cholecalciferol (Vitamin D3) (1000 Units /25 mcg) tablet 2/6/2022 at Unknown time  Yes Yes   Sig: Take 1,000 Units by mouth daily. citalopram (CeleXA) 20 mg tablet 2/6/2022 at Unknown time  Yes Yes   Sig: Take 20 mg by mouth daily. colestipoL (COLESTID) 5 gram packet 2/6/2022 at Unknown time  Yes Yes   Sig: Take 5 g by mouth two (2) times a day. dilTIAZem ER (Cartia XT) 120 mg capsule 2/6/2022 at Unknown time  Yes Yes   Sig: Take 120 mg by mouth daily. diphenoxylate-atropine (LomotiL) 2.5-0.025 mg per tablet   Yes Yes   Sig: Take 2 Tablets by mouth four (4) times daily. magnesium chloride (MAG DELAY) 64 mg delayed release tablet 2/6/2022 at Unknown time  Yes Yes   Sig: Take 64 mg by mouth two (2) times a day. melatonin 5 mg tablet 2/5/2022 at Unknown time  Yes Yes   Sig: Take 5 mg by mouth nightly. mesalamine ER (APRISO) 0.375 gram 24 hour capsule 2/6/2022 at Unknown time  Yes Yes   Sig: Take 1.5 g by mouth daily. nystatin (MYCOSTATIN) topical cream 1/28/2022  Yes Yes   Sig: Apply 1 g to affected area two (2) times daily as needed for Skin Irritation. oxyCODONE-acetaminophen (Percocet) 2.5-325 mg per tablet 2/5/2022 at Unknown time  Yes Yes   Sig: Take 1 Tablet by mouth every four (4) hours as needed for Pain.   potassium chloride (KLOR-CON) 20 mEq pack 2/6/2022 at Unknown time  Yes Yes   Sig: Take 20 mEq by mouth daily. therapeutic multivitamin-minerals (THERAGRAN-M) tablet 2/6/2022 at Unknown time  Yes Yes   Sig: Take 1 Tablet by mouth daily.       Facility-Administered Medications: None Please contact the main inpatient pharmacy with any questions or concerns at [phone] and we will direct you to the clinical pharmacist covering this patient's care while in-house.    Edward Peoples, LESLID

## 2022-02-06 NOTE — Clinical Note
Status[de-identified] INPATIENT [101]   Type of Bed: Telemetry [19]   Cardiac Monitoring Required?: Yes   Inpatient Hospitalization Certified Necessary for the Following Reasons: 3.  Patient receiving treatment that can only be provided in an inpatient setting (further clarification in H&P documentation)   Admitting Diagnosis: UTI (urinary tract infection) [716554]   Admitting Physician: Nathanael Crigler   Attending Physician: Nathanael Crigler   Estimated Length of Stay: 2 Midnights   Discharge Plan[de-identified] Home with Office Follow-up

## 2022-02-06 NOTE — H&P
[facility]    ICU TEAM Progress Note    Name: Jose Golden   : 3/13/1933   MRN: [id number]   Date: 2022      ICU Assessment     1. UTI  2. CURT  3. Hypotension    ICU Comprehensive Plan of Care     1. Neurological System  No issues    2. Cardiovascular System  SBP Goal of: > 90 mmHg  MAP Goal of: > 65 mmHg  Transfusion Trigger (Hgb): <7 g/dL  Keep K > 4; Mg > 2   Hold anti HTN meds  Give ASA    3. Respiratory System  No issues   SpO2 Goal: > 92%  DVT Prophylaxis: Heparin     4. Renal/GI/Endocrine System  IVFs: 150/hr  Bowel Regimen: MiraLax  Feeding:  Pending   Blood Sugar Goal 120-180 - Glycemic Control: Insulin    5. Infectious Disease  Tx for UTI  Lines: Peripheral IV  Drains: Pearson Catheter for I/o and CURT    6. PT/OT: PT consulted and on board and OT consulted and on board     7. Goals of Care Discussion with family Yes     8. Plan of Care/Code Status: Full Code    9. Discussed Care Plan with Bedside RN    10. Documentation of Current Medications    Subjective:   Progress Note: 2022      Reason for ICU Admission: Weakness    HPI:  Ms. Meg Joaquin is an 79yo female who presents to the ER with generalized weakness and low blood pressure. Apparently, she has not been able to take much by mouth recently. She has been receiving IV fluids for dehydration. Patient was noted to be hypertensive today, so she was sent to the ER. Patient has no complaints. She denies any pain currently.     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) ICD-10-CM: O10.656  ICD-9-CM: 707.03, 707.23         Lethargy ICD-10-CM: R53.83  ICD-9-CM: 780.79         Feeding difficulties ICD-10-CM: R63.30  ICD-9-CM: 783.3         Goals of care, counseling/discussion ICD-10-CM: Z71.89  ICD-9-CM: V65.49         Palliative care encounter ICD-10-CM: Z51.5  ICD-9-CM: V66.7         Small bowel ischemia (HCC) ICD-10-CM: K55.9  ICD-9-CM: 557.9               Past Medical History:      has a past medical history of Atrial fibrillation ([facility] [address] Atrial flutter ([facility] [address] Edema, Hypertension, Hypokalemia, Hypothyroidism, Insomnia, Major depressive disorder, Pain, UTI (urinary tract infection), and Vitamin D deficiency. Past Surgical History:      has a past surgical history that includes hx orthopaedic (05/01/2021); hx gi (12/01/2021); and hx appendectomy. Home Medications:     Prior to Admission medications    Medication Sig Start Date End Date Taking? Authorizing Provider   dilTIAZem ER (Cartia XT) 120 mg capsule Take 120 mg by mouth daily. Yes Provider, Historical   magnesium chloride (MAG DELAY) 64 mg delayed release tablet Take 64 mg by mouth two (2) times a day. Yes Provider, Historical   colestipoL (COLESTID) 5 gram packet Take 5 g by mouth two (2) times a day. Yes Provider, Historical   diphenoxylate-atropine (LomotiL) 2.5-0.025 mg per tablet Take 2 Tablets by mouth four (4) times daily. Yes Provider, Historical   oxyCODONE-acetaminophen (Percocet) 2.5-325 mg per tablet Take 1 Tablet by mouth every four (4) hours as needed for Pain. Yes Provider, Historical   promethazine (PHENERGAN) 25 mg tablet Take 25 mg by mouth every six (6) hours as needed for Nausea. Yes Provider, Historical   acetaminophen (TYLENOL) 650 mg TbER Take 650 mg by mouth two (2) times daily as needed for Pain. Indications: fever, pain   Yes Provider, Historical   L.acid,para-B. bifidum-S.therm (RISAQUAD) 8 billion cell cap cap Take 1 Capsule by mouth daily. Yes Provider, Historical   mesalamine ER (APRISO) 0.375 gram 24 hour capsule Take 1.5 g by mouth daily.    Yes Provider, Historical   nystatin (MYCOSTATIN) topical cream Apply 1 g to affected area two (2) times daily as needed for Skin Irritation. Yes Provider, Historical   citalopram (CeleXA) 20 mg tablet Take 20 mg by mouth daily. Yes Provider, Historical   therapeutic multivitamin-minerals (THERAGRAN-M) tablet Take 1 Tablet by mouth daily. Yes Provider, Historical   apixaban (ELIQUIS) 2.5 mg tablet Take 1 Tablet by mouth two (2) times a day. 21  Yes Jesus Velazquez MD   aspirin 81 mg chewable tablet Take 81 mg by mouth daily. Yes Other, MD Helene   carvediloL (COREG) 12.5 mg tablet Take 12.5 mg by mouth two (2) times daily (with meals). Yes Other, MD Helene   melatonin 5 mg tablet Take 5 mg by mouth nightly. Yes Other, MD Helene   potassium chloride (KLOR-CON) 20 mEq pack Take 20 mEq by mouth daily. Yes Gio, MD Helene   cholecalciferol (Vitamin D3) (1000 Units /25 mcg) tablet Take 1,000 Units by mouth daily. Yes Other, MD Helene   Hydrocolloid Dressing 4 X 5 \" bndg by Apply Externally route. Other, MD Helene       Allergies/Social/Family History: Allergies   Allergen Reactions    Codeine Unknown (comments)    Gluten Unknown (comments)    Sulfa (Sulfonamide Antibiotics) Unknown (comments)      Social History     Tobacco Use    Smoking status: Never Smoker    Smokeless tobacco: Never Used   Substance Use Topics    Alcohol use: Not Currently     Alcohol/week: 3.0 standard drinks     Types: 3 Glasses of wine per week      No family history on file. Review of Systems:     A comprehensive review of systems was negative except for that written in the HPI.     Objective:   Vital Signs:  Visit Vitals  BP (!) 88/66   Pulse (!) 106   Temp 97 °F (36.1 °C)   Resp 13   Ht 5' 4\" (1.626 m)   Wt 50.6 kg (111 lb 8.8 oz)   SpO2 98%   BMI 19.15 kg/m²        Temp (24hrs), Av °F (36.1 °C), Min:97 °F (36.1 °C), Max:97 °F (36.1 °C)           Intake/Output:     Intake/Output Summary (Last 24 hours) at 2022 [address] filed at 2/6/2022 1413  Gross per 24 hour   Intake 3660 ml   Output 150 ml   Net 3510 ml       Physical Exam:    General appearance: no distress, [place], very thin  Head: Normocephalic, without obvious abnormality, atraumatic  Eyes: negative  Throat: Lips, mucosa, and tongue normal. Teeth and gums normal  Lungs: clear to auscultation bilaterally  Heart: regular rate and rhythm, S1, S2 normal, no murmur, click, rub or gallop  Abdomen: soft, non-tender. Bowel sounds normal. No masses,  no organomegaly  Extremities: extremities normal, atraumatic, no cyanosis or edema  Pulses: 2+ and symmetric  Skin: Skin color, texture, turgor normal. No rashes or lesions  Neurologic: very pleasant, mildly confused    LABS AND  DATA: Personally reviewed  Recent Labs     02/06/22  1046   WBC 9.6   HGB 12.5   HCT 40.3        Recent Labs     02/06/22  1343 02/06/22  1046    132*   K 5.2* 6.8*   * 105   CO2 20* 19*   * 160*   CREA 3.48* 4.67*   * 123*   CA 7.7* 9.8     Recent Labs     02/06/22  1046   *   TP 6.7   ALB 2.5*   GLOB 4.2*     No results for input(s): INR, PTP, APTT, INREXT in the last 72 hours. Recent Labs     02/06/22  1255   PHI 7.31*   PCO2I 33.2*   PO2I 142*     No results for input(s): CPK, CKMB, TROIQ, BNPP in the last 72 hours. MEDS: Reviewed    Multidisciplinary Rounds Completed:  No    ABCDEF Bundle/Checklist Completed:  Yes    SPECIAL EQUIPMENT  PA Catheter and Temporary Pacemaker    DISPOSITION  Stay in ICU    CRITICAL CARE CONSULTANT NOTE  I had a face to face encounter with the patient, reviewed and interpreted patient data including clinical events, labs, images, vital signs, I/O's, and examined patient.   I have discussed the case and the plan and management of the patient's care with the consulting services, the bedside nurses and the respiratory therapist.      NOTE OF PERSONAL INVOLVEMENT IN CARE   This patient has a high probability of imminent, clinically significant deterioration, which requires the highest level of preparedness to intervene urgently. I participated in the decision-making and personally managed or directed the management of the following life and organ supporting interventions that required my frequent assessment to treat or prevent imminent deterioration. I personally spent 60 minutes of critical care time. This is time spent at this critically ill patient's bedside actively involved in patient care as well as the coordination of care. This does not include any procedural time which has been billed separately.     Isaac Garcia MD  Staff [facility]  2/6/2022

## 2022-02-06 NOTE — PROGRESS NOTES
Patient arrived from ER, Transfer report received from [facility] using SBAR. Amy toure applied for low temp, medications initiated to support Hypotension.

## 2022-02-06 NOTE — ROUTINE PROCESS
TRANSFER - OUT REPORT:    Verbal report given to TAMMY Lopez(name) on Chayo Maldonadodge  being transferred to ICU(unit) for routine progression of care       Report consisted of patients Situation, Background, Assessment and   Recommendations(SBAR). Information from the following report(s) SBAR, ED Summary, [facility] and Recent Results was reviewed with the receiving nurse. Lines:   Peripheral IV 02/06/22 Anterior;Proximal;Right Forearm (Active)       Peripheral IV 02/06/22 Left;Posterior Wrist (Active)        Opportunity for questions and clarification was provided.       Patient transported with:   Monitor  Registered Nurse

## 2022-02-06 NOTE — CONSULTS
Consultation Note    NAME: Charo Camara   :  3/13/1933   MRN:  [id number]     Date/Time:  2022 1:38 PM    I have been asked to see this patient by Dr. Mehrdad Rubio  for advice/opinion re: curt. Assessment :    Plan:  CURT  Hyperkalemia  Generalized weakness  Hypotension  Anorexia  N/v/d  UTI ATN from low BP and dehydration; creatinine 22 0.96, now 4.7    K 6.8    Bicarb 19    FARTUN pending; urine c/w UTI    No emergent need for KRT (given age and comorbid conditions, dialysis would not be a good option). IVF resuscitation (s/p NS bolus and now on NS at 150). Stop Lisinopril (I wouldn't restart given her tendency to dehydration). Stop PO K for now. Subjective:   CHIEF COMPLAINT:  curt    HISTORY OF PRESENT ILLNESS:     Vania Trujillo is a 80 y.o.   female who has a history of the below. Oriented x 3, but not to situation. States she is very weak. Very dry mouth. Mouth is painful. + n/v/d. Denies dysuria. She has presented from [facility]. Past Medical History:   Diagnosis Date    Atrial fibrillation (HCC)     Atrial flutter (HCC)     Edema     Hypertension     Hypokalemia     Hypothyroidism     Insomnia     Major depressive disorder     Pain     UTI (urinary tract infection)     Vitamin D deficiency       Past Surgical History:   Procedure Laterality Date    HX APPENDECTOMY      approx age 21    HX GI  2021    bowel resection    HX ORTHOPAEDIC  2021    R hip      Social History     Tobacco Use    Smoking status: Never Smoker    Smokeless tobacco: Never Used   Substance Use Topics    Alcohol use: Not Currently     Alcohol/week: 3.0 standard drinks     Types: 3 Glasses of wine per week      No family history on file. Allergies   Allergen Reactions    Codeine Unknown (comments)    Gluten Unknown (comments)    Sulfa (Sulfonamide Antibiotics) Unknown (comments)      Prior to Admission medications    Medication Sig Start Date End Date Taking?  Authorizing Provider dilTIAZem ER (Cartia XT) 120 mg capsule Take 120 mg by mouth daily. Yes Provider, Historical   magnesium chloride (MAG DELAY) 64 mg delayed release tablet Take 64 mg by mouth two (2) times a day. Yes Provider, Historical   colestipoL (COLESTID) 5 gram packet Take 5 g by mouth two (2) times a day. Yes Provider, Historical   diphenoxylate-atropine (LomotiL) 2.5-0.025 mg per tablet Take 2 Tablets by mouth four (4) times daily. Yes Provider, Historical   oxyCODONE-acetaminophen (Percocet) 2.5-325 mg per tablet Take 1 Tablet by mouth every four (4) hours as needed for Pain. Yes Provider, Historical   acetaminophen (TYLENOL) 650 mg TbER Take 650 mg by mouth two (2) times daily as needed for Pain. Indications: fever, pain   Yes Provider, Historical   L.acid,para-B. bifidum-S.therm (RISAQUAD) 8 billion cell cap cap Take 1 Capsule by mouth daily. Yes Provider, Historical   mesalamine ER (APRISO) 0.375 gram 24 hour capsule Take 1.5 g by mouth daily. Yes Provider, Historical   nystatin (MYCOSTATIN) topical cream Apply 1 g to affected area two (2) times daily as needed for Skin Irritation. Yes Provider, Historical   citalopram (CeleXA) 20 mg tablet Take 20 mg by mouth daily. Yes Provider, Historical   therapeutic multivitamin-minerals (THERAGRAN-M) tablet Take 1 Tablet by mouth daily. Yes Provider, Historical   apixaban (ELIQUIS) 2.5 mg tablet Take 1 Tablet by mouth two (2) times a day. 12/6/21  Yes Brandon Wallace MD   aspirin 81 mg chewable tablet Take 81 mg by mouth daily. Yes Other, MD Helene   carvediloL (COREG) 12.5 mg tablet Take 12.5 mg by mouth two (2) times daily (with meals). Yes Gio, MD Helene   melatonin 5 mg tablet Take 5 mg by mouth nightly. Yes Gio, MD Helene   potassium chloride (KLOR-CON) 20 mEq pack Take 20 mEq by mouth daily. Yes Gio, MD Helene   cholecalciferol (Vitamin D3) (1000 Units /25 mcg) tablet Take 1,000 Units by mouth daily.    Yes Gio, MD Helene   Hydrocolloid Dressing 4 X 5 \" bndg by Apply Externally route.     Other, MD Helene     REVIEW OF SYSTEMS:     []  Unable to obtain reliable ROS due to  [] mental status  [] sedated   [] intubated   [x] Total of 12 systems reviewed as follows:  Constitutional: negative fever, negative chills, negative weight loss  Eyes:   negative visual changes  ENT:   negative sore throat, tongue or lip swelling  Respiratory:  negative cough, negative dyspnea  Cards:  negative for chest pain, palpitations, lower extremity edema  GI:   + for nausea, vomiting, diarrhea; no abdominal pain  :  negative for frequency, dysuria  Integument:  negative for rash and pruritus  Heme:  negative for easy bruising and gum/nose bleeding  Musculoskel: negative for myalgias,  back pain   Neuro:  negative for headaches, dizziness, vertigo  Psych:  negative for feelings of anxiety, depression   Travel?: none    Objective:   VITALS:    Visit Vitals  BP 96/68 (BP 1 Location: Left arm)   Pulse (!) 102   Temp 97 °F (36.1 °C)   Resp 18   Ht 5' 4\" (1.626 m)   Wt 50.6 kg (111 lb 8.8 oz)   SpO2 98%   BMI 19.15 kg/m²     PHYSICAL EXAM:  Gen:  []  WD []  WN  [] cachectic [x]  Thin/frail  []  obese []  disheveled             [x]  ill apearing  []   Critical  [x]   Chronic    []  No acute distress    HEENT:   [x] NC/AT/PERRLA/EOMI    [] pink conjunctivae      [] pale conjunctivae                  PERRL  [] yes  [] no      [] moist mucosa    [] dry mucosa    hearing intact to voice [x] yes  [] No                 NECK:   supple [] yes  [] no        masses [] yes  [] No               thyroid  []  non tender  []  tender    RESP:   [x] CTA bilaterally/no wheezing/rhonchi/rales/crackles    [] rhonchi bilaterally - no dullness  [] wheezing   [] rhonchi   [] crackles     use of accessory muscles [] yes [] no    CARD:   [x]  regular rate and rhythm/No murmurs/rubs/gallops    murmur  [] yes ()  [] no      Rubs  [] yes  [] no       Gallops [] yes  [] no    Rate []  regular  [] irregular        carotid bruits  [] Right  []  Left                 LE edema [] yes  [x] no           JVP  []  yes   []  no    ABD:    [x] soft/non distended/non tender/+bowel sounds/no HSM    []  Rigid    tenderness [] yes [] no   Liver enlargement  []  yes []  no                Spleen enlargement  []  yes []  no     distended []  yes [] no     bowel sound  [] hypoactive   [] hyperactive    LYMPH:    Neck []  yes []  no       Axillae []  yes []  no    SKIN:   Rashes []  yes   []  no    Ulcers []  yes   []  no               [] tight to palpitation    skin turgor []  good  [x] poor  [x] decreased               Cyanosis/clubbing []  yes []  no    NEUR:   [x] cranial nerves II-XII grossly intact       [] Cranial nerves deficit                 []  facial droop    []  slurred speech   [] aphasic     [] Strength normal     []  weakness  []  LUE  []   RUE/ []  LLE  []   RLE    follows commands  [x]  yes []  no           PSYCH:   insight [] poor [] good   Alert and Oriented to  [x] person  [x] place  [x]  time                    [] depressed [] anxious [] agitated  [] lethargic [] stuporous  [] sedated     LAB DATA REVIEWED:    Recent Labs     02/06/22  1046   WBC 9.6   HGB 12.5   HCT 40.3        Recent Labs     02/06/22  1046   *   K 6.8*      CO2 19*   *   CREA 4.67*   *   CA 9.8     Recent Labs     02/06/22  1046   ALT 44   *   TBILI 0.2   ALB 2.5*   GLOB 4.2*     No results for input(s): INR, PTP, APTT, INREXT in the last 72 hours. No results for input(s): FE, TIBC, PSAT, FERR in the last 72 hours. No results for input(s): PH, PCO2, PO2 in the last 72 hours. No results for input(s): CPK, CKMB in the last 72 hours.     No lab exists for component: TROPONINI  Lab Results   Component Value Date/Time    Glucose (POC) 78 02/06/2022 01:07 PM    Glucose (POC) 102 12/07/2021 06:45 AM    Glucose (POC) 105 12/02/2021 05:39 AM    Glucose (POC) 161 (H) 12/01/2021 11:27 PM    Glucose (POC) 170 (H) 12/01/2021 04:38 PM       Procedures: see electronic medical records for all procedures/Xrays and details which were not copied into this note but were reviewed prior to creation of Plan.    ________________________________________________________________________       ___________________________________________________  Consulting Physician:  Liza Berry MD

## 2022-02-07 LAB
ALBUMIN SERPL-MCNC: 2.9 G/DL (ref 3.5–5)
ALBUMIN/GLOB SERPL: 1.1 {RATIO} (ref 1.1–2.2)
ALP SERPL-CCNC: 93 U/L (ref 45–117)
ALT SERPL-CCNC: 29 U/L (ref 12–78)
ANION GAP SERPL CALC-SCNC: 9 MMOL/L (ref 5–15)
AST SERPL-CCNC: 19 U/L (ref 15–37)
BASOPHILS # BLD: 0.1 K/UL (ref 0–0.1)
BASOPHILS NFR BLD: 1 % (ref 0–1)
BILIRUB SERPL-MCNC: 0.2 MG/DL (ref 0.2–1)
BUN SERPL-MCNC: 111 MG/DL (ref 6–20)
BUN/CREAT SERPL: 47 (ref 12–20)
CALCIUM SERPL-MCNC: 7.9 MG/DL (ref 8.5–10.1)
CHLORIDE SERPL-SCNC: 119 MMOL/L (ref 97–108)
CO2 SERPL-SCNC: 15 MMOL/L (ref 21–32)
CORTIS SERPL-MCNC: 22.9 UG/DL
CREAT SERPL-MCNC: 2.35 MG/DL (ref 0.55–1.02)
DIFFERENTIAL METHOD BLD: ABNORMAL
EOSINOPHIL # BLD: 0.2 K/UL (ref 0–0.4)
EOSINOPHIL NFR BLD: 1 % (ref 0–7)
ERYTHROCYTE [DISTWIDTH] IN BLOOD BY AUTOMATED COUNT: 17.2 % (ref 11.5–14.5)
GLOBULIN SER CALC-MCNC: 2.7 G/DL (ref 2–4)
GLUCOSE SERPL-MCNC: 83 MG/DL (ref 65–100)
HCT VFR BLD AUTO: 34.2 % (ref 35–47)
HGB BLD-MCNC: 10.2 G/DL (ref 11.5–16)
IMM GRANULOCYTES # BLD AUTO: 0.1 K/UL (ref 0–0.04)
IMM GRANULOCYTES NFR BLD AUTO: 1 % (ref 0–0.5)
INR PPP: 1.3 (ref 0.9–1.1)
LYMPHOCYTES # BLD: 2.5 K/UL (ref 0.8–3.5)
LYMPHOCYTES NFR BLD: 23 % (ref 12–49)
MAGNESIUM SERPL-MCNC: 1.3 MG/DL (ref 1.6–2.4)
MCH RBC QN AUTO: 28.5 PG (ref 26–34)
MCHC RBC AUTO-ENTMCNC: 29.8 G/DL (ref 30–36.5)
MCV RBC AUTO: 95.5 FL (ref 80–99)
MONOCYTES # BLD: 0.8 K/UL (ref 0–1)
MONOCYTES NFR BLD: 7 % (ref 5–13)
NEUTS SEG # BLD: 7.4 K/UL (ref 1.8–8)
NEUTS SEG NFR BLD: 67 % (ref 32–75)
NRBC # BLD: 0 K/UL (ref 0–0.01)
NRBC BLD-RTO: 0 PER 100 WBC
PHOSPHATE SERPL-MCNC: 4.6 MG/DL (ref 2.6–4.7)
PLATELET # BLD AUTO: 306 K/UL (ref 150–400)
PMV BLD AUTO: 11.5 FL (ref 8.9–12.9)
POTASSIUM SERPL-SCNC: 5.2 MMOL/L (ref 3.5–5.1)
PROT SERPL-MCNC: 5.6 G/DL (ref 6.4–8.2)
PROTHROMBIN TIME: 13.5 SEC (ref 9–11.1)
RBC # BLD AUTO: 3.58 M/UL (ref 3.8–5.2)
SODIUM SERPL-SCNC: 143 MMOL/L (ref 136–145)
TSH SERPL DL<=0.05 MIU/L-ACNC: 0.33 UIU/ML (ref 0.36–3.74)
WBC # BLD AUTO: 11.1 K/UL (ref 3.6–11)

## 2022-02-07 PROCEDURE — 74011250636 HC RX REV CODE- 250/636: Performed by: NURSE PRACTITIONER

## 2022-02-07 PROCEDURE — 85025 COMPLETE CBC W/AUTO DIFF WBC: CPT

## 2022-02-07 PROCEDURE — 82533 TOTAL CORTISOL: CPT

## 2022-02-07 PROCEDURE — 74011250636 HC RX REV CODE- 250/636: Performed by: EMERGENCY MEDICINE

## 2022-02-07 PROCEDURE — 77030018717 HC DRSG GRNUFM KCON -B

## 2022-02-07 PROCEDURE — 83735 ASSAY OF MAGNESIUM: CPT

## 2022-02-07 PROCEDURE — 74011250636 HC RX REV CODE- 250/636: Performed by: ANESTHESIOLOGY

## 2022-02-07 PROCEDURE — 74011000250 HC RX REV CODE- 250: Performed by: NURSE PRACTITIONER

## 2022-02-07 PROCEDURE — 65270000029 HC RM PRIVATE

## 2022-02-07 PROCEDURE — 74011250637 HC RX REV CODE- 250/637: Performed by: NURSE PRACTITIONER

## 2022-02-07 PROCEDURE — 74011000250 HC RX REV CODE- 250: Performed by: ANESTHESIOLOGY

## 2022-02-07 PROCEDURE — P9045 ALBUMIN (HUMAN), 5%, 250 ML: HCPCS | Performed by: NURSE PRACTITIONER

## 2022-02-07 PROCEDURE — 77030041076 HC DRSG AG OPTICELL MDII -A

## 2022-02-07 PROCEDURE — 85610 PROTHROMBIN TIME: CPT

## 2022-02-07 PROCEDURE — 74011000258 HC RX REV CODE- 258: Performed by: NURSE PRACTITIONER

## 2022-02-07 PROCEDURE — 2709999900 HC NON-CHARGEABLE SUPPLY

## 2022-02-07 PROCEDURE — 80053 COMPREHEN METABOLIC PANEL: CPT

## 2022-02-07 PROCEDURE — 87205 SMEAR GRAM STAIN: CPT

## 2022-02-07 PROCEDURE — 74011250637 HC RX REV CODE- 250/637: Performed by: ANESTHESIOLOGY

## 2022-02-07 PROCEDURE — 84100 ASSAY OF PHOSPHORUS: CPT

## 2022-02-07 PROCEDURE — 99223 1ST HOSP IP/OBS HIGH 75: CPT | Performed by: PHYSICAL MEDICINE & REHABILITATION

## 2022-02-07 PROCEDURE — 84443 ASSAY THYROID STIM HORMONE: CPT

## 2022-02-07 PROCEDURE — 36415 COLL VENOUS BLD VENIPUNCTURE: CPT

## 2022-02-07 RX ORDER — ALPRAZOLAM 0.25 MG/1
0.25 TABLET ORAL
Status: DISCONTINUED | OUTPATIENT
Start: 2022-02-07 | End: 2022-02-10 | Stop reason: HOSPADM

## 2022-02-07 RX ORDER — HYDROCORTISONE SODIUM SUCCINATE 100 MG/2ML
50 INJECTION, POWDER, FOR SOLUTION INTRAMUSCULAR; INTRAVENOUS EVERY 6 HOURS
Status: DISCONTINUED | OUTPATIENT
Start: 2022-02-07 | End: 2022-02-07

## 2022-02-07 RX ORDER — ALBUMIN HUMAN 50 G/1000ML
25 SOLUTION INTRAVENOUS ONCE
Status: COMPLETED | OUTPATIENT
Start: 2022-02-07 | End: 2022-02-07

## 2022-02-07 RX ORDER — MIDODRINE HYDROCHLORIDE 5 MG/1
5 TABLET ORAL EVERY 12 HOURS
Status: DISCONTINUED | OUTPATIENT
Start: 2022-02-07 | End: 2022-02-09

## 2022-02-07 RX ORDER — SODIUM BICARBONATE IN D5W 150/1000ML
PLASTIC BAG, INJECTION (ML) INTRAVENOUS CONTINUOUS
Status: DISCONTINUED | OUTPATIENT
Start: 2022-02-07 | End: 2022-02-07 | Stop reason: CLARIF

## 2022-02-07 RX ORDER — CITALOPRAM 20 MG/1
20 TABLET, FILM COATED ORAL DAILY
Status: DISCONTINUED | OUTPATIENT
Start: 2022-02-07 | End: 2022-02-10 | Stop reason: HOSPADM

## 2022-02-07 RX ORDER — HYDROCORTISONE SODIUM SUCCINATE 100 MG/2ML
50 INJECTION, POWDER, FOR SOLUTION INTRAMUSCULAR; INTRAVENOUS EVERY 6 HOURS
Status: DISCONTINUED | OUTPATIENT
Start: 2022-02-07 | End: 2022-02-08

## 2022-02-07 RX ADMIN — CITALOPRAM HYDROBROMIDE 20 MG: 20 TABLET ORAL at 11:36

## 2022-02-07 RX ADMIN — ALPRAZOLAM 0.25 MG: 0.5 TABLET ORAL at 23:17

## 2022-02-07 RX ADMIN — PHENYLEPHRINE HYDROCHLORIDE 100 MCG/MIN: 10 INJECTION INTRAVENOUS at 01:54

## 2022-02-07 RX ADMIN — SODIUM CHLORIDE, PRESERVATIVE FREE 10 ML: 5 INJECTION INTRAVENOUS at 23:18

## 2022-02-07 RX ADMIN — NOREPINEPHRINE BITARTRATE 4 MCG/MIN: 1 INJECTION, SOLUTION, CONCENTRATE INTRAVENOUS at 01:50

## 2022-02-07 RX ADMIN — ALBUMIN (HUMAN) 25 G: 12.5 INJECTION, SOLUTION INTRAVENOUS at 15:10

## 2022-02-07 RX ADMIN — WATER 2 G: 1 INJECTION INTRAMUSCULAR; INTRAVENOUS; SUBCUTANEOUS at 01:30

## 2022-02-07 RX ADMIN — HYDROCORTISONE SODIUM SUCCINATE 50 MG: 100 INJECTION, POWDER, FOR SOLUTION INTRAMUSCULAR; INTRAVENOUS at 20:22

## 2022-02-07 RX ADMIN — SODIUM CHLORIDE, PRESERVATIVE FREE 10 ML: 5 INJECTION INTRAVENOUS at 06:07

## 2022-02-07 RX ADMIN — HYDROCORTISONE SODIUM SUCCINATE 50 MG: 100 INJECTION, POWDER, FOR SOLUTION INTRAMUSCULAR; INTRAVENOUS at 15:11

## 2022-02-07 RX ADMIN — ALBUMIN (HUMAN) 25 G: 12.5 INJECTION, SOLUTION INTRAVENOUS at 09:17

## 2022-02-07 RX ADMIN — PHENYLEPHRINE HYDROCHLORIDE 100 MCG/MIN: 10 INJECTION INTRAVENOUS at 06:05

## 2022-02-07 RX ADMIN — SODIUM BICARBONATE: 84 INJECTION, SOLUTION INTRAVENOUS at 08:36

## 2022-02-07 RX ADMIN — MIDODRINE HYDROCHLORIDE 5 MG: 5 TABLET ORAL at 20:22

## 2022-02-07 RX ADMIN — MIDODRINE HYDROCHLORIDE 5 MG: 5 TABLET ORAL at 11:36

## 2022-02-07 RX ADMIN — SODIUM BICARBONATE: 84 INJECTION, SOLUTION INTRAVENOUS at 19:09

## 2022-02-07 RX ADMIN — CEFEPIME 2 G: 2 INJECTION, POWDER, FOR SOLUTION INTRAVENOUS at 10:23

## 2022-02-07 RX ADMIN — SODIUM CHLORIDE, PRESERVATIVE FREE 10 ML: 5 INJECTION INTRAVENOUS at 13:24

## 2022-02-07 RX ADMIN — SODIUM CHLORIDE 150 ML/HR: 9 INJECTION, SOLUTION INTRAVENOUS at 01:54

## 2022-02-07 RX ADMIN — NOREPINEPHRINE BITARTRATE 13 MCG/MIN: 1 INJECTION, SOLUTION, CONCENTRATE INTRAVENOUS at 15:19

## 2022-02-07 RX ADMIN — NOREPINEPHRINE BITARTRATE 11 MCG/MIN: 1 INJECTION, SOLUTION, CONCENTRATE INTRAVENOUS at 16:23

## 2022-02-07 RX ADMIN — ACETAMINOPHEN 650 MG: 325 TABLET ORAL at 11:36

## 2022-02-07 NOTE — PROGRESS NOTES
Reason for Readmission:    UTI          RUR Score/Risk Level:     24%    PCP: First and Last name:  Dr Sumi Carranza   Name of Practice:    Are you a current patient: Yes/No: Yes   Approximate date of last visit:    Can you participate in a virtual visit with your PCP:     Is a Care Conference indicated: No      Did you attend your follow up appointment (s): If not, why not: Patient followed at Rehab         Resources/supports as identified by patient/family: Daughter and 2 sons. Patient lives at [address] facing patient (as identified by patient/family and CM): Finances/Medication cost?   Confirmed insurance to be Medicare A,B with [employer]   Will need \[facility]\""     Support system or lack thereof?   1 daughter and 2 sons    Living arrangements? [facility] [address]       Self-care/ADLs/Cognition? Needs assistance. Oriented to name        Current Advanced Directive/Advance Care Plan: On file, patient is a DNR           Plan for utilizing home health:   NA             Transition of Care Plan:    Based on readmission, the patient's previous Plan of Care   has been evaluated and/or modified. The current Transition of Care Plan is:  Return to [facility] to complete rehab. Care manager spoke with daughter Roxi Ronquillo [phone]  via phone to introduce self and explain role. Patient lives at [facility] [address] [phone]. Patient was at [facility] for short term rehab. Plan will be for patient to return to [facility] to complete rehab. Per daughter patient was ambulating with a walker and needs assistance with ADL's. CM confirmed demographic information. Referral made to [facility]. Care manager left IM letter in room for daughter. Will follow for transitions of care. Viky Camacho RN,Care Manager  Care Management Interventions  PCP Verified by CM:  Yes (Dr Sumi Carranza )  Mode of Transport at Discharge: S  Physical Therapy Consult: No  Occupational Therapy Consult: No  Speech Therapy Consult: No  Support Systems: Child(grisel) (Daughter Bryan Simon [phone])

## 2022-02-07 NOTE — PROGRESS NOTES
Order for PICC line noted. Review of record and discussion w/PHILIPPE Black; okay to wait 48 hours for [facility] results. Will put on hold and re-evaluate 2/8/22 for vascular access needs.

## 2022-02-07 NOTE — PROGRESS NOTES
*ATTENTION: This note has been created by a nurse practitioner student for educational purposes only. Please do not refer to the content of this note for clinical decision-making, billing, or other purposes. Please see attending providers note to obtain clinical information on this patient. *           Critical Care Progress Note    Name: Bennie Diaz   : 3/13/1933   MRN: [id number]   Date: 2022      Interval Admission HPI:  Bennie Diaz is a 80 y.o. female with pmhx significant for A.fib,HTN,hypokalemia, sacral wounds, failure to thrive and hypothyroidism who presented to [facility] ER with 2 week history of decreased po intake progressive generalized weakness and hypotension. Patient endorses she is experiencing chronic diarrhea which she was had for the past year. Last 2021 she had a fall and hospitalization for a broken hip and for a perforated bowel as well. She was transferred to ICU for persistent hypotension and has pending urine cultures and cultures from her sacral wound as well.        Subjective:   Overnight Events:   Switched fluids to sodium bicarb this morning due to acidosis. Sending a cortisol level to determine if adrenal insufficiency could be causing her persistent hypotension. Will plan to wean vasopressors as her blood pressures allows.    POD:  * No surgery found *    S/P:       Active Problem List:     Problem List  Date Reviewed: 2021          Codes Class    UTI (urinary tract infection) ICD-10-CM: N39.0  ICD-9-CM: 599.0         Sepsis ([facility] [address] ICD-10-CM: A41.9  ICD-9-CM: 038.9, 995.91         Diarrhea ICD-10-CM: R19.7  ICD-9-CM: 787.91         Dehydration ICD-10-CM: E86.0  ICD-9-CM: 276.51         Stage III pressure ulcer of sacral region [facility]) ICD-10-CM: L89.153  ICD-9-CM: 707.03, 707.23         Lethargy ICD-10-CM: R53.83  ICD-9-CM: 780.79         Feeding difficulties ICD-10-CM: R63.30  ICD-9-CM: 783.3         Goals of care, counseling/discussion ICD-10-CM: Z71.89  ICD-9-CM: V65.49         Palliative care encounter ICD-10-CM: Z51.5  ICD-9-CM: V66.7         Small bowel ischemia (HCC) ICD-10-CM: K55.9  ICD-9-CM: 557.9               Past Medical History:    has a past medical history of Atrial fibrillation ([facility] [address] Atrial flutter ([facility] [address] Edema, Hypertension, Hypokalemia, Hypothyroidism, Insomnia, Major depressive disorder, Pain, UTI (urinary tract infection), and Vitamin D deficiency. Past Surgical History:    has a past surgical history that includes hx orthopaedic (05/01/2021); hx gi (12/01/2021); and hx appendectomy. Home Medications:     Prior to Admission medications    Medication Sig Start Date End Date Taking? Authorizing Provider   dilTIAZem ER (Cartia XT) 120 mg capsule Take 120 mg by mouth daily. Yes Provider, Historical   magnesium chloride (MAG DELAY) 64 mg delayed release tablet Take 64 mg by mouth two (2) times a day. Yes Provider, Historical   colestipoL (COLESTID) 5 gram packet Take 5 g by mouth two (2) times a day. Yes Provider, Historical   diphenoxylate-atropine (LomotiL) 2.5-0.025 mg per tablet Take 2 Tablets by mouth four (4) times daily. Yes Provider, Historical   oxyCODONE-acetaminophen (Percocet) 2.5-325 mg per tablet Take 1 Tablet by mouth every four (4) hours as needed for Pain. Yes Provider, Historical   promethazine (PHENERGAN) 25 mg tablet Take 25 mg by mouth every six (6) hours as needed for Nausea. Yes Provider, Historical   acetaminophen (TYLENOL) 650 mg TbER Take 650 mg by mouth two (2) times daily as needed for Pain. Indications: fever, pain   Yes Provider, Historical   L.acid,para-B. bifidum-S.therm (RISAQUAD) 8 billion cell cap cap Take 1 Capsule by mouth daily. Yes Provider, Historical   mesalamine ER (APRISO) 0.375 gram 24 hour capsule Take 1.5 g by mouth daily. Yes Provider, Historical   nystatin (MYCOSTATIN) topical cream Apply 1 g to affected area two (2) times daily as needed for Skin Irritation.    Yes Provider, Historical   citalopram (CeleXA) 20 mg tablet Take 20 mg by mouth daily. Yes Provider, Historical   therapeutic multivitamin-minerals (THERAGRAN-M) tablet Take 1 Tablet by mouth daily. Yes Provider, Historical   apixaban (ELIQUIS) 2.5 mg tablet Take 1 Tablet by mouth two (2) times a day. 21  Yes Bryan Huddleston MD   aspirin 81 mg chewable tablet Take 81 mg by mouth daily. Yes Other, MD Helene   carvediloL (COREG) 12.5 mg tablet Take 12.5 mg by mouth two (2) times daily (with meals). Yes Other, MD Helene   melatonin 5 mg tablet Take 5 mg by mouth nightly. Yes Other, MD Helene   potassium chloride (KLOR-CON) 20 mEq pack Take 20 mEq by mouth daily. Yes Other, MD Helene   cholecalciferol (Vitamin D3) (1000 Units /25 mcg) tablet Take 1,000 Units by mouth daily. Yes Gio, MD Helene   Hydrocolloid Dressing 4 X 5 \" bndg by Apply Externally route. Other, MD Helene       Allergies/Social/Family History: Allergies   Allergen Reactions    Codeine Unknown (comments)    Gluten Unknown (comments)    Sulfa (Sulfonamide Antibiotics) Unknown (comments)      Social History     Tobacco Use    Smoking status: Never Smoker    Smokeless tobacco: Never Used   Substance Use Topics    Alcohol use: Not Currently     Alcohol/week: 3.0 standard drinks     Types: 3 Glasses of wine per week      No family history on file.     Review of Systems:   A comprehensive review of systems was negative except for: Constitutional: positive for anorexia and weight loss  Gastrointestinal: positive for nausea, vomiting and diarrhea  Musculoskeletal: positive for muscle weakness    Objective:   Vital Signs:  Visit Vitals  /63   Pulse 92   Temp 97.5 °F (36.4 °C)   Resp 13   Ht 5' 4\" (1.626 m)   Wt 52.5 kg (115 lb 11.9 oz)   SpO2 100%   BMI 19.87 kg/m²    O2 Flow Rate (L/min): 2 l/min O2 Device: None (Room air) Temp (24hrs), Av.9 °F (36.1 °C), Min:95.5 °F (35.3 °C), Max:97.7 °F (36.5 °C)           Intake/Output: Intake/Output Summary (Last 24 hours) at 2/7/2022 2157  Last data filed at 2/7/2022 0700  Gross per 24 hour   Intake 8150.73 ml   Output 1885 ml   Net 6265.73 ml       Physical Exam:  General:  alert, cooperative, no distress, appears stated age  Eye:  R and L pupils round and reactive   Neurologic:  Oriented, follows commands  Lungs:  clear to auscultation bilaterally  Heart:  Irregular rhythm but controlled rate., S1, S2 normal, no murmur  Abdomen:  Soft, non-tender, surgical scar s/p bowel perforation  Cardiovascular:  Palpable pulses   Skin:Stage III Sacral Wound     LABS AND  DATA: Personally reviewed  Recent Labs     02/07/22  0605 02/06/22  1639   WBC 11.1* 7.5   HGB 10.2* 10.2*   HCT 34.2* 32.8*    271     Recent Labs     02/07/22  0605 02/06/22  1639    138   K 5.2* 5.4*   * 115*   CO2 15* 18*   * 133*   CREA 2.35* 3.28*   GLU 83 92   CA 7.9* 7.8*   PHOS 4.6  --      Recent Labs     02/07/22  0605 02/06/22  1046   AP 93 134*   TP 5.6* 6.7   ALB 2.9* 2.5*   GLOB 2.7 4.2*     No results for input(s): INR, PTP, APTT, INREXT in the last 72 hours. Recent Labs     02/06/22  1255   PHI 7.31*   PCO2I 33.2*   PO2I 142*     No results for input(s): CPK, CKMB, TROIQ, BNPP in the last 72 hours.         MEDS:   Current Facility-Administered Medications:     sodium bicarbonate (8.4%) 150 mEq in dextrose 5% 1,000 mL infusion, , IntraVENous, CONTINUOUS, Jan Morocho NP    albumin human 5% (BUMINATE) solution 25 g, 25 g, IntraVENous, ONCE, Wayne Jiang, NP-C    cefTRIAXone (ROCEPHIN) 2 g in sterile water (preservative free) 20 mL IV syringe, 2 g, IntraVENous, Q24H, Lupe Ahuja, COOPERP, 2 g at 02/07/22 0130    sodium chloride (NS) flush 5-40 mL, 5-40 mL, IntraVENous, Q8H, Arleen Chopra MD, 10 mL at 02/07/22 0607    sodium chloride (NS) flush 5-40 mL, 5-40 mL, IntraVENous, PRN, Deyanira Parkinson MD    acetaminophen (TYLENOL) tablet 650 mg, 650 mg, Oral, Q6H PRN **OR** acetaminophen (TYLENOL) suppository 650 mg, 650 mg, Rectal, Q6H PRN, Christiano Jim MD    polyethylene glycol (MIRALAX) packet 17 g, 17 g, Oral, DAILY PRN, Christiano Jim MD    ondansetron (ZOFRAN ODT) tablet 4 mg, 4 mg, Oral, Q8H PRN **OR** ondansetron (ZOFRAN) injection 4 mg, 4 mg, IntraVENous, Q6H PRN, Christiano Jim MD    PHENYLephrine (CLAIRE-SYNEPHRINE) 30 mg in 0.9% sodium chloride 250 mL infusion,  mcg/min, IntraVENous, TITRATE, Arleen Chopra MD, Last Rate: 50 mL/hr at 02/07/22 0605, 100 mcg/min at 02/07/22 0605    NOREPINephrine (LEVOPHED) 8 mg in 5% dextrose 250mL (32 mcg/mL) infusion, 0.5-20 mcg/min, IntraVENous, TITRATE, Gui Malin NP, Last Rate: 13.1 mL/hr at 02/07/22 0234, 7 mcg/min at 02/07/22 0234    Chest X-Ray:  CXR Results  (Last 48 hours)               02/06/22 1112  XR CHEST PORT Final result    Impression:  No acute process seen       Narrative:  EXAM: XR CHEST PORT       INDICATION: Chest Pain       COMPARISON: 11/19/2021       FINDINGS: A portable AP radiograph of the chest was obtained at 1109 hours. The   patient is on a cardiac monitor. The lungs are clear. The cardiac and   mediastinal contours and pulmonary vascularity are stable. The bones and soft   tissues are grossly within normal limits. ECHO:  11/19/2021  · LV: Estimated LVEF is 55 - 60%. Normal cavity size, wall thickness and systolic function (ejection fraction normal). Wall motion: normal.  · MV: Mitral valve non-specific thickening. Mild mitral annular calcification. Mild mitral valve regurgitation is present. · TV: Mild tricuspid valve regurgitation is present. Assessment:     1. UTI  2. CURT  3. Hypotension  4. Septic Shock d/t UTI  5. Metabolic Acidosis    ICU Comprehensive Plan of Care:   Plans for this Shift:     1. Neuro/ Pain/ Sedation   a. PRN Acetaminophen   2. Resp   a. COVID Treatment: Negative for COVID-19  b. SpO2 Goal: > 92%  3. CVS   a.  SBP Goal of: > 90 mmHg  b. MAP Goal of: > 65 mmHg  c. Phenylephrine and Norepinephrine - For above SBP/MAP goals  d. IVFs: Sodium Bicabonate 100 ml/hr  4. Heme/ Onc  a. Chronic Anemia   b. Transfusion Trigger (Hgb): <7 g/dL  5. GI  a. Chronic diarrhea  6. Renal/   a. Improving CURT, current Creat 2.35  b. Metabolic Acidosis, starting sodium bicarbonate IVF   a. UTI   b. Cultures pending   7. ID  a. UTI   b. Antibiotics: Cefepime  8. Endocrine  a. Cortisol level drawn today to check for adrenal insufficiency  9. Musc/Integ  a. Lower Extremity weakness    Discussed Plan of Care/Code Status: Yes / Do Not Resuscitate  Appreciate Consultants Input: Nephrology   Discussed Care Plan with Bedside RN  Documentation of Current Medications    F - Feeding:  Yes   A - Analgesia: Acetaminophen  S - Sedation: N/A  T - DVT Prophylaxis: SCD's or Sequential Compression Device   H - Head of Bed: > 30 Degrees  U - Ulcer Prophylaxis: Not at this time   G - Glycemic Control: Prevent Hypoglycemia / PRN SSI  S - Spontaneous Breathing Trial: N/A  B - Bowel Regimen: MiraLax  I - Indwelling Catheter:   Tubes: None  Lines: Peripheral IV  Drains: Pearson Catheter  D - De-escalation of Antibiotics: No     Multidisciplinary Rounds Completed: Yes    ABCDEF Bundle/Checklist Completed:  Yes    SPECIAL EQUIPMENT  None    DISPOSITION  Stay in ICU    CRITICAL CARE CONSULTANT NOTE  I had a face to face encounter with the patient, reviewed and interpreted patient data including clinical events, labs, images, vital signs, I/O's, and examined patient. I have discussed the case and the plan and management of the patient's care with the consulting services, the bedside nurses and the respiratory therapist.      NOTE OF PERSONAL INVOLVEMENT IN CARE   This patient has a high probability of imminent, clinically significant deterioration, which requires the highest level of preparedness to intervene urgently.  I participated in the decision-making and personally managed or directed the management of the following life and organ supporting interventions that required my frequent assessment to treat or prevent imminent deterioration. EMR entered and chart reviewed in the course of carrying out educational functions and responsibilities as a Nurse Practitioner student following Regla Sheets NP.     Amarjit Burns, VAIBHAV  [employer] [facility] [employer]

## 2022-02-07 NOTE — CONSULTS
Palliative Medicine Consult  [place]: [phone] ([phone])    Patient Name: Bird Hunter  YOB: 1933    Date of Initial Consult: 2/7/22  Reason for Consult: Care decisions   Requesting Provider: Kortney De La Fuente  Primary Care Physician: Kaushal Le MD     SUMMARY:   Bird Hunter is a 80 y.o. with a past history of a fib, hip fx s/p repair 2021, HTN, and hx pneumoperitoneum and ischemic colon 11/2021 s/p R colectomy and small bowel resection who was admitted on 2/6/2022 from [facility] (normally lives at [address]) with weakness, hypotension, poor po intake. Found to have UTI and CURT. On pressors in ICU. Renal function improving. Current medical issues leading to Palliative Medicine involvement include: care decisions. Social: Pt has 3 children, dtr Zoya is [place]. PALLIATIVE DIAGNOSES:   1. Debility, generalized weakness after mult medical events in 2021  2. Sacral decubitus ulcer, declined wound vac this AM  3. CURT, dehydration   4. Poor appetite, weight loss - 12/2021 at 74.4kg, now 52kg   5. Goals of care       PLAN:   1. Meet w/ pt along w/ chaplain Britton- pt alert/oriented, has no complaints of pain but does endorse significant weakness. 2. Speak to dtr/Arik Kenny- our team actually met pt back in 48/4053 when it was uncertain how pt would do after major surgery. Ever since hip fx last year, pt has had ongoing medical issues but has recovered from each one. Family even surprised how she has overcome so much- although they also realize that w/ each event pt has been weaker and more susceptible to decline. 3. Since R colectomy and small bowel resection pt has had loose stools w/ any po intake and as a result has been eating/drinking minimally. Note significant weight loss since 2 months ago (if weights correct, ~ 40lbs) and now pt w/ sacral decub ulcer, less mobility. Uncertain how well decub will be able to heal w/ current nutritional status.  Family shares these concerns, requests dietician consult. 4. Dtr states that family has also noticed worsening cognitive function ca with short term memory. Do recommend that if pt declines an intervention to have dtr Zoya on speakerphone to help w/ understanding recommendations. 5. Goals clear for recovery as possible. Pt w/ DNR status and will need DDNR as discharge comes closer. 6. Following w/ you.  7. Initial consult note routed to primary continuity provider and/or primary health care team members  8. Communicated plan of care with: Palliative [facility] Team     GOALS OF CARE / TREATMENT PREFERENCES:     GOALS OF CARE:  Patient/Health Care Proxy Stated Goals: Rehabilitation    TREATMENT PREFERENCES:   Code Status: DNR    Patient and family's personal goals include: to have pt eat more      Advance Care Planning:  [x] [facility] Interdisciplinary Team has updated the ACP Navigator with Health Care Decision Maker and Patient Capacity      Primary Decision Maker: Jacky Perez - Daughter - [phone]    Secondary Decision Maker: Lorrie Fitzgerald Son - [phone]    Supplemental (Other) Decision Maker: Faith Jama - [phone]  Advance Care Planning 1/19/2022   Patient's [address] is: Named in scanned ACP document   Confirm Advance Directive Yes, on file       Medical Interventions: Limited additional interventions       Other:    As far as possible, the palliative care team has discussed with patient / health care proxy about goals of care / treatment preferences for patient. HISTORY:     History obtained from: chart, pt, family, staff     CHIEF COMPLAINT: fatigue    HPI/SUBJECTIVE:    The patient is:   [x] Verbal and participatory  [] Non-participatory due to: Pt awake, alert. Denies any pain.      Clinical Pain Assessment (nonverbal scale for severity on nonverbal patients):   Clinical Pain Assessment  Severity: 0          Duration: for how long has pt been experiencing pain (e.g., 2 days, 1 month, years)  Frequency: how often pain is an issue (e.g., several times per day, once every few days, constant)     FUNCTIONAL ASSESSMENT:     Palliative Performance Scale (PPS):  PPS: 40       PSYCHOSOCIAL/SPIRITUAL SCREENING:     Palliative IDT has assessed this patient for cultural preferences / practices and a referral made as appropriate to needs (Cultural Services, Patient Advocacy, Ethics, etc.)    Any spiritual / Sabianist concerns:  [] Yes /  [x] No   If \"Yes\" to discuss with pastoral care during IDT     Does caregiver feel burdened by caring for their loved one:   [] Yes /  [x] No /  [] No Caregiver Present    If \"Yes\" to discuss with social work during IDT    Anticipatory grief assessment:   [x] Normal  / [] Maladaptive     If \"Maladaptive\" to discuss with social work during IDT    ESAS Anxiety: Anxiety: 0    ESAS Depression: Depression: 0        REVIEW OF SYSTEMS:     Positive and pertinent negative findings in ROS are noted above in HPI. The following systems were [x] reviewed / [] unable to be reviewed as noted in HPI  Other findings are noted below. Systems: constitutional, ears/nose/mouth/throat, respiratory, gastrointestinal, genitourinary, musculoskeletal, integumentary, neurologic, psychiatric, endocrine. Positive findings noted below. Modified ESAS Completed by: provider   Fatigue: 8 Drowsiness: 0   Depression: 0 Pain: 0   Anxiety: 0 Nausea: 0   Anorexia: 8 Dyspnea: 0           Stool Occurrence(s): 1        PHYSICAL EXAM:     From RN flowsheet:  Wt Readings from Last 3 Encounters:   02/06/22 115 lb 11.9 oz (52.5 kg)   12/07/21 162 lb 0.6 oz (73.5 kg)     Blood pressure (!) 127/58, pulse 95, temperature 98.4 °F (36.9 °C), resp. rate 17, height 5' 4\" (1.626 m), weight 115 lb 11.9 oz (52.5 kg), SpO2 100 %.     Pain Scale 1: Numeric (0 - 10)  Pain Intensity 1: 0                Constitutional: awake, oriented, NAD  Eyes: pupils equal, anicteric  ENMT: no nasal discharge, moist mucous membranes  Respiratory: breathing not labored  Neurologic: following commands, moving all extremities  Psychiatric: full affect, no hallucinations       HISTORY:     Active Problems:    UTI (urinary tract infection) (2/6/2022)      Sepsis ([address] (2/6/2022)      Past Medical History:   Diagnosis Date    Atrial fibrillation (HCC)     Atrial flutter (HCC)     Edema     Hypertension     Hypokalemia     Hypothyroidism     Insomnia     Major depressive disorder     Pain     UTI (urinary tract infection)     Vitamin D deficiency       Past Surgical History:   Procedure Laterality Date    HX APPENDECTOMY      approx age 21    HX GI  12/01/2021    bowel resection    HX ORTHOPAEDIC  05/01/2021    R hip       No family history on file. History reviewed, no pertinent family history.   Social History     Tobacco Use    Smoking status: Never Smoker    Smokeless tobacco: Never Used   Substance Use Topics    Alcohol use: Not Currently     Alcohol/week: 3.0 standard drinks     Types: 3 Glasses of wine per week     Allergies   Allergen Reactions    Codeine Unknown (comments)    Gluten Unknown (comments)    Sulfa (Sulfonamide Antibiotics) Unknown (comments)      Current Facility-Administered Medications   Medication Dose Route Frequency    sodium bicarbonate (8.4%) 150 mEq in dextrose 5% 1,000 mL infusion   IntraVENous CONTINUOUS    citalopram (CELEXA) tablet 20 mg  20 mg Oral DAILY    cefepime (MAXIPIME) 2 g in 0.9% sodium chloride 10 mL IV syringe  2 g IntraVENous Q24H    [Held by provider] apixaban (ELIQUIS) tablet 2.5 mg  2.5 mg Oral Q12H    midodrine (PROAMATINE) tablet 5 mg  5 mg Oral Q12H    albumin human 5% (BUMINATE) solution 25 g  25 g IntraVENous ONCE    hydrocortisone Sod Succ (PF) (SOLU-CORTEF) injection 50 mg  50 mg IntraVENous Q6H    sodium chloride (NS) flush 5-40 mL  5-40 mL IntraVENous Q8H    sodium chloride (NS) flush 5-40 mL  5-40 mL IntraVENous PRN    acetaminophen (TYLENOL) tablet 650 mg  650 mg Oral Q6H PRN    Or    acetaminophen (TYLENOL) suppository 650 mg  650 mg Rectal Q6H PRN    polyethylene glycol (MIRALAX) packet 17 g  17 g Oral DAILY PRN    ondansetron (ZOFRAN ODT) tablet 4 mg  4 mg Oral Q8H PRN    Or    ondansetron (ZOFRAN) injection 4 mg  4 mg IntraVENous Q6H PRN    NOREPINephrine (LEVOPHED) 8 mg in 5% dextrose 250mL (32 mcg/mL) infusion  0.5-20 mcg/min IntraVENous TITRATE          LAB AND IMAGING FINDINGS:     Lab Results   Component Value Date/Time    WBC 11.1 (H) 02/07/2022 06:05 AM    HGB 10.2 (L) 02/07/2022 06:05 AM    PLATELET 299 36/65/3906 06:05 AM     Lab Results   Component Value Date/Time    Sodium 143 02/07/2022 06:05 AM    Potassium 5.2 (H) 02/07/2022 06:05 AM    Chloride 119 (H) 02/07/2022 06:05 AM    CO2 15 (LL) 02/07/2022 06:05 AM     (H) 02/07/2022 06:05 AM    Creatinine 2.35 (H) 02/07/2022 06:05 AM    Calcium 7.9 (L) 02/07/2022 06:05 AM    Magnesium 1.3 (L) 02/07/2022 06:05 AM    Phosphorus 4.6 02/07/2022 06:05 AM      Lab Results   Component Value Date/Time    Alk. phosphatase 93 02/07/2022 06:05 AM    Protein, total 5.6 (L) 02/07/2022 06:05 AM    Albumin 2.9 (L) 02/07/2022 06:05 AM    Globulin 2.7 02/07/2022 06:05 AM     Lab Results   Component Value Date/Time    INR 1.3 (H) 02/07/2022 09:05 AM    Prothrombin time 13.5 (H) 02/07/2022 09:05 AM      No results found for: IRON, FE, TIBC, IBCT, PSAT, FERR   No results found for: PH, PCO2, PO2  No components found for: GLPOC   No results found for: CPK, CKMB             Total time:   Counseling / coordination time, spent as noted above:   > 50% counseling / coordination?:     Prolonged service was provided for  []30 min   []75 min in face to face time in the presence of the patient, spent as noted above. Time Start:   Time End:   Note: this can only be billed with 01101 (initial) or 32136 (follow up). If multiple start / stop times, list each separately.

## 2022-02-07 NOTE — PROGRESS NOTES
[facility]    Progress Note  Name: Shefali Greenfield   : 3/13/1933   MRN: [id number]   Date: 2022      Interval Admission HPI:  Shefali Greenfield is a 80 y.o. female with history of atrial fibrillation/flutter on Eliquis, HTN, Hypothyroidism, depression, UTI's, and bowel perforation with colon resection and anastomosis 2021 who presented to [facility] ED on  from [facility] by EMS for increased generalized weakness and low blood pressure. Hx sacral wounds and failure to thrive. She has not been able to take much by mouth recently. She has been receiving IV fluids for dehydration and poor PO intake. Patient was start on pressors and cultures pending. Transferred to ICU for continued management. 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 [facility]) ICD-10-CM: L02.135  ICD-9-CM: 707.03, 707.23         Lethargy ICD-10-CM: R53.83  ICD-9-CM: 780.79         Feeding difficulties ICD-10-CM: R63.30  ICD-9-CM: 783.3         Goals of care, counseling/discussion ICD-10-CM: Z71.89  ICD-9-CM: V65.49         Palliative care encounter ICD-10-CM: Z51.5  ICD-9-CM: V66.7         Small bowel ischemia ([facility] ICD-10-CM: K55.9  ICD-9-CM: 557.9               Past Medical History:    has a past medical history of Atrial fibrillation ([address] Atrial flutter ([address] Edema, Hypertension, Hypokalemia, Hypothyroidism, Insomnia, Major depressive disorder, Pain, UTI (urinary tract infection), and Vitamin D deficiency.     Past Surgical History:    has a past surgical history that includes hx orthopaedic (05/01/2021); hx gi (12/01/2021); and hx appendectomy. Home Medications:     Prior to Admission medications    Medication Sig Start Date End Date Taking? Authorizing Provider   dilTIAZem ER (Cartia XT) 120 mg capsule Take 120 mg by mouth daily. Yes Provider, Historical   magnesium chloride (MAG DELAY) 64 mg delayed release tablet Take 64 mg by mouth two (2) times a day. Yes Provider, Historical   colestipoL (COLESTID) 5 gram packet Take 5 g by mouth two (2) times a day. Yes Provider, Historical   diphenoxylate-atropine (LomotiL) 2.5-0.025 mg per tablet Take 2 Tablets by mouth four (4) times daily. Yes Provider, Historical   oxyCODONE-acetaminophen (Percocet) 2.5-325 mg per tablet Take 1 Tablet by mouth every four (4) hours as needed for Pain. Yes Provider, Historical   promethazine (PHENERGAN) 25 mg tablet Take 25 mg by mouth every six (6) hours as needed for Nausea. Yes Provider, Historical   acetaminophen (TYLENOL) 650 mg TbER Take 650 mg by mouth two (2) times daily as needed for Pain. Indications: fever, pain   Yes Provider, Historical   L.acid,para-B. bifidum-S.therm (RISAQUAD) 8 billion cell cap cap Take 1 Capsule by mouth daily. Yes Provider, Historical   mesalamine ER (APRISO) 0.375 gram 24 hour capsule Take 1.5 g by mouth daily. Yes Provider, Historical   nystatin (MYCOSTATIN) topical cream Apply 1 g to affected area two (2) times daily as needed for Skin Irritation. Yes Provider, Historical   citalopram (CeleXA) 20 mg tablet Take 20 mg by mouth daily. Yes Provider, Historical   therapeutic multivitamin-minerals (THERAGRAN-M) tablet Take 1 Tablet by mouth daily. Yes Provider, Historical   apixaban (ELIQUIS) 2.5 mg tablet Take 1 Tablet by mouth two (2) times a day. 12/6/21  Yes Tapan Moore MD   aspirin 81 mg chewable tablet Take 81 mg by mouth daily.    Yes Other, MD Helene   carvediloL (COREG) 12.5 mg tablet Take 12.5 mg by mouth two (2) times daily (with meals). Yes Other, MD Helene   melatonin 5 mg tablet Take 5 mg by mouth nightly. Yes Other, MD Helene   potassium chloride (KLOR-CON) 20 mEq pack Take 20 mEq by mouth daily. Yes Gio, MD Helene   cholecalciferol (Vitamin D3) (1000 Units /25 mcg) tablet Take 1,000 Units by mouth daily. Yes Gio, MD Helene   Hydrocolloid Dressing 4 X 5 \" bndg by Apply Externally route. Other, MD Helene       Allergies/Social/Family History: Allergies   Allergen Reactions    Codeine Unknown (comments)    Gluten Unknown (comments)    Sulfa (Sulfonamide Antibiotics) Unknown (comments)      Social History     Tobacco Use    Smoking status: Never Smoker    Smokeless tobacco: Never Used   Substance Use Topics    Alcohol use: Not Currently     Alcohol/week: 3.0 standard drinks     Types: 3 Glasses of wine per week      No family history on file. Review of Systems:   Review of systems not obtained due to patient factors. Objective:   Vital Signs:  Visit Vitals  /63   Pulse 92   Temp 97.5 °F (36.4 °C)   Resp 13   Ht 5' 4\" (1.626 m)   Wt 52.5 kg (115 lb 11.9 oz)   SpO2 100%   BMI 19.87 kg/m²    O2 Flow Rate (L/min): 2 l/min O2 Device: None (Room air) Temp (24hrs), Av.9 °F (36.1 °C), Min:95.5 °F (35.3 °C), Max:97.7 °F (36.5 °C)           Intake/Output:     Intake/Output Summary (Last 24 hours) at 2022 0837  Last data filed at 2022 0700  Gross per 24 hour   Intake 8150.73 ml   Output 1885 ml   Net 6265.73 ml       Physical Exam:  General:  fatigued, cooperative, no distress, appears stated age, cachectic  Eye:  conjunctivae/corneas clear. PERRL, EOM's intact. Neurologic:  Generalized weakness in bilateral lower extremities, follows commands appropriately, non focal  Lymphatic:  Cervical, supraclavicular, and axillary nodes normal.   Neck:  normal and no erythema or exudates noted.    Lungs:  clear to auscultation bilaterally  Heart:  irregular rhythm and controlled rate, S1, S2 normal.  Abdomen:  soft, non-tender. Bowel sounds normal. No masses,  no organomegaly  Cardiovascular:  pedal pulses normal and no edema  Skin: sacral wound    LABS AND  DATA: Personally reviewed 02/07/22     Hemodynamics:   PAP:   CO:     Wedge:   CI:     CVP:    SVR:       PVR:       Ventilator Settings:  Mode Rate Tidal Volume Pressure FiO2 PEEP                    Peak airway pressure:      Minute ventilation:        MEDS: Personally Reviewed 02/07/22     Chest X-Ray: personally reviewed and report checked 02/07/22  CXR Results  (Last 48 hours)               02/06/22 1112  XR CHEST PORT Final result    Impression:  No acute process seen       Narrative:  EXAM: XR CHEST PORT       INDICATION: Chest Pain       COMPARISON: 11/19/2021       FINDINGS: A portable AP radiograph of the chest was obtained at 1109 hours. The   patient is on a cardiac monitor. The lungs are clear. The cardiac and   mediastinal contours and pulmonary vascularity are stable. The bones and soft   tissues are grossly within normal limits. CT Scans: no new scans to review 02/07/22   CT Results  (Last 48 hours)    None        ECHO:   11/19/21  Interpretation Summary    Result status: Final result  · LV: Estimated LVEF is 55 - 60%. Normal cavity size, wall thickness and systolic function (ejection fraction normal). Wall motion: normal.  · MV: Mitral valve non-specific thickening. Mild mitral annular calcification. Mild mitral valve regurgitation is present. · TV: Mild tricuspid valve regurgitation is present. Assessment:   1. UTI - cultures pending  2. CURT likely pre renal in setting of sepsis and dehydration  3. Septic shock 2' UTI vs sacral wound  4. Non anion gap metabolic acidosis   5. Hyperkalemia  6. Atrial fibrillation - controlled rate on eliquis  7. Failure to Thrive  8. Protein deficient malnutrition    ICU Comprehensive Plan of Care:     Plans for this Shift:     1. Neuro/ Pain/ Sedation   a.  PRN Pain management - sacral wound  b. No acute issues  c. Colestipol and ASA on hold  d. Restart Celexa  e. Hold Melatonin  2. Resp   a. NC PRN  3. CVS   a. Levophed and Cristino for pressure support - plan to wean off cristino, may increase levo as needed to keep MAP >65mmHg  b. Albumin 5% 25g ordered to help with volume expansion  c. PICC ordered, if unable to place may need CVL if cannot get off cristino  d. Cardizem, Coreg on hold will restart once BP improved  e. Restart Eliquis  4. Heme/ Onc  a. Trend CBC  5. GI  a. Encourage PO intake and eating.  b. Consider restarting mesalamine  c. Nutrition consult for oral supplementation  6. Renal/   a. CURT likely due to UTI - cultures pending  a. Nephrology following for CURT  b. Improving renal indices continue to trend  c. Started on Sodium Bicarb in D5W% gtt 2/7 for metabolic acidosis - monitor for worsening hypernatremia. 7. ID  a. Antibiotics: Ceftriaxone,  Will broaden to Cefepime due to hx of frequent UTI's and Bowel surgery  b. Send MRSA swab  c. COVID rapid negative  d. Urine cultures pending  e. Sacral wound culture sent and pending  8. Endocrine  a. Check cortisol level for adrenal insufficiency  b. PRN glucose control with SSI if needed. 9. Musc/Integ  a. ROM per nursing in bed  b.  Wound care consult    SBP Goal of: > 90 mmHg  MAP Goal of: > 65 mmHg  Phenylephrine and Norepinephrine - For above SBP/MAP goals  IVFs: 150 Meq in D5W  Transfusion Trigger (Hgb): <7 g/dL  Respiratory Goals:  N/A  Pulmonary toilet: NA   SpO2 Goal: > 92%  Keep K>4; Mg>2   PT/OT: NA   Discussed Plan of Care/Code Status: Do Not Resuscitate  Appreciate Consultants Input: Nephrology  Discussed Care Plan with Bedside RN  Documentation of Current Medications    F - Feeding:  Yes Reg  A - Analgesia: Acetaminophen  S - Sedation: None  T - DVT Prophylaxis: Heparin  On Eliquis at home may need to restart, will discuss with pharmacy  H - Head of Bed: > 30 Degrees  U - Ulcer Prophylaxis: Not at this time   G - Glycemic Control: Prevent Hypoglycemia   S - Spontaneous Breathing Trial: N/A  B - Bowel Regimen: MiraLax  I - Indwelling Catheter:   Tubes: None  Lines: Peripheral IV  Drains: Pearson Catheter  D - De-escalation of Antibiotics: UTI --> Ceftriaxone changed to Cefepime    Multidisciplinary Rounds Completed:  Pending    ABCDEF Bundle/Checklist Completed:  Yes    SPECIAL EQUIPMENT  None    DISPOSITION  Stay in ICU    CRITICAL CARE CONSULTANT NOTE  I had a face to face encounter with the patient, reviewed and interpreted patient data including clinical events, labs, images, vital signs, I/O's, and examined patient. I have discussed the case and the plan and management of the patient's care with the consulting services, the bedside nurses and the respiratory therapist.      NOTE OF PERSONAL INVOLVEMENT IN CARE   This patient has a high probability of imminent, clinically significant deterioration, which requires the highest level of preparedness to intervene urgently. I participated in the decision-making and personally managed or directed the management of the following life and organ supporting interventions that required my frequent assessment to treat or prevent imminent deterioration. I personally spent 50 minutes of critical care time. This is time spent at this critically ill patient's bedside actively involved in patient care as well as the coordination of care and discussions with the patient's family. This does not include any procedural time which has been billed separately.     Jose L Taylor [facility]     [address] [facility]

## 2022-02-07 NOTE — WOUND CARE
Wound Care Note:     New consult placed by nurse request for sacral wound    Chart shows:  Admitted for UTI and sepsis   Past Medical History:   Diagnosis Date    Atrial fibrillation ([address]     Atrial flutter ([address]     Edema     Hypertension     Hypokalemia     Hypothyroidism     Insomnia     Major depressive disorder     Pain     UTI (urinary tract infection)     Vitamin D deficiency      WBC = 11.1 on 2/7/21  Admitted from [address]:   Patient is A&O x 4, communicative, incontinent with some assistance needed in repositioning. Bed: In Touch [place]  Patient has a Pearson. Diet: Adult diet regular; gluten free  Patient reports no pain. Bilateral heels and buttocks skin intact and without erythema. 1. POA stage 3 sacral pressure injury measures 1.5 cm x 0.6 cm x 1.1 cm with undermining from 7 o'clock to 1 o'clock with deepest being 2 cm, wound bed is pink, small sero/sang drainage, wound edges are open, yolanda-wound with erythema, possible candidiasis forming, mostly under dressing, will continue to monitor. Opt[employer]ell AG ribbon placed in wound, covered with 4 x 4 folded and secured with Tegaderm. Patient tolerated well. Attempted to place wound VAC on patient per the recommendations of Dr. Lynda Kent; however, patient refused. She cannot deal with it right now, possibly next week. Explained the wound VAC, dressings only changed Monday's and Thursday's if applied today. She refused. Educated her on the importance of turning approximately every 2 hours, keeping head of bed lower to reduce pressure on sacrum and increasing her protein intake for wound healing. Patient stated she is aware and turns from side to side. Note: Only a few bites of yogurt eaten, some orange juice and some milk. Spoke with Harpal Arellano NP, wound care orders obtained. Patient repositioned on right side. Heels offloaded on pillows. Recommendations:    Sacrum- Every other day cleanse with normal saline, wipe wound bed clean and dry, loosely fill with Opt[employer]ell AG ribbon (located on 5E back par room), cover with folded 4 x 4 and secure with Tegaderm to prevent stool from entering wound. Skin Care & Pressure Prevention:  Minimize layers of linen/pads under patient to optimize support surface. Turn/reposition approximately every 2 hours and offload heels.   Manage incontinence / promote continence   Nourishing Skin Cream to dry skin, minimize use of briefs when able    Discussed above plan with patient & TAMMY Lopez    Transition of Care: Plan to follow as needed while admitted to hospital.    Adair Christ" Franz Cranker, BSN, RN, [facility], [facility].  office [phone]  pager [phone] or call  to page

## 2022-02-07 NOTE — PROGRESS NOTES
1930: Bedside and Verbal shift change report given to Ralph Killian RN (oncoming nurse) by Nena Montilla RN (offgoing nurse). Report included the following information SBAR, Kardex, Intake/Output, MAR, Accordion and Recent Results. 2115: BP 80s/40s with MAP low 60s. Pt maxed on Cristino gtt at 100mcg/min, NS 150mL/hr, currently receiving Albumin bolus. Pt asymptomatic. Teofilo Cannon NP notified. No new orders received at this time. Will continue to monitor. Cristino running through right forearm 20g PIV. Positive blood return in this PIV and site assessment with no issues. Teofilo Cannon NP aware of this. Will continue to monitor PIV site closely and document assessment in flowsheet per policy. 2235Dia Hammans, NP notified of BP 77/39 with MAP 50 following final Albumin bolus. Pt remains asymptomatic. Per Teofilo Cannon NP, start Levophed gtt and maintain MAP 65 or greater. 2245: Pt normothermic. Amy hugger removed. 2300: MAP improving prior to initiating Levophed gtt. Will hold on Levophed for now and continue to monitor closely. Teofilo Cannon NP updated. 0150: Per Teofilo Cannon NP, start Levophed gtt and infuse it through right 20g PIV along with Cristino gtt. 0730: Bedside shift change report given to TAMMY Lopez (oncoming nurse) by Ralph Killian RN (offgoing nurse). Report included the following information SBAR, Intake/Output, MAR, Accordion, Recent Results and Cardiac Rhythm Afib.

## 2022-02-07 NOTE — PROGRESS NOTES
Case discussed with PHILIPPE Howard. I saw and examined patient in ED room 20. Ultrasound tech performing renal sonogram at this time. Patient is alert, oriented. She is not in distress. She was sent from [facility] for weakness and hypotension. Upon evaluation in the ED, she was found to be hypotensive with SBP in the 70s and tachycardic. Infectious work-up included a chest x-ray which was negative, urinalysis consistent with UTI and negative rapid Covid test.  Fluid resuscitation started and patient already had 3 L of normal saline with additional 500 mL normal saline running at this time. She is mentating fine. Her BP during my exam is SBP in the 80s. She is alert, not in distress. SBP in the 80s, tachycardic in the 110  Buccal mucosa are dry  Lungs are clear  Abdomen is soft  No CVA or suprapubic tenderness  Extremities are extremely dry    Assessment  Septic shock, UTI sepsis. Patient has remained hypotensive despite> 3 L of normal saline. She will need vasopressors. ICU team consulted, intensivist arrived at the bedside. Case discussed with PHILIPPE Howard. See the H&P from PHILIPPE Howard for other details.

## 2022-02-07 NOTE — PROGRESS NOTES
Day #1 of cefepime  Indication:  sepsis  Current regimen:  2gm q8h  Abx regimen: previous tx with ceftriaxone  Recent Labs     22  0605 22  1639 22  1343 22  1046 22  1046   WBC 11.1* 7.5  --   --  9.6   CREA 2.35* 3.28* 3.48*   < > 4.67*   * 133* 141*   < > 160*    < > = values in this interval not displayed.      Est CrCl: 13.7 ml/min  Temp (24hrs), Av.1 °F (36.2 °C), Min:95.5 °F (35.3 °C), Max:98.1 °F (36.7 °C)    Cultures: 2/6 wound, blood, urine - pending    Plan: Change to 2gm q24h for crcl 11-29ml/min

## 2022-02-07 NOTE — PROGRESS NOTES
Patient name: Dk Newton  MRN: [id number]    Nephrology Progress note:    Assessment:  CURT: Pre-renal vs Septic ATN. Serum Cr improving from 4.6-> 3.4-> 3.2-> 2.3mg/dl. Non-oliguric  Urosepsis  Metabolic acidosis:NAGMA  Hyperkalemia: improving  Anorexia  N/v/d      Plan/Recommendations:  IVF already changed to Bicarb drip by critical care team-> watch for hypernatremia  Will likely need slightly hypotonic fluids  Vasopressor support-> keep MAPS>65  IV Abx  Strict I/Os  Avoid nephrotoxins  AM labs      Subjective:  No complaints. Requiring multiple vasopressors. UOP 1.8L yesterday    ROS:   No nausea, no vomiting  No chest pain, no shortness of breath    Exam:  Visit Vitals  /63   Pulse 92   Temp 97.5 °F (36.4 °C)   Resp 13   Ht 5' 4\" (1.626 m)   Wt 52.5 kg (115 lb 11.9 oz)   SpO2 100%   BMI 19.87 kg/m²     Wt Readings from Last 3 Encounters:   02/06/22 52.5 kg (115 lb 11.9 oz)   12/07/21 73.5 kg (162 lb 0.6 oz)       Intake/Output Summary (Last 24 hours) at 2/7/2022 0804  Last data filed at 2/7/2022 0700  Gross per 24 hour   Intake 8150.73 ml   Output 1885 ml   Net 6265.73 ml       Gen: NAD/Elderly/Frail  HEENT:  AT/NC  Lungs/Chest wall: Clear. No accessory muscle use. Cardiovascular: Regular rate, normal rhythm. Abdomen/: Soft, NT, ND, BS+. Ext: No peripheral edema  CNS: alert and awake.  Answers appropriately      Current Facility-Administered Medications   Medication Dose Route Frequency Last Admin    sodium bicarbonate (8.4%) 150 mEq in dextrose 5% 1,000 mL infusion   IntraVENous CONTINUOUS      albumin human 5% (BUMINATE) solution 25 g  25 g IntraVENous ONCE      cefTRIAXone (ROCEPHIN) 2 g in sterile water (preservative free) 20 mL IV syringe  2 g IntraVENous Q24H 2 g at 02/07/22 0130    sodium chloride (NS) flush 5-40 mL  5-40 mL IntraVENous Q8H 10 mL at 02/07/22 4152    sodium chloride (NS) flush 5-40 mL  5-40 mL IntraVENous PRN      acetaminophen (TYLENOL) tablet 650 mg  650 mg Oral Q6H PRN      Or    acetaminophen (TYLENOL) suppository 650 mg  650 mg Rectal Q6H PRN      polyethylene glycol (MIRALAX) packet 17 g  17 g Oral DAILY PRN      ondansetron (ZOFRAN ODT) tablet 4 mg  4 mg Oral Q8H PRN      Or    ondansetron (ZOFRAN) injection 4 mg  4 mg IntraVENous Q6H PRN      PHENYLephrine (CLAIRE-SYNEPHRINE) 30 mg in 0.9% sodium chloride 250 mL infusion   mcg/min IntraVENous TITRATE 100 mcg/min at 02/07/22 0605    NOREPINephrine (LEVOPHED) 8 mg in 5% dextrose 250mL (32 mcg/mL) infusion  0.5-20 mcg/min IntraVENous TITRATE 7 mcg/min at 02/07/22 0234       Labs/Data:    Lab Results   Component Value Date/Time    WBC 11.1 (H) 02/07/2022 06:05 AM    HGB 10.2 (L) 02/07/2022 06:05 AM    HCT 34.2 (L) 02/07/2022 06:05 AM    PLATELET 019 82/55/3417 06:05 AM    MCV 95.5 02/07/2022 06:05 AM       Lab Results   Component Value Date/Time    Sodium 143 02/07/2022 06:05 AM    Potassium 5.2 (H) 02/07/2022 06:05 AM    Chloride 119 (H) 02/07/2022 06:05 AM    CO2 15 (LL) 02/07/2022 06:05 AM    Anion gap 9 02/07/2022 06:05 AM    Glucose 83 02/07/2022 06:05 AM     (H) 02/07/2022 06:05 AM    Creatinine 2.35 (H) 02/07/2022 06:05 AM    BUN/Creatinine ratio 47 (H) 02/07/2022 06:05 AM    GFR est AA 24 (L) 02/07/2022 06:05 AM    GFR est non-AA 20 (L) 02/07/2022 06:05 AM    Calcium 7.9 (L) 02/07/2022 06:05 AM       Patient seen and examined. Chart reviewed. Labs, data and other pertinent notes reviewed in last 24 hrs.     Discussed with patient    Signed by:  Kellie Simons, MD  [id number] [facility]

## 2022-02-08 PROBLEM — E43 SEVERE PROTEIN-CALORIE MALNUTRITION (HCC): Status: ACTIVE | Noted: 2022-02-08

## 2022-02-08 LAB
ANION GAP SERPL CALC-SCNC: 5 MMOL/L (ref 5–15)
BACTERIA SPEC CULT: ABNORMAL
BACTERIA SPEC CULT: NORMAL
BACTERIA SPEC CULT: NORMAL
BASOPHILS # BLD: 0 K/UL (ref 0–0.1)
BASOPHILS NFR BLD: 0 % (ref 0–1)
BUN SERPL-MCNC: 71 MG/DL (ref 6–20)
BUN/CREAT SERPL: 45 (ref 12–20)
CALCIUM SERPL-MCNC: 8 MG/DL (ref 8.5–10.1)
CC UR VC: ABNORMAL
CHLORIDE SERPL-SCNC: 114 MMOL/L (ref 97–108)
CO2 SERPL-SCNC: 23 MMOL/L (ref 21–32)
CREAT SERPL-MCNC: 1.59 MG/DL (ref 0.55–1.02)
DIFFERENTIAL METHOD BLD: ABNORMAL
EOSINOPHIL # BLD: 0 K/UL (ref 0–0.4)
EOSINOPHIL NFR BLD: 0 % (ref 0–7)
ERYTHROCYTE [DISTWIDTH] IN BLOOD BY AUTOMATED COUNT: 17.3 % (ref 11.5–14.5)
GLUCOSE SERPL-MCNC: 170 MG/DL (ref 65–100)
HCT VFR BLD AUTO: 27.8 % (ref 35–47)
HGB BLD-MCNC: 8.8 G/DL (ref 11.5–16)
IMM GRANULOCYTES # BLD AUTO: 0.1 K/UL (ref 0–0.04)
IMM GRANULOCYTES NFR BLD AUTO: 1 % (ref 0–0.5)
LYMPHOCYTES # BLD: 1.2 K/UL (ref 0.8–3.5)
LYMPHOCYTES NFR BLD: 20 % (ref 12–49)
MAGNESIUM SERPL-MCNC: 1.1 MG/DL (ref 1.6–2.4)
MCH RBC QN AUTO: 28.6 PG (ref 26–34)
MCHC RBC AUTO-ENTMCNC: 31.7 G/DL (ref 30–36.5)
MCV RBC AUTO: 90.3 FL (ref 80–99)
MONOCYTES # BLD: 0.1 K/UL (ref 0–1)
MONOCYTES NFR BLD: 3 % (ref 5–13)
NEUTS SEG # BLD: 4.3 K/UL (ref 1.8–8)
NEUTS SEG NFR BLD: 76 % (ref 32–75)
NRBC # BLD: 0 K/UL (ref 0–0.01)
NRBC BLD-RTO: 0 PER 100 WBC
PHOSPHATE SERPL-MCNC: 3.1 MG/DL (ref 2.6–4.7)
PLATELET # BLD AUTO: 197 K/UL (ref 150–400)
PMV BLD AUTO: 12.1 FL (ref 8.9–12.9)
POTASSIUM SERPL-SCNC: 4 MMOL/L (ref 3.5–5.1)
RBC # BLD AUTO: 3.08 M/UL (ref 3.8–5.2)
SERVICE CMNT-IMP: ABNORMAL
SERVICE CMNT-IMP: NORMAL
SODIUM SERPL-SCNC: 142 MMOL/L (ref 136–145)
WBC # BLD AUTO: 5.6 K/UL (ref 3.6–11)

## 2022-02-08 PROCEDURE — 36415 COLL VENOUS BLD VENIPUNCTURE: CPT

## 2022-02-08 PROCEDURE — 74011250636 HC RX REV CODE- 250/636: Performed by: NURSE PRACTITIONER

## 2022-02-08 PROCEDURE — 85025 COMPLETE CBC W/AUTO DIFF WBC: CPT

## 2022-02-08 PROCEDURE — 84100 ASSAY OF PHOSPHORUS: CPT

## 2022-02-08 PROCEDURE — 74011250637 HC RX REV CODE- 250/637: Performed by: NURSE PRACTITIONER

## 2022-02-08 PROCEDURE — 74011000250 HC RX REV CODE- 250: Performed by: NURSE PRACTITIONER

## 2022-02-08 PROCEDURE — 65660000000 HC RM CCU STEPDOWN

## 2022-02-08 PROCEDURE — 80048 BASIC METABOLIC PNL TOTAL CA: CPT

## 2022-02-08 PROCEDURE — 74011000258 HC RX REV CODE- 258: Performed by: NURSE PRACTITIONER

## 2022-02-08 PROCEDURE — 83735 ASSAY OF MAGNESIUM: CPT

## 2022-02-08 PROCEDURE — 74011000250 HC RX REV CODE- 250: Performed by: ANESTHESIOLOGY

## 2022-02-08 PROCEDURE — 74011250637 HC RX REV CODE- 250/637: Performed by: HOSPITALIST

## 2022-02-08 RX ORDER — DIPHENOXYLATE HYDROCHLORIDE AND ATROPINE SULFATE 2.5; .025 MG/1; MG/1
2 TABLET ORAL 4 TIMES DAILY
Status: DISCONTINUED | OUTPATIENT
Start: 2022-02-08 | End: 2022-02-10 | Stop reason: HOSPADM

## 2022-02-08 RX ORDER — HYDROCORTISONE SODIUM SUCCINATE 100 MG/2ML
50 INJECTION, POWDER, FOR SOLUTION INTRAMUSCULAR; INTRAVENOUS EVERY 8 HOURS
Status: DISCONTINUED | OUTPATIENT
Start: 2022-02-08 | End: 2022-02-09

## 2022-02-08 RX ADMIN — DIPHENOXYLATE HYDROCHLORIDE AND ATROPINE SULFATE 2 TABLET: 2.5; .025 TABLET ORAL at 18:14

## 2022-02-08 RX ADMIN — MIDODRINE HYDROCHLORIDE 5 MG: 5 TABLET ORAL at 21:36

## 2022-02-08 RX ADMIN — Medication 1 CAPSULE: at 19:18

## 2022-02-08 RX ADMIN — SODIUM CHLORIDE, PRESERVATIVE FREE 10 ML: 5 INJECTION INTRAVENOUS at 22:00

## 2022-02-08 RX ADMIN — CEFEPIME 2 G: 2 INJECTION, POWDER, FOR SOLUTION INTRAVENOUS at 09:00

## 2022-02-08 RX ADMIN — HYDROCORTISONE SODIUM SUCCINATE 50 MG: 100 INJECTION, POWDER, FOR SOLUTION INTRAMUSCULAR; INTRAVENOUS at 02:36

## 2022-02-08 RX ADMIN — DIPHENOXYLATE HYDROCHLORIDE AND ATROPINE SULFATE 2 TABLET: 2.5; .025 TABLET ORAL at 21:35

## 2022-02-08 RX ADMIN — HYDROCORTISONE SODIUM SUCCINATE 50 MG: 100 INJECTION, POWDER, FOR SOLUTION INTRAMUSCULAR; INTRAVENOUS at 21:36

## 2022-02-08 RX ADMIN — SODIUM CHLORIDE, PRESERVATIVE FREE 10 ML: 5 INJECTION INTRAVENOUS at 06:00

## 2022-02-08 RX ADMIN — APIXABAN 2.5 MG: 2.5 TABLET, FILM COATED ORAL at 21:36

## 2022-02-08 RX ADMIN — MIDODRINE HYDROCHLORIDE 5 MG: 5 TABLET ORAL at 08:59

## 2022-02-08 RX ADMIN — HYDROCORTISONE SODIUM SUCCINATE 50 MG: 100 INJECTION, POWDER, FOR SOLUTION INTRAMUSCULAR; INTRAVENOUS at 08:59

## 2022-02-08 RX ADMIN — MAGNESIUM SULFATE HEPTAHYDRATE 3 G: 500 INJECTION, SOLUTION INTRAMUSCULAR; INTRAVENOUS at 15:41

## 2022-02-08 RX ADMIN — SODIUM BICARBONATE: 84 INJECTION, SOLUTION INTRAVENOUS at 08:57

## 2022-02-08 RX ADMIN — SODIUM CHLORIDE, PRESERVATIVE FREE 10 ML: 5 INJECTION INTRAVENOUS at 14:09

## 2022-02-08 RX ADMIN — CITALOPRAM HYDROBROMIDE 20 MG: 20 TABLET ORAL at 08:59

## 2022-02-08 NOTE — PROGRESS NOTES
1930: Bedside and Verbal shift change report given to Sergey Canales RN (oncoming nurse) by Vinny Thomas RN (offgoing nurse). Report included the following information SBAR, Kardex, Intake/Output, MAR, Accordion and Recent Results. 0730: Bedside and Verbal shift change report given to Keith Estrada RN (oncoming nurse) by Sergey Canales RN (offgoing nurse). Report included the following information SBAR, Kardex, Intake/Output, MAR, Accordion and Recent Results.

## 2022-02-08 NOTE — PROGRESS NOTES
Hospitalist Progress Note      Hospital summary: Mili Sheikh S 25 y. o. female with history of atrial fibrillation/flutter on Eliquis, HTN, Hypothyroidism, depression, UTI's, and bowel perforation with colon resection and anastomosis 11/2021 who presented to [facility] ED on 2/6 from [facility] by EMS for increased generalized weakness and low blood pressure. Hx sacral wounds and failure to thrive. She has not been able to take much by mouth recently. She has been receiving IV fluids for dehydration and poor PO intake. Patient was start on pressors and cultures pending. Transferred to ICU for continued management 2/6/2022      Assessment/Plan:  Septic shock due to infected sacral wounds and UTI  - out of ICU 2/7  - continue broad spectrum abx, f/u cultures. Wounds with mixed mercedes and yeast, urine w/ kleb pneumoniae   - wean hydrocortisone and midodrine     CURT   - Likely pre-renal in setting of Sepsis and dehydration  - Continue bicarb drip  - Avoid nephrotoxins and renal dose medication   - Nephrology following      Atrial fibrillation   - Rate controlled   - resume Eliquis     Hyperkalemia   - Improving      Non- Anion gap metabolic acidosis   - on bicarb drip; monitor    Failure to thrive   Protein deficient malnutrition   - Palliative care consulted      Sacral Wound (POA)   - Wound care consult    Code status: DNR  DVT prophylaxis: anticoagulate  Disposition: TBD  ----------------------------------------------    CC: f/u sepsis    S: feels better today, denies pain, dyspnea, n/v/d     Review of Systems:  Pertinent items are noted in HPI.     O:  Visit Vitals  /60 (BP 1 Location: Left arm, BP Patient Position: At rest)   Pulse (!) 107   Temp 97.4 °F (36.3 °C)   Resp 21   Ht 5' 4\" (1.626 m)   Wt 52.5 kg (115 lb 11.9 oz)   SpO2 100%   BMI 19.87 kg/m²       PHYSICAL EXAM:  Gen: NAD, non-toxic  HEENT: anicteric sclerae, normal conjunctiva  Neck: supple, trachea midline, no adenopathy  Heart: RRR, no MRG, no JVD, no peripheral edema  Lungs: CTA b/l, non-labored respirations  Abd: soft, NT, ND, BS+  Extr: warm  Skin: sacral wounds not assessed by me today  Neuro: CN II-XII grossly intact, normal speech, moves all extremities  Psych: normal mood, appropriate affect      Intake/Output Summary (Last 24 hours) at 2/8/2022 1549  Last data filed at 2/8/2022 1000  Gross per 24 hour   Intake 2418.7 ml   Output 1475 ml   Net 943.7 ml        Recent labs & imaging reviewed:  Recent Results (from the past 24 hour(s))   CBC WITH AUTOMATED DIFF    Collection Time: 02/08/22  8:18 AM   Result Value Ref Range    WBC 5.6 3.6 - 11.0 K/uL    RBC 3.08 (L) 3.80 - 5.20 M/uL    HGB 8.8 (L) 11.5 - 16.0 g/dL    HCT 27.8 (L) 35.0 - 47.0 %    MCV 90.3 80.0 - 99.0 FL    MCH 28.6 26.0 - 34.0 PG    MCHC 31.7 30.0 - 36.5 g/dL    RDW 17.3 (H) 11.5 - 14.5 %    PLATELET 940 407 - 962 K/uL    MPV 12.1 8.9 - 12.9 FL    NRBC 0.0 0  WBC    ABSOLUTE NRBC 0.00 0.00 - 0.01 K/uL    NEUTROPHILS 76 (H) 32 - 75 %    LYMPHOCYTES 20 12 - 49 %    MONOCYTES 3 (L) 5 - 13 %    EOSINOPHILS 0 0 - 7 %    BASOPHILS 0 0 - 1 %    IMMATURE GRANULOCYTES 1 (H) 0.0 - 0.5 %    ABS. NEUTROPHILS 4.3 1.8 - 8.0 K/UL    ABS. LYMPHOCYTES 1.2 0.8 - 3.5 K/UL    ABS. MONOCYTES 0.1 0.0 - 1.0 K/UL    ABS. EOSINOPHILS 0.0 0.0 - 0.4 K/UL    ABS. BASOPHILS 0.0 0.0 - 0.1 K/UL    ABS. IMM.  GRANS. 0.1 (H) 0.00 - 0.04 K/UL    DF AUTOMATED     MAGNESIUM    Collection Time: 02/08/22  8:18 AM   Result Value Ref Range    Magnesium 1.1 (L) 1.6 - 2.4 mg/dL   PHOSPHORUS    Collection Time: 02/08/22  8:18 AM   Result Value Ref Range    Phosphorus 3.1 2.6 - 4.7 MG/DL   METABOLIC PANEL, BASIC    Collection Time: 02/08/22  8:18 AM   Result Value Ref Range    Sodium 142 136 - 145 mmol/L    Potassium 4.0 3.5 - 5.1 mmol/L    Chloride 114 (H) 97 - 108 mmol/L    CO2 23 21 - 32 mmol/L    Anion gap 5 5 - 15 mmol/L    Glucose 170 (H) 65 - 100 mg/dL    BUN 71 (H) 6 - 20 MG/DL Creatinine 1.59 (H) 0.55 - 1.02 MG/DL    BUN/Creatinine ratio 45 (H) 12 - 20      GFR est AA 37 (L) >60 ml/min/1.73m2    GFR est non-AA 31 (L) >60 ml/min/1.73m2    Calcium 8.0 (L) 8.5 - 10.1 MG/DL     Recent Labs     02/08/22  0818 02/07/22  0605   WBC 5.6 11.1*   HGB 8.8* 10.2*   HCT 27.8* 34.2*    306     Recent Labs     02/08/22  0818 02/07/22  0605 02/06/22  1639    143 138   K 4.0 5.2* 5.4*   * 119* 115*   CO2 23 15* 18*   BUN 71* 111* 133*   CREA 1.59* 2.35* 3.28*   * 83 92   CA 8.0* 7.9* 7.8*   MG 1.1* 1.3*  --    PHOS 3.1 4.6  --      Recent Labs     02/07/22  0605 02/06/22  1046   ALT 29 44   AP 93 134*   TBILI 0.2 0.2   TP 5.6* 6.7   ALB 2.9* 2.5*   GLOB 2.7 4.2*     Recent Labs     02/07/22  0905   INR 1.3*   PTP 13.5*      No results for input(s): FE, TIBC, PSAT, FERR in the last 72 hours. No results found for: FOL, RBCF   No results for input(s): PH, PCO2, PO2 in the last 72 hours. No results for input(s): CPK, CKNDX, TROIQ in the last 72 hours.     No lab exists for component: CPKMB  No results found for: CHOL, CHOLX, CHLST, CHOLV, HDL, HDLP, LDL, LDLC, DLDLP, TGLX, TRIGL, TRIGP, CHHD, CHHDX  Lab Results   Component Value Date/Time    Glucose (POC) 75 02/06/2022 11:08 PM    Glucose (POC) 78 02/06/2022 01:07 PM    Glucose (POC) 102 12/07/2021 06:45 AM    Glucose (POC) 105 12/02/2021 05:39 AM    Glucose (POC) 161 (H) 12/01/2021 11:27 PM     Lab Results   Component Value Date/Time    Color YELLOW/STRAW 02/06/2022 11:33 AM    Appearance TURBID (A) 02/06/2022 11:33 AM    Specific gravity 1.015 02/06/2022 11:33 AM    Specific gravity 1.015 11/23/2021 05:05 PM    pH (UA) 5.0 02/06/2022 11:33 AM    Protein 30 (A) 02/06/2022 11:33 AM    Glucose Negative 02/06/2022 11:33 AM    Ketone Negative 02/06/2022 11:33 AM    Bilirubin Negative 02/06/2022 11:33 AM    Urobilinogen 0.2 02/06/2022 11:33 AM    Nitrites Negative 02/06/2022 11:33 AM    Leukocyte Esterase LARGE (A) 02/06/2022 11:33 AM Epithelial cells FEW 02/06/2022 11:33 AM    Bacteria 3+ (A) 02/06/2022 11:33 AM    WBC >100 (H) 02/06/2022 11:33 AM    RBC 0-5 02/06/2022 11:33 AM       Med list reviewed  Current Facility-Administered Medications   Medication Dose Route Frequency    magnesium sulfate 3 g in 0.9% sodium chloride 100 mL IVPB  3 g IntraVENous ONCE    hydrocortisone Sod Succ (PF) (SOLU-CORTEF) injection 50 mg  50 mg IntraVENous Q8H    diphenoxylate-atropine (LOMOTIL) tablet 2 Tablet  2 Tablet Oral QID    . PHARMACY TO SUBSTITUTE PER PROTOCOL (Reordered from: mesalamine ER (APRISO) 0.375 gram 24 hour capsule)    Per Protocol    sodium bicarbonate (8.4%) 150 mEq in dextrose 5% 1,000 mL infusion   IntraVENous CONTINUOUS    citalopram (CELEXA) tablet 20 mg  20 mg Oral DAILY    cefepime (MAXIPIME) 2 g in 0.9% sodium chloride 10 mL IV syringe  2 g IntraVENous Q24H    apixaban (ELIQUIS) tablet 2.5 mg  2.5 mg Oral Q12H    midodrine (PROAMATINE) tablet 5 mg  5 mg Oral Q12H    ALPRAZolam (XANAX) tablet 0.25 mg  0.25 mg Oral QHS PRN    sodium chloride (NS) flush 5-40 mL  5-40 mL IntraVENous Q8H    sodium chloride (NS) flush 5-40 mL  5-40 mL IntraVENous PRN    acetaminophen (TYLENOL) tablet 650 mg  650 mg Oral Q6H PRN    Or    acetaminophen (TYLENOL) suppository 650 mg  650 mg Rectal Q6H PRN    polyethylene glycol (MIRALAX) packet 17 g  17 g Oral DAILY PRN    ondansetron (ZOFRAN ODT) tablet 4 mg  4 mg Oral Q8H PRN    Or    ondansetron (ZOFRAN) injection 4 mg  4 mg IntraVENous Q6H PRN       Care Plan discussed with:  Patient/Family and Nurse    Briana Boudreaux MD  Internal Medicine  Date of Service: 2/8/2022

## 2022-02-08 NOTE — PROGRESS NOTES
Comprehensive Nutrition Assessment    Type and Reason for Visit: Initial,Consult,Wound    Nutrition Recommendations/Plan:      Appetite stimulant    Resume lomotil and probiotic (PTA medications)    Encourage oral intake-please document meal/supplement intake on I/O's    Nutrition Assessment:    Pt admitted with UTI. PMHx: Atrial fib, HTN, Hypothyroidism, Pneumoperitoneum and ischemic colon-s/p (R) colectomy and small bowel resection 11/2021. Urosepsis with shock-off pressors. CURT: pre-renal vs septic ATN and hyperkalemia on admission; nephrology following. Remains on bicarb drip for metabolic acidosis. Stage 3 sacral pressure injury. Ms Pat Martinez has lost ~47# or 29% UBW in the past couple of months. Meets criteria for severe malnutrition (see below). Pt continues to have very poor PO intake-reports having no appetite. Only consumed Ensure Compact (4 oz supplement) and a few bites of canned fruit for breakfast. Told RD she would try to do better. Will change Ensure Compact to Ensure Enlive (higher nutrient content) all meals-pt accepts the ONS well. Noted pt was receiving IVF intermittently PTA. Still complaining of dry mouth/lips. Unclear how much diarrhea pt normally has-says it was doing better PTA (lomotil and probiotic). Recommend resuming these medications. Pt notes she has had 2 BM's so far today. If pt consumes at least 2 Ensure Enlive supplements per day this would provide 700 calories and 40 gm protein per day to meet 44-58% estimated energy and protein needs, respectively. Boost pudding also ordered (230 calories/7 gm protein). Bicarb drip is providing 2.4L and 410 calories per day.       Malnutrition Assessment:  Malnutrition Status:  Severe malnutrition    Context:  Acute illness     Findings of the 6 clinical characteristics of malnutrition:   Energy Intake:  7 - 50% or less of est energy requirements for 5 or more days  Weight Loss:  7.00 - Greater than 7.5% over 3 months     Body Fat Loss: 1 - Mild body fat loss, Fat overlying ribs,Orbital   Muscle Mass Loss:  7 - Moderate muscle mass loss, Clavicles (pectoralis & deltoids),Temples (temporalis)  Fluid Accumulation:  No significant fluid accumulation,     Strength:  Not performed     Nutritionally Significant Medications:   Hydrocortisone, Bicarb drip in D5 @ 100 ml/hr    Estimated Daily Nutrient Needs:  Energy (kcal): 1600 (MSJ x 1.2 x 1.2 +250 kcals/day for weight gain); Weight Used for Energy Requirements: Current (53 kg)  Protein (g): 69-80 (1.3-1.5g/kg); Weight Used for Protein Requirements: Current  Fluid (ml/day): Method Used for Fluid Requirements: 1 ml/kcal    Nutrition Related Findings:       BM: 2/8  Edema:  None  Wounds:  Stage III       Current Nutrition Therapies:   Diet: Regular Gluten-free  Supplements: Ensure Compact bid, Boost pudding once daily  Additional Caloric Sources:  D5  Meal intake: No data found. Supplement Intake: No data found. Anthropometric Measures:  · Height:  5' 4\" (162.6 cm)  · Current Body Wt:  52.5 kg (115 lb 11.9 oz)   · Ideal Body Wt:  120:  96.5 %    · BMI Categories:  Underweight (BMI less than 22) age over 72     Wt Readings from Last 10 Encounters:   02/06/22 52.5 kg (115 lb 11.9 oz)   12/07/21 73.5 kg (162 lb 0.6 oz)       Nutrition Diagnosis:   · Increased nutrient needs related to increased demand for energy/nutrients as evidenced by wounds. Nutrition Interventions:   Food and/or Nutrient Delivery: Continue current diet,Continue oral nutrition supplement  Nutrition Education and Counseling: No recommendations at this time  Coordination of Nutrition Care: Continue to monitor while inpatient    Goals:  Consume at least 50% of meals and ONS in next 3-5 days.        Nutrition Monitoring and Evaluation:   Behavioral-Environmental Outcomes: None identified  Food/Nutrient Intake Outcomes: Food and nutrient intake,Supplement intake  Physical Signs/Symptoms Outcomes: Biochemical data,Weight,Skin    Discharge Planning:    Continue oral nutrition supplement,Continue current diet     Nuria Silva RD CNSC  Contact: Jammie Chapman

## 2022-02-08 NOTE — MED STUDENT NOTES
*ATTENTION: This note has been created by a nurse practitioner student for educational purposes only. Please do not refer to the content of this note for clinical decision-making, billing, or other purposes. Please see attending providers note to obtain clinical information on this patient. *           Critical Care Progress Note    Name: Shefali Greenfield   : 3/13/1933   MRN: [id number]   Date: 2022      Interval Admission HPI:  Juwan Reese a 80 y. o. female with pmhx significant for A.fib,HTN,hypokalemia, sacral wounds, failure to thrive and hypothyroidism who presented to [facility] ER with 2 week history of decreased po intake progressive generalized weakness and hypotension. Patient endorses she is experiencing chronic diarrhea which she was had for the past year. Last 2021 she had a fall and hospitalization for a broken hip and for a perforated bowel as well. She was transferred to ICU for persistent hypotension. Urine cultures positive for Klebsiella. Subjective:   Overnight Events:   2022  All pressors now off. Hypothermic this morning, using viktoriya hugger to rewarm. Resolving CURT, Creat now 1.59.  Plan to send to floor from ICU today    POD:  * No surgery found *    S/P:       Active Problem List:     Problem List  Date Reviewed: 2021          Codes Class    UTI (urinary tract infection) ICD-10-CM: N39.0  ICD-9-CM: 599.0         Sepsis ([facility] [address] ICD-10-CM: A41.9  ICD-9-CM: 038.9, 995.91         Diarrhea ICD-10-CM: R19.7  ICD-9-CM: 787.91         Dehydration ICD-10-CM: E86.0  ICD-9-CM: 276.51         Stage III pressure ulcer of sacral region [facility]) ICD-10-CM: L89.153  ICD-9-CM: 707.03, 707.23         Lethargy ICD-10-CM: R53.83  ICD-9-CM: 780.79         Feeding difficulties ICD-10-CM: R63.30  ICD-9-CM: 783.3         Goals of care, counseling/discussion ICD-10-CM: Z71.89  ICD-9-CM: V65.49         Palliative care encounter ICD-10-CM: Z51.5  ICD-9-CM: V66.7         Small bowel ischemia ([facility] [address] ICD-10-CM: K55.9  ICD-9-CM: 557.9               Past Medical History:    has a past medical history of Atrial fibrillation ([facility] [address] Atrial flutter ([facility] [address] Edema, Hypertension, Hypokalemia, Hypothyroidism, Insomnia, Major depressive disorder, Pain, UTI (urinary tract infection), and Vitamin D deficiency. Past Surgical History:    has a past surgical history that includes hx orthopaedic (05/01/2021); hx gi (12/01/2021); and hx appendectomy. Home Medications:     Prior to Admission medications    Medication Sig Start Date End Date Taking? Authorizing Provider   dilTIAZem ER (Cartia XT) 120 mg capsule Take 120 mg by mouth daily. Yes Provider, Historical   magnesium chloride (MAG DELAY) 64 mg delayed release tablet Take 64 mg by mouth two (2) times a day. Yes Provider, Historical   colestipoL (COLESTID) 5 gram packet Take 5 g by mouth two (2) times a day. Yes Provider, Historical   diphenoxylate-atropine (LomotiL) 2.5-0.025 mg per tablet Take 2 Tablets by mouth four (4) times daily. Yes Provider, Historical   oxyCODONE-acetaminophen (Percocet) 2.5-325 mg per tablet Take 1 Tablet by mouth every four (4) hours as needed for Pain. Yes Provider, Historical   promethazine (PHENERGAN) 25 mg tablet Take 25 mg by mouth every six (6) hours as needed for Nausea. Yes Provider, Historical   acetaminophen (TYLENOL) 650 mg TbER Take 650 mg by mouth two (2) times daily as needed for Pain. Indications: fever, pain   Yes Provider, Historical   L.acid,para-B. bifidum-S.therm (RISAQUAD) 8 billion cell cap cap Take 1 Capsule by mouth daily. Yes Provider, Historical   mesalamine ER (APRISO) 0.375 gram 24 hour capsule Take 1.5 g by mouth daily. Yes Provider, Historical   nystatin (MYCOSTATIN) topical cream Apply 1 g to affected area two (2) times daily as needed for Skin Irritation. Yes Provider, Historical   citalopram (CeleXA) 20 mg tablet Take 20 mg by mouth daily.    Yes Provider, Historical   therapeutic multivitamin-minerals (THERAGRAN-M) tablet Take 1 Tablet by mouth daily. Yes Provider, Historical   apixaban (ELIQUIS) 2.5 mg tablet Take 1 Tablet by mouth two (2) times a day. 21  Yes Loreto Chapman MD   aspirin 81 mg chewable tablet Take 81 mg by mouth daily. Yes Other, MD Helene   carvediloL (COREG) 12.5 mg tablet Take 12.5 mg by mouth two (2) times daily (with meals). Yes Gio, MD Helene   melatonin 5 mg tablet Take 5 mg by mouth nightly. Yes Other, MD Helene   potassium chloride (KLOR-CON) 20 mEq pack Take 20 mEq by mouth daily. Yes Gio, MD Helene   cholecalciferol (Vitamin D3) (1000 Units /25 mcg) tablet Take 1,000 Units by mouth daily. Yes Gio, MD Helene   Hydrocolloid Dressing 4 X 5 \" bndg by Apply Externally route. Other, MD Helene       Allergies/Social/Family History: Allergies   Allergen Reactions    Codeine Unknown (comments)    Gluten Unknown (comments)    Sulfa (Sulfonamide Antibiotics) Unknown (comments)      Social History     Tobacco Use    Smoking status: Never Smoker    Smokeless tobacco: Never Used   Substance Use Topics    Alcohol use: Not Currently     Alcohol/week: 3.0 standard drinks     Types: 3 Glasses of wine per week      No family history on file.     Review of Systems:     A comprehensive review of systems was negative except for: Constitutional: positive for anorexia and weight loss  Gastrointestinal: positive for nausea, vomiting and diarrhea  Musculoskeletal: positive for muscle weakness  Objective:   Vital Signs:  Visit Vitals  /68 (BP 1 Location: Left upper arm, BP Patient Position: At rest)   Pulse 93   Temp 97.8 °F (36.6 °C)   Resp 21   Ht 5' 4\" (1.626 m)   Wt 52.5 kg (115 lb 11.9 oz)   SpO2 99%   BMI 19.87 kg/m²    O2 Flow Rate (L/min): 2 l/min O2 Device: None (Room air) Temp (24hrs), Av °F (36.7 °C), Min:97 °F (36.1 °C), Max:98.6 °F (37 °C)           Intake/Output:     Intake/Output Summary (Last 24 hours) at 2022 1154  Last data filed at 2/8/2022 1000  Gross per 24 hour   Intake 2973.81 ml   Output 1900 ml   Net 1073.81 ml       Physical Exam:  General:  fatigued, cooperative, shivering, appears stated age  Eye:  R and L pupils round and reactive   Neurologic:  Oriented, follows commands. Weak lower extremities   Lungs:  clear to auscultation bilaterally  Heart:  Irregular rhythm but controlled rate., S1, S2 normal, no murmur  Abdomen:  Soft, non-tender, surgical scar s/p bowel perforation  Cardiovascular:  Palpable pulses   Skin:Stage III Sacral Wound     LABS AND  DATA: Personally reviewed  Recent Labs     02/08/22 0818 02/07/22  0605   WBC 5.6 11.1*   HGB 8.8* 10.2*   HCT 27.8* 34.2*    306     Recent Labs     02/08/22 0818 02/07/22  0605    143   K 4.0 5.2*   * 119*   CO2 23 15*   BUN 71* 111*   CREA 1.59* 2.35*   * 83   CA 8.0* 7.9*   MG  --  1.3*   PHOS 3.1 4.6     Recent Labs     02/07/22  0605 02/06/22  1046   AP 93 134*   TP 5.6* 6.7   ALB 2.9* 2.5*   GLOB 2.7 4.2*     Recent Labs     02/07/22  0905   INR 1.3*   PTP 13.5*      Recent Labs     02/06/22  1255   PHI 7.31*   PCO2I 33.2*   PO2I 142*     No results for input(s): CPK, CKMB, TROIQ, BNPP in the last 72 hours.     Hemodynamics:   PAP:   CO:     Wedge:   CI:     CVP:    SVR:       PVR:       Ventilator Settings:  Mode Rate Tidal Volume Pressure FiO2 PEEP                    Peak airway pressure:      Minute ventilation:        MEDS:   Current Facility-Administered Medications:     sodium bicarbonate (8.4%) 150 mEq in dextrose 5% 1,000 mL infusion, , IntraVENous, CONTINUOUS, Jan Morocho NP, Last Rate: 100 mL/hr at 02/08/22 0857, New Bag at 02/08/22 0857    citalopram (CELEXA) tablet 20 mg, 20 mg, Oral, DAILY, VIRY Christianson, 20 mg at 02/08/22 0859    cefepime (MAXIPIME) 2 g in 0.9% sodium chloride 10 mL IV syringe, 2 g, IntraVENous, Q24H, Wayne Jiang, NP-C, 2 g at 02/08/22 0900    [Held by provider] apixaban (ELIQUIS) tablet 2.5 mg, 2.5 mg, Oral, Q12H, Griselda Rickeys R, NP-C    midodrine (PROAMATINE) tablet 5 mg, 5 mg, Oral, Q12H, Griselda Rickeys R, NP-C, 5 mg at 02/08/22 0859    hydrocortisone Sod Succ (PF) (SOLU-CORTEF) injection 50 mg, 50 mg, IntraVENous, Q6H, Wayne Jiang NP-C, 50 mg at 02/08/22 0859    ALPRAZolam Evetta Acosta) tablet 0.25 mg, 0.25 mg, Oral, QHS PRN, Astrid Lock NP, 0.25 mg at 02/07/22 2317    sodium chloride (NS) flush 5-40 mL, 5-40 mL, IntraVENous, Q8H, Sunday Cheney MD, 10 mL at 02/08/22 0600    sodium chloride (NS) flush 5-40 mL, 5-40 mL, IntraVENous, PRN, Sunday Cheney MD    acetaminophen (TYLENOL) tablet 650 mg, 650 mg, Oral, Q6H PRN, 650 mg at 02/07/22 1136 **OR** acetaminophen (TYLENOL) suppository 650 mg, 650 mg, Rectal, Q6H PRN, Sunday Cheney MD    polyethylene glycol (MIRALAX) packet 17 g, 17 g, Oral, DAILY PRN, Sunday Cheney MD    ondansetron (ZOFRAN ODT) tablet 4 mg, 4 mg, Oral, Q8H PRN **OR** ondansetron (ZOFRAN) injection 4 mg, 4 mg, IntraVENous, Q6H PRN, Sunday Cheney MD    Chest X-Ray:               02/06/22 1112   XR CHEST PORT Final result     Impression:   No acute process seen        Narrative:   EXAM: XR CHEST PORT       INDICATION: Chest Pain       COMPARISON: 11/19/2021       FINDINGS: A portable AP radiograph of the chest was obtained at 1109 hours. The   patient is on a cardiac monitor. The lungs are clear. The cardiac and   mediastinal contours and pulmonary vascularity are stable. The bones and soft   tissues are grossly within normal limits. ECHO:  11/19/2021  · LV: Estimated LVEF is 55 - 60%. Normal cavity size, wall thickness and systolic function (ejection fraction normal). Wall motion: normal.  · MV: Mitral valve non-specific thickening. Mild mitral annular calcification. Mild mitral valve regurgitation is present. · TV: Mild tricuspid valve regurgitation is present. Assessment:     1. UTI  2. CURT  3. Hypotension  4.  Septic Shock d/t Klebsiella UTI  5. Metabolic Acidosis    ICU Comprehensive Plan of Care:   Plans for this Shift:     1. Neuro/ Pain/ Sedation   a. PRN Acetaminophen   2. Resp   a. COVID Treatment: Negative for COVID-19  b. SpO2 Goal: > 92%  3. CVS   a. SBP Goal of: > 90 mmHg  b. MAP Goal of: > 65 mmHg  c. Phenylephrine and Norepinephrine - For above SBP/MAP goals  d. IVFs: Sodium Bicabonate 50 ml/hr  4. Heme/ Onc  a. Chronic Anemia   b. Transfusion Trigger (Hgb): <7 g/dL  5. GI  a. Chronic diarrhea  6. Renal/   a. Improving CURT, current Creat 2.35  b. Metabolic Acidosis, sodium bicarbonate IVF   a. UTI   b. Urine Culture positive for Klebsiella  7. ID  a. UTI , Klebsiella  b. Antibiotics: Cefepime  8. Endocrine  a. Cortisol level 22.9 on  2/7/22, taper Hydrocort as tolerated  9. Musc/Integ  a. Lower Extremity weakness    Discussed Plan of Care/Code Status: Yes / Do Not Resuscitate  Appreciate Consultants Input: Nephrology  Discussed Care Plan with Bedside RN  Documentation of Current Medications    F - Feeding:  Yes   A - Analgesia: Acetaminophen  S - Sedation: N/A  T - DVT Prophylaxis: SCD's or Sequential Compression Device   H - Head of Bed: > 30 Degrees  U - Ulcer Prophylaxis: Not at this time   G - Glycemic Control: Prevent Hypoglycemia / PRN SSI  S - Spontaneous Breathing Trial: N/A  B - Bowel Regimen: MiraLax  I - Indwelling Catheter:   Tubes: None  Lines: Peripheral IV  Drains: Pearson Catheter  D - De-escalation of Antibiotics: No     Multidisciplinary Rounds Completed: Yes    ABCDEF Bundle/Checklist Completed:  Yes    SPECIAL EQUIPMENT  None    DISPOSITION  Transfer to non-ICU bed    CRITICAL CARE CONSULTANT NOTE  I had a face to face encounter with the patient, reviewed and interpreted patient data including clinical events, labs, images, vital signs, I/O's, and examined patient.   I have discussed the case and the plan and management of the patient's care with the consulting services, the bedside nurses and the respiratory therapist.      NOTE OF PERSONAL INVOLVEMENT IN CARE   This patient has a high probability of imminent, clinically significant deterioration, which requires the highest level of preparedness to intervene urgently. I participated in the decision-making and personally managed or directed the management of the following life and organ supporting interventions that required my frequent assessment to treat or prevent imminent deterioration. EMR entered and chart reviewed in the course of carrying out educational functions and responsibilities as a Nurse Practitioner student following Jeremías Kyle NP.     Ned Barreto, VAIBHAV  [facility] [employer]

## 2022-02-08 NOTE — PROGRESS NOTES
[facility]  Hospitalist Group     ICU Transfer/Accept Summary     This [place] being [place] [address] [place]  DATE OF TRANSFER: 2/7/2022       PATIENT ID: Jose Golden  MRN: [id number]   YOB: 1933    PRIMARY CARE PROVIDER: Luz Reed MD   DATE OF ADMISSION: 2/6/2022 10:30 AM    ATTENDING PHYSICIAN: Janie Thakkar NP  CONSULTATIONS:   IP CONSULT TO NEPHROLOGY  IP CONSULT TO PALLIATIVE CARE - PROVIDER  IP CONSULT TO HOSPITALIST    PROCEDURES/SURGERIES:   * No surgery found *    REASON FOR ADMISSION: <principal problem not specified>     HOSPITAL PROBLEM LIST:  Patient Active Problem List   Diagnosis Code    Small bowel ischemia ([facility] [address] K55.9    Lethargy R53.83    Feeding difficulties R63.30    Goals of care, counseling/discussion Z71.89    Palliative care encounter Z51.5    Stage III pressure ulcer of sacral region ([facility] [address] L89.153    Diarrhea R19.7    Dehydration E86.0    UTI (urinary tract infection) N39.0    Sepsis ([facility] [address] A41.9         Brief HPI and Hospital Course:    Per ICU note:   Jose Golden is a 80 y.o. female with history of atrial fibrillation/flutter on Eliquis, HTN, Hypothyroidism, depression, UTI's, and bowel perforation with colon resection and anastomosis 11/2021 who presented to [facility] on 2/6 from [facility] by EMS for increased generalized weakness and low blood pressure. Hx sacral wounds and failure to thrive. She has not been able to take much by mouth recently. She has been receiving IV fluids for dehydration and poor PO intake. Patient was start on pressors and cultures pending. Transferred to ICU for continued management    Hospitalist note:   Seen and examined patient. States that she is feeling fine this evening. Chart reviewed. Patient to transfer out of ICU. Discussed with RN     Assessment and Plan:    Sepsis secondary to UTI vs Sacral wounds   - Initially admitted to ICU on pressors.  BP have improved  - Abnormal UA   - Continue with IV antibiotics   - Follow UC     CURT   - Likely pre-renal in setting of Sepsis and dehydration   - Improving   - Continue IV fluids  - Avoid nephrotoxins and renal dose medication   - Nephrology following   - Monitor labs in am     Atrial fibrillation   - Rate controlled   - Will restart Eliquis when stable     Hyperkalemia   - Improving     Non- Anion gap metabolic acidosis   - CO2 15  - Continue IV fluid with bicarb   - Nephrology following     Failure to thrive   Protein deficient malnutrition   - Palliative care consulted     Sacral Wound (POA)   - Wound care consult         PHYSICAL EXAMINATION:  Visit Vitals  BP (!) 103/56   Pulse (!) 116   Temp 98.5 °F (36.9 °C)   Resp 14   Ht 5' 4\" (1.626 m)   Wt 52.5 kg (115 lb 11.9 oz)   SpO2 100%   BMI 19.87 kg/m²       General:          Alert, cooperative, no distress  HEENT:           Atraumatic, MMM            Neck:               Supple, symmetrical,  thyroid: non tender  Lungs:             Clear to auscultation bilaterally. No Wheezing or Rhonchi. No rales. Heart:              Regular  rhythm,  No  murmur   No edema  Abdomen:       Soft, non-tender. Not distended. Bowel sounds normal  Extremities:     No cyanosis. No clubbing,  +2 distal pulses  Skin:                Not pale. Not Jaundiced  No rashes. Sacral wounds   Psych:             Not anxious or agitated. Neurologic:      Alert, moves all extremities, oriented.  Follows commands     CODE STATUS:   Full Code   X DNR    Partial    Comfort Care     Signed:   Shyam Weiss NP  Date of Service:  2/7/2022  11:52 PM

## 2022-02-08 NOTE — PROGRESS NOTES
Transition of Care Plan: [facility]    RUR: 25% high    PCP F/U: Dr Sumi Carranza    Disposition: [facility]    Transportation: BLS    1500: Notified [facility] patient was transferred to Neuro floor and will keep them updated on REINALDO.     Jeanna Boswell RN, CRM

## 2022-02-08 NOTE — PROGRESS NOTES
Problem: Pressure Injury - Risk of  Goal: *Prevention of pressure injury  Description: Document Brian Scale and appropriate interventions in the flowsheet.   Outcome: Progressing Towards Goal  Note: Pressure Injury Interventions:       Moisture Interventions: Absorbent underpads,Minimize layers    Activity Interventions: Pressure redistribution bed/mattress(bed type),PT/OT evaluation    Mobility Interventions: Float heels,HOB 30 degrees or less    Nutrition Interventions: Document food/fluid/supplement intake    Friction and Shear Interventions: Lift sheet,Minimize layers,HOB 30 degrees or less                Problem: Patient Education: Go to Patient Education Activity  Goal: Patient/Family Education  Outcome: Progressing Towards Goal

## 2022-02-08 NOTE — PROGRESS NOTES
Transition of Care Plan   RUR- High Risk   DISPOSITION: Short term Rehab   F/U with PCP/Specialist     Transport: [facility]  Received notification [facility] [phone] has accepted patient. Daughter in agreement for patient to return.  Gala Cardona RN,Care Management

## 2022-02-08 NOTE — PROGRESS NOTES
Patient name: Shefali Greenfield  MRN: [id number]    Nephrology Progress note:    Assessment:  CURT: Pre-renal vs Septic ATN. Serum Cr improving from 4.6-> 3.4-> 3.2-> 2.3mg/dl yesterday. Non-oliguric  Urosepsis: GNR  Metabolic acidosis:NAGMA-> on Bicarb drip  Hyperkalemia: improving  Anorexia  N/v/d  Hypomagnesemia      Plan/Recommendations:  Remains on IV Bicarb gtt  Awaiting today's lab work  IV Abx  Strict I/Os  Avoid nephrotoxins  AM labs      Subjective:   Resting comfortably. ICU RN in the room. Patient is off vasopressors. Reportedly feeling better per RN  UOP up to 2L yesterday    ROS:   deferred    Exam:  Visit Vitals  /68   Pulse 93   Temp 97.8 °F (36.6 °C)   Resp 21   Ht 5' 4\" (1.626 m)   Wt 52.5 kg (115 lb 11.9 oz)   SpO2 99%   BMI 19.87 kg/m²     Wt Readings from Last 3 Encounters:   02/06/22 52.5 kg (115 lb 11.9 oz)   12/07/21 73.5 kg (162 lb 0.6 oz)       Intake/Output Summary (Last 24 hours) at 2/8/2022 0910  Last data filed at 2/8/2022 0900  Gross per 24 hour   Intake 3964.25 ml   Output 1975 ml   Net 1989.25 ml       Gen: NAD/Elderly/Frail  HEENT:  AT/NC  Lungs/Chest wall: Clear. No accessory muscle use. Cardiovascular: Regular rate, normal rhythm.    Abdomen/: Soft, NT, ND, BS+. +Pearson  Ext: No peripheral edema      Current Facility-Administered Medications   Medication Dose Route Frequency Last Admin    sodium bicarbonate (8.4%) 150 mEq in dextrose 5% 1,000 mL infusion   IntraVENous CONTINUOUS New Bag at 02/08/22 0857    citalopram (CELEXA) tablet 20 mg 20 mg Oral DAILY 20 mg at 02/08/22 0859    cefepime (MAXIPIME) 2 g in 0.9% sodium chloride 10 mL IV syringe  2 g IntraVENous Q24H 2 g at 02/08/22 0900    [Held by provider] apixaban (ELIQUIS) tablet 2.5 mg  2.5 mg Oral Q12H      midodrine (PROAMATINE) tablet 5 mg  5 mg Oral Q12H 5 mg at 02/08/22 0859    hydrocortisone Sod Succ (PF) (SOLU-CORTEF) injection 50 mg  50 mg IntraVENous Q6H 50 mg at 02/08/22 0859    ALPRAZolam (XANAX) tablet 0.25 mg  0.25 mg Oral QHS PRN 0.25 mg at 02/07/22 2317    sodium chloride (NS) flush 5-40 mL  5-40 mL IntraVENous Q8H 10 mL at 02/08/22 0600    sodium chloride (NS) flush 5-40 mL  5-40 mL IntraVENous PRN      acetaminophen (TYLENOL) tablet 650 mg  650 mg Oral Q6H  mg at 02/07/22 1136    Or    acetaminophen (TYLENOL) suppository 650 mg  650 mg Rectal Q6H PRN      polyethylene glycol (MIRALAX) packet 17 g  17 g Oral DAILY PRN      ondansetron (ZOFRAN ODT) tablet 4 mg  4 mg Oral Q8H PRN      Or    ondansetron (ZOFRAN) injection 4 mg  4 mg IntraVENous Q6H PRN         Labs/Data:    Lab Results   Component Value Date/Time    WBC 11.1 (H) 02/07/2022 06:05 AM    HGB 10.2 (L) 02/07/2022 06:05 AM    HCT 34.2 (L) 02/07/2022 06:05 AM    PLATELET 219 63/63/2258 06:05 AM    MCV 95.5 02/07/2022 06:05 AM       Lab Results   Component Value Date/Time    Sodium 143 02/07/2022 06:05 AM    Potassium 5.2 (H) 02/07/2022 06:05 AM    Chloride 119 (H) 02/07/2022 06:05 AM    CO2 15 (LL) 02/07/2022 06:05 AM    Anion gap 9 02/07/2022 06:05 AM    Glucose 83 02/07/2022 06:05 AM     (H) 02/07/2022 06:05 AM    Creatinine 2.35 (H) 02/07/2022 06:05 AM    BUN/Creatinine ratio 47 (H) 02/07/2022 06:05 AM    GFR est AA 24 (L) 02/07/2022 06:05 AM    GFR est non-AA 20 (L) 02/07/2022 06:05 AM    Calcium 7.9 (L) 02/07/2022 06:05 AM       Patient seen and examined. Chart reviewed. Labs, data and other pertinent notes reviewed in last 24 hrs.     Discussed with ICU RN    Signed by:  Areli Huggins, MD  [address] [facility]

## 2022-02-09 LAB
ANION GAP SERPL CALC-SCNC: 8 MMOL/L (ref 5–15)
BACTERIA SPEC CULT: ABNORMAL
BACTERIA SPEC CULT: ABNORMAL
BUN SERPL-MCNC: 51 MG/DL (ref 6–20)
BUN/CREAT SERPL: 38 (ref 12–20)
CALCIUM SERPL-MCNC: 8.6 MG/DL (ref 8.5–10.1)
CHLORIDE SERPL-SCNC: 107 MMOL/L (ref 97–108)
CO2 SERPL-SCNC: 27 MMOL/L (ref 21–32)
CREAT SERPL-MCNC: 1.34 MG/DL (ref 0.55–1.02)
ERYTHROCYTE [DISTWIDTH] IN BLOOD BY AUTOMATED COUNT: 17.8 % (ref 11.5–14.5)
GLUCOSE SERPL-MCNC: 144 MG/DL (ref 65–100)
GRAM STN SPEC: ABNORMAL
GRAM STN SPEC: ABNORMAL
HCT VFR BLD AUTO: 31 % (ref 35–47)
HGB BLD-MCNC: 10.1 G/DL (ref 11.5–16)
MAGNESIUM SERPL-MCNC: 1.6 MG/DL (ref 1.6–2.4)
MCH RBC QN AUTO: 28.9 PG (ref 26–34)
MCHC RBC AUTO-ENTMCNC: 32.6 G/DL (ref 30–36.5)
MCV RBC AUTO: 88.8 FL (ref 80–99)
NRBC # BLD: 0 K/UL (ref 0–0.01)
NRBC BLD-RTO: 0 PER 100 WBC
PHOSPHATE SERPL-MCNC: 2.7 MG/DL (ref 2.6–4.7)
PLATELET # BLD AUTO: 239 K/UL (ref 150–400)
PMV BLD AUTO: 12.2 FL (ref 8.9–12.9)
POTASSIUM SERPL-SCNC: 3.3 MMOL/L (ref 3.5–5.1)
RBC # BLD AUTO: 3.49 M/UL (ref 3.8–5.2)
SERVICE CMNT-IMP: ABNORMAL
SODIUM SERPL-SCNC: 142 MMOL/L (ref 136–145)
WBC # BLD AUTO: 9.1 K/UL (ref 3.6–11)

## 2022-02-09 PROCEDURE — 36415 COLL VENOUS BLD VENIPUNCTURE: CPT

## 2022-02-09 PROCEDURE — 80048 BASIC METABOLIC PNL TOTAL CA: CPT

## 2022-02-09 PROCEDURE — 97163 PT EVAL HIGH COMPLEX 45 MIN: CPT

## 2022-02-09 PROCEDURE — 74011250636 HC RX REV CODE- 250/636: Performed by: NURSE PRACTITIONER

## 2022-02-09 PROCEDURE — 74011250636 HC RX REV CODE- 250/636: Performed by: INTERNAL MEDICINE

## 2022-02-09 PROCEDURE — 74011250637 HC RX REV CODE- 250/637: Performed by: INTERNAL MEDICINE

## 2022-02-09 PROCEDURE — 97530 THERAPEUTIC ACTIVITIES: CPT

## 2022-02-09 PROCEDURE — 65660000000 HC RM CCU STEPDOWN

## 2022-02-09 PROCEDURE — 74011250637 HC RX REV CODE- 250/637: Performed by: HOSPITALIST

## 2022-02-09 PROCEDURE — 97165 OT EVAL LOW COMPLEX 30 MIN: CPT

## 2022-02-09 PROCEDURE — 74011250637 HC RX REV CODE- 250/637: Performed by: NURSE PRACTITIONER

## 2022-02-09 PROCEDURE — 84100 ASSAY OF PHOSPHORUS: CPT

## 2022-02-09 PROCEDURE — 74011000250 HC RX REV CODE- 250: Performed by: NURSE PRACTITIONER

## 2022-02-09 PROCEDURE — 97161 PT EVAL LOW COMPLEX 20 MIN: CPT

## 2022-02-09 PROCEDURE — 77030038269 HC DRN EXT URIN PURWCK BARD -A

## 2022-02-09 PROCEDURE — 97535 SELF CARE MNGMENT TRAINING: CPT

## 2022-02-09 PROCEDURE — 74011000250 HC RX REV CODE- 250: Performed by: ANESTHESIOLOGY

## 2022-02-09 PROCEDURE — 83735 ASSAY OF MAGNESIUM: CPT

## 2022-02-09 PROCEDURE — 85027 COMPLETE CBC AUTOMATED: CPT

## 2022-02-09 RX ORDER — POTASSIUM CHLORIDE 750 MG/1
40 TABLET, FILM COATED, EXTENDED RELEASE ORAL
Status: COMPLETED | OUTPATIENT
Start: 2022-02-09 | End: 2022-02-09

## 2022-02-09 RX ORDER — MIDODRINE HYDROCHLORIDE 5 MG/1
2.5 TABLET ORAL EVERY 12 HOURS
Status: DISCONTINUED | OUTPATIENT
Start: 2022-02-09 | End: 2022-02-10 | Stop reason: HOSPADM

## 2022-02-09 RX ORDER — MAGNESIUM SULFATE HEPTAHYDRATE 40 MG/ML
2 INJECTION, SOLUTION INTRAVENOUS ONCE
Status: COMPLETED | OUTPATIENT
Start: 2022-02-09 | End: 2022-02-09

## 2022-02-09 RX ADMIN — MAGNESIUM SULFATE IN WATER 2 G: 40 INJECTION, SOLUTION INTRAVENOUS at 09:46

## 2022-02-09 RX ADMIN — SODIUM CHLORIDE, PRESERVATIVE FREE 10 ML: 5 INJECTION INTRAVENOUS at 13:03

## 2022-02-09 RX ADMIN — SODIUM BICARBONATE: 84 INJECTION, SOLUTION INTRAVENOUS at 00:36

## 2022-02-09 RX ADMIN — DIPHENOXYLATE HYDROCHLORIDE AND ATROPINE SULFATE 2 TABLET: 2.5; .025 TABLET ORAL at 13:03

## 2022-02-09 RX ADMIN — HYDROCORTISONE SODIUM SUCCINATE 50 MG: 100 INJECTION, POWDER, FOR SOLUTION INTRAMUSCULAR; INTRAVENOUS at 05:40

## 2022-02-09 RX ADMIN — DIPHENOXYLATE HYDROCHLORIDE AND ATROPINE SULFATE 2 TABLET: 2.5; .025 TABLET ORAL at 09:37

## 2022-02-09 RX ADMIN — APIXABAN 2.5 MG: 2.5 TABLET, FILM COATED ORAL at 21:54

## 2022-02-09 RX ADMIN — APIXABAN 2.5 MG: 2.5 TABLET, FILM COATED ORAL at 09:38

## 2022-02-09 RX ADMIN — Medication 1 CAPSULE: at 09:37

## 2022-02-09 RX ADMIN — DIPHENOXYLATE HYDROCHLORIDE AND ATROPINE SULFATE 2 TABLET: 2.5; .025 TABLET ORAL at 21:54

## 2022-02-09 RX ADMIN — CEFEPIME 2 G: 2 INJECTION, POWDER, FOR SOLUTION INTRAVENOUS at 09:47

## 2022-02-09 RX ADMIN — MESALAMINE 500 MG: 250 CAPSULE ORAL at 21:54

## 2022-02-09 RX ADMIN — SODIUM CHLORIDE, PRESERVATIVE FREE 10 ML: 5 INJECTION INTRAVENOUS at 05:40

## 2022-02-09 RX ADMIN — SODIUM CHLORIDE, PRESERVATIVE FREE 10 ML: 5 INJECTION INTRAVENOUS at 21:55

## 2022-02-09 RX ADMIN — MESALAMINE 500 MG: 250 CAPSULE ORAL at 15:48

## 2022-02-09 RX ADMIN — MIDODRINE HYDROCHLORIDE 2.5 MG: 5 TABLET ORAL at 09:37

## 2022-02-09 RX ADMIN — CITALOPRAM HYDROBROMIDE 20 MG: 20 TABLET ORAL at 09:38

## 2022-02-09 RX ADMIN — DIPHENOXYLATE HYDROCHLORIDE AND ATROPINE SULFATE 2 TABLET: 2.5; .025 TABLET ORAL at 17:50

## 2022-02-09 RX ADMIN — POTASSIUM CHLORIDE 40 MEQ: 750 TABLET, FILM COATED, EXTENDED RELEASE ORAL at 09:37

## 2022-02-09 NOTE — PROGRESS NOTES
Problem: Self Care Deficits Care Plan (Adult)  Goal: *Acute Goals and Plan of Care (Insert Text)  Description: FUNCTIONAL STATUS PRIOR TO ADMISSION: Patient admitted from SNF, was receiving assist for ADLs and mobility with RW per chart review and patient report, use for w/c PRN (however crys plantarflexion ankle contractures noted). At her baseline, resides at [facility] with assist for ADLs, SBA for short distance mobility with RW. Occupational Therapy Goals  Initiated 2/9/2022  1. Patient will perform grooming in unsupported sitting with supervision/set-up within 7 day(s). 2.  Patient will perform bathing with moderate assistance within 7 day(s). 3.  Patient will perform lower body dressing with moderate assistance & AE PRN within 7 day(s). 4.  Patient will perform toilet transfers to/from [facility] with maximal assistance within 7 day(s). 5.  Patient will perform all aspects of toileting with moderate assistance within 7 day(s). 6.  Patient will participate in upper extremity therapeutic exercise/activities with supervision/set-up for 10 minutes within 7 day(s). 7.  Patient will utilize energy conservation techniques during functional activities with verbal and visual cues within 7 day(s). Outcome: Not Met     OCCUPATIONAL THERAPY EVALUATION  Patient: Esther Cook (2 y.o. female)  Date: 2/9/2022  Primary Diagnosis: UTI (urinary tract infection) [N39.0]  Sepsis ([facility] [address] [A41.9]        Precautions: Fall    ASSESSMENT  Based on the objective data described below, the patient presents with decreased balance, strength, activity tolerance, AROM, activity tolerance, cardiopulmonary endurance, safety awareness, memory, and coordination s/p admission for UTI, hypotension, and increased debility. Patient admitted from SNF, requiring assist for all ADLs and mobility (unclear of ambulation status given crys plantarflexion contractures), was living at an [facility] prior to admission.  She now presents far from her baseline, requiring Min-Total A for bed level toileting, Min A for bed mobility but Max A x2 to stand with RW support and crys foot blocking. Poor standing tolerance and endurance, HR up to 141 with minimal activity, returned to supine for safety d/t quick fatigue. Given her current level of function, recommend d/c back to SNF. Current Level of Function Impacting Discharge (ADLs/self-care): setup-Min A for upper body ADLs, Max-Total A for lower body ADLs, and Max A x2 for limited OOB mobility with RW    Functional Outcome Measure: The patient scored Total: 35/100 on the Barthel Index outcome measure which is indicative of being very dependent in basic self-care. Other factors to consider for discharge: fall risk, assist of 2, PMH, PLOF     Patient will benefit from skilled therapy intervention to address the above noted impairments. PLAN :  Recommendations and Planned Interventions: self care training, functional mobility training, therapeutic exercise, balance training, therapeutic activities, cognitive retraining, endurance activities, patient education, home safety training, and family training/education    Frequency/Duration: Patient will be followed by occupational therapy 3 times a week to address goals. Recommendation for discharge: (in order for the patient to meet his/her long term goals)  Therapy up to 5 days/week in SNF setting    This discharge recommendation:  Has been made in collaboration with the attending provider and/or case management    IF patient discharges home will need the following DME: To be determined (TBD)         SUBJECTIVE:   Patient stated I'm in a pissy mood.     OBJECTIVE DATA SUMMARY:   HISTORY:   Past Medical History:   Diagnosis Date    Atrial fibrillation (HCC)     Atrial flutter ([address]     Edema     Hypertension     Hypokalemia     Hypothyroidism     Insomnia     Major depressive disorder     Pain     UTI (urinary tract infection)     Vitamin D deficiency      Past Surgical History:   Procedure Laterality Date    HX APPENDECTOMY      approx age 21    HX GI  12/01/2021    bowel resection    HX ORTHOPAEDIC  05/01/2021    R hip        Expanded or extensive additional review of patient history:     Home Situation  [facility] Environment: [facility] [address]: [address]: Child(grisel) (Daughter Marcia Sanchez [phone])  Patient Expects to be Discharged to[de-identified] Skilled nursing facility [facility]  Current DME Used/Available at Home: [employer], elmer,Wheelchair    Hand dominance: Right    EXAMINATION OF PERFORMANCE DEFICITS:  Cognitive/Behavioral Status:  Neurologic State: Alert  Orientation Level: Oriented to person;Oriented to place; Disoriented to situation;Disoriented to time  Cognition: Decreased attention/concentration; Follows commands;Poor safety awareness  Perception: Appears intact  Perseveration: No perseveration noted  Safety/Judgement: Awareness of environment;Decreased awareness of need for assistance;Decreased insight into deficits; Decreased awareness of need for safety    Skin: Appears intact    Edema: None    Hearing:       Vision/Perceptual:    Tracking: Able to track stimulus in all quadrants w/o difficulty                                Range of Motion:  AROM: Generally decreased, functional (bilateral foot PF contracture )  PROM: Generally decreased, functional                      Strength:  Strength: Generally decreased, functional (L>R)                Coordination:  Coordination: Generally decreased, functional  Fine Motor Skills-Upper: Left Intact; Right Intact    Gross Motor Skills-Upper: Left Intact; Right Intact    Tone & Sensation:  Tone: Normal  Sensation: Intact                      Balance:  Sitting: Impaired  Sitting - Static: Good (unsupported)  Sitting - Dynamic: Fair (occasional)  Standing: Impaired; With support  Standing - Static: Poor;Constant support  Standing - Dynamic : Poor;Constant support    Functional Mobility and Transfers for ADLs:  Bed Mobility:  Rolling: Contact guard assistance;Minimum assistance  Supine to Sit: Contact guard assistance;Minimum assistance; Additional time  Sit to Supine: Minimum assistance  Scooting: Minimum assistance; Moderate assistance;Assist x2 (supine, assist of 1 EOB)    Transfers:  Sit to Stand: Maximum assistance;Assist x2  Stand to Sit: Maximum assistance;Assist x2  Toilet Transfer : Total assistance;Assist x2 (infer squat pivot to [facility] per mobility)  Assistive Device : Gait Belt;Walker, rolling    ADL Assessment:  Feeding: Setup    Oral Facial Hygiene/Grooming: Setup    Bathing: Moderate assistance    Upper Body Dressing: Setup    Lower Body Dressing: Maximum assistance    Toileting: Total assistance                ADL Intervention and task modifications:  Lower Body Dressing Assistance  Dressing Assistance: Total assistance(dependent)  Protective Undergarmet: Total assistance (dependent)  Socks: Maximum assistance  Leg Crossed Method Used: No  Position Performed: Seated edge of bed;Standing;Supine  Cues: Physical assistance; Tactile cues provided;Verbal cues provided;Visual cues provided    Toileting  Toileting Assistance: Total assistance(dependent) (rolling in supine with Min A )  Bladder Hygiene: Total assistance (dependent)  Bowel Hygiene: Total assistance (dependent)  Clothing Management: Total assistance (dependent)    Cognitive Retraining  Safety/Judgement: Awareness of environment;Decreased awareness of need for assistance;Decreased insight into deficits; Decreased awareness of need for safety    Therapeutic Exercise:  Sit<>stand x2 with Max A x2 and RW support, limited standing tolerance <15 seconds     Functional Measure:    Barthel Index:  Bathin  Bladder: 5  Bowels: 5  Groomin  Dressin  Feeding: 10  Mobility: 0  Stairs: 0  Toilet Use: 0  Transfer (Bed to Chair and Back): 5  Total: 35/100      The Barthel ADL Index: Guidelines  1.  The index should be used as a record of what a patient does, not as a record of what a patient could do. 2. The main aim is to establish degree of independence from any help, physical or verbal, however minor and for whatever reason. 3. The need for supervision renders the patient not independent. 4. A patient's performance should be established using the best available evidence. Asking the patient, friends/relatives and nurses are the usual sources, but direct observation and common sense are also important. However direct testing is not needed. 5. Usually the patient's performance over the preceding 24-48 hours is important, but occasionally longer periods will be relevant. 6. Middle categories imply that the patient supplies over 50 per cent of the effort. 7. Use of aids to be independent is allowed. Score Interpretation (from [facility] [address])    Independent   60-79 Minimally independent   40-59 Partially dependent   20-39 Very dependent   <20 Totally dependent     -Jia Lane., Barthel, D.W. (1965). Functional evaluation: the Barthel Index. [facility] ([address] (1997). The Barthel activities of daily living index: self-reporting versus actual performance in the old (> or = 75 years). Journal of [address] [place] [address] 457), [facility], MARLEEN, Myesha Martínez., Caim Ashley. (1999). Measuring the change in disability after inpatient rehabilitation; comparison of the responsiveness of the Barthel Index and Functional [place] Measure. Journal of Neurology, Neurosurgery, and Psychiatry, 66(4), 519-517. Mike Lucas, N.J.A, JUNIOR Giang, & Miguel Ham, M.A. (2004) Assessment of post-stroke quality of life in cost-effectiveness studies: The usefulness of the Barthel Index and the EuroQoL-5D.  Quality of Life Research, 15, 421-08     Occupational Therapy Evaluation Charge Determination   History Examination Decision-Making   MEDIUM Complexity : Expanded review of history including physical, cognitive and psychosocial  history  HIGH Complexity : 5 or more performance deficits relating to physical, cognitive , or psychosocial skils that result in activity limitations and / or participation restrictions HIGH Complexity : Patient presents with comorbidities that affect occupational performance. Signifigant modification of tasks or assistance (eg, physical or verbal) with assessment (s) is necessary to enable patient to complete evaluation       Based on the above components, the patient evaluation is determined to be of the following complexity level: MEDIUM  Pain Rating:  None reported    Activity Tolerance:   Poor, requires rest breaks, and tachycardic with activity (HR up to 141 with activity)    After treatment patient left in no apparent distress:    Supine in bed, Call bell within reach, Caregiver / family present, and Side rails x 3    COMMUNICATION/EDUCATION:   The patients plan of care was discussed with: Physical therapist and Registered nurse. Home safety education was provided and the patient/caregiver indicated understanding., Patient/family have participated as able in goal setting and plan of care. , and Patient/family agree to work toward stated goals and plan of care. This patients plan of care is appropriate for delegation to [facility].     Thank you for this referral.  KEM Sparrow, OTR/L  Time Calculation: 26 mins

## 2022-02-09 NOTE — PROGRESS NOTES
Bedside shift change report given to Rylee Gaona (oncoming nurse) by Cliff Díaz (offgoing nurse). Report included the following information SBAR, Kardex, ED Summary, Intake/Output and MAR.

## 2022-02-09 NOTE — PROGRESS NOTES
Hospitalist Progress Note      Hospital summary: Wale Shah K 68 y. o. female with history of atrial fibrillation/flutter on Eliquis, HTN, Hypothyroidism, depression, UTI's, and bowel perforation with colon resection and anastomosis 11/2021 who presented to [facility] ED on 2/6 from [facility] by EMS for increased generalized weakness and low blood pressure. Hx sacral wounds and failure to thrive. She has not been able to take much by mouth recently. She has been receiving IV fluids for dehydration and poor PO intake. Patient was start on pressors and cultures pending. Transferred to ICU for continued management 2/6/2022      Assessment/Plan:  Septic shock due to infected sacral wounds and UTI  - out of ICU 2/7  - change IV cefepime to IV ceftriaxone to start 2/10  . Wounds with mixed mercedes and yeast, urine w/ kleb pneumoniae   - wean hydrocortisone and midodrine - reduced doses today     CURT   - Likely pre-renal in setting of Sepsis and dehydration  - bicarb gtt stopped 2/9  - Avoid nephrotoxins and renal dose medication   - Nephrology following      Atrial fibrillation   - Rate controlled   - resumed Eliquis 2/8     Hyperkalemia   - monitor    Diarrhea: chronic per the patient, hx of UC. She takes limotil at home. Thsi was resumed 2/8. She continues having diarrhea. Started probiotic and de-escalate abx.      Non- Anion gap metabolic acidosis   - on bicarb drip; monitor    Failure to thrive   Protein deficient malnutrition   - Palliative care consulted      Sacral Wound (POA)   - Wound care consult    Code status: DNR  DVT prophylaxis: anticoagulate  Disposition: TBD  ----------------------------------------------    CC: f/u sepsis    S: feels better today, denies pain, dyspnea, n/v, having diarrhea    Review of Systems:  Pertinent items are noted in HPI.     O:  Visit Vitals  BP (!) 144/84   Pulse (!) 114   Temp 97.5 °F (36.4 °C)   Resp 13   Ht 5' 4\" (1.626 m)   Wt 52.5 kg (115 lb 11.9 oz)   SpO2 100%   BMI 19.87 kg/m²       PHYSICAL EXAM:  Gen: NAD, non-toxic  HEENT: anicteric sclerae, normal conjunctiva  Neck: supple, trachea midline, no adenopathy  Heart: RRR, no MRG, no JVD, no peripheral edema  Lungs: CTA b/l, non-labored respirations  Abd: soft, NT, ND, BS+  Extr: warm  Skin: sacral wounds not assessed by me today  Neuro: CN II-XII grossly intact, normal speech, moves all extremities  Psych: normal mood, appropriate affect      Intake/Output Summary (Last 24 hours) at 2/9/2022 1552  Last data filed at 2/9/2022 0800  Gross per 24 hour   Intake 1202.99 ml   Output 1250 ml   Net -47.01 ml        Recent labs & imaging reviewed:  Recent Results (from the past 24 hour(s))   METABOLIC PANEL, BASIC    Collection Time: 02/09/22  4:27 AM   Result Value Ref Range    Sodium 142 136 - 145 mmol/L    Potassium 3.3 (L) 3.5 - 5.1 mmol/L    Chloride 107 97 - 108 mmol/L    CO2 27 21 - 32 mmol/L    Anion gap 8 5 - 15 mmol/L    Glucose 144 (H) 65 - 100 mg/dL    BUN 51 (H) 6 - 20 MG/DL    Creatinine 1.34 (H) 0.55 - 1.02 MG/DL    BUN/Creatinine ratio 38 (H) 12 - 20      GFR est AA 45 (L) >60 ml/min/1.73m2    GFR est non-AA 37 (L) >60 ml/min/1.73m2    Calcium 8.6 8.5 - 10.1 MG/DL   CBC W/O DIFF    Collection Time: 02/09/22  4:27 AM   Result Value Ref Range    WBC 9.1 3.6 - 11.0 K/uL    RBC 3.49 (L) 3.80 - 5.20 M/uL    HGB 10.1 (L) 11.5 - 16.0 g/dL    HCT 31.0 (L) 35.0 - 47.0 %    MCV 88.8 80.0 - 99.0 FL    MCH 28.9 26.0 - 34.0 PG    MCHC 32.6 30.0 - 36.5 g/dL    RDW 17.8 (H) 11.5 - 14.5 %    PLATELET 282 035 - 878 K/uL    MPV 12.2 8.9 - 12.9 FL    NRBC 0.0 0  WBC    ABSOLUTE NRBC 0.00 0.00 - 0.01 K/uL   MAGNESIUM    Collection Time: 02/09/22  4:27 AM   Result Value Ref Range    Magnesium 1.6 1.6 - 2.4 mg/dL   PHOSPHORUS    Collection Time: 02/09/22  4:27 AM   Result Value Ref Range    Phosphorus 2.7 2.6 - 4.7 MG/DL     Recent Labs     02/09/22  0427 02/08/22  0818   WBC 9.1 5.6   HGB 10.1* 8.8*   HCT 31.0* 27.8*    197     Recent Labs     02/09/22  0427 02/08/22  0818 02/07/22  0605    142 143   K 3.3* 4.0 5.2*    114* 119*   CO2 27 23 15*   BUN 51* 71* 111*   CREA 1.34* 1.59* 2.35*   * 170* 83   CA 8.6 8.0* 7.9*   MG 1.6 1.1* 1.3*   PHOS 2.7 3.1 4.6     Recent Labs     02/07/22  0605   ALT 29   AP 93   TBILI 0.2   TP 5.6*   ALB 2.9*   GLOB 2.7     Recent Labs     02/07/22  0905   INR 1.3*   PTP 13.5*      No results for input(s): FE, TIBC, PSAT, FERR in the last 72 hours. No results found for: FOL, RBCF   No results for input(s): PH, PCO2, PO2 in the last 72 hours. No results for input(s): CPK, CKNDX, TROIQ in the last 72 hours.     No lab exists for component: CPKMB  No results found for: CHOL, CHOLX, CHLST, CHOLV, HDL, HDLP, LDL, LDLC, DLDLP, TGLX, TRIGL, TRIGP, CHHD, CHHDX  Lab Results   Component Value Date/Time    Glucose (POC) 75 02/06/2022 11:08 PM    Glucose (POC) 78 02/06/2022 01:07 PM    Glucose (POC) 102 12/07/2021 06:45 AM    Glucose (POC) 105 12/02/2021 05:39 AM    Glucose (POC) 161 (H) 12/01/2021 11:27 PM     Lab Results   Component Value Date/Time    Color YELLOW/STRAW 02/06/2022 11:33 AM    Appearance TURBID (A) 02/06/2022 11:33 AM    Specific gravity 1.015 02/06/2022 11:33 AM    Specific gravity 1.015 11/23/2021 05:05 PM    pH (UA) 5.0 02/06/2022 11:33 AM    Protein 30 (A) 02/06/2022 11:33 AM    Glucose Negative 02/06/2022 11:33 AM    Ketone Negative 02/06/2022 11:33 AM    Bilirubin Negative 02/06/2022 11:33 AM    Urobilinogen 0.2 02/06/2022 11:33 AM    Nitrites Negative 02/06/2022 11:33 AM    Leukocyte Esterase LARGE (A) 02/06/2022 11:33 AM    Epithelial cells FEW 02/06/2022 11:33 AM    Bacteria 3+ (A) 02/06/2022 11:33 AM    WBC >100 (H) 02/06/2022 11:33 AM    RBC 0-5 02/06/2022 11:33 AM       Med list reviewed  Current Facility-Administered Medications   Medication Dose Route Frequency    midodrine (PROAMATINE) tablet 2.5 mg  2.5 mg Oral Q12H    [START ON 2/10/2022] cefTRIAXone (ROCEPHIN) 1 g in sterile water (preservative free) 10 mL IV syringe  1 g IntraVENous Q24H    diphenoxylate-atropine (LOMOTIL) tablet 2 Tablet  2 Tablet Oral QID    mesalamine (PENTASA) CR capsule 500 mg  500 mg Oral TID    L.acidophilus-paracasei-S.thermophil-bifidobacter (RISAQUAD) 8 billion cell capsule  1 Capsule Oral DAILY    citalopram (CELEXA) tablet 20 mg  20 mg Oral DAILY    apixaban (ELIQUIS) tablet 2.5 mg  2.5 mg Oral Q12H    ALPRAZolam (XANAX) tablet 0.25 mg  0.25 mg Oral QHS PRN    sodium chloride (NS) flush 5-40 mL  5-40 mL IntraVENous Q8H    sodium chloride (NS) flush 5-40 mL  5-40 mL IntraVENous PRN    acetaminophen (TYLENOL) tablet 650 mg  650 mg Oral Q6H PRN    Or    acetaminophen (TYLENOL) suppository 650 mg  650 mg Rectal Q6H PRN    polyethylene glycol (MIRALAX) packet 17 g  17 g Oral DAILY PRN    ondansetron (ZOFRAN ODT) tablet 4 mg  4 mg Oral Q8H PRN    Or    ondansetron (ZOFRAN) injection 4 mg  4 mg IntraVENous Q6H PRN       Care Plan discussed with:  Patient/Family and Nurse    Gabriel Rees MD  Internal Medicine  Date of Service: 2/9/2022

## 2022-02-09 NOTE — ROUTINE PROCESS
Transition of Care Plan: [facility]     RUR: 23% high     PCP F/U: Dr Mauricio Ellis  Disposition: [facility]     Transportation: S     4176: Likely medically ready tomorrow.  Will update facility.      Sunita Benson RN, CRM

## 2022-02-09 NOTE — PROGRESS NOTES
Problem: Falls - Risk of  Goal: *Absence of Falls  Description: Document Lettie Duverney Fall Risk and appropriate interventions in the flowsheet.   Outcome: Progressing Towards Goal  Note: Fall Risk Interventions:  Mobility Interventions: Communicate number of staff needed for ambulation/transfer,OT consult for ADLs,Patient to call before getting OOB,PT Consult for mobility concerns,PT Consult for assist device competence,Strengthening exercises (ROM-active/passive)    Mentation Interventions: Adequate sleep, hydration, pain control,Door open when patient unattended    Medication Interventions: Assess postural VS orthostatic hypotension,Patient to call before getting OOB,Teach patient to arise slowly    Elimination Interventions: Call light in reach,Patient to call for help with toileting needs,Stay With Me (per policy),Toileting schedule/hourly rounds

## 2022-02-09 NOTE — PROGRESS NOTES
Problem: Mobility Impaired (Adult and Pediatric)  Goal: *Acute Goals and Plan of Care (Insert Text)  Description: FUNCTIONAL STATUS PRIOR TO ADMISSION: Pt came from SNF but was living at an [facility] prior to that. PLOF of function was unclear. Per chart, pt was ambulating short distances with RW at SNF. Per pt she has not ambulated in quite some time. Observed plantarflexion contractures. HOME SUPPORT PRIOR TO ADMISSION: Pt has been at SNF and prior to that was at an retirement. Physical Therapy Goals  Initiated 2/9/2022  1. Patient will move from supine to sit and sit to supine , scoot up and down, and roll side to side in bed with supervision/set-up within 7 day(s). 2.  Patient will transfer from bed to chair and chair to bed with moderate assistance x2 using the least restrictive device within 7 day(s). 3.  Patient will perform sit to stand with moderate assistance x2 within 7 day(s). Outcome: Not Met   PHYSICAL THERAPY EVALUATION  Patient: Chayo Stover (42 y.o. female)  Date: 2/9/2022  Primary Diagnosis: UTI (urinary tract infection) [N39.0]  Sepsis ([facility] [A41.9]        Precautions:  Fall    ASSESSMENT  Based on the objective data described below, the patient presents with impaired balance, decreased bilateral ankle mobility (PF contracture), decreased activity tolerance, tachycardia, generalize weakness (L>R), and impaired cognition s/p admission for a UTI. PLOF is unclear, but pt was admitted from SNF. This date, pt required CGA-Polo for supine <>sit. She was a maxA x2 to stand with a RW. Pt unable to achieve full upright position or tolerate standing >5 seconds. HR elevated to 147 during standing and returned back to 110s with seated rest. D/t pt decreased activity tolerance and tachycardia further mobility was deferred. Recommending discharge back to SNF. Current Level of Function Impacting Discharge (mobility/balance): maxA x2 stand     Functional Outcome Measure:   The patient scored 35/100 on the Barthel outcome measure. Other factors to consider for discharge: from SNF, unclear PLOF, fall risk, plantarflexion contractures      Patient will benefit from skilled therapy intervention to address the above noted impairments. PLAN :  Recommendations and Planned Interventions: bed mobility training, transfer training, gait training, therapeutic exercises, neuromuscular re-education, patient and family training/education, and therapeutic activities      Frequency/Duration: Patient will be followed by physical therapy:  3 times a week to address goals. Recommendation for discharge: (in order for the patient to meet his/her long term goals)  Therapy up to 5 days/week in SNF setting    This discharge recommendation:  Has not yet been discussed the attending provider and/or case management    IF patient discharges home will need the following DME: none         SUBJECTIVE:   Patient stated I put butt cream on my mouth this morning.     OBJECTIVE DATA SUMMARY:   HISTORY:    Past Medical History:   Diagnosis Date    Atrial fibrillation (HCC)     Atrial flutter (HCC)     Edema     Hypertension     Hypokalemia     Hypothyroidism     Insomnia     Major depressive disorder     Pain     UTI (urinary tract infection)     Vitamin D deficiency      Past Surgical History:   Procedure Laterality Date    HX APPENDECTOMY      approx age 21    HX GI  12/01/2021    bowel resection    HX ORTHOPAEDIC  05/01/2021    R hip        Home Situation  Home Environment: (P) Assisted living  Support Systems: Child(grisel) (Daughter Oleg Jones [phone])    EXAMINATION/PRESENTATION/DECISION MAKING:   Critical Behavior:  Neurologic State: Alert,Eyes open spontaneously  Orientation Level: Oriented X4  Cognition: Follows commands       Range Of Motion:  AROM: Generally decreased, functional (bilateral foot PF contracture )           PROM: Generally decreased, functional           Strength:    Strength: Generally decreased, functional (L>R)        Tone & Sensation:   Tone: Normal              Sensation: Intact               Coordination:  Coordination: Generally decreased, functional  Vision:      Functional Mobility:  Bed Mobility:  Rolling: Contact guard assistance  Supine to Sit: Contact guard assistance;Minimum assistance; Additional time  Sit to Supine: Minimum assistance     Transfers:  Sit to Stand: Maximum assistance;Assist x2  Stand to Sit: Maximum assistance;Assist x2                       Balance:   Sitting: Impaired  Sitting - Static: Good (unsupported)  Sitting - Dynamic: Fair (occasional)  Standing: Impaired; With support  Standing - Static: Poor;Constant support  Standing - Dynamic : Poor;Constant support       Functional Measure:  Barthel Index:    Bathin  Bladder: 5  Bowels: 5  Groomin  Dressin  Feeding: 10  Mobility: 0  Stairs: 0  Toilet Use: 0  Transfer (Bed to Chair and Back): 5  Total: 35/100       The Barthel ADL Index: Guidelines  1. The index should be used as a record of what a patient does, not as a record of what a patient could do. 2. The main aim is to establish degree of independence from any help, physical or verbal, however minor and for whatever reason. 3. The need for supervision renders the patient not independent. 4. A patient's performance should be established using the best available evidence. Asking the patient, friends/relatives and nurses are the usual sources, but direct observation and common sense are also important. However direct testing is not needed. 5. Usually the patient's performance over the preceding 24-48 hours is important, but occasionally longer periods will be relevant. 6. Middle categories imply that the patient supplies over 50 per cent of the effort. 7. Use of aids to be independent is allowed.     Score Interpretation (from [address] [place] [address])    Independent   60-79 Minimally independent   40-59 Partially dependent   20-39 Very dependent   <20 Totally dependent     -Milind Lane, Barthel, D.W. (5639). Functional evaluation: the Barthel Index. [address] ([address] (1997). The Barthel activities of daily living index: self-reporting versus actual performance in the old (> or = 75 years). Journal of [address] [place] [address] 45(7), [facility], MARLEEN, Lisa Agrawal., [facility] Jacob. (1999). Measuring the change in disability after inpatient rehabilitation; comparison of the responsiveness of the Barthel Index and Functional [place] Measure. Journal of Neurology, Neurosurgery, and Psychiatry, 66(4), 236-532. Jazz Howard, [place].EDIN, JUNIOR Giang, & Deonte Delgadillo MSANDRA. (2004) Assessment of post-stroke quality of life in cost-effectiveness studies: The usefulness of the Barthel Index and the EuroQoL-5D. Quality of Life Research, 15, 379-74           Physical Therapy Evaluation Charge Determination   History Examination Presentation Decision-Making   HIGH Complexity :3+ comorbidities / personal factors will impact the outcome/ POC  HIGH Complexity : 4+ Standardized tests and measures addressing body structure, function, activity limitation and / or participation in recreation  HIGH Complexity : Unstable and unpredictable characteristics  Other outcome measures Barthel  HIGH       Based on the above components, the patient evaluation is determined to be of the following complexity level: HIGH       Activity Tolerance:   Poor    After treatment patient left in no apparent distress:   Supine in bed, Heels elevated for pressure relief, Call bell within reach, Caregiver / family present, and Side rails x 3    COMMUNICATION/EDUCATION:   The patients plan of care was discussed with: Occupational therapist and Registered nurse. Fall prevention education was provided and the patient/caregiver indicated understanding., Patient/family have participated as able in goal setting and plan of care. , and Patient/family agree to work toward stated goals and plan of care. Thank you for this referral.  Kamryn Tijerina, NATALI   Time Calculation: 24 mins      Regarding student involvement in patient care:  A student participated in this treatment session. Per CMS Medicare statements and APTA guidelines I certify that the following was true:  1. I was present and directly observed the entire session. 2. I made all skilled judgments and clinical decisions regarding care. 3. I am the practitioner responsible for assessment, treatment, and documentation.

## 2022-02-09 NOTE — PROGRESS NOTES
Patient name: Michelle Payment  MRN: [id number]    Nephrology Progress note:    Assessment:  CURT: Pre-renal vs Septic ATN. Serum Cr improving from 4.6-> all the way down to 1.3mg/dl today. Non-oliguric  Urosepsis: Kleb Pneum  Metabolic acidosis:NAGMA-> improved with Bicarb drip  Hyperkalemia: improving-> now hypokalemic  Anorexia  N/v/d  Hypomagnesemia      Plan/Recommendations:  Stop IV Bicarb gtt  IV Mag sulfate 2gm x1 dose  Oral KCl 40meq x1 dose today  IV Abx  Strict I/Os  Avoid nephrotoxins  AM labs  Discharge planning      Subjective:  Ct to look frail but patient states she feels much better. ROS:   No n/v/d  No SOB    Exam:  Visit Vitals  /68   Pulse 91   Temp 97.3 °F (36.3 °C)   Resp 14   Ht 5' 4\" (1.626 m)   Wt 52.5 kg (115 lb 11.9 oz)   SpO2 99%   BMI 19.87 kg/m²     Wt Readings from Last 3 Encounters:   02/06/22 52.5 kg (115 lb 11.9 oz)   12/07/21 73.5 kg (162 lb 0.6 oz)       Intake/Output Summary (Last 24 hours) at 2/9/2022 0919  Last data filed at 2/9/2022 0200  Gross per 24 hour   Intake 842.99 ml   Output 645 ml   Net 197.99 ml       Gen: NAD/Elderly/Frail  HEENT:  AT/NC  Lungs/Chest wall: Clear. No accessory muscle use. Cardiovascular: Regular rate, normal rhythm.    Abdomen/: Soft, NT, ND, BS+. +Pearson  Ext: No peripheral edema      Current Facility-Administered Medications   Medication Dose Route Frequency Last Admin    potassium chloride SR (KLOR-CON 10) tablet 40 mEq  40 mEq Oral NOW      midodrine (PROAMATINE) tablet 2.5 mg  2.5 mg Oral Q12H      magnesium sulfate 2 g/50 ml IVPB (premix or compounded)  2 g IntraVENous ONCE      diphenoxylate-atropine (LOMOTIL) tablet 2 Tablet  2 Tablet Oral QID 2 Tablet at 02/08/22 2135    . PHARMACY TO SUBSTITUTE PER PROTOCOL (Reordered from: mesalamine ER (APRISO) 0.375 gram 24 hour capsule)    Per Protocol      L.acidophilus-paracasei-S.thermophil-bifidobacter (RISAQUAD) 8 billion cell capsule  1 Capsule Oral DAILY 1 Capsule at 02/08/22 1918    citalopram (CELEXA) tablet 20 mg  20 mg Oral DAILY 20 mg at 02/08/22 0859    cefepime (MAXIPIME) 2 g in 0.9% sodium chloride 10 mL IV syringe  2 g IntraVENous Q24H 2 g at 02/08/22 0900    apixaban (ELIQUIS) tablet 2.5 mg  2.5 mg Oral Q12H 2.5 mg at 02/08/22 2136    ALPRAZolam (XANAX) tablet 0.25 mg  0.25 mg Oral QHS PRN 0.25 mg at 02/07/22 2317    sodium chloride (NS) flush 5-40 mL  5-40 mL IntraVENous Q8H 10 mL at 02/09/22 0540    sodium chloride (NS) flush 5-40 mL  5-40 mL IntraVENous PRN      acetaminophen (TYLENOL) tablet 650 mg  650 mg Oral Q6H  mg at 02/07/22 1136    Or    acetaminophen (TYLENOL) suppository 650 mg  650 mg Rectal Q6H PRN      polyethylene glycol (MIRALAX) packet 17 g  17 g Oral DAILY PRN      ondansetron (ZOFRAN ODT) tablet 4 mg  4 mg Oral Q8H PRN      Or    ondansetron (ZOFRAN) injection 4 mg  4 mg IntraVENous Q6H PRN         Labs/Data:    Lab Results   Component Value Date/Time    WBC 9.1 02/09/2022 04:27 AM    HGB 10.1 (L) 02/09/2022 04:27 AM    HCT 31.0 (L) 02/09/2022 04:27 AM    PLATELET 330 57/08/1496 04:27 AM    MCV 88.8 02/09/2022 04:27 AM       Lab Results   Component Value Date/Time    Sodium 142 02/09/2022 04:27 AM    Potassium 3.3 (L) 02/09/2022 04:27 AM    Chloride 107 02/09/2022 04:27 AM    CO2 27 02/09/2022 04:27 AM    Anion gap 8 02/09/2022 04:27 AM    Glucose 144 (H) 02/09/2022 04:27 AM    BUN 51 (H) 02/09/2022 04:27 AM    Creatinine 1.34 (H) 02/09/2022 04:27 AM    BUN/Creatinine ratio 38 (H) 02/09/2022 04:27 AM    GFR est AA 45 (L) 02/09/2022 04:27 AM    GFR est non-AA 37 (L) 02/09/2022 04:27 AM    Calcium 8.6 02/09/2022 04:27 AM       Patient seen and examined. Chart reviewed. Labs, data and other pertinent notes reviewed in last 24 hrs.     Discussed with patient    Signed by:  Ronald Dunn MD  [address] [facility]

## 2022-02-09 NOTE — ROUTINE PROCESS
Bedside and Verbal shift change report given to hayde Duke (oncoming nurse) by Blanca Holley (offgoing nurse). Report included the following information SBAR, Kardex, Intake/Output, Recent Results and Cardiac Rhythm A-fib.

## 2022-02-10 VITALS
TEMPERATURE: 98 F | DIASTOLIC BLOOD PRESSURE: 66 MMHG | WEIGHT: 115.74 LBS | RESPIRATION RATE: 11 BRPM | BODY MASS INDEX: 19.76 KG/M2 | HEART RATE: 103 BPM | SYSTOLIC BLOOD PRESSURE: 112 MMHG | OXYGEN SATURATION: 98 % | HEIGHT: 64 IN

## 2022-02-10 LAB
ALBUMIN SERPL-MCNC: 2.6 G/DL (ref 3.5–5)
ALBUMIN/GLOB SERPL: 1 {RATIO} (ref 1.1–2.2)
ALP SERPL-CCNC: 79 U/L (ref 45–117)
ALT SERPL-CCNC: 23 U/L (ref 12–78)
ANION GAP SERPL CALC-SCNC: 7 MMOL/L (ref 5–15)
AST SERPL-CCNC: 20 U/L (ref 15–37)
BILIRUB SERPL-MCNC: 0.4 MG/DL (ref 0.2–1)
BUN SERPL-MCNC: 37 MG/DL (ref 6–20)
BUN/CREAT SERPL: 33 (ref 12–20)
CALCIUM SERPL-MCNC: 8.3 MG/DL (ref 8.5–10.1)
CHLORIDE SERPL-SCNC: 108 MMOL/L (ref 97–108)
CO2 SERPL-SCNC: 29 MMOL/L (ref 21–32)
COVID-19 RAPID TEST, COVR: NOT DETECTED
CREAT SERPL-MCNC: 1.11 MG/DL (ref 0.55–1.02)
ERYTHROCYTE [DISTWIDTH] IN BLOOD BY AUTOMATED COUNT: 17.9 % (ref 11.5–14.5)
GLOBULIN SER CALC-MCNC: 2.5 G/DL (ref 2–4)
GLUCOSE SERPL-MCNC: 86 MG/DL (ref 65–100)
HCT VFR BLD AUTO: 32.2 % (ref 35–47)
HGB BLD-MCNC: 9.9 G/DL (ref 11.5–16)
MAGNESIUM SERPL-MCNC: 1.5 MG/DL (ref 1.6–2.4)
MCH RBC QN AUTO: 28.4 PG (ref 26–34)
MCHC RBC AUTO-ENTMCNC: 30.7 G/DL (ref 30–36.5)
MCV RBC AUTO: 92.5 FL (ref 80–99)
NRBC # BLD: 0 K/UL (ref 0–0.01)
NRBC BLD-RTO: 0 PER 100 WBC
PHOSPHATE SERPL-MCNC: 2 MG/DL (ref 2.6–4.7)
PLATELET # BLD AUTO: 198 K/UL (ref 150–400)
PMV BLD AUTO: 11.6 FL (ref 8.9–12.9)
POTASSIUM SERPL-SCNC: 3 MMOL/L (ref 3.5–5.1)
PROT SERPL-MCNC: 5.1 G/DL (ref 6.4–8.2)
RBC # BLD AUTO: 3.48 M/UL (ref 3.8–5.2)
SODIUM SERPL-SCNC: 144 MMOL/L (ref 136–145)
SOURCE, COVRS: NORMAL
WBC # BLD AUTO: 8 K/UL (ref 3.6–11)

## 2022-02-10 PROCEDURE — 74011250636 HC RX REV CODE- 250/636: Performed by: INTERNAL MEDICINE

## 2022-02-10 PROCEDURE — 74011000250 HC RX REV CODE- 250: Performed by: ANESTHESIOLOGY

## 2022-02-10 PROCEDURE — 85027 COMPLETE CBC AUTOMATED: CPT

## 2022-02-10 PROCEDURE — 74011250637 HC RX REV CODE- 250/637: Performed by: HOSPITALIST

## 2022-02-10 PROCEDURE — 83735 ASSAY OF MAGNESIUM: CPT

## 2022-02-10 PROCEDURE — 74011250637 HC RX REV CODE- 250/637: Performed by: NURSE PRACTITIONER

## 2022-02-10 PROCEDURE — 74011000250 HC RX REV CODE- 250: Performed by: HOSPITALIST

## 2022-02-10 PROCEDURE — 36415 COLL VENOUS BLD VENIPUNCTURE: CPT

## 2022-02-10 PROCEDURE — 84100 ASSAY OF PHOSPHORUS: CPT

## 2022-02-10 PROCEDURE — 80053 COMPREHEN METABOLIC PANEL: CPT

## 2022-02-10 PROCEDURE — 74011250636 HC RX REV CODE- 250/636: Performed by: HOSPITALIST

## 2022-02-10 PROCEDURE — 87635 SARS-COV-2 COVID-19 AMP PRB: CPT

## 2022-02-10 RX ORDER — POTASSIUM CHLORIDE 750 MG/1
40 TABLET, FILM COATED, EXTENDED RELEASE ORAL EVERY 4 HOURS
Status: COMPLETED | OUTPATIENT
Start: 2022-02-10 | End: 2022-02-10

## 2022-02-10 RX ORDER — DILTIAZEM HYDROCHLORIDE 120 MG/1
120 CAPSULE, COATED, EXTENDED RELEASE ORAL DAILY
Qty: 30 CAPSULE | Refills: 0 | Status: SHIPPED | OUTPATIENT
Start: 2022-02-10

## 2022-02-10 RX ORDER — CEPHALEXIN 250 MG/1
500 CAPSULE ORAL 4 TIMES DAILY
Qty: 40 CAPSULE | Refills: 0 | Status: SHIPPED | OUTPATIENT
Start: 2022-02-10 | End: 2022-02-15

## 2022-02-10 RX ORDER — MAGNESIUM SULFATE HEPTAHYDRATE 40 MG/ML
2 INJECTION, SOLUTION INTRAVENOUS ONCE
Status: COMPLETED | OUTPATIENT
Start: 2022-02-10 | End: 2022-02-10

## 2022-02-10 RX ORDER — CARVEDILOL 12.5 MG/1
12.5 TABLET ORAL 2 TIMES DAILY WITH MEALS
Qty: 60 TABLET | Refills: 0 | Status: ON HOLD | OUTPATIENT
Start: 2022-02-10 | End: 2022-03-20 | Stop reason: SDUPTHER

## 2022-02-10 RX ORDER — MIDODRINE HYDROCHLORIDE 2.5 MG/1
2.5 TABLET ORAL EVERY 12 HOURS
Qty: 60 TABLET | Refills: 0 | Status: SHIPPED | OUTPATIENT
Start: 2022-02-10 | End: 2022-03-20

## 2022-02-10 RX ADMIN — DIPHENOXYLATE HYDROCHLORIDE AND ATROPINE SULFATE 2 TABLET: 2.5; .025 TABLET ORAL at 14:49

## 2022-02-10 RX ADMIN — MESALAMINE 500 MG: 250 CAPSULE ORAL at 08:37

## 2022-02-10 RX ADMIN — MIDODRINE HYDROCHLORIDE 2.5 MG: 5 TABLET ORAL at 08:37

## 2022-02-10 RX ADMIN — POTASSIUM CHLORIDE 40 MEQ: 750 TABLET, EXTENDED RELEASE ORAL at 08:36

## 2022-02-10 RX ADMIN — WATER 1 G: 1 INJECTION INTRAMUSCULAR; INTRAVENOUS; SUBCUTANEOUS at 08:36

## 2022-02-10 RX ADMIN — SODIUM CHLORIDE, PRESERVATIVE FREE 10 ML: 5 INJECTION INTRAVENOUS at 05:29

## 2022-02-10 RX ADMIN — CITALOPRAM HYDROBROMIDE 20 MG: 20 TABLET ORAL at 08:37

## 2022-02-10 RX ADMIN — POTASSIUM CHLORIDE 40 MEQ: 750 TABLET, EXTENDED RELEASE ORAL at 11:56

## 2022-02-10 RX ADMIN — DIPHENOXYLATE HYDROCHLORIDE AND ATROPINE SULFATE 2 TABLET: 2.5; .025 TABLET ORAL at 08:36

## 2022-02-10 RX ADMIN — MAGNESIUM SULFATE IN WATER 2 G: 40 INJECTION, SOLUTION INTRAVENOUS at 08:35

## 2022-02-10 RX ADMIN — Medication 1 CAPSULE: at 08:37

## 2022-02-10 RX ADMIN — APIXABAN 2.5 MG: 2.5 TABLET, FILM COATED ORAL at 08:37

## 2022-02-10 NOTE — DISCHARGE INSTRUCTIONS
Discharge SNF/Rehab Instructions/LTAC       PATIENT ID: Michelle Mendes  MRN: [id number]   YOB: 1933    DATE OF ADMISSION: 2/6/2022 10:30 AM    DATE OF DISCHARGE: 2/10/2022    PRIMARY CARE PROVIDER: Mabel Sheehan MD       ATTENDING PHYSICIAN: Daniella Gonzalez MD  DISCHARGING PROVIDER: Yobani Boston MD     To contact this individual call [phone] and ask the  to page. If unavailable ask to be transferred the Adult Hospitalist Department. CONSULTATIONS: IP CONSULT TO NEPHROLOGY  IP CONSULT TO PALLIATIVE CARE - PROVIDER  IP CONSULT TO HOSPITALIST    PROCEDURES/SURGERIES: * No surgery found *    ADMITTING DIAGNOSES & HOSPITAL COURSE:     Jessie Fernandez B 82 y. o. female with history of atrial fibrillation/flutter on Eliquis, HTN, Hypothyroidism, depression, UTI's, and bowel perforation with colon resection and anastomosis 11/2021 who presented to [facility] ED on 2/6 from [facility] by EMS for increased generalized weakness and low blood pressure. Hx sacral wounds and failure to thrive. She has not been able to take much by mouth recently. She has been receiving IV fluids for dehydration and poor PO intake. Patient was start on pressors and cultures pending.  Transferred to ICU for continued management 2/6/2022  Septic shock due to infected sacral wounds and UTI  - out of ICU 2/7  - change IV cefepime to IV ceftriaxone to start on 2/10, to switch to po keflex on discharge with probiotics  -Wounds culture with mixed mercedes and yeast,   -urine culture grow kleb pneumoniae sensitive to ceftrixone    - weaned hydrocortisone and midodrine - reduced doses today  CURT likely prerenal in setting of Sepsis and dehydration  - bicarb gtt stopped 2/9  - Avoid nephrotoxins and renal dose medication   -creatinine improved  - Nephrology on board  Atrial fibrillation Rate controlled   -resumed Eliquis 2/8  -home diltiazem on hold due to low normal BP  Hyperkalemia   -resolved, now low, replaced with kcl  Diarrhea: chronic per the patient,   -hx of UC. She takes limotil at home. Thsi was resumed 2/8.   -started probiotic and de-escalate abx.   -improving  Non- Anion gap metabolic acidosis   -resolved with bicarb drip; off bicarb,     Failure to thrive/Protein deficient malnutrition   -Palliative care consulted    Sacral Wound (POA)   -continue local wound care per wound care nurse            Code status: DNR  Prophylaxis: Eliquis    DISCHARGE DIAGNOSES / PLAN:      Septic shock due to infected sacral wounds and UTI  -keflex 250 mg qid for 5 more days with FloraQ    -Wounds culture with mixed mercedes and yeast,   CURT likely prerenal in setting of Sepsis and dehydration  -resolved  Atrial fibrillation Rate controlled   -resumed Eliquis 2/8  -home diltiazem on hold due to low normal BP, can resume if her BP >130/65  Hyperkalemia   -resolved,   Diarrhea: chronic per the patient,   -hx of UC   -improving  Non- Anion gap metabolic acidosis   -resolved      Failure to thrive/ Protein deficient malnutrition   - Palliative care consulted    Sacral Wound (POA)   -continue local wound care per wound care nurse           PENDING TEST RESULTS:   At the time of discharge the following test results are still pending: None    FOLLOW UP APPOINTMENTS:    Follow-up Information     Follow up With Specialties Details Why Contact Info    Thanh Lawler MD Family Medicine In one week  [address] [facility] [address] [phone]          ADDITIONAL CARE RECOMMENDATIONS:  Hold diltiazem and Coreg if her BP <130/65    DIET: Gluten Free Cardiac Diet       TUBE FEEDING INSTRUCTIONS: None    OXYGEN / BiPAP SETTINGS: None    ACTIVITY: Activity as tolerated    WOUND CARE:   Sacrum- Every other day cleanse with normal saline, wipe wound bed clean and dry, loosely fill with [employer] ribbon (located on 5E back par room), cover with folded 4 x 4 and secure with Tegaderm to prevent stool from entering wound.     EQUIPMENT needed: None DISCHARGE MEDICATIONS:   See Medication Reconciliation Form      NOTIFY YOUR PHYSICIAN FOR ANY OF THE FOLLOWING:   Fever over 101 degrees for 24 hours. Chest pain, shortness of breath, fever, chills, nausea, vomiting, diarrhea, change in mentation, falling, weakness, bleeding. Severe pain or pain not relieved by medications. Or, any other signs or symptoms that you may have questions about. DISPOSITION:    Home With:   OT  PT  HH  RN      x SNF/Inpatient Rehab/LTAC    Independent/assisted living    Hospice    Other:       PATIENT CONDITION AT DISCHARGE:     Functional status    Poor    x Deconditioned     Independent      Cognition   x  Lucid     Forgetful     Dementia      Catheters/lines (plus indication)    Pearson     PICC     PEG    x None      Code status     Full code    x DNR      PHYSICAL EXAMINATION AT DISCHARGE:   Refer to Progress Note       CHRONIC MEDICAL DIAGNOSES:  Problem List as of 2/10/2022 Date Reviewed: 12/29/2021          Codes Class Noted - Resolved    Severe protein-calorie malnutrition ([facility] ICD-10-CM: X43  ICD-9-CM: 262  2/8/2022 - Present        UTI (urinary tract infection) ICD-10-CM: N39.0  ICD-9-CM: 599.0  2/6/2022 - Present        Sepsis ([facility] ICD-10-CM: A41.9  ICD-9-CM: 038.9, 995.91  2/6/2022 - Present        Diarrhea ICD-10-CM: R19.7  ICD-9-CM: 787.91  1/19/2022 - Present        Dehydration ICD-10-CM: E86.0  ICD-9-CM: 276.51  1/19/2022 - Present        Stage III pressure ulcer of sacral region [facility]) ICD-10-CM: Y31.753  ICD-9-CM: 707.03, 707.23  1/6/2022 - Present        Lethargy ICD-10-CM: R53.83  ICD-9-CM: 780.79  Unknown - Present        Feeding difficulties ICD-10-CM: R63.30  ICD-9-CM: 424. 3  Unknown - Present        Goals of care, counseling/discussion ICD-10-CM: Z71.89  ICD-9-CM: V65.49  Unknown - Present        Palliative care encounter ICD-10-CM: Z51.5  ICD-9-CM: V66.7  Unknown - Present        Small bowel ischemia ([facility] ICD-10-CM: K55.9  ICD-9-CM: 557.9  11/16/2021 - Present                CDMP Checked:   Yes x     PROBLEM LIST Updated:  Yes x         Signed:   Imelda Moran MD  2/10/2022  10:40 AM

## 2022-02-10 NOTE — PROGRESS NOTES
Patient name: Ana Navarro  MRN: [id number]    Nephrology Progress note:    Assessment:  CURT: Pre-renal vs Septic ATN. Serum Cr improving from 4.6-> all the way down to 1.1mg/dl today. Non-oliguric  Urosepsis: Kleb Pneum  Metabolic acidosis:NAGMA-> improved with Bicarb drip  Hyperkalemia: improving-> now hypokalemic  Anorexia  N/v/d  Hypomagnesemia      Plan/Recommendations:  Push water intake  IV Mag sulfate 2gm x1 dose  Oral KCl 40meq x2 dose today  Midodrine  IV Abx  Strict I/Os  Avoid nephrotoxins  AM labs  Discharge planning-> will sign off. Call us back if needed      Subjective:  Reports feeling significantly better  Eating breakfast    ROS:   No n/v/d  No SOB    Exam:  Visit Vitals  BP (!) 105/53 (BP 1 Location: Left upper arm, BP Patient Position: At rest)   Pulse 100   Temp 97.9 °F (36.6 °C)   Resp 10   Ht 5' 4\" (1.626 m)   Wt 52.5 kg (115 lb 11.9 oz)   SpO2 99%   BMI 19.87 kg/m²     Wt Readings from Last 3 Encounters:   02/06/22 52.5 kg (115 lb 11.9 oz)   12/07/21 73.5 kg (162 lb 0.6 oz)     No intake or output data in the 24 hours ending 02/10/22 1206    Gen: NAD/Elderly/Frail  HEENT:  AT/NC  Lungs/Chest wall: Clear. No accessory muscle use. Cardiovascular: Regular rate, normal rhythm.    Abdomen/: Soft, NT, ND, BS+. +Pearson  Ext: No peripheral edema      Current Facility-Administered Medications   Medication Dose Route Frequency Last Admin    midodrine (PROAMATINE) tablet 2.5 mg  2.5 mg Oral Q12H 2.5 mg at 02/10/22 0837    cefTRIAXone (ROCEPHIN) 1 g in sterile water (preservative free) 10 mL IV syringe  1 g IntraVENous Q24H 1 g at 02/10/22 0836    diphenoxylate-atropine (LOMOTIL) tablet 2 Tablet  2 Tablet Oral QID 2 Tablet at 02/10/22 0836    mesalamine (PENTASA) CR capsule 500 mg  500 mg Oral  mg at 02/10/22 0837    L.acidophilus-paracasei-S.thermophil-bifidobacter (RISAQUAD) 8 billion cell capsule  1 Capsule Oral DAILY 1 Capsule at 02/10/22 0837    citalopram (CELEXA) tablet 20 mg  20 mg Oral DAILY 20 mg at 02/10/22 1032    apixaban (ELIQUIS) tablet 2.5 mg  2.5 mg Oral Q12H 2.5 mg at 02/10/22 5208    ALPRAZolam (XANAX) tablet 0.25 mg  0.25 mg Oral QHS PRN 0.25 mg at 02/07/22 2317    sodium chloride (NS) flush 5-40 mL  5-40 mL IntraVENous Q8H 10 mL at 02/10/22 0529    sodium chloride (NS) flush 5-40 mL  5-40 mL IntraVENous PRN      acetaminophen (TYLENOL) tablet 650 mg  650 mg Oral Q6H  mg at 02/07/22 1136    Or    acetaminophen (TYLENOL) suppository 650 mg  650 mg Rectal Q6H PRN      polyethylene glycol (MIRALAX) packet 17 g  17 g Oral DAILY PRN      ondansetron (ZOFRAN ODT) tablet 4 mg  4 mg Oral Q8H PRN      Or    ondansetron (ZOFRAN) injection 4 mg  4 mg IntraVENous Q6H PRN         Labs/Data:    Lab Results   Component Value Date/Time    WBC 8.0 02/10/2022 02:53 AM    HGB 9.9 (L) 02/10/2022 02:53 AM    HCT 32.2 (L) 02/10/2022 02:53 AM    PLATELET 750 99/30/4638 02:53 AM    MCV 92.5 02/10/2022 02:53 AM       Lab Results   Component Value Date/Time    Sodium 144 02/10/2022 02:53 AM    Potassium 3.0 (L) 02/10/2022 02:53 AM    Chloride 108 02/10/2022 02:53 AM    CO2 29 02/10/2022 02:53 AM    Anion gap 7 02/10/2022 02:53 AM    Glucose 86 02/10/2022 02:53 AM    BUN 37 (H) 02/10/2022 02:53 AM    Creatinine 1.11 (H) 02/10/2022 02:53 AM    BUN/Creatinine ratio 33 (H) 02/10/2022 02:53 AM    GFR est AA 56 (L) 02/10/2022 02:53 AM    GFR est non-AA 46 (L) 02/10/2022 02:53 AM    Calcium 8.3 (L) 02/10/2022 02:53 AM       Patient seen and examined.  Chart reviewed. Labs, data and other pertinent notes reviewed in last 24 hrs.     Discussed with patient and RN    Signed by:  Richa Fuchs MD  [address] [facility]

## 2022-02-10 NOTE — DISCHARGE SUMMARY
Discharge Summary       PATIENT ID: Cami Borges  MRN: [id number]   YOB: 1933    DATE OF ADMISSION: 2/6/2022 10:30 AM    DATE OF DISCHARGE: 02/10/2022   PRIMARY CARE PROVIDER: Stevan Cole MD     ATTENDING PHYSICIAN: Andrez Trinh MD   DISCHARGING PROVIDER: Héctor Heck MD    To contact this individual call [phone] and ask the  to page. If unavailable ask to be transferred the Adult Hospitalist Department. CONSULTATIONS: IP CONSULT TO NEPHROLOGY  IP CONSULT TO PALLIATIVE CARE - PROVIDER  IP CONSULT TO HOSPITALIST    PROCEDURES/SURGERIES: * No surgery found *       ADMISSION SUMMARY AND HOSPITAL COURSE:     Patricia Geiger L 54 y. o. female with history of atrial fibrillation/flutter on Eliquis, HTN, Hypothyroidism, depression, UTI's, and bowel perforation with colon resection and anastomosis 11/2021 who presented to [facility] ED on 2/6 from [facility] by EMS for increased generalized weakness and low blood pressure. Hx sacral wounds and failure to thrive. She has not been able to take much by mouth recently. She has been receiving IV fluids for dehydration and poor PO intake. Patient was start on pressors and cultures pending.  Transferred to ICU for continued management 2/6/2022  Septic shock due to infected sacral wounds and UTI  - out of ICU 2/7  - change IV cefepime to IV ceftriaxone to start on 2/10, to switch to po keflex on discharge with probiotics  -Wounds culture with mixed mercedes and yeast,   -urine culture grow kleb pneumoniae sensitive to ceftrixone    - weaned hydrocortisone and midodrine - reduced doses today  CURT likely prerenal in setting of Sepsis and dehydration  - bicarb gtt stopped 2/9  - Avoid nephrotoxins and renal dose medication   -creatinine improved  - Nephrology on board  Atrial fibrillation Rate controlled   -resumed Eliquis 2/8  -home diltiazem on hold due to low normal BP  Hyperkalemia   -resolved, now low, replaced with kcl  Diarrhea: chronic per the patient,   -hx of UC. She takes limotil at home. Thsi was resumed 2/8.   -started probiotic and de-escalate abx.   -improving  Non- Anion gap metabolic acidosis   -resolved with bicarb drip; off bicarb,     Failure to thrive/Protein deficient malnutrition   -Palliative care consulted    Sacral Wound (POA)   -continue local wound care per wound care nurse            Code status: DNR  Prophylaxis: Eliquis    DISCHARGE DIAGNOSES / PLAN:      Septic shock due to infected sacral wounds and UTI  -keflex 250 mg qid for 5 more days with FloraQ    -Wounds culture with mixed mercedes and yeast,   CURT likely prerenal in setting of Sepsis and dehydration  -resolved  Atrial fibrillation Rate controlled   -resumed Eliquis 2/8  -home diltiazem on hold due to low normal BP, can resume if her BP >130/65  Hyperkalemia   -resolved,   Diarrhea: chronic per the patient,   -hx of UC   -improving  Non- Anion gap metabolic acidosis   -resolved      Failure to thrive/ Protein deficient malnutrition   - Palliative care consulted    Sacral Wound (POA)   -continue local wound care per wound care nurse       NOTIFY [address] FOLLOWING:   Fever over 101 degrees for 24 hours. Chest pain, shortness of breath, fever, chills, nausea, vomiting, diarrhea, change in mentation, falling, weakness, bleeding. Severe pain or pain not relieved by medications, as well as any other signs or symptoms that you may have questions about.     FOLLOW UP APPOINTMENTS:    Follow-up Information     Follow up With Specialties Details Why Contact Info    Fernando Mullins MD Family Medicine In one week  [address] [facility] [id number] [phone]          DIET:  Gluten Free Cardiac Diet    ACTIVITY: Activity as tolerated    EQUIPMENT needed: None    WOUND CARE:   Sacrum- Every other day cleanse with normal saline, wipe wound bed clean and dry, loosely fill with [employer] ribbon (located on 5E back par room), cover with folded 4 x 4 and secure with Tegaderm to prevent stool from entering wound. DISCHARGE MEDICATIONS:  Current Discharge Medication List      START taking these medications    Details   midodrine (PROAMATINE) 2.5 mg tablet Take 1 Tablet by mouth every twelve (12) hours for 30 days. Qty: 60 Tablet, Refills: 0  Start date: 2/10/2022, End date: 3/12/2022      cephALEXin (Keflex) 250 mg capsule Take 2 Capsules by mouth four (4) times daily for 5 days. Qty: 40 Capsule, Refills: 0  Start date: 2/10/2022, End date: 2/15/2022         CONTINUE these medications which have CHANGED    Details   carvediloL (COREG) 12.5 mg tablet Take 1 Tablet by mouth two (2) times daily (with meals). HOLD FOR BP <130/65  Qty: 60 Tablet, Refills: 0  Start date: 2/10/2022      dilTIAZem ER (Cartia XT) 120 mg capsule Take 1 Capsule by mouth daily. HOLD FOR BP <130/65  Qty: 30 Capsule, Refills: 0  Start date: 2/10/2022      L.acid,para-B. bifidum-S.therm (RISAQUAD) 8 billion cell cap cap Take 1 Capsule by mouth daily. Qty: 15 Capsule, Refills: 0  Start date: 2/10/2022         CONTINUE these medications which have NOT CHANGED    Details   magnesium chloride (MAG DELAY) 64 mg delayed release tablet Take 64 mg by mouth two (2) times a day. colestipoL (COLESTID) 5 gram packet Take 5 g by mouth two (2) times a day. diphenoxylate-atropine (LomotiL) 2.5-0.025 mg per tablet Take 2 Tablets by mouth four (4) times daily. oxyCODONE-acetaminophen (Percocet) 2.5-325 mg per tablet Take 1 Tablet by mouth every four (4) hours as needed for Pain. promethazine (PHENERGAN) 25 mg tablet Take 25 mg by mouth every six (6) hours as needed for Nausea. acetaminophen (TYLENOL) 650 mg TbER Take 650 mg by mouth two (2) times daily as needed for Pain. Indications: fever, pain      mesalamine ER (APRISO) 0.375 gram 24 hour capsule Take 1.5 g by mouth daily.       nystatin (MYCOSTATIN) topical cream Apply 1 g to affected area two (2) times daily as needed for Skin Irritation. citalopram (CeleXA) 20 mg tablet Take 20 mg by mouth daily. therapeutic multivitamin-minerals (THERAGRAN-M) tablet Take 1 Tablet by mouth daily. apixaban (ELIQUIS) 2.5 mg tablet Take 1 Tablet by mouth two (2) times a day. Qty: 30 Tablet, Refills: 3      aspirin 81 mg chewable tablet Take 81 mg by mouth daily. melatonin 5 mg tablet Take 5 mg by mouth nightly. potassium chloride (KLOR-CON) 20 mEq pack Take 20 mEq by mouth daily. cholecalciferol (Vitamin D3) (1000 Units /25 mcg) tablet Take 1,000 Units by mouth daily. Hydrocolloid Dressing 4 X 5 \" bndg by Apply Externally route.          STOP taking these medications       magnesium oxide (MAG-OX) 400 mg tablet Comments:   Reason for Stopping:               DISPOSITION:    Home With:   OT  PT  HH  RN      x Long term SNF/Inpatient Rehab    Independent/assisted living    Hospice    Other:       PATIENT CONDITION AT DISCHARGE:     Functional status    Poor    x Deconditioned     Independent      Cognition   x  Lucid     Forgetful     Dementia      Catheters/lines (plus indication)    Pearson     PICC     PEG    x None      Code status     Full code    x DNR      PHYSICAL EXAMINATION AT DISCHARGE:  Patient Vitals for the past 24 hrs:   Temp Pulse Resp BP SpO2   02/10/22 1000  100      02/10/22 0953 97.9 °F (36.6 °C) (!) 106 10 (!) 105/53 99 %   02/10/22 0600 97.6 °F (36.4 °C) 87 9 133/87 99 %   02/10/22 0554  96      02/10/22 0400  84 9 115/63 99 %   02/10/22 0352  83      02/10/22 0200 97.4 °F (36.3 °C) 84 8 (!) 123/57 98 %   02/10/22 0153  92      02/09/22 2200 97.6 °F (36.4 °C) 94 9 134/85 100 %   02/09/22 2159  (!) 102      02/09/22 2100  90  135/80    02/09/22 2007  (!) 101      02/09/22 2000     100 %   02/09/22 1800 97.5 °F (36.4 °C) (!) 111 17 (!) 142/84 100 %   02/09/22 1400 97.5 °F (36.4 °C) (!) 114 13 (!) 144/84 100 %   02/09/22 1200  (!) 118        General:          Alert, cooperative, no distress, appears stated age. HEENT:           Atraumatic, anicteric sclerae, pink conjunctivae                          No oral ulcers, mucosa moist, throat clear, dentition fair  Neck:               Supple, symmetrical  Lungs:             Clear to auscultation bilaterally. No Wheezing or Rhonchi. No rales. Chest wall:      No tenderness  No Accessory muscle use. Heart:              Regular  rhythm,  No  murmur   No edema  Abdomen:        Soft, non-tender. Not distended. Bowel sounds normal  Extremities:     No cyanosis. No clubbing,                            Skin turgor normal, Capillary refill normal  Skin:                Not pale. Not Jaundiced  No rashes   Psych:             Not anxious or agitated.   Neurologic:      Alert, moves all extremities, answers questions appropriately and responds to commands       Recent Results (from the past 24 hour(s))   CBC W/O DIFF    Collection Time: 02/10/22  2:53 AM   Result Value Ref Range    WBC 8.0 3.6 - 11.0 K/uL    RBC 3.48 (L) 3.80 - 5.20 M/uL    HGB 9.9 (L) 11.5 - 16.0 g/dL    HCT 32.2 (L) 35.0 - 47.0 %    MCV 92.5 80.0 - 99.0 FL    MCH 28.4 26.0 - 34.0 PG    MCHC 30.7 30.0 - 36.5 g/dL    RDW 17.9 (H) 11.5 - 14.5 %    PLATELET 889 898 - 177 K/uL    MPV 11.6 8.9 - 12.9 FL    NRBC 0.0 0  WBC    ABSOLUTE NRBC 0.00 0.00 - 0.01 K/uL   MAGNESIUM    Collection Time: 02/10/22  2:53 AM   Result Value Ref Range    Magnesium 1.5 (L) 1.6 - 2.4 mg/dL   PHOSPHORUS    Collection Time: 02/10/22  2:53 AM   Result Value Ref Range    Phosphorus 2.0 (L) 2.6 - 4.7 MG/DL   METABOLIC PANEL, COMPREHENSIVE    Collection Time: 02/10/22  2:53 AM   Result Value Ref Range    Sodium 144 136 - 145 mmol/L    Potassium 3.0 (L) 3.5 - 5.1 mmol/L    Chloride 108 97 - 108 mmol/L    CO2 29 21 - 32 mmol/L    Anion gap 7 5 - 15 mmol/L    Glucose 86 65 - 100 mg/dL    BUN 37 (H) 6 - 20 MG/DL    Creatinine 1.11 (H) 0.55 - 1.02 MG/DL    BUN/Creatinine ratio 33 (H) 12 - 20 GFR est AA 56 (L) >60 ml/min/1.73m2    GFR est non-AA 46 (L) >60 ml/min/1.73m2    Calcium 8.3 (L) 8.5 - 10.1 MG/DL    Bilirubin, total 0.4 0.2 - 1.0 MG/DL    ALT (SGPT) 23 12 - 78 U/L    AST (SGOT) 20 15 - 37 U/L    Alk. phosphatase 79 45 - 117 U/L    Protein, total 5.1 (L) 6.4 - 8.2 g/dL    Albumin 2.6 (L) 3.5 - 5.0 g/dL    Globulin 2.5 2.0 - 4.0 g/dL    A-G Ratio 1.0 (L) 1.1 - 2.2       CHRONIC MEDICAL DIAGNOSES:  Problem List as of 2/10/2022 Date Reviewed: 12/29/2021          Codes Class Noted - Resolved    Severe protein-calorie malnutrition ([facility] ICD-10-CM: E43  ICD-9-CM: 262  2/8/2022 - Present        UTI (urinary tract infection) ICD-10-CM: N39.0  ICD-9-CM: 599.0  2/6/2022 - Present        Sepsis ([facility] ICD-10-CM: A41.9  ICD-9-CM: 038.9, 995.91  2/6/2022 - Present        Diarrhea ICD-10-CM: R19.7  ICD-9-CM: 787.91  1/19/2022 - Present        Dehydration ICD-10-CM: E86.0  ICD-9-CM: 276.51  1/19/2022 - Present        Stage III pressure ulcer of sacral region [facility]) ICD-10-CM: E96.599  ICD-9-CM: 707.03, 707.23  1/6/2022 - Present        Lethargy ICD-10-CM: R53.83  ICD-9-CM: 780.79  Unknown - Present        Feeding difficulties ICD-10-CM: R63.30  ICD-9-CM: 551. 3  Unknown - Present        Goals of care, counseling/discussion ICD-10-CM: Z71.89  ICD-9-CM: V65.49  Unknown - Present        Palliative care encounter ICD-10-CM: Z51.5  ICD-9-CM: V66.7  Unknown - Present        Small bowel ischemia ([facility] ICD-10-CM: K55.9  ICD-9-CM: 557.9  11/16/2021 - Present              Greater than 45 minutes were spent with the patient on counseling and coordination of care    Signed:   Franklyn Magallanes MD  2/10/2022  10:53 AM

## 2022-02-10 NOTE — PROGRESS NOTES
Pt given DC instructions with verbalized understanding, transport given report, IV site removed; cath tip intact and dry dressing applied, pt DC'd via stretcher transport without changes noted.

## 2022-02-10 NOTE — PROGRESS NOTES
[facility]                                                                                          Hospitalist Progress Note  Carli Gomes MD  Answering service: [phone] from in house phone        Date of Service:  2/10/2022  NAME:  Andry Garces  :  3/13/1933  MRN:  [id number]      Admission Summary:     Raynold Schirmer P 33 y. o. female with history of atrial fibrillation/flutter on Eliquis, HTN, Hypothyroidism, depression, UTI's, and bowel perforation with colon resection and anastomosis 2021 who presented to [facility] [place] ED on  from [facility] by EMS for increased generalized weakness and low blood pressure. Hx sacral wounds and failure to thrive. She has not been able to take much by mouth recently. She has been receiving IV fluids for dehydration and poor PO intake. Patient was start on pressors and cultures pending. Transferred to ICU for continued management 2022     Interval history / Subjective:     She said she feels better , had soft bowel movement early this morning. Assessment & Plan:     Septic shock due to infected sacral wounds and UTI  - out of ICU   - change IV cefepime to IV ceftriaxone to start on 2/10, to switch to po keflex on discharge with probiotics  -Wounds culture with mixed mercedes and yeast,   -urine culture grow kleb pneumoniae sensitive to ceftrixone    - weaned hydrocortisone and midodrine - reduced doses today     CURT likely prerenal in setting of Sepsis and dehydration  - bicarb gtt stopped   - Avoid nephrotoxins and renal dose medication   -creatinine improved  - Nephrology on board     Atrial fibrillation Rate controlled   -resumed Eliquis   -home diltiazem on hold due to low normal BP     Hyperkalemia   -resolved, now low, replaced with kcl     Diarrhea: chronic per the patient,   -hx of UC. She takes limotil at home. Thsi was resumed .   -started probiotic and de-escalate abx. -improving     Non- Anion gap metabolic acidosis   -resolved with bicarb drip; off bicarb,       Failure to thrive   Protein deficient malnutrition   - Palliative care consulted      Sacral Wound (POA)   -continue local wound care per wound care nurse            Code status: DNR  Prophylaxis: [address] discussed with: Patient, Nurse and CM  Anticipated Disposition: [facility] Problems  Date Reviewed: 12/29/2021          Codes Class Noted POA    Severe protein-calorie malnutrition ([facility] ICD-10-CM: U15  ICD-9-CM: 926  2/8/2022 Unknown        UTI (urinary tract infection) ICD-10-CM: N39.0  ICD-9-CM: 599.0  2/6/2022 Unknown        Sepsis ([facility] ICD-10-CM: A41.9  ICD-9-CM: 038.9, 995.91  2/6/2022 Unknown                   Vital Signs:    Last 24hrs VS reviewed since prior progress note. Most recent are:  Visit Vitals  BP (!) 105/53 (BP 1 Location: Left upper arm, BP Patient Position: At rest)   Pulse 100   Temp 97.9 °F (36.6 °C)   Resp 10   Ht 5' 4\" (1.626 m)   Wt 52.5 kg (115 lb 11.9 oz)   SpO2 99%   BMI 19.87 kg/m²       No intake or output data in the 24 hours ending 02/10/22 1023     Physical Examination:     I had a face to face encounter with this patient and independently examined them on 2/10/2022 as outlined below:          Constitutional:  No acute distress, cooperative, pleasant    ENT:  Oral mucosa moist, oropharynx benign. Resp:  CTA bilaterally. No wheezing/rhonchi/rales. No accessory muscle use. CV:  Regular rhythm, normal rate, no murmurs, gallops, rubs    GI:  Soft, non distended, non tender. normoactive bowel sounds, no hepatosplenomegaly     Musculoskeletal:  No edema     Neurologic:  Skin:  Moves all extremities.   AAOx3, CN II-XII reviewed  Small sacral decub ulcer             Data Review:    Review and/or order of clinical lab test  Review and/or order of tests in the radiology section of CPT  Review and/or order of tests in the medicine section of CPT      Labs: Recent Labs     02/10/22  0253 02/09/22  0427   WBC 8.0 9.1   HGB 9.9* 10.1*   HCT 32.2* 31.0*    239     Recent Labs     02/10/22  0253 02/09/22  0427 02/08/22  0818    142 142   K 3.0* 3.3* 4.0    107 114*   CO2 29 27 23   BUN 37* 51* 71*   CREA 1.11* 1.34* 1.59*   GLU 86 144* 170*   CA 8.3* 8.6 8.0*   MG 1.5* 1.6 1.1*   PHOS 2.0* 2.7 3.1     Recent Labs     02/10/22  0253   ALT 23   AP 79   TBILI 0.4   TP 5.1*   ALB 2.6*   GLOB 2.5     No results for input(s): INR, PTP, APTT, INREXT in the last 72 hours. No results for input(s): FE, TIBC, PSAT, FERR in the last 72 hours. No results found for: FOL, RBCF   No results for input(s): PH, PCO2, PO2 in the last 72 hours. No results for input(s): CPK, CKNDX, TROIQ in the last 72 hours.     No lab exists for component: CPKMB  No results found for: CHOL, CHOLX, CHLST, CHOLV, HDL, HDLP, LDL, LDLC, DLDLP, TGLX, TRIGL, TRIGP, CHHD, CHHDX  Lab Results   Component Value Date/Time    Glucose (POC) 75 02/06/2022 11:08 PM    Glucose (POC) 78 02/06/2022 01:07 PM    Glucose (POC) 102 12/07/2021 06:45 AM    Glucose (POC) 105 12/02/2021 05:39 AM    Glucose (POC) 161 (H) 12/01/2021 11:27 PM     Lab Results   Component Value Date/Time    Color YELLOW/STRAW 02/06/2022 11:33 AM    Appearance TURBID (A) 02/06/2022 11:33 AM    Specific gravity 1.015 02/06/2022 11:33 AM    Specific gravity 1.015 11/23/2021 05:05 PM    pH (UA) 5.0 02/06/2022 11:33 AM    Protein 30 (A) 02/06/2022 11:33 AM    Glucose Negative 02/06/2022 11:33 AM    Ketone Negative 02/06/2022 11:33 AM    Bilirubin Negative 02/06/2022 11:33 AM    Urobilinogen 0.2 02/06/2022 11:33 AM    Nitrites Negative 02/06/2022 11:33 AM    Leukocyte Esterase LARGE (A) 02/06/2022 11:33 AM    Epithelial cells FEW 02/06/2022 11:33 AM    Bacteria 3+ (A) 02/06/2022 11:33 AM    WBC >100 (H) 02/06/2022 11:33 AM    RBC 0-5 02/06/2022 11:33 AM         Medications Reviewed:     Current Facility-Administered Medications Medication Dose Route Frequency    potassium chloride SR (KLOR-CON 10) tablet 40 mEq  40 mEq Oral Q4H    magnesium sulfate 2 g/50 ml IVPB (premix or compounded)  2 g IntraVENous ONCE    midodrine (PROAMATINE) tablet 2.5 mg  2.5 mg Oral Q12H    cefTRIAXone (ROCEPHIN) 1 g in sterile water (preservative free) 10 mL IV syringe  1 g IntraVENous Q24H    diphenoxylate-atropine (LOMOTIL) tablet 2 Tablet  2 Tablet Oral QID    mesalamine (PENTASA) CR capsule 500 mg  500 mg Oral TID    L.acidophilus-paracasei-S.thermophil-bifidobacter (RISAQUAD) 8 billion cell capsule  1 Capsule Oral DAILY    citalopram (CELEXA) tablet 20 mg  20 mg Oral DAILY    apixaban (ELIQUIS) tablet 2.5 mg  2.5 mg Oral Q12H    ALPRAZolam (XANAX) tablet 0.25 mg  0.25 mg Oral QHS PRN    sodium chloride (NS) flush 5-40 mL  5-40 mL IntraVENous Q8H    sodium chloride (NS) flush 5-40 mL  5-40 mL IntraVENous PRN    acetaminophen (TYLENOL) tablet 650 mg  650 mg Oral Q6H PRN    Or    acetaminophen (TYLENOL) suppository 650 mg  650 mg Rectal Q6H PRN    polyethylene glycol (MIRALAX) packet 17 g  17 g Oral DAILY PRN    ondansetron (ZOFRAN ODT) tablet 4 mg  4 mg Oral Q8H PRN    Or    ondansetron (ZOFRAN) injection 4 mg  4 mg IntraVENous Q6H PRN     ______________________________________________________________________  EXPECTED LENGTH OF STAY: 4d 19h  ACTUAL LENGTH OF STAY:          4                 Quentin Henry MD

## 2022-02-10 NOTE — PROGRESS NOTES
Transition of Care Plan: [facility]  RN to call report to [phone]     RUR: 23% high     PCP F/U: Dr Sloan Shallow Cage     Disposition: [facility]-     Transportation: [facility] 1500     1058: Spoke with attending. States patient is medically ready to be discharged back to facility. Discussed case with wound care RN. States that patient goes to weekly wound care clinic and she does not need to see prior to discharge. Spoke with Ranjana. Would like transport at 1500. Is requesting clinicals be sent to [phone]. Needs a rapid prior to discharge. RN notified. 1125: RN to call report to [phone]: Covid negative. Clinicals faxed to Ranjana.      Deion Spring RN, CRM

## 2022-02-11 LAB
BACTERIA SPEC CULT: NORMAL
SERVICE CMNT-IMP: NORMAL

## 2022-02-15 ENCOUNTER — HOSPITAL ENCOUNTER (OUTPATIENT)
Dept: WOUND CARE | Age: 87
Discharge: HOME | End: 2022-02-15
Payer: MEDICARE

## 2022-02-15 VITALS
TEMPERATURE: 97.5 F | DIASTOLIC BLOOD PRESSURE: 69 MMHG | SYSTOLIC BLOOD PRESSURE: 163 MMHG | RESPIRATION RATE: 18 BRPM | HEART RATE: 76 BPM

## 2022-02-15 PROCEDURE — 11043 DBRDMT MUSC&/FSCA 1ST 20/<: CPT

## 2022-02-15 NOTE — WOUND CARE
02/15/22 1302   Wound Sacrum 01/06/22   Date First Assessed/Time First Assessed: 01/06/22 1311   Present on Hospital Admission: Yes  Wound Approximate Age at First Assessment (Weeks): 4 weeks  Primary Wound Type: Pressure Injury  Location: Sacrum  Date of First Observation: 01/06/22   Wound Image    Wound Etiology Pressure Stage 3   Dressing Status Other (Comment)  (duoderm rolled up onto L hip)   Cleansed Irrigated with saline   Wound Length (cm) 1.3 cm   Wound Width (cm) 1.4 cm   Wound Depth (cm) 1.5 cm   Wound Surface Area (cm^2) 1.82 cm^2   Change in Wound Size % 67.5   Wound Volume (cm^3) 2.73 cm^3   Wound Healing % 56   Distance Tunneling (cm) 2 cm   Direction of Tunnel 12 o'clock   Undermining Starts ___ O'Clock 9 o'clock   Undermining Ends ___ O'Clock 12 o'clock   Undermining Maximum Distance (cm) 3.5 cm   Wound Assessment Pink/red   Drainage Amount Moderate   Drainage Description Serosanguinous   Wound Odor None   Basia-Wound/Incision Assessment Blanchable erythema   Edges Defined edges   Wound Thickness Description Full thickness   Pain 1   Pain Scale 1 Numeric (0 - 10)   Pain Intensity 1 0

## 2022-02-15 NOTE — PROGRESS NOTES
Wound Anterior; Left; Mid (Active)   Number of days: 91       Wound Gluteal fold/cleft 2nd degree pressure sore (Active)   Number of days: 89       Wound Sacrum Mid; Posterior (Active)   Number of days: 80       Wound Sacrum 01/06/22 (Active)   Wound Image   02/15/22 1302   Wound Etiology Pressure Stage 3 02/15/22 1302   Dressing Status Other (Comment) 02/15/22 1302   Cleansed Irrigated with saline 02/15/22 1302   Dressing/Treatment Hydrofiber Ag; Foam 02/01/22 1410   Wound Length (cm) 1.3 cm 02/15/22 1302   Wound Width (cm) 1.4 cm 02/15/22 1302   Wound Depth (cm) 1.5 cm 02/15/22 1302   Wound Surface Area (cm^2) 1.82 cm^2 02/15/22 1302   Change in Wound Size % 67.5 02/15/22 1302   Wound Volume (cm^3) 2.73 cm^3 02/15/22 1302   Wound Healing % 56 02/15/22 1302   Distance Tunneling (cm) 2 cm 02/15/22 1302   Direction of Tunnel 12 o'clock 02/15/22 1302   Undermining Starts ___ O'Clock 9 o'clock 02/15/22 1302   Undermining Ends ___ O'Clock 12 o'clock 02/15/22 1302   Undermining Maximum Distance (cm) 3.5 cm 02/15/22 1302   Wound Assessment Pink/red 02/15/22 1302   Drainage Amount Moderate 02/15/22 1302   Drainage Description Serosanguinous 02/15/22 1302   Wound Odor None 02/15/22 1302   Basia-Wound/Incision Assessment Blanchable erythema 02/15/22 1302   Edges Defined edges 02/15/22 1302   Wound Thickness Description Full thickness 02/15/22 1302   Number of days: 40       Incision 11/15/21 Abdomen (Active)   Number of days: 92       .       I have noted, and reviewed today's data for this patient in [facility] and concur with same. The focused physical exam, other physical findings, Medical history, Review of Symptoms and Medications today remains unchanged except as noted below. Patient notes today: in hospital one week ago, no wound vac for 2 weeks. Hungry, was dehydrated, .   Lesion/Wound, focused exam on Presentation today: sacrum ulcer larger, deep to bone not into bone, slough over all open surface, pink moist surround. Procedure:   Wound # sacral decub. Procedure name: sharp excisional debridement. Anaesthesia: Lidocaine; topical    Description: using a sharp curette I excised all non viable tissue to effect a clean bleeding base. Tissue Level/depth of debridement: fascia. Post debridement dimensions changed as noted:    Depth, add 1.0 mm;    Width, add 0.0 mm   Length, add 0.0 mm. Blood Loss: 3 CCs. Bleeding abated post treatment . Post Procedure Condition/ Diagnosis: as above, little progress with no consitant wound care, ? Mattress adequate. Follow up and Future plans today: wound pressure mapping at home, wound vac with isra to bse of wound RTC 2 weeks, sooner if not getting dressing care. Specimens: 0. Patient Counseled regarding/Discussed: as above, ? Adequate woundcare off loading. Clinical Considerations: alert to infection. Dx Codes: D48.470.

## 2022-02-15 NOTE — DISCHARGE INSTRUCTIONS
Discharge Instructions/Wound [facility]  [place], [address]  Phone: [phone] Fax: [phone]    NAME:  Hung Grimes  YOB: 1933  MEDICAL RECORD NUMBER:  [id number]  DATE:  2/15/2022  WOUND CARE ORDERS:  Sacrum:   Baby shampoo cleanse, leave lather on for 3 minutes, rinse and pat dry. Place isra to wound bed. Cover with foam border. Change every other day or more often if soiled. [place] - Please apply NPWT. Cleanse with baby shampoo, leave lather on for 3 minutes, rinse and pat dry. . Zinc oxide or Desitin paste to raw areas. Isra (collagen with Ag) to wound base. Pack tunnel with white foam, and wound bed with black foam. Cover with drape and suction at -125mmHg continuous suction. Change three times a week. Please follow wound care orders as directed, wound vac is still not in place when she comes to her visits. Wound vac needs to be applied to patient today, 2/15/21. Patient needs low air loss mattress or pressure mapping on current mattress to make sure offloading is accurate. TREATMENT ORDERS:  Turn/reposition every 2 hours when in bed, avoid direct pressure on wound site. When sitting, shift position or do seat lifts every 15 minutes. Limit side lying to 30 degree tilt. Limit HOB elevation to 30 degrees. Use speciality pressure relief cushion, mattress as appropriate. Follow diet as prescribed:  [x] Diet as tolerated: [] Calorie Diabetic Diet:Low carb and no Sugar [] No Added Salt:[x] Increase Protein: [] Other:Limit the amount of liquid you are drinking and avoid drinking in between meals              Return Appointment:  [x] Return Appointment: With Dr Maksim Harvey in 2 weeks.   [] Ordered tests:    Electronically signed Manuela Aldridge RN on 2/15/2022 at 1:25 PM     [address] [facility] Information: Should you experience any significant changes in your wound(s) or have questions about your wound care, please contact the [address] at [address] [place] [address] 8:00 am - 4:30. If you need help with your wound outside these hours and cannot wait until we are again available, contact your PCP or go to the hospital emergency room. PLEASE NOTE: IF YOU ARE UNABLE TO OBTAIN WOUND SUPPLIES, CONTINUE TO USE THE SUPPLIES YOU HAVE AVAILABLE UNTIL YOU ARE ABLE TO REACH US. IT IS MOST IMPORTANT TO KEEP THE WOUND COVERED AT ALL TIMES.      Physician Signature:_______________________    Date: ___________ Time:  ____________

## 2022-03-01 ENCOUNTER — HOSPITAL ENCOUNTER (OUTPATIENT)
Dept: WOUND CARE | Age: 87
Discharge: HOME | End: 2022-03-01
Payer: MEDICARE

## 2022-03-01 VITALS
TEMPERATURE: 97.4 F | HEART RATE: 73 BPM | RESPIRATION RATE: 18 BRPM | DIASTOLIC BLOOD PRESSURE: 74 MMHG | SYSTOLIC BLOOD PRESSURE: 160 MMHG

## 2022-03-01 PROCEDURE — 11043 DBRDMT MUSC&/FSCA 1ST 20/<: CPT

## 2022-03-01 RX ORDER — ARGININE/GLUTAMINE/CALCIUM BMB 7G-7G-1.5G
POWDER IN PACKET (EA) ORAL 2 TIMES DAILY
COMMUNITY

## 2022-03-01 RX ORDER — POTASSIUM CHLORIDE 750 MG/1
20 TABLET, EXTENDED RELEASE ORAL DAILY
COMMUNITY

## 2022-03-01 RX ORDER — POTASSIUM CHLORIDE 750 MG/1
10 CAPSULE, EXTENDED RELEASE ORAL DAILY
COMMUNITY
End: 2022-03-20

## 2022-03-01 RX ORDER — LACTOSE-REDUCED FOOD
LIQUID (ML) ORAL 3 TIMES DAILY
COMMUNITY

## 2022-03-01 NOTE — DISCHARGE INSTRUCTIONS
Discharge Instructions/Wound [address] [facility]. [address]  [place], [address]  Phone: [phone] Fax: [phone]    NAME:  Evetta Aschoff  YOB: 1933  MEDICAL RECORD NUMBER:  [id number]  DATE:  3/1/2022  WOUND CARE ORDERS:  Sacrum:   Baby shampoo cleanse, leave lather on for 3 minutes, rinse and pat dry. Place isra to wound bed. Cover with foam border. Change every other day or more often if soiled. At the facility/home - Please apply NPWT. Cleanse with baby shampoo, leave lather on for 3 minutes, rinse and pat dry. . Zinc oxide or Desitin paste to raw areas. Isra (collagen with Ag) to wound base. Pack tunnel with white foam, and wound bed with black foam. Cover with drape and suction at -125mmHg continuous suction. Change three times a week. Please follow wound care orders as directed, wound vac is still not in place when she comes to her visits. Wound vac needs to be applied to patient today, 3/1/22. Patient needs low air loss mattress or pressure mapping on current mattress to make sure offloading is accurate. TREATMENT ORDERS:  Turn/reposition every 2 hours when in bed, avoid direct pressure on wound site. When sitting, shift position or do seat lifts every 15 minutes. Limit side lying to 30 degree tilt. Limit HOB elevation to 30 degrees. Use speciality pressure relief cushion, mattress as appropriate. Follow diet as prescribed:  [] Diet as tolerated: [] Calorie Diabetic Diet:Low carb and no Sugar [x] No Added Salt:[x] Increase Protein: [] Other:Limit the amount of liquid you are drinking and avoid drinking in between meals  Return Appointment:  [x] Return Appointment: With Dr Nima Prasad in 2 weeks.  Please call and let us know what facility you are going to change over to.   [] Ordered tests:    Electronically signed Jazmin Barnes RN on 3/1/2022 at [facility] Information: Should you experience any significant changes in your wound(s) or have questions about your wound care, please contact the [address] at [address] [place] [address] 8:00 am - 4:30. If you need help with your wound outside these hours and cannot wait until we are again available, contact your PCP or go to the hospital emergency room. PLEASE NOTE: IF YOU ARE UNABLE TO OBTAIN WOUND SUPPLIES, CONTINUE TO USE THE SUPPLIES YOU HAVE AVAILABLE UNTIL YOU ARE ABLE TO REACH US. IT IS MOST IMPORTANT TO KEEP THE WOUND COVERED AT ALL TIMES.      Physician Signature:_______________________    Date: ___________ Time:  ____________

## 2022-03-01 NOTE — WOUND CARE
03/01/22 1303   Wound Sacrum 01/06/22   Date First Assessed/Time First Assessed: 01/06/22 1311   Present on Hospital Admission: Yes  Wound Approximate Age at First Assessment (Weeks): 4 weeks  Primary Wound Type: Pressure Injury  Location: Sacrum  Date of First Observation: 01/06/22   Wound Image    Wound Etiology Pressure Stage 3   Dressing Status Intact; New drainage noted   Cleansed Irrigated with saline   Wound Length (cm) 1.2 cm   Wound Width (cm) 1.2 cm   Wound Depth (cm) 1 cm   Wound Surface Area (cm^2) 1.44 cm^2   Change in Wound Size % 74.29   Wound Volume (cm^3) 1.44 cm^3   Wound Healing % 77   Distance Tunneling (cm) 2 cm   Direction of Tunnel 12 o'clock   Undermining Starts ___ O'Clock 9 o'clock   Undermining Ends ___ O'Clock 3 o'clock   Undermining Maximum Distance (cm) 1 cm   Wound Assessment Pink/red   Drainage Amount Moderate   Drainage Description Serosanguinous   Wound Odor None   Basia-Wound/Incision Assessment Blanchable erythema   Edges Defined edges   Wound Thickness Description Full thickness   Pain 1   Pain Scale 1 Numeric (0 - 10)   Pain Intensity 1 7   Patient Stated Pain Goal 0   Pain Location 1 Coccyx   Pain Description 1 Aching;Burning   Pain Intervention(s) 1 Position; Emotional support     Visit Vitals  BP (!) 160/74 (BP 1 Location: Left upper arm, BP Patient Position: At rest;Sitting)   Pulse 73   Temp 97.4 °F (36.3 °C)   Resp 18

## 2022-03-01 NOTE — PROGRESS NOTES
Wound Anterior; Left; Mid (Active)   Number of days: 105       Wound Gluteal fold/cleft 2nd degree pressure sore (Active)   Number of days: 103       Wound Sacrum Mid; Posterior (Active)   Number of days: 94       Wound Sacrum 01/06/22 (Active)   Wound Image   03/01/22 1303   Wound Etiology Pressure Stage 3 03/01/22 1303   Dressing Status Intact; New drainage noted 03/01/22 1303   Cleansed Irrigated with saline 03/01/22 1303   Dressing/Treatment Hydrofiber Ag; Foam 02/01/22 1410   Wound Length (cm) 1.2 cm 03/01/22 1303   Wound Width (cm) 1.2 cm 03/01/22 1303   Wound Depth (cm) 1 cm 03/01/22 1303   Wound Surface Area (cm^2) 1.44 cm^2 03/01/22 1303   Change in Wound Size % 74.29 03/01/22 1303   Wound Volume (cm^3) 1.44 cm^3 03/01/22 1303   Wound Healing % 77 03/01/22 1303   Distance Tunneling (cm) 2 cm 03/01/22 1303   Direction of Tunnel 12 o'clock 03/01/22 1303   Undermining Starts ___ O'Clock 9 o'clock 03/01/22 1303   Undermining Ends ___ O'Clock 3 o'clock 03/01/22 1303   Undermining Maximum Distance (cm) 1 cm 03/01/22 1303   Wound Assessment Pink/red 03/01/22 1303   Drainage Amount Moderate 03/01/22 1303   Drainage Description Serosanguinous 03/01/22 1303   Wound Odor None 03/01/22 1303   Basia-Wound/Incision Assessment Blanchable erythema 03/01/22 1303   Edges Defined edges 03/01/22 1303   Wound Thickness Description Full thickness 03/01/22 1303   Number of days: 54       Incision 11/15/21 Abdomen (Active)   Number of days: 106       . I have noted, and reviewed today's data for this patient in [facility] and concur with same. The focused physical exam, other physical findings, Medical history, Review of Symptoms and Medications today remains unchanged except as noted below. Patient notes today: very little wound care at [facility], I'm moving.  I'm much better today, I don't hurt  Lesion/Wound, focused exam on Presentation today: sacral ulcer slough over all open base, pink surround moist surround, Mitch Oleary Procedure:   Wound # sacral decub. Procedure name: sharp excisional debridement. Anaesthesia: Lidocaine; topical    Description: using a sharp curette I excised all non viable tissue to effect a clean bleeding base. Tissue Level/depth of debridement: fascia. Post debridement dimensions changed as noted:    Depth, add 1.0 mm;    Width, add 0.0 mm   Length, add 0.0 mm. Blood Loss: 3 CCs. Bleeding abated post treatment . Post Procedure Condition/ Diagnosis: stable but needs consistant wound care, wound vac. Follow up and Future plans today: RTC 2 weeks sooner if worse. Specimens: 0. Patient Counseled regarding/Discussed: stable. Clinical Considerations: alert to infection, . Dx Codes: N69.587.

## 2022-03-09 ENCOUNTER — APPOINTMENT (OUTPATIENT)
Dept: CT IMAGING | Age: 87
DRG: 689 | End: 2022-03-09
Attending: EMERGENCY MEDICINE
Payer: MEDICARE

## 2022-03-09 ENCOUNTER — HOSPITAL ENCOUNTER (INPATIENT)
Age: 87
LOS: 11 days | Discharge: NURSING, REHAB OR LONG-TERM CARE | DRG: 689 | End: 2022-03-20
Attending: EMERGENCY MEDICINE | Admitting: INTERNAL MEDICINE
Payer: MEDICARE

## 2022-03-09 ENCOUNTER — APPOINTMENT (OUTPATIENT)
Dept: GENERAL RADIOLOGY | Age: 87
DRG: 689 | End: 2022-03-09
Attending: EMERGENCY MEDICINE
Payer: MEDICARE

## 2022-03-09 ENCOUNTER — APPOINTMENT (OUTPATIENT)
Dept: CT IMAGING | Age: 87
DRG: 689 | End: 2022-03-09
Attending: INTERNAL MEDICINE
Payer: MEDICARE

## 2022-03-09 DIAGNOSIS — R41.82 ALTERED MENTAL STATUS, UNSPECIFIED ALTERED MENTAL STATUS TYPE: Primary | ICD-10-CM

## 2022-03-09 DIAGNOSIS — N39.0 UTI DUE TO KLEBSIELLA SPECIES: ICD-10-CM

## 2022-03-09 DIAGNOSIS — L89.150 PRESSURE INJURY OF SACRAL REGION, UNSTAGEABLE (HCC): ICD-10-CM

## 2022-03-09 DIAGNOSIS — B96.89 UTI DUE TO KLEBSIELLA SPECIES: ICD-10-CM

## 2022-03-09 PROBLEM — R41.0 ACUTE DELIRIUM: Status: ACTIVE | Noted: 2022-03-09

## 2022-03-09 LAB
ALBUMIN SERPL-MCNC: 2.3 G/DL (ref 3.5–5)
ALBUMIN/GLOB SERPL: 0.5 {RATIO} (ref 1.1–2.2)
ALP SERPL-CCNC: 120 U/L (ref 45–117)
ALT SERPL-CCNC: 110 U/L (ref 12–78)
ANION GAP SERPL CALC-SCNC: 7 MMOL/L (ref 5–15)
APPEARANCE UR: ABNORMAL
AST SERPL-CCNC: 70 U/L (ref 15–37)
BACTERIA URNS QL MICRO: ABNORMAL /HPF
BASOPHILS # BLD: 0 K/UL (ref 0–0.1)
BASOPHILS NFR BLD: 1 % (ref 0–1)
BILIRUB SERPL-MCNC: 0.2 MG/DL (ref 0.2–1)
BILIRUB UR QL: NEGATIVE
BNP SERPL-MCNC: 2402 PG/ML
BUN SERPL-MCNC: 20 MG/DL (ref 6–20)
BUN/CREAT SERPL: 19 (ref 12–20)
CALCIUM SERPL-MCNC: 6.6 MG/DL (ref 8.5–10.1)
CHLORIDE SERPL-SCNC: 112 MMOL/L (ref 97–108)
CO2 SERPL-SCNC: 20 MMOL/L (ref 21–32)
COLOR UR: ABNORMAL
COMMENT, HOLDF: NORMAL
COMMENT, HOLDF: NORMAL
CREAT SERPL-MCNC: 1.07 MG/DL (ref 0.55–1.02)
DIFFERENTIAL METHOD BLD: ABNORMAL
EOSINOPHIL # BLD: 0.2 K/UL (ref 0–0.4)
EOSINOPHIL NFR BLD: 3 % (ref 0–7)
EPITH CASTS URNS QL MICRO: ABNORMAL /LPF
ERYTHROCYTE [DISTWIDTH] IN BLOOD BY AUTOMATED COUNT: 16.1 % (ref 11.5–14.5)
GLOBULIN SER CALC-MCNC: 4.2 G/DL (ref 2–4)
GLUCOSE SERPL-MCNC: 124 MG/DL (ref 65–100)
GLUCOSE UR STRIP.AUTO-MCNC: NEGATIVE MG/DL
HCT VFR BLD AUTO: 32.5 % (ref 35–47)
HGB BLD-MCNC: 10.3 G/DL (ref 11.5–16)
HGB UR QL STRIP: ABNORMAL
IMM GRANULOCYTES # BLD AUTO: 0 K/UL (ref 0–0.04)
IMM GRANULOCYTES NFR BLD AUTO: 1 % (ref 0–0.5)
KETONES UR QL STRIP.AUTO: NEGATIVE MG/DL
LEUKOCYTE ESTERASE UR QL STRIP.AUTO: ABNORMAL
LYMPHOCYTES # BLD: 1.8 K/UL (ref 0.8–3.5)
LYMPHOCYTES NFR BLD: 24 % (ref 12–49)
MAGNESIUM SERPL-MCNC: 0.6 MG/DL (ref 1.6–2.4)
MCH RBC QN AUTO: 28.4 PG (ref 26–34)
MCHC RBC AUTO-ENTMCNC: 31.7 G/DL (ref 30–36.5)
MCV RBC AUTO: 89.5 FL (ref 80–99)
MONOCYTES # BLD: 0.6 K/UL (ref 0–1)
MONOCYTES NFR BLD: 8 % (ref 5–13)
NEUTS SEG # BLD: 5 K/UL (ref 1.8–8)
NEUTS SEG NFR BLD: 63 % (ref 32–75)
NITRITE UR QL STRIP.AUTO: NEGATIVE
NRBC # BLD: 0 K/UL (ref 0–0.01)
NRBC BLD-RTO: 0 PER 100 WBC
PH UR STRIP: 5 [PH] (ref 5–8)
PLATELET # BLD AUTO: 299 K/UL (ref 150–400)
PMV BLD AUTO: 11.1 FL (ref 8.9–12.9)
POTASSIUM SERPL-SCNC: 3.1 MMOL/L (ref 3.5–5.1)
PROT SERPL-MCNC: 6.5 G/DL (ref 6.4–8.2)
PROT UR STRIP-MCNC: 30 MG/DL
RBC # BLD AUTO: 3.63 M/UL (ref 3.8–5.2)
RBC #/AREA URNS HPF: ABNORMAL /HPF (ref 0–5)
SAMPLES BEING HELD,HOLD: NORMAL
SAMPLES BEING HELD,HOLD: NORMAL
SODIUM SERPL-SCNC: 139 MMOL/L (ref 136–145)
SP GR UR REFRACTOMETRY: 1.02 (ref 1–1.03)
TROPONIN-HIGH SENSITIVITY: 12 NG/L (ref 0–51)
TROPONIN-HIGH SENSITIVITY: 20 NG/L (ref 0–51)
UROBILINOGEN UR QL STRIP.AUTO: 0.2 EU/DL (ref 0.2–1)
WBC # BLD AUTO: 7.7 K/UL (ref 3.6–11)
WBC URNS QL MICRO: >100 /HPF (ref 0–4)

## 2022-03-09 PROCEDURE — 70450 CT HEAD/BRAIN W/O DYE: CPT

## 2022-03-09 PROCEDURE — 85025 COMPLETE CBC W/AUTO DIFF WBC: CPT

## 2022-03-09 PROCEDURE — 93005 ELECTROCARDIOGRAM TRACING: CPT

## 2022-03-09 PROCEDURE — 74177 CT ABD & PELVIS W/CONTRAST: CPT

## 2022-03-09 PROCEDURE — 74011000250 HC RX REV CODE- 250: Performed by: EMERGENCY MEDICINE

## 2022-03-09 PROCEDURE — 96374 THER/PROPH/DIAG INJ IV PUSH: CPT

## 2022-03-09 PROCEDURE — 74011250636 HC RX REV CODE- 250/636: Performed by: EMERGENCY MEDICINE

## 2022-03-09 PROCEDURE — 84484 ASSAY OF TROPONIN QUANT: CPT

## 2022-03-09 PROCEDURE — 99285 EMERGENCY DEPT VISIT HI MDM: CPT

## 2022-03-09 PROCEDURE — 65660000000 HC RM CCU STEPDOWN

## 2022-03-09 PROCEDURE — 87186 SC STD MICRODIL/AGAR DIL: CPT

## 2022-03-09 PROCEDURE — 83735 ASSAY OF MAGNESIUM: CPT

## 2022-03-09 PROCEDURE — 36415 COLL VENOUS BLD VENIPUNCTURE: CPT

## 2022-03-09 PROCEDURE — 71045 X-RAY EXAM CHEST 1 VIEW: CPT

## 2022-03-09 PROCEDURE — 74011250636 HC RX REV CODE- 250/636: Performed by: INTERNAL MEDICINE

## 2022-03-09 PROCEDURE — 81001 URINALYSIS AUTO W/SCOPE: CPT

## 2022-03-09 PROCEDURE — 74011000636 HC RX REV CODE- 636

## 2022-03-09 PROCEDURE — 83880 ASSAY OF NATRIURETIC PEPTIDE: CPT

## 2022-03-09 PROCEDURE — 87077 CULTURE AEROBIC IDENTIFY: CPT

## 2022-03-09 PROCEDURE — 80053 COMPREHEN METABOLIC PANEL: CPT

## 2022-03-09 PROCEDURE — 87086 URINE CULTURE/COLONY COUNT: CPT

## 2022-03-09 RX ORDER — SODIUM CHLORIDE 0.9 % (FLUSH) 0.9 %
5-40 SYRINGE (ML) INJECTION EVERY 8 HOURS
Status: DISCONTINUED | OUTPATIENT
Start: 2022-03-09 | End: 2022-03-20 | Stop reason: HOSPADM

## 2022-03-09 RX ORDER — ACETAMINOPHEN 325 MG/1
650 TABLET ORAL
Status: DISCONTINUED | OUTPATIENT
Start: 2022-03-09 | End: 2022-03-20 | Stop reason: HOSPADM

## 2022-03-09 RX ORDER — CITALOPRAM 20 MG/1
20 TABLET, FILM COATED ORAL DAILY
Status: DISCONTINUED | OUTPATIENT
Start: 2022-03-10 | End: 2022-03-20 | Stop reason: HOSPADM

## 2022-03-09 RX ORDER — POLYETHYLENE GLYCOL 3350 17 G/17G
17 POWDER, FOR SOLUTION ORAL DAILY PRN
Status: DISCONTINUED | OUTPATIENT
Start: 2022-03-09 | End: 2022-03-20 | Stop reason: HOSPADM

## 2022-03-09 RX ORDER — COLESTIPOL HYDROCHLORIDE 5 G/5G
5 GRANULE, FOR SUSPENSION ORAL 2 TIMES DAILY
Status: DISCONTINUED | OUTPATIENT
Start: 2022-03-10 | End: 2022-03-20 | Stop reason: HOSPADM

## 2022-03-09 RX ORDER — MELATONIN
1000 DAILY
Status: DISCONTINUED | OUTPATIENT
Start: 2022-03-10 | End: 2022-03-20 | Stop reason: HOSPADM

## 2022-03-09 RX ORDER — DILTIAZEM HYDROCHLORIDE 120 MG/1
120 CAPSULE, COATED, EXTENDED RELEASE ORAL DAILY
Status: DISCONTINUED | OUTPATIENT
Start: 2022-03-10 | End: 2022-03-20 | Stop reason: HOSPADM

## 2022-03-09 RX ORDER — ACETAMINOPHEN 650 MG/1
650 SUPPOSITORY RECTAL
Status: DISCONTINUED | OUTPATIENT
Start: 2022-03-09 | End: 2022-03-20 | Stop reason: HOSPADM

## 2022-03-09 RX ORDER — DIPHENOXYLATE HYDROCHLORIDE AND ATROPINE SULFATE 2.5; .025 MG/1; MG/1
2 TABLET ORAL 4 TIMES DAILY
Status: DISCONTINUED | OUTPATIENT
Start: 2022-03-09 | End: 2022-03-20 | Stop reason: HOSPADM

## 2022-03-09 RX ORDER — SODIUM CHLORIDE 0.9 % (FLUSH) 0.9 %
5-40 SYRINGE (ML) INJECTION AS NEEDED
Status: DISCONTINUED | OUTPATIENT
Start: 2022-03-09 | End: 2022-03-20 | Stop reason: HOSPADM

## 2022-03-09 RX ORDER — ONDANSETRON 2 MG/ML
4 INJECTION INTRAMUSCULAR; INTRAVENOUS
Status: DISCONTINUED | OUTPATIENT
Start: 2022-03-09 | End: 2022-03-20 | Stop reason: HOSPADM

## 2022-03-09 RX ORDER — CARVEDILOL 12.5 MG/1
12.5 TABLET ORAL 2 TIMES DAILY WITH MEALS
Status: DISCONTINUED | OUTPATIENT
Start: 2022-03-10 | End: 2022-03-19

## 2022-03-09 RX ORDER — POTASSIUM CHLORIDE 14.9 MG/ML
10 INJECTION INTRAVENOUS
Status: DISPENSED | OUTPATIENT
Start: 2022-03-09 | End: 2022-03-10

## 2022-03-09 RX ORDER — ONDANSETRON 4 MG/1
4 TABLET, ORALLY DISINTEGRATING ORAL
Status: DISCONTINUED | OUTPATIENT
Start: 2022-03-09 | End: 2022-03-20 | Stop reason: HOSPADM

## 2022-03-09 RX ORDER — MAGNESIUM SULFATE HEPTAHYDRATE 40 MG/ML
2 INJECTION, SOLUTION INTRAVENOUS ONCE
Status: COMPLETED | OUTPATIENT
Start: 2022-03-09 | End: 2022-03-10

## 2022-03-09 RX ORDER — GUAIFENESIN 100 MG/5ML
81 LIQUID (ML) ORAL DAILY
Status: DISCONTINUED | OUTPATIENT
Start: 2022-03-10 | End: 2022-03-20 | Stop reason: HOSPADM

## 2022-03-09 RX ORDER — CALCIUM GLUCONATE 20 MG/ML
1 INJECTION, SOLUTION INTRAVENOUS
Status: COMPLETED | OUTPATIENT
Start: 2022-03-09 | End: 2022-03-10

## 2022-03-09 RX ORDER — LANOLIN ALCOHOL/MO/W.PET/CERES
5 CREAM (GRAM) TOPICAL
Status: DISCONTINUED | OUTPATIENT
Start: 2022-03-09 | End: 2022-03-20 | Stop reason: HOSPADM

## 2022-03-09 RX ORDER — CALCIUM GLUCONATE 94 MG/ML
1 INJECTION, SOLUTION INTRAVENOUS ONCE
Status: DISCONTINUED | OUTPATIENT
Start: 2022-03-09 | End: 2022-03-09 | Stop reason: CLARIF

## 2022-03-09 RX ORDER — MIDODRINE HYDROCHLORIDE 5 MG/1
2.5 TABLET ORAL EVERY 12 HOURS
Status: DISCONTINUED | OUTPATIENT
Start: 2022-03-09 | End: 2022-03-20

## 2022-03-09 RX ADMIN — SODIUM CHLORIDE 1000 ML: 9 INJECTION, SOLUTION INTRAVENOUS at 16:59

## 2022-03-09 RX ADMIN — MAGNESIUM SULFATE IN WATER 2 G: 40 INJECTION, SOLUTION INTRAVENOUS at 22:39

## 2022-03-09 RX ADMIN — POTASSIUM CHLORIDE 10 MEQ: 14.9 INJECTION, SOLUTION INTRAVENOUS at 23:37

## 2022-03-09 RX ADMIN — CEFTRIAXONE SODIUM 1 G: 1 INJECTION, POWDER, FOR SOLUTION INTRAMUSCULAR; INTRAVENOUS at 19:03

## 2022-03-09 RX ADMIN — IOPAMIDOL 100 ML: 755 INJECTION, SOLUTION INTRAVENOUS at 16:49

## 2022-03-09 NOTE — PROGRESS NOTES
6:16 PM  Date of previous inpatient admission/ ED visit? 2/6/22-2/10/22 UTI    What brought the patient back to ED? Chart reviewed: Pt arrived via EMS from [facility] with CC abnormal lab results. RN at facility did not disclose which lab result to EMS. Pt currently AOX4. Pt concerned for blood in urine.      Pt denies CP, SOB, fever/chills, dysuria, N/V.      PMH: intermittent diarrhea, A Fib, CHF, kidney disease. Did patient decline recommended services during last admission/ ED visit (if yes, what)? No    Has patient seen a provider since their last inpatient admission/ED visit (if yes, when)? No    PCP: YES First and Last name: Gladys Thomas MD   Name of Practice:    Are you a current patient: Yes/No:  YES   Approximate date of last visit: Unknown    CM Interventions:  From previous inpatient admission/ED visit: Patient discharged to [facility] with BLS transport. From current inpatient admission/ED visit: Patient currently in the ED being evaluated and needs assessed. Senior Services CM consult received for increased agitation/AMS. Initial assessment completed with patient's daughter, Oelg Jones [phone]    80year old female, currently AOx4. Is a resident of [address] [place] [phone]. Patient is currently in rehab with [facility] [phone] . Plan is for patient to return to rehab after seen in ED if not admitted. Patient requires assistance with ADL's and IADL's. DME utilized: walker/ W/C. Insurance verified:  Medicare A[employer]/ [employer]. [employer] as source of income. Medications are administered at facility. Patient will require BLS transport at discharge. CM currently awaiting recommendations at this time. CM will continue to follow and assist with TEREZA needs as they arise.     LEROY Lei/KIRSTEN  Care Management

## 2022-03-09 NOTE — DISCHARGE INSTRUCTIONS
Discharging provider: Gilma Mejía MD    Primary care provider: Gio, MD Helene    [address]:    66-year-old woman with a past medical history significant for chronic atrial fibrillation on Eliquis for anticoagulation, hypertension, congestive heart failure, chronic kidney disease, who was in her usual state of health until the day of presentation at the emergency room when it was reported that the patient developed change in mental status at the assisted living facility where the patient resides.  The change in mental status is in the form of agitation and outburst of anger.  It was also reported that the patient has blood in her urine.  Because of this abnormality and change in mental status, the patient was sent to the emergency room at [facility] for further evaluation.  When the patient arrived at the emergency room, her urinalysis was consistent with urinary tract infection.  She was subsequently referred to the hospitalist service for evaluation for admission. Lui Benoit was last admitted to this hospital from 02/06/2022 to 02/10/2022.  The patient was admitted for evaluation and treatment of septic shock secondary to infected sacral wound and urinary tract infection. Acute metabolic encephalopathy secondary to UTI:  -Urine culture with Justin Sargent  -s/p ceftriaxone x 3 days     Paroxysmal atrial fibrillation:  -Continue home Eliquis, diltiazem ER, carvedilol  -coreg reduced due to bradycardia with HR in 50s     Hypertension:  -Continue home carvedilol, diltiazem     Hypokalemia: Replete as needed  Hypomagnesemia - replace as needed, 6gm today and recheck      Anemia of chronic disease: Stable, transfuse for hemoglobin less than 7      Pressure injury of sacral region, stage 3 POA  -Wound care consulted,   -Due to constant stools, wound care dressing is becoming soiled frequently.  Wound cleansed well with saline, placed Puracol Plus Ag placed in wound, then Opticel rope, covered with 4x4 gauze. Skin prep to periwound, piece of ostomy ring placed to distal periwound to help prevent stool from leaking under transparent drape, then wound covered with wound vac transparent drape.       Hypocalcemia: Stable, repleted, monitor      Chronic diarrhea - continue home lomotil, nutrition consult today        FOLLOW-UP CARE RECOMMENDATIONS:    APPOINTMENTS:  Follow-up Information     Follow up With Specialties Details Why Contact Info    Your PCP    Discharge follow up              It is very important that you keep follow-up appointment(s). Bring discharge papers, medication list (and/or medication bottles) to follow-up appointments for review by outpatient provider(s). FOLLOW-UP TESTS RECOMMENDED:   · NONE    ONGOING TREATMENT PLAN: as above     PENDING TEST RESULTS:  At the time of discharge the following test results are still pending: none. Please review these results as they become available. Specific symptoms to watch for: chest pain, shortness of breath, fever, chills, nausea, vomiting, diarrhea, change in mentation, falling, weakness, bleeding. DIET:  Regular Diet    ACTIVITY:  Activity as tolerated    WOUND CARE:   Stage 3 pressure injury coccyx   Due to constant stools, wound care dressing is becoming soiled frequently. Wound cleansed well with saline, placed Puracol Plus Ag placed in wound, then Opticel rope, covered with 4x4 gauze. Skin prep to periwound, piece of ostomy ring placed to distal periwound to help prevent stool from leaking under transparent drape, then wound covered with wound vac transparent drape.     EQUIPMENT needed:  NONE    INCIDENTAL FINDINGS:  NONE    GOALS OF CARE:    Eventual return to home/independent/assisted living   X  Long term SNF      Hospice     No rehospitalization     Patient condition at discharge:   Functional status    Poor    X  Deconditioned      Independent   Cognition  X  Lucid     Forgetful (some sensescence)     Dementia Catheters/lines (plus indication)    Pearson     PICC      PEG    X     Code status    Full code    X  DNR    . . . . . . . . . . . . . . . . . . . . . . . . . . . . . . . . . . . . . . . . . . . . . . . . . . . . . . . . . . . . . . . . . . . . . . . Cloteal Boswell CHRONIC MEDICAL CONDITIONS:  Problem List as of 3/20/2022 Date Reviewed: 3/9/2022          Codes Class Noted - Resolved    * (Principal) Acute delirium ICD-10-CM: R41.0  ICD-9-CM: 780.09  3/9/2022 - Present        Severe protein-calorie malnutrition ([facility] [address] ICD-10-CM: E43  ICD-9-CM: 262  2/8/2022 - Present        UTI (urinary tract infection) ICD-10-CM: N39.0  ICD-9-CM: 599.0  2/6/2022 - Present        Sepsis ([facility] ICD-10-CM: A41.9  ICD-9-CM: 038.9, 995.91  2/6/2022 - Present        Diarrhea ICD-10-CM: R19.7  ICD-9-CM: 787.91  1/19/2022 - Present        Dehydration ICD-10-CM: E86.0  ICD-9-CM: 276.51  1/19/2022 - Present        Stage III pressure ulcer of sacral region [facility]) ICD-10-CM: H59.352  ICD-9-CM: 707.03, 707.23  1/6/2022 - Present        Lethargy ICD-10-CM: R53.83  ICD-9-CM: 780.79  Unknown - Present        Feeding difficulties ICD-10-CM: R63.30  ICD-9-CM: 108. 3  Unknown - Present        Goals of care, counseling/discussion ICD-10-CM: Z71.89  ICD-9-CM: V65.49  Unknown - Present        Palliative care encounter ICD-10-CM: Z51.5  ICD-9-CM: V66.7  Unknown - Present        Small bowel ischemia ([facility] ICD-10-CM: K55.9  ICD-9-CM: 557.9  11/16/2021 - Present

## 2022-03-09 NOTE — ED PROVIDER NOTES
Please note that this dictation was completed with Want[employer], the computer voice recognition software.  Quite often unanticipated grammatical, syntax, homophones, and other interpretive errors are inadvertently transcribed by the computer software.  Please disregard these errors.  Please excuse any errors that have escaped final proofreading. 80-year-old female past medical history Migue for A. fib, a flutter, edema, hypertension, hypokalemia, hypothyroidism, insomnia, major depressive disorder, UTI, and vitamin D deficiency presents the ER via EMS  complaining of \" I am not sure why I am here. \"  Patient states she has no complaints currently. Patient states \"the wound on my bottom is been doing well I have been doing okay overall. They told me that I am dehydrated and they told me something about blood in my urine last night but other than that I am fine. \"    Nursing home records reviewed patient has a history of a end-stage sacral decubitus ulcer, previous ESBL UTI and evidently recent episodes of agitation and angry outburst.  Patient currently is very calm and collected. Discussed things with the patient she agrees with laboratory evaluation CT scan of her abdomen and we will have Senior services evaluate her as well. She also has a durable DNR accompanying her               Past Medical History:   Diagnosis Date    Atrial fibrillation ([address]     Atrial flutter (HCC)     Edema     Hypertension     Hypokalemia     Hypothyroidism     Insomnia     Major depressive disorder     Pain     UTI (urinary tract infection)     Vitamin D deficiency        Past Surgical History:   Procedure Laterality Date    HX APPENDECTOMY      approx age 21    HX GI  12/01/2021    bowel resection    HX ORTHOPAEDIC  05/01/2021    R hip          No family history on file.     Social History     Socioeconomic History    Marital status:      Spouse name: Not on file    Number of children: Not on file    Years of education: Not on file    Highest education level: Not on file   Occupational History    Not on file   Tobacco Use    Smoking status: Never Smoker    Smokeless tobacco: Never Used   Vaping Use    Vaping Use: Never used   Substance and Sexual Activity    Alcohol use: Not Currently     Alcohol/week: 3.0 standard drinks     Types: 3 Glasses of wine per week    Drug use: Never    Sexual activity: Not on file   Other Topics Concern     Service Not Asked    Blood Transfusions Not Asked    Caffeine Concern Not Asked    Occupational Exposure Not Asked    Hobby Hazards Not Asked    Sleep Concern Not Asked    Stress Concern Not Asked    Weight Concern Not Asked    Special Diet Not Asked    Back Care Not Asked    Exercise Not Asked    Bike Helmet Not Asked   [address] Not Asked    Self-Exams Not Asked   Social History Narrative    Not on file     Social Determinants of Health     Financial Resource Strain:     Difficulty of Paying Living Expenses: Not on file   Food Insecurity:     Worried About Running Out of Food in the Last Year: Not on file    Florentino of Food in the Last Year: Not on file   Transportation Needs:     Lack of Transportation (Medical): Not on file    Lack of Transportation (Non-Medical):  Not on file   Physical Activity:     Days of Exercise per Week: Not on file    Minutes of Exercise per Session: Not on file   Stress:     Feeling of Stress : Not on file   Social Connections:     Frequency of Communication with Friends and Family: Not on file    Frequency of Social Gatherings with Friends and Family: Not on file    Attends Temple Services: Not on file    Active Member of Clubs or Organizations: Not on file    Attends Club or Organization Meetings: Not on file    Marital Status: Not on file   Intimate Partner Violence:     Fear of Current or Ex-Partner: Not on file    Emotionally Abused: Not on file    Physically Abused: Not on file    Sexually Abused: Not on file   Housing Stability:     Unable to Pay for Housing in the Last Year: Not on file    Number of Places Lived in the Last Year: Not on file    Unstable Housing in the Last Year: Not on file         ALLERGIES: Codeine, Gluten, and Sulfa (sulfonamide antibiotics)    Review of Systems   Unable to perform ROS: Mental status change   All other systems reviewed and are negative. Vitals:    03/09/22 1601 03/09/22 1701 03/09/22 1731 03/09/22 1930   BP: (!) 141/114 137/75 (!) 148/70 138/78   Pulse: 96 83 78 79   Resp: 16 23 15 19   Temp:       SpO2:  98%  98%            Physical Exam  Vitals and nursing note reviewed. Constitutional:       General: She is not in acute distress. Appearance: Normal appearance. She is well-developed. She is not ill-appearing, toxic-appearing or diaphoretic. Comments: NAD, AxOx1, speaking in complete sentences    Unable to remember some of the facts not other factual things. She is not oriented to the day of the week date or month. But she does remember that \"they came down told me I had blood in my urine she does remember that she has a sacral decubitus ulcer even though \"it really does not hurt anymore. \"           HENT:      Head: Normocephalic and atraumatic. Comments: Cn intact grossly         Right Ear: External ear normal.      Left Ear: External ear normal.      Nose: Nose normal.   Eyes:      General: No scleral icterus. Right eye: No discharge. Left eye: No discharge. Extraocular Movements: Extraocular movements intact. Conjunctiva/sclera: Conjunctivae normal.      Pupils: Pupils are equal, round, and reactive to light. Neck:      Vascular: No JVD. Trachea: No tracheal deviation. Cardiovascular:      Rate and Rhythm: Normal rate and regular rhythm. Pulses: Normal pulses. Heart sounds: Normal heart sounds. No murmur heard. No friction rub. No gallop.     Pulmonary:      Effort: Pulmonary effort is normal. No respiratory distress. Breath sounds: Normal breath sounds. No stridor. No wheezing, rhonchi or rales. Chest:      Chest wall: No tenderness. Abdominal:      General: Bowel sounds are normal.      Palpations: Abdomen is soft. Tenderness: There is no abdominal tenderness. There is no guarding or rebound. Hernia: No hernia is present. Genitourinary:     Vagina: No vaginal discharge. Comments: Noted pencil sized sacral decubitus ulcer of unknown depth. It does appear fairly deep however there is no surrounding cellulitis no other skin breakdown    Pt in diaper;   Musculoskeletal:         General: No swelling, tenderness, deformity or signs of injury. Normal range of motion. Cervical back: Normal range of motion and neck supple. No tenderness. Comments: Pt had central/ paraspinal C/T/L spines, Upper/Lower ext long bones, Abdomen,  Pelvis, bilateral snuff boxes/ bilateral snuff boxes/ hands /feet and all joints palpated and tolerated well (except as above) ; Pt has motor/ CV / Sensation grossly intact to all extremities;   Skin:     General: Skin is warm and dry. Capillary Refill: Capillary refill takes less than 2 seconds. Coloration: Skin is not pale. Findings: Lesion and rash present. No bruising or erythema. Neurological:      General: No focal deficit present. Mental Status: She is alert. Mental status is at baseline. Cranial Nerves: No cranial nerve deficit. Sensory: No sensory deficit. Motor: No weakness or abnormal muscle tone. Coordination: Coordination normal.      Gait: Gait normal.      Deep Tendon Reflexes: Reflexes normal.   Psychiatric:         Behavior: Behavior normal.         Thought Content:  Thought content normal.          MDM       Procedures    Chief Complaint   Patient presents with    Abnormal Lab Results       3:34 PM  The patients presenting problems have been discussed, and they are in agreement with the care plan formulated and outlined with them. I have encouraged them to ask questions as they arise throughout their visit. MEDICATIONS GIVEN:  Medications - No data to display    LABS REVIEWED:  Labs Reviewed   CBC WITH AUTOMATED DIFF - Abnormal; Notable for the following components:       Result Value    RBC 3.63 (*)     HGB 10.3 (*)     HCT 32.5 (*)     RDW 16.1 (*)     IMMATURE GRANULOCYTES 1 (*)     All other components within normal limits   SAMPLES BEING HELD   METABOLIC PANEL, COMPREHENSIVE       RADIOLOGY RESULTS:  The following have been ordered and reviewed:  _____________________________________________________________________  _____________________________________________________________________    EKG interpretation:   Rhythm: normal sinus rhythm; and regular . Rate (approx.): 76; Axis: normal; P wave: normal; QRS interval: normal ; ST/T wave: normal; Negative acute significant segmental elevations/ compared to study dated 02/06/2022 NSR has replaced a fib w/ RVR        PROCEDURES:        CONSULTATIONS:       PROGRESS NOTES:      DIAGNOSIS:    1. Altered mental status, unspecified altered mental status type    2. UTI due to Klebsiella species    3. Pressure injury of sacral region, unstageable [facility])        PLAN:  1-ams/ UTI rocephin appropriate based on past C+S results;       ED COURSE: The patients hospital course has been uncomplicated. Perfect Serve Consult for Admission  6:39 PM    ED Room Number: ER30/30  Patient Name and age:  Sridhar Garnica 80 y.o.  female  Working Diagnosis:   1. Altered mental status, unspecified altered mental status type    2.  UTI due to Klebsiella species        COVID-19 Suspicion:  no  Sepsis present:  no  Reassessment needed: yes  Code Status:  Do Not Resuscitate  Readmission: no  Isolation Requirements:  no  Recommended Level of Care:  med/surg  Department:[facility] Adult ED - [phone]  Other:  Uti/ hematuria/ 'altered' per NH;

## 2022-03-09 NOTE — ED TRIAGE NOTES
Pt arrived via EMS from [facility] with CC abnormal lab results. RN at facility did not disclose which lab result to EMS. Pt currently AOX4. Pt concerned for blood in urine. Pt denies CP, SOB, fever/chills, dysuria, N/V.     PMH: intermittent diarrhea, A Fib, CHF, kidney disease.

## 2022-03-10 LAB
ALBUMIN SERPL-MCNC: 2.4 G/DL (ref 3.5–5)
ALBUMIN/GLOB SERPL: 0.6 {RATIO} (ref 1.1–2.2)
ALP SERPL-CCNC: 123 U/L (ref 45–117)
ALT SERPL-CCNC: 108 U/L (ref 12–78)
AMMONIA PLAS-SCNC: 26 UMOL/L
ANION GAP SERPL CALC-SCNC: 7 MMOL/L (ref 5–15)
APAP SERPL-MCNC: <2 UG/ML (ref 10–30)
AST SERPL-CCNC: 69 U/L (ref 15–37)
ATRIAL RATE: 77 BPM
BASOPHILS # BLD: 0.1 K/UL (ref 0–0.1)
BASOPHILS NFR BLD: 1 % (ref 0–1)
BILIRUB SERPL-MCNC: 0.1 MG/DL (ref 0.2–1)
BUN SERPL-MCNC: 14 MG/DL (ref 6–20)
BUN/CREAT SERPL: 16 (ref 12–20)
CALCIUM SERPL-MCNC: 7.1 MG/DL (ref 8.5–10.1)
CALCULATED P AXIS, ECG09: 81 DEGREES
CALCULATED R AXIS, ECG10: -2 DEGREES
CALCULATED T AXIS, ECG11: 66 DEGREES
CHLORIDE SERPL-SCNC: 115 MMOL/L (ref 97–108)
CO2 SERPL-SCNC: 19 MMOL/L (ref 21–32)
CREAT SERPL-MCNC: 0.85 MG/DL (ref 0.55–1.02)
DIAGNOSIS, 93000: NORMAL
DIFFERENTIAL METHOD BLD: ABNORMAL
EOSINOPHIL # BLD: 0.2 K/UL (ref 0–0.4)
EOSINOPHIL NFR BLD: 3 % (ref 0–7)
ERYTHROCYTE [DISTWIDTH] IN BLOOD BY AUTOMATED COUNT: 16.2 % (ref 11.5–14.5)
EST. AVERAGE GLUCOSE BLD GHB EST-MCNC: 120 MG/DL
ETHANOL SERPL-MCNC: <10 MG/DL
FERRITIN SERPL-MCNC: 210 NG/ML (ref 26–388)
GLOBULIN SER CALC-MCNC: 3.7 G/DL (ref 2–4)
GLUCOSE SERPL-MCNC: 76 MG/DL (ref 65–100)
HBA1C MFR BLD: 5.8 % (ref 4–5.6)
HCT VFR BLD AUTO: 30.7 % (ref 35–47)
HGB BLD-MCNC: 9.6 G/DL (ref 11.5–16)
IMM GRANULOCYTES # BLD AUTO: 0 K/UL (ref 0–0.04)
IMM GRANULOCYTES NFR BLD AUTO: 1 % (ref 0–0.5)
LIPASE SERPL-CCNC: 233 U/L (ref 73–393)
LYMPHOCYTES # BLD: 2.2 K/UL (ref 0.8–3.5)
LYMPHOCYTES NFR BLD: 33 % (ref 12–49)
MAGNESIUM SERPL-MCNC: 1 MG/DL (ref 1.6–2.4)
MCH RBC QN AUTO: 28.2 PG (ref 26–34)
MCHC RBC AUTO-ENTMCNC: 31.3 G/DL (ref 30–36.5)
MCV RBC AUTO: 90.3 FL (ref 80–99)
MONOCYTES # BLD: 0.7 K/UL (ref 0–1)
MONOCYTES NFR BLD: 10 % (ref 5–13)
NEUTS SEG # BLD: 3.5 K/UL (ref 1.8–8)
NEUTS SEG NFR BLD: 52 % (ref 32–75)
NRBC # BLD: 0 K/UL (ref 0–0.01)
NRBC BLD-RTO: 0 PER 100 WBC
P-R INTERVAL, ECG05: 150 MS
PHOSPHATE SERPL-MCNC: 3.2 MG/DL (ref 2.6–4.7)
PLATELET # BLD AUTO: 289 K/UL (ref 150–400)
PMV BLD AUTO: 11.3 FL (ref 8.9–12.9)
POTASSIUM SERPL-SCNC: 3.9 MMOL/L (ref 3.5–5.1)
PROT SERPL-MCNC: 6.1 G/DL (ref 6.4–8.2)
Q-T INTERVAL, ECG07: 408 MS
QRS DURATION, ECG06: 70 MS
QTC CALCULATION (BEZET), ECG08: 461 MS
RBC # BLD AUTO: 3.4 M/UL (ref 3.8–5.2)
SALICYLATES SERPL-MCNC: 2.1 MG/DL (ref 2.8–20)
SODIUM SERPL-SCNC: 141 MMOL/L (ref 136–145)
TROPONIN-HIGH SENSITIVITY: 20 NG/L (ref 0–51)
VENTRICULAR RATE, ECG03: 77 BPM
VIT B12 SERPL-MCNC: 702 PG/ML (ref 193–986)
WBC # BLD AUTO: 6.6 K/UL (ref 3.6–11)

## 2022-03-10 PROCEDURE — 83690 ASSAY OF LIPASE: CPT

## 2022-03-10 PROCEDURE — 82140 ASSAY OF AMMONIA: CPT

## 2022-03-10 PROCEDURE — 83735 ASSAY OF MAGNESIUM: CPT

## 2022-03-10 PROCEDURE — 82077 ASSAY SPEC XCP UR&BREATH IA: CPT

## 2022-03-10 PROCEDURE — 82728 ASSAY OF FERRITIN: CPT

## 2022-03-10 PROCEDURE — 84100 ASSAY OF PHOSPHORUS: CPT

## 2022-03-10 PROCEDURE — 97165 OT EVAL LOW COMPLEX 30 MIN: CPT

## 2022-03-10 PROCEDURE — 80143 DRUG ASSAY ACETAMINOPHEN: CPT

## 2022-03-10 PROCEDURE — 36415 COLL VENOUS BLD VENIPUNCTURE: CPT

## 2022-03-10 PROCEDURE — 80053 COMPREHEN METABOLIC PANEL: CPT

## 2022-03-10 PROCEDURE — 83036 HEMOGLOBIN GLYCOSYLATED A1C: CPT

## 2022-03-10 PROCEDURE — 74011000250 HC RX REV CODE- 250: Performed by: INTERNAL MEDICINE

## 2022-03-10 PROCEDURE — 84484 ASSAY OF TROPONIN QUANT: CPT

## 2022-03-10 PROCEDURE — 80179 DRUG ASSAY SALICYLATE: CPT

## 2022-03-10 PROCEDURE — 85025 COMPLETE CBC W/AUTO DIFF WBC: CPT

## 2022-03-10 PROCEDURE — 65660000000 HC RM CCU STEPDOWN

## 2022-03-10 PROCEDURE — 82607 VITAMIN B-12: CPT

## 2022-03-10 PROCEDURE — 74011250636 HC RX REV CODE- 250/636: Performed by: INTERNAL MEDICINE

## 2022-03-10 PROCEDURE — 74011250637 HC RX REV CODE- 250/637: Performed by: INTERNAL MEDICINE

## 2022-03-10 PROCEDURE — 97161 PT EVAL LOW COMPLEX 20 MIN: CPT

## 2022-03-10 RX ORDER — POTASSIUM CHLORIDE 14.9 MG/ML
10 INJECTION INTRAVENOUS
Status: COMPLETED | OUTPATIENT
Start: 2022-03-10 | End: 2022-03-10

## 2022-03-10 RX ADMIN — DIPHENOXYLATE HYDROCHLORIDE AND ATROPINE SULFATE 2 TABLET: 2.5; .025 TABLET ORAL at 23:09

## 2022-03-10 RX ADMIN — POTASSIUM CHLORIDE 10 MEQ: 14.9 INJECTION, SOLUTION INTRAVENOUS at 02:43

## 2022-03-10 RX ADMIN — CARVEDILOL 12.5 MG: 12.5 TABLET, FILM COATED ORAL at 17:31

## 2022-03-10 RX ADMIN — Medication 4.5 MG: at 01:09

## 2022-03-10 RX ADMIN — Medication 1 CAPSULE: at 08:59

## 2022-03-10 RX ADMIN — Medication 1000 UNITS: at 08:59

## 2022-03-10 RX ADMIN — DIPHENOXYLATE HYDROCHLORIDE AND ATROPINE SULFATE 2 TABLET: 2.5; .025 TABLET ORAL at 08:59

## 2022-03-10 RX ADMIN — DIPHENOXYLATE HYDROCHLORIDE AND ATROPINE SULFATE 2 TABLET: 2.5; .025 TABLET ORAL at 01:09

## 2022-03-10 RX ADMIN — APIXABAN 2.5 MG: 2.5 TABLET, FILM COATED ORAL at 17:30

## 2022-03-10 RX ADMIN — DIPHENOXYLATE HYDROCHLORIDE AND ATROPINE SULFATE 2 TABLET: 2.5; .025 TABLET ORAL at 19:36

## 2022-03-10 RX ADMIN — POTASSIUM CHLORIDE 10 MEQ: 14.9 INJECTION, SOLUTION INTRAVENOUS at 01:42

## 2022-03-10 RX ADMIN — CEFTRIAXONE SODIUM 1 G: 1 INJECTION, POWDER, FOR SOLUTION INTRAMUSCULAR; INTRAVENOUS at 17:31

## 2022-03-10 RX ADMIN — SODIUM CHLORIDE, PRESERVATIVE FREE 10 ML: 5 INJECTION INTRAVENOUS at 23:09

## 2022-03-10 RX ADMIN — SODIUM CHLORIDE, PRESERVATIVE FREE 10 ML: 5 INJECTION INTRAVENOUS at 06:46

## 2022-03-10 RX ADMIN — APIXABAN 2.5 MG: 2.5 TABLET, FILM COATED ORAL at 08:59

## 2022-03-10 RX ADMIN — DIPHENOXYLATE HYDROCHLORIDE AND ATROPINE SULFATE 2 TABLET: 2.5; .025 TABLET ORAL at 15:27

## 2022-03-10 RX ADMIN — ASPIRIN 81 MG CHEWABLE TABLET 81 MG: 81 TABLET CHEWABLE at 08:59

## 2022-03-10 RX ADMIN — CALCIUM GLUCONATE 1000 MG: 20 INJECTION, SOLUTION INTRAVENOUS at 00:04

## 2022-03-10 RX ADMIN — DILTIAZEM HYDROCHLORIDE 120 MG: 120 CAPSULE, COATED, EXTENDED RELEASE ORAL at 09:18

## 2022-03-10 RX ADMIN — CARVEDILOL 12.5 MG: 12.5 TABLET, FILM COATED ORAL at 08:59

## 2022-03-10 RX ADMIN — CITALOPRAM HYDROBROMIDE 20 MG: 20 TABLET ORAL at 08:59

## 2022-03-10 RX ADMIN — Medication 4.5 MG: at 23:09

## 2022-03-10 NOTE — PROGRESS NOTES
OCCUPATIONAL THERAPY EVALUATION/DISCHARGE  Patient: Bhanu Aguilar (21 y.o. female)  Date: 3/10/2022  Primary Diagnosis: Acute delirium [R41.0]       Precautions: fall       ASSESSMENT  Patient presents at limited functional baseline. Note patient with stage 3 sacral ulcer per wound care. Patient verbalized good understanding of turning/ repositioning in bed and demonstrated rolling with modified independence using bed rail. She reports prior to admission she recently finished therapy at [facility] and declined need for additional therapy. She reports she is unable to sit EOB d/t sacral wound and is not interested in progressing mobility or ADL independence. Demonstrated BUE overall generally / functional.  She reports at baseline she can perform grooming and self-feeding with set-up and receives assistance with bathing, dressing, and toileting at bed level. Further OT is not indicated. Recommend return to [facility] at d/c with continued staff assistance for ADLs at bed level. Current Level of Function (ADLs/self-care): modified independent rolling, set-up to moderate assistance UB ADLs and total assistance LB ADLs. Functional Outcome Measure: The patient scored 30/100 on the Barthel Index outcome measure which is indicative of ~70% impairment in functional performance. PLAN :    Recommendation for discharge: (in order for the patient to meet his/her long term goals)  No skilled occupational therapy/ follow up rehabilitation needs identified at this time. This discharge recommendation:  Has not yet been discussed the attending provider and/or case management         SUBJECTIVE:   Patient stated I just finished therapy at [facility]. I don't want any more therapy.     OBJECTIVE DATA SUMMARY:   HISTORY:   Past Medical History:   Diagnosis Date    Atrial fibrillation ([address]     Atrial flutter (HCC)     Edema     Hypertension     Hypokalemia     Hypothyroidism     Insomnia     Major depressive disorder     Pain     UTI (urinary tract infection)     Vitamin D deficiency      Past Surgical History:   Procedure Laterality Date    HX APPENDECTOMY      approx age 21    HX GI  2021    bowel resection    HX ORTHOPAEDIC  2021    R hip        Prior Level of Function/Environment/Context: assistance for bathing, dressing, and toileting at bed level  Expanded or extensive additional review of patient history:   Home Situation  Patient Expects to be Discharged to[de-identified] Skilled nursing facility    EXAMINATION OF PERFORMANCE DEFICITS:  Cognitive/Behavioral Status:  Neurologic State: Alert                   Skin: visible skin appears intact, wound not observed    Edema: none noted    Hearing: WDL    Vision/Perceptual:                           Acuity: Within Defined Limits         Range of Motion:    AROM:  (BUE generally decreased/ functional, BLE non-functional)                         Strength:    Strength:  (BUE generally decreased/ functional, BLE non-functional)                Coordination:  Coordination: Generally decreased, functional  Fine Motor Skills-Upper: Left Impaired;Right Impaired    Gross Motor Skills-Upper: Left Impaired;Right Impaired    Tone & Sensation:    Tone: Normal  Sensation:  (baseline b/l feet neuropathy)               Functional Mobility and Transfers for ADLs:  Bed Mobility:  Rolling: Modified independent (using bed rail)    ADL Assessment:  Feeding: Independent (inferred)    Oral Facial Hygiene/Grooming: Setup (inferred)    Bathing: Moderate assistance (inferred for functional reach, limited mobility)    Upper Body Dressing: Moderate assistance (inferred for functional reach, limited mobility)    Lower Body Dressing: Total assistance (inferred for functional reach, limited mobility)    Toileting:  Total assistance (inferred for functional reach, limited mobility)            Functional Measure:    Barthel Index:  Bathin  Bladder: 5  Bowels: 5  Grooming: 5  Dressin  Feeding: 10  Mobility: 0  Stairs: 0  Toilet Use: 0  Transfer (Bed to Chair and Back): 0  Total: 30/100      The Barthel ADL Index: Guidelines  1. The index should be used as a record of what a patient does, not as a record of what a patient could do. 2. The main aim is to establish degree of independence from any help, physical or verbal, however minor and for whatever reason. 3. The need for supervision renders the patient not independent. 4. A patient's performance should be established using the best available evidence. Asking the patient, friends/relatives and nurses are the usual sources, but direct observation and common sense are also important. However direct testing is not needed. 5. Usually the patient's performance over the preceding 24-48 hours is important, but occasionally longer periods will be relevant. 6. Middle categories imply that the patient supplies over 50 per cent of the effort. 7. Use of aids to be independent is allowed. Score Interpretation (from [address] [facility] [address])    Independent   60-79 Minimally independent   40-59 Partially dependent   20-39 Very dependent   <20 Totally dependent     -Jia Lane., Barthel, D.W. (1965). Functional evaluation: the Barthel Index. [address] [facility] ([address] (). The Barthel activities of daily living index: self-reporting versus actual performance in the old (> or = 75 years). Journal of [address] [place] [address] 45(7), [facility], J.RODRIGO, Rose Marie John., [place]. (1999). Measuring the change in disability after inpatient rehabilitation; comparison of the responsiveness of the Barthel Index and Functional [place] Measure. Journal of Neurology, Neurosurgery, and Psychiatry, 66(4), 406-636. Hayder Navarro, N.J.A, DESI Giang.ADAM, & Matthew Guido M.A. (2004) Assessment of post-stroke quality of life in cost-effectiveness studies:  The usefulness of the Barthel Index and the EuroQoL-5D. Quality of Life Research, 15, 186-53         Occupational Therapy Evaluation Charge Determination   History Examination Decision-Making   LOW Complexity : Brief history review  MEDIUM Complexity : 3-5 performance deficits relating to physical, cognitive , or psychosocial skils that result in activity limitations and / or participation restrictions MEDIUM Complexity : Patient may present with comorbidities that affect occupational performnce. Miniml to moderate modification of tasks or assistance (eg, physical or verbal ) with assesment(s) is necessary to enable patient to complete evaluation       Based on the above components, the patient evaluation is determined to be of the following complexity level: LOW   Pain Rating:  Patient did not report pain    Activity Tolerance:   Fair    After treatment patient left in no apparent distress:    Supine in bed, Call bell within reach and Caregiver / family present    COMMUNICATION/EDUCATION:   The patients plan of care was discussed with: Physical therapist and Registered nurse.      Thank you for this referral.  Tim Mejía OT  Time Calculation: 12 mins

## 2022-03-10 NOTE — PROGRESS NOTES
Problem: Falls - Risk of  Goal: *Absence of Falls  Description: Document Ricardo Avitia Fall Risk and appropriate interventions in the flowsheet. Outcome: Progressing Towards Goal  Note: Fall Risk Interventions:  Mobility Interventions: Assess mobility with egress test,Bed/chair exit alarm,Communicate number of staff needed for ambulation/transfer,Patient to call before getting OOB,Utilize walker, cane, or other assistive device         Medication Interventions: Bed/chair exit alarm,Patient to call before getting OOB,Teach patient to arise slowly,Evaluate medications/consider consulting pharmacy    Elimination Interventions: Bed/chair exit alarm,Toileting schedule/hourly rounds,Toilet paper/wipes in reach,Stay With Me (per policy),Patient to call for help with toileting needs,Call light in reach    History of Falls Interventions: Door open when patient unattended,Bed/chair exit alarm,Room close to nurse's station         Problem: Patient Education: Go to Patient Education Activity  Goal: Patient/Family Education  Outcome: Progressing Towards Goal     Problem: Patient Education: Go to Patient Education Activity  Goal: Patient/Family Education  Outcome: Progressing Towards Goal     Problem: Falls - Risk of  Goal: *Absence of Falls  Description: Document Ricardo Avitia Fall Risk and appropriate interventions in the flowsheet.   Outcome: Progressing Towards Goal  Note: Fall Risk Interventions:  Mobility Interventions: Assess mobility with egress test,Bed/chair exit alarm,Communicate number of staff needed for ambulation/transfer,Patient to call before getting OOB,Utilize walker, cane, or other assistive device         Medication Interventions: Bed/chair exit alarm,Patient to call before getting OOB,Teach patient to arise slowly,Evaluate medications/consider consulting pharmacy    Elimination Interventions: Bed/chair exit alarm,Toileting schedule/hourly rounds,Toilet paper/wipes in reach,Stay With Me (per policy),Patient to call for help with toileting needs,Call light in reach    History of Falls Interventions: Door open when patient unattended,Bed/chair exit alarm,Room close to nurse's station         Problem: Impaired Skin Integrity/Pressure Injury Treatment  Goal: *Improvement of Existing Pressure Injury  Outcome: Progressing Towards Goal

## 2022-03-10 NOTE — ED NOTES
Verbal shift change report given to [facility] SUZI (oncoming nurse) by Gerda Epley (offgoing nurse). Report included the following information SBAR, Kardex, ED Summary, [facility] and Recent Results.

## 2022-03-10 NOTE — WOUND CARE
WOCN Note:     New wound care consult placed for coccyx wound  Assessed in room 637  Isolation: no    Chart shows:  Patient admitted on 3/9/22 with acute delirium  Past Medical History:   Diagnosis Date    Atrial fibrillation (HCC)     Atrial flutter (HCC)     Edema     Hypertension     Hypokalemia     Hypothyroidism     Insomnia     Major depressive disorder     Pain     UTI (urinary tract infection)     Vitamin D deficiency       Admitted from [facility]    3/10/22  WBC = 6.6  Hgb = 9.6    Assessment:   Alert and oriented x3. Pleasant mood  Reports no pain  Mobility: minimal assistance with repositioning and turning  Continence: Incontinent of urine and stool. Pure Wick in place   Restraints: no   Last Brian Score: 14  Surface: P500 IRA mattress  Diet: Regular, low fat, low chol, high fiber, NAVNEET    Wt Readings from Last 1 Encounters:   03/10/22 51.9 kg (114 lb 6.7 oz)     Bilateral heel, buttocks skin intact and without erythema   Heels offloaded with pillows        Wound History: Patient goes to the outpatient [address] [place] [address] wound care clinic under the care of Dr. Shimon Ballesteros. Patient had a wound vac at facility. 1. 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 Patient is incontinent of loose BMs, tegaderm used to prevent fecal contamination of wound    Wound Recommendations:      Cleanse coccyx wound with normal saline, apply skin prep to periwound, place puracol plus ag dressing on top of wound and moisten with saline until dressing forms a gel.  Then fill wound with Opticel ag rope, while loose stools present- cover with gauze and secure with tegaderm, once loose stools resolve- cover with foam border dressing , every other day and prn if becomes soiled     PI Prevention:  Turn/reposition approximately every 2 hours  Offload heels with pillows at all times in bed. Sacral Foam dressing: lift to assess regularly; change as needed. Discontinue if incontinent. Z-guard cream to buttocks and sacrum TID  and as needed with incontinence care   Pad bony prominences   Keep HOB 30 degrees or less to decrease shearing and pressure unless medically contraindicated.  If HOB is to be over 30 degrees, raise knees first then [facility] to prevent sliding   Minimize layers of linen/pads under patient to optimize support surface to one flat sheet and one incontinence pad     Orders received from Dr. Alvaro Leyva  Discussed with RNAbby    Transition of Care: Plan to follow weekly and as needed while admitted to hospital.      Alvino Ross MSN, RN, [facility]  Certified Wound and Ostomy Nurse  office [phone]  Can be reached through [address] [place] [address]

## 2022-03-10 NOTE — PROGRESS NOTES
Patient has documentation in hard copy brought in with her from the ED as DNR but chart states Full code.  Will communicate with day shift RN to follow up for possible corrections with MD in the morning

## 2022-03-10 NOTE — PROGRESS NOTES
Comprehensive Nutrition Assessment    Type and Reason for Visit: Initial,Positive nutrition screen    Nutrition Recommendations/Plan:      1. Continue with Low fat/Low Chol/NAVNEET diet order    2. RD will order Ensure Enlive (high calorie, high protein) TID + snacks      Nutrition Assessment:    79 yo female admitted for acute delirium. PMhx: chronic Afib, HTN, CHF, CKD, ischemic colon s/p colectomy and small bowel resection 11/2021. Pt now has chronic diarrhea. Takes lomotil at home, ordered here as well. States she needs to [facility]" to try and gain weight but every time she eats she has to go to the bathroom. Reports crackers before meals helps with her diarrhea, brought her several packets of saltine crackers. Reiterated the need for increased calorie intake, pt has been in nursing facilities so she has limited control over her food. States the place she is at now will let her have more and provides Ensure shakes which she enjoys, will order. Reports good appetite. Intakes % of meals today, states she did not like all the lunch food and was starving at time of my visit, eating peanut butter crackers. Gluten allergy noted in chart. Pt states she does not know where that came from but she denies having any food allergies and does not avoid gluten. Had gluten allergy removed from her chart. Weight hx in EMR indicates weight loss of 29% over last 3 months, which is significant for time frame, pt confirms this weight loss. 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     Labs: LFT's elevated      Malnutrition Assessment:  Malnutrition Status:  Severe malnutrition    Context:  Acute illness     Findings of the 6 clinical characteristics of malnutrition:   Energy Intake:  No significant decrease in energy intake  Weight Loss:  7.00 - Greater than 7.5% over 3 months     Body Fat Loss:  7 - Moderate body fat loss, Orbital   Muscle Mass Loss:  7 - Moderate muscle mass loss, Temples (temporalis),Clavicles (pectoralis & deltoids)  Fluid Accumulation:  No significant fluid accumulation,     Strength:  Not performed         Nutritionally Significant Medications: Lomotil, Probiotic, MgSu, Kcl, Vit D3    Estimated Daily Nutrient Needs:  Energy (kcal): 0494-3645 (35-40 gm/kg); Weight Used for Energy Requirements: Current  Protein (g): 78-83 (1.5-1.6 gm/kg); Weight Used for Protein Requirements: Current  Fluid (ml/day): 7548-8791; Method Used for Fluid Requirements: 1 ml/kcal    Nutrition Related Findings:       BM: loose 3/10  Edema: none  Wounds:  Stage III,Unstageable (sacrum)       Current Nutrition Therapies:   Diet: Low Fat/Low chol/NAVNEET  Supplements: none currently ordered  Additional Caloric Sources: none    Meal intake: Patient Vitals for the past 168 hrs:   % Diet Eaten   03/10/22 1047 76 - 100%     Supplement Intake: No data found.     Anthropometric Measures:  · Height:  5' 4\" (162.6 cm)  · Current Body Wt:  51.9 kg (114 lb 6.7 oz)   · Admission Body Wt:       · Usual Body Wt:        · Ideal Body Wt:  120:  95.3 %   · Adjusted Body Weight:   ; Weight Adjustment for:     · Adjusted BMI:       · BMI Categories:  Underweight (BMI less than 22) age over 72     Wt Readings from Last 10 Encounters:   03/10/22 51.9 kg (114 lb 6.7 oz)   02/06/22 52.5 kg (115 lb 11.9 oz)   12/07/21 73.5 kg (162 lb 0.6 oz)       Nutrition Diagnosis:   · Increased nutrient needs related to altered GI structure as evidenced by diarrhea,GI abnormality,weight loss      Nutrition Interventions:   Food and/or Nutrient Delivery: Continue current diet,Start oral nutrition supplement  Nutrition Education and Counseling: No recommendations at this time  Coordination of Nutrition Care: Continue to monitor while inpatient    Goals:  PO intakes > 75% meals + snacks + 2-3 ONS each day within next 5- 7 days       Nutrition Monitoring and Evaluation:   Behavioral-Environmental Outcomes: None identified  Food/Nutrient Intake Outcomes: Food and nutrient intake,Supplement intake  Physical Signs/Symptoms Outcomes: Biochemical data,GI status,Weight    Discharge Planning:    Continue current diet,Continue oral nutrition supplement     Gavin Neville RD  Contact via [employer]

## 2022-03-10 NOTE — PROGRESS NOTES
Problem: Impaired Skin Integrity/Pressure Injury Treatment  Goal: *Improvement of Existing Pressure Injury  Outcome: Progressing Towards Goal  Goal: *Prevention of pressure injury  Description: Document Brian Scale and appropriate interventions in the flowsheet. Outcome: Progressing Towards Goal  Note: Pressure Injury Interventions:  Sensory Interventions: Turn and reposition approx. every two hours (pillows and wedges if needed),Keep linens dry and wrinkle-free,Maintain/enhance activity level,Avoid rigorous massage over bony prominences,Pressure redistribution bed/mattress (bed type)    Moisture Interventions: Absorbent underpads,Check for incontinence Q2 hours and as needed,Moisture barrier    Activity Interventions: Increase time out of bed,Pressure redistribution bed/mattress(bed type)    Mobility Interventions: Turn and reposition approx.  every two hours(pillow and wedges)         Friction and Shear Interventions: Feet elevated on foot rest,Apply protective barrier, creams and emollients                Problem: Patient Education: Go to Patient Education Activity  Goal: Patient/Family Education  Outcome: Progressing Towards Goal

## 2022-03-10 NOTE — H&P
[address]  HISTORY AND PHYSICAL    Name:  George Tidwell  MR#:  [id number]  :  1933  ACCOUNT #:  [de-identified]  ADMIT DATE:  2022      The patient was seen, evaluated, and admitted by me on 2022. PRIMARY CARE PHYSICIAN:  Unknown. SOURCE OF INFORMATION:  Patient who is not a very good historian because of change in mental status and review of ED and old electronic medical records. CHIEF COMPLAINT:  Change in mental status. HISTORY OF PRESENT ILLNESS:  This is an 80-year-old woman with a past medical history significant for chronic atrial fibrillation on Eliquis for anticoagulation, hypertension, congestive heart failure, chronic kidney disease, who was in her usual state of health until the day of presentation at the emergency room when it was reported that the patient developed change in mental status at the assisted living facility where the patient resides. The change in mental status is in the form of agitation and outburst of anger. It was also reported that the patient has blood in her urine. Because of this abnormality and change in mental status, the patient was sent to the emergency room at [facility] for further evaluation. When the patient arrived at the emergency room, her urinalysis was consistent with urinary tract infection. She was subsequently referred to the hospitalist service for evaluation for admission. She was last admitted to this hospital from 2022 to 02/10/2022. The patient was admitted for evaluation and treatment of septic shock secondary to infected sacral wound and urinary tract infection. The patient denies fever, rigors, or chills. She does not know why she was sent from the nursing home to the emergency room. PAST MEDICAL HISTORY:  Chronic atrial fibrillation on Eliquis for anticoagulation, hypertension, chronic congestive heart failure, and chronic kidney disease.     ALLERGIES:  THE PATIENT IS ALLERGIC TO CODEINE, SULFA, AND GLUTEN. MEDICATIONS:  1. Tylenol 650 mg twice daily as needed. 2.  Eliquis 2.5 mg twice daily. 3.  Aspirin 81 mg daily. 4.  Coreg 12.5 mg twice daily. 5.  Celexa 20 mg daily. 6.  Colestid 5 g twice daily. 7.  Diltiazem  mg daily. 8.  Lomotil 2 tablets 4 times daily. 9.  Melatonin 5 mg daily at bedtime. 10.  Mesalamine 1.5 g daily. 11.  Midodrine 2.5 mg 1 tablet twice daily. 12.  Nystatin cream applied to affected area twice daily. 13.  Percocet 2.5/325 one tablet 4 times daily as needed for pain. 14.  Potassium chloride 20 mEq daily. 15.  Phenergan 25 mg every 6 hours as needed for nausea. 16.  Multivitamin 1 tablet daily. FAMILY HISTORY:  This was reviewed. Father and mother had hypertension. PAST SURGICAL HISTORY:  This is significant for appendectomy, bowel resection secondary to ischemic bowel, and right hip surgery. SOCIAL HISTORY:  No history of alcohol or tobacco abuse. REVIEW OF SYSTEMS:  HEAD, EYES, EARS, NOSE, AND THROAT:  This is positive for confusion. No headache, no blurring of vision, no photophobia. RESPIRATORY:  No cough, no shortness of breath, no hemoptysis. CARDIOVASCULAR:  No chest pain, no orthopnea, no palpitation. GASTROINTESTINAL:  This is positive for diarrhea. No nausea or vomiting. No abdominal pain. GENITOURINARY:  No dysuria, no urgency, and no frequency. All other systems are reviewed and they are negative. PHYSICAL EXAMINATION:  GENERAL APPEARANCE:  The patient appeared ill, in moderate distress. VITAL SIGNS:  On arrival at the emergency room, temperature 98.5, pulse 74, respiratory rate 18, blood pressure 158/65, and oxygen saturation 99%. HEAD:  Normocephalic, atraumatic. EYES:  Normal eye movement. No redness, no drainage, no discharge. EARS:  Normal external ears with no obvious drainage. NOSE:  No deformity and no drainage. MOUTH AND THROAT:  No visible oral lesion. NECK:  Neck is supple.   No JVD, no thyromegaly. CHEST:  Clear breath sounds. No wheezing, no crackles. HEART:  Normal S1 and S2, regular. No clinically appreciable murmur. ABDOMEN:  Soft and nontender. Normal bowel sounds. CENTRAL NERVOUS SYSTEM:  Alert and oriented to person and place. No gross focal neurological deficit. EXTREMITIES:  No edema. Pulses 2+ bilaterally. MUSCULOSKELETAL:  No obvious joint deformity or swelling. SKIN:  Sacral decubitus ulcer noted and present on admission. PSYCHIATRIC:  Unable to assess mood and affect. LYMPHATIC:  No cervical lymphadenopathy. DIAGNOSTIC DATA:  EKG shows sinus rhythm, no significant ST and T-wave abnormalities. Chest x-ray, no acute disease. CT scan of the abdomen and pelvis with contrast, no acute disease. LABORATORY DATA:  Hematology, WBC 7.7, hemoglobin 10.3, hematocrit 32.5, platelets 262. Chemistry, sodium 139, potassium 3.1, chloride 112, CO2 of 20, glucose 124, BUN 20, creatinine 1.07, calcium 6.6. Total bilirubin 0.2, , AST 70, alkaline phosphatase 120, total protein 6.5, albumin level 2.3, globulin 4.2. ASSESSMENT:  1. Acute delirium. 2.  Acute cystitis with hematuria. 3.  Paroxysmal atrial fibrillation. 4.  Hypertension. 5.  Hypomagnesemia. 6.  Hypokalemia. 7.  Anemia. 8.  Abnormal liver function tests. 9.  Hyperglycemia. 10.  Pressure injury of sacral region, unstageable. 11.  Chronic congestive heart failure. 12.  Chronic kidney disease, stage III. 13.  Hypocalcemia. PLAN:  1. Acute delirium. We will admit the patient for further evaluation and treatment. This is most likely due to metabolic event. We will identify and treat underlying etiological factors. The patient is on Eliquis. Because of that, we will obtain CT scan of the head to evaluate the patient for acute pathology. We will check ammonia level. We will check acetaminophen level. We will check Tylenol level. We will also check serum alcohol level.   We will check B12 and folic acid level as well. We will monitor the patient closely. The patient may require further evaluation including MRI of the brain and Neurology consult if the patient did not return to normal baseline mental status after treatment of the underlying metabolic etiological factors. 2.  Acute cystitis with hematuria. This is the most likely cause of the patient's acute delirium. We will start the patient on Rocephin. We will await urine culture. 3.  Paroxysmal atrial fibrillation. The patient at present is in sinus rhythm. We will continue with Eliquis for anticoagulation. 4.  Hypertension. We will resume preadmission medication and monitor the patient's blood pressure closely. 5.  Hypomagnesemia. We will replete magnesium and repeat level. 6.  Hypokalemia. We will replete potassium and repeat potassium level. 7.  Anemia. This is most likely due to chronic disease. We will carry out anemia workup including checking a stool guaiac to rule out occult GI bleed. 8.  Abnormal liver function tests. This is mild. We will check lipase level. We will monitor the patient's liver function tests closely. The patient may require Hepatology consult if this is getting worse and if CT scan of the abdomen and pelvis did not show any active disease. 9.  Hyperglycemia. We will check hemoglobin A1c level. The patient has no history of diabetes. 10.  Pressure injury of sacral region, unstageable. This is chronic and present on admission. Wound Care consult will be requested for local wound care. 11.  Chronic congestive heart failure. The patient appears to be well compensated. Chest x-ray did not show any vascular congestion, although the patient's BNP level is elevated. The patient was also not on any diuretic at home. We will obtain echocardiogram to determine the ejection fraction and the type of congestive heart failure. We will monitor the patient closely.   12.  Chronic kidney disease, stage III.  We will continue to monitor the patient's renal function. 13.  Hypocalcemia. We will replete calcium and repeat level. We will also check ionized calcium level. 14.  Other issues:  Code status, the patient is a full code. The patient is on Eliquis. Because of that, there is no need for DVT prophylaxis. FUNCTIONAL STATUS PRIOR TO ADMISSION:  The patient came from assisted living facility. The patient is ambulatory with assistive device. COVID PRECAUTION:  The patient was wearing a face mask. I was wearing a face mask and gloves for this patient's encounter.         Clary Geiger MD      [id number]  D:  03/09/2022 23:09  T:  03/10/2022 0:41  JOB #:  [id number]

## 2022-03-10 NOTE — PROGRESS NOTES
Bedside and Verbal shift change report given to Maribeth Nissen, RN (oncoming nurse) by Rohit Sepulveda RN (offgoing nurse). Report included the following information SBAR, Kardex, Intake/Output, MAR and Cardiac Rhythm . Skin issues, reposition, and follow-up addendum to code status.

## 2022-03-10 NOTE — PROGRESS NOTES
[facility]  Hospitalist Group                                                                                          Hospitalist Progress Note  Kaity Dumont, DO  Answering service: [phone] from in house phone        Date of Service:  3/10/2022  NAME:  Susan Babin  :  3/13/1933  MRN:  [id number]      Admission Summary:   40-year-old woman with a past medical history significant for chronic atrial fibrillation on Eliquis for anticoagulation, hypertension, congestive heart failure, chronic kidney disease, who was in her usual state of health until the day of presentation at the emergency room when it was reported that the patient developed change in mental status at the assisted living facility where the patient resides. The change in mental status is in the form of agitation and outburst of anger. It was also reported that the patient has blood in her urine. Because of this abnormality and change in mental status, the patient was sent to the emergency room at [facility] for further evaluation. When the patient arrived at the emergency room, her urinalysis was consistent with urinary tract infection. She was subsequently referred to the hospitalist service for evaluation for admission. She was last admitted to this hospital from 2022 to 02/10/2022. The patient was admitted for evaluation and treatment of septic shock secondary to infected sacral wound and urinary tract infection. The patient denies fever, rigors, or chills. She does not know why she was sent from the nursing home to the emergency room. Interval history / Subjective: Follow up delirium. Patient seen and examined earlier today. Daughter at bedside. Patient not confused now, mentation at baseline.  Has UTI     Assessment & Plan:     Acute metabolic encephalopathy secondary to UTI:  -Urinalysis and urine culture positive, follow-up final culture results   -Continue ceftriaxone  -Due to history of antibiotic exposure, patient at risk for MDR, will remain inpatient until final culture results    Paroxysmal atrial fibrillation:  -Continue home Eliquis, diltiazem ER, carvedilol    Hypertension:  -Continue home carvedilol, diltiazem    Hypokalemia: Replete as needed    Anemia of chronic disease: Stable    Pressure injury of sacral region, unstageable  -Wound care consulted, recommended removing wound VAC due to improvement    Hypocalcemia: Stable, repleted, monitor              Code status:  DNR- has DDNR on file  Prophylaxis: [address] discussed with: Patient, daughter  Anticipated Disposition: SNF pending culture results     Hospital Problems  Date Reviewed: 3/9/2022          Codes Class Noted POA    * (Principal) Acute delirium ICD-10-CM: R41.0  ICD-9-CM: 780.09  3/9/2022 Yes                Review of Systems:   Negative unless stated above      Vital Signs:    Last 24hrs VS reviewed since prior progress note. Most recent are:  Visit Vitals  BP (!) 147/54   Pulse (!) 53   Temp 98.1 °F (36.7 °C)   Resp 16   Ht 5' 4\" (1.626 m)   Wt 51.9 kg (114 lb 6.7 oz)   SpO2 100%   BMI 19.64 kg/m²         Intake/Output Summary (Last 24 hours) at 3/10/2022 1842  Last data filed at 3/10/2022 1627  Gross per 24 hour   Intake 752 ml   Output    Net 752 ml        Physical Examination:     I had a face to face encounter with this patient and independently examined them on 3/10/2022 as outlined below:          Constitutional:  No acute distress, cooperative, pleasant, thin appearing   ENT:  Oral mucosa moist, oropharynx benign. Resp:  CTA bilaterally. No wheezing/rhonchi/rales. No accessory muscle use. CV:  Regular rhythm, normal rate, no murmurs, gallops, rubs    GI:  Soft, non distended, abdominal scar, non tender.  normoactive bowel sounds, no hepatosplenomegaly     Musculoskeletal:  No edema, warm, 2+ pulses throughout    Neurologic:  Moves all extremities            Data Review: Review and/or order of clinical lab test  Review and/or order of tests in the radiology section of CPT  Review and/or order of tests in the medicine section of CPT      Labs:     Recent Labs     03/10/22  0634 03/09/22  1520   WBC 6.6 7.7   HGB 9.6* 10.3*   HCT 30.7* 32.5*    299     Recent Labs     03/10/22  0634 03/09/22  1520    139   K 3.9 3.1*   * 112*   CO2 19* 20*   BUN 14 20   CREA 0.85 1.07*   GLU 76 124*   CA 7.1* 6.6*   MG 1.0* 0.6*   PHOS 3.2  --      Recent Labs     03/10/22  0634 03/09/22  1520   * 110*   * 120*   TBILI 0.1* 0.2   TP 6.1* 6.5   ALB 2.4* 2.3*   GLOB 3.7 4.2*   LPSE 233  --      No results for input(s): INR, PTP, APTT, INREXT in the last 72 hours. Recent Labs     03/10/22  0634   FERR 210      No results found for: FOL, RBCF   No results for input(s): PH, PCO2, PO2 in the last 72 hours. No results for input(s): CPK, CKNDX, TROIQ in the last 72 hours.     No lab exists for component: CPKMB  No results found for: CHOL, CHOLX, CHLST, CHOLV, HDL, HDLP, LDL, LDLC, DLDLP, TGLX, TRIGL, TRIGP, CHHD, CHHDX  Lab Results   Component Value Date/Time    Glucose (POC) 75 02/06/2022 11:08 PM    Glucose (POC) 78 02/06/2022 01:07 PM    Glucose (POC) 102 12/07/2021 06:45 AM    Glucose (POC) 105 12/02/2021 05:39 AM    Glucose (POC) 161 (H) 12/01/2021 11:27 PM     Lab Results   Component Value Date/Time    Color YELLOW/STRAW 03/09/2022 05:26 PM    Appearance TURBID (A) 03/09/2022 05:26 PM    Specific gravity 1.020 03/09/2022 05:26 PM    Specific gravity 1.015 02/06/2022 11:33 AM    pH (UA) 5.0 03/09/2022 05:26 PM    Protein 30 (A) 03/09/2022 05:26 PM    Glucose Negative 03/09/2022 05:26 PM    Ketone Negative 03/09/2022 05:26 PM    Bilirubin Negative 03/09/2022 05:26 PM    Urobilinogen 0.2 03/09/2022 05:26 PM    Nitrites Negative 03/09/2022 05:26 PM    Leukocyte Esterase LARGE (A) 03/09/2022 05:26 PM    Epithelial cells FEW 03/09/2022 05:26 PM    Bacteria 2+ (A) 03/09/2022 05:26 PM    WBC >100 (H) 03/09/2022 05:26 PM    RBC 0-5 03/09/2022 05:26 PM         Medications Reviewed:     Current Facility-Administered Medications   Medication Dose Route Frequency    apixaban (ELIQUIS) tablet 2.5 mg  2.5 mg Oral BID    aspirin chewable tablet 81 mg  81 mg Oral DAILY    carvediloL (COREG) tablet 12.5 mg  12.5 mg Oral BID WITH MEALS    cholecalciferol (VITAMIN D3) (1000 Units /25 mcg) tablet 1,000 Units  1,000 Units Oral DAILY    citalopram (CELEXA) tablet 20 mg  20 mg Oral DAILY    colestipoL (COLESTID) packet 5 g  5 g Oral BID    dilTIAZem ER (CARDIZEM CD) capsule 120 mg  120 mg Oral DAILY    diphenoxylate-atropine (LOMOTIL) tablet 2 Tablet  2 Tablet Oral QID    L.acidophilus-paracasei-S.thermophil-bifidobacter (RISAQUAD) 8 billion cell capsule  1 Capsule Oral DAILY    melatonin tablet 4.5 mg  4.5 mg Oral QHS    midodrine (PROAMATINE) tablet 2.5 mg  2.5 mg Oral Q12H    sodium chloride (NS) flush 5-40 mL  5-40 mL IntraVENous Q8H    sodium chloride (NS) flush 5-40 mL  5-40 mL IntraVENous PRN    acetaminophen (TYLENOL) tablet 650 mg  650 mg Oral Q6H PRN    Or    acetaminophen (TYLENOL) suppository 650 mg  650 mg Rectal Q6H PRN    polyethylene glycol (MIRALAX) packet 17 g  17 g Oral DAILY PRN    ondansetron (ZOFRAN ODT) tablet 4 mg  4 mg Oral Q8H PRN    Or    ondansetron (ZOFRAN) injection 4 mg  4 mg IntraVENous Q6H PRN    cefTRIAXone (ROCEPHIN) 1 g in sterile water (preservative free) 10 mL IV syringe  1 g IntraVENous Q24H     ______________________________________________________________________  EXPECTED LENGTH OF STAY: - - -  ACTUAL LENGTH OF STAY:          [address], DO

## 2022-03-10 NOTE — ED NOTES
TRANSFER - OUT REPORT:    Verbal report given to Missael RN(name) on Charo Camara  being transferred to (unit) for routine progression of care       Report consisted of patients Situation, Background, Assessment and   Recommendations(SBAR). Information from the following report(s) SBAR, ED Summary, [facility] and Recent Results was reviewed with the receiving nurse. Lines:   Peripheral IV 03/09/22 Anterior;Proximal;Right Forearm (Active)        Opportunity for questions and clarification was provided.       Patient transported with:   [employer]

## 2022-03-10 NOTE — PROGRESS NOTES
Primary Nurse Bhavesh Daniels, RN and Wolf Doherty, TAMMY, RN performed a dual skin assessment on this patient Impairment noted- see wound doc flow sheet  Brian score . Patient had bilateral red and blanchable heels, skin tear of about 1cm to Left lower leg, scattered bruises on arms and lags, larger ones on bilateral back hands, probably from sticks, excoriation and redness to yolanda area, a stage 3/unstageable pressure wound to sacrum.

## 2022-03-10 NOTE — PROGRESS NOTES
PHYSICAL THERAPY EVALUATION/DISCHARGE  Patient: Susan Babin (89 y.o. female)  Date: 3/10/2022  Primary Diagnosis: Acute delirium [R41.0]       Precautions: Fall         ASSESSMENT  Based on the objective data described below, the patient presents with overall mobility at/near baseline s/p admission for acute delirium. Pt from SNF and know to this therapist from previous hospital admissions. Pt reports prior to this admission, she was rolling in the bed mod I and has not been out of bed in quite some time. She was sitting EOB until recently however pt reports MD told her not do to that d/t significant sacral wound. Pt required assistance for all ADLs at bedlevel. Pt reports she did not want therapy at SNF prior to admission and does not want therapy here in the hospital.  She was able to demonstrate ability to roll right and left mod I and appears at her functional baseline. No further PT needs, will sign off. Functional Outcome Measure: The patient scored 30/100 on the Barthel outcome measure. Other factors to consider for discharge: bedbound at baseline, does not want therapy, from SNF     Further skilled acute physical therapy is not indicated at this time.      PLAN :  Recommendation for discharge: (in order for the patient to meet his/her long term goals)  Return to SNF    This discharge recommendation:  Has been discussed the attending provider    IF patient discharges home will need the following DME: none       SUBJECTIVE:   Patient stated I keep saying I don't want therapy and you keep coming back every time I'm at the hospital.    OBJECTIVE DATA SUMMARY:   HISTORY:    Past Medical History:   Diagnosis Date    Atrial fibrillation ([address]     Atrial flutter ([address]     Edema     Hypertension     Hypokalemia     Hypothyroidism     Insomnia     Major depressive disorder     Pain     UTI (urinary tract infection)     Vitamin D deficiency      Past Surgical History:   Procedure Laterality Date    HX APPENDECTOMY      approx age 21    HX GI  2021    bowel resection    HX ORTHOPAEDIC  2021    R hip        Prior level of function: from SNF. Was rolling in bed only and was requiring assistance with ADLs at [facility]  Patient Expects to be Discharged to[de-identified] Skilled nursing facility    EXAMINATION/PRESENTATION/DECISION MAKING:   Critical Behavior:  Neurologic State: Alert    Range Of Motion:  AROM:  (BUE generally decreased/ functional, BLE non-functional)                       Strength:    Strength:  (BUE generally decreased/ functional, BLE non-functional)                    Tone & Sensation:   Tone: Normal              Sensation:  (baseline b/l feet neuropathy)               Coordination:  Coordination: Generally decreased, functional  Vision:   Acuity: Within Defined Limits  Functional Mobility:  Bed Mobility:  Rolling: Modified independent (using bed rail)    Functional Measure:  Barthel Index:    Bathin  Bladder: 5  Bowels: 5  Groomin  Dressin  Feeding: 10  Mobility: 0  Stairs: 0  Toilet Use: 0  Transfer (Bed to Chair and Back): 0  Total: 30/100       The Barthel ADL Index: Guidelines  1. The index should be used as a record of what a patient does, not as a record of what a patient could do. 2. The main aim is to establish degree of independence from any help, physical or verbal, however minor and for whatever reason. 3. The need for supervision renders the patient not independent. 4. A patient's performance should be established using the best available evidence. Asking the patient, friends/relatives and nurses are the usual sources, but direct observation and common sense are also important. However direct testing is not needed. 5. Usually the patient's performance over the preceding 24-48 hours is important, but occasionally longer periods will be relevant.   6. Middle categories imply that the patient supplies over 50 per cent of the effort. 7. Use of aids to be independent is allowed. Score Interpretation (from [address])    Independent   60-79 Minimally independent   40-59 Partially dependent   20-39 Very dependent   <20 Totally dependent     -Jia Lane., Barthel, D.W. (1965). Functional evaluation: the Barthel Index. [address] ([address] (1997). The Barthel activities of daily living index: self-reporting versus actual performance in the old (> or = 75 years). Journal of [address] [place] [address] 45(7), [facility], J.RICKYF, Khushboo Harper., Romana Just. (1999). Measuring the change in disability after inpatient rehabilitation; comparison of the responsiveness of the Barthel Index and Functional Stevens Measure. Journal of Neurology, Neurosurgery, and Psychiatry, 66(4), 341-944. COURTNEY Leung, JUNIOR Giang, & Daria Collins, MCelestinoA. (2004) Assessment of post-stroke quality of life in cost-effectiveness studies: The usefulness of the Barthel Index and the EuroQoL-5D. Quality of Life Research, 15, 839-11           Physical Therapy Evaluation Charge Determination   History Examination Presentation Decision-Making   HIGH Complexity :3+ comorbidities / personal factors will impact the outcome/ POC  HIGH Complexity : 4+ Standardized tests and measures addressing body structure, function, activity limitation and / or participation in recreation  LOW Complexity : Stable, uncomplicated  Other outcome measures Barthel  HIGH       Based on the above components, the patient evaluation is determined to be of the following complexity level: LOW     Activity Tolerance:   Poor      After treatment patient left in no apparent distress:   Patient positioned in Right sidelying for pressure relief    COMMUNICATION/EDUCATION:   The patients plan of care was discussed with: Occupational therapist, Registered nurse and Physician.      Patient understands intent and goals of therapy, but is neutral about his/her participation.     Thank you for this referral.  Sasha Clayton, PT, DPT   Time Calculation: 10 mins

## 2022-03-11 LAB
ANION GAP SERPL CALC-SCNC: 6 MMOL/L (ref 5–15)
BACTERIA SPEC CULT: ABNORMAL
BUN SERPL-MCNC: 12 MG/DL (ref 6–20)
BUN/CREAT SERPL: 15 (ref 12–20)
CALCIUM SERPL-MCNC: 7.6 MG/DL (ref 8.5–10.1)
CC UR VC: ABNORMAL
CHLORIDE SERPL-SCNC: 115 MMOL/L (ref 97–108)
CO2 SERPL-SCNC: 19 MMOL/L (ref 21–32)
CREAT SERPL-MCNC: 0.78 MG/DL (ref 0.55–1.02)
FOLATE SERPL-MCNC: 29.1 NG/ML (ref 5–21)
GLUCOSE SERPL-MCNC: 81 MG/DL (ref 65–100)
POTASSIUM SERPL-SCNC: 3.5 MMOL/L (ref 3.5–5.1)
SERVICE CMNT-IMP: ABNORMAL
SODIUM SERPL-SCNC: 140 MMOL/L (ref 136–145)

## 2022-03-11 PROCEDURE — 74011250636 HC RX REV CODE- 250/636: Performed by: INTERNAL MEDICINE

## 2022-03-11 PROCEDURE — 74011250637 HC RX REV CODE- 250/637: Performed by: INTERNAL MEDICINE

## 2022-03-11 PROCEDURE — 74011000250 HC RX REV CODE- 250: Performed by: INTERNAL MEDICINE

## 2022-03-11 PROCEDURE — 36415 COLL VENOUS BLD VENIPUNCTURE: CPT

## 2022-03-11 PROCEDURE — 80048 BASIC METABOLIC PNL TOTAL CA: CPT

## 2022-03-11 PROCEDURE — 82746 ASSAY OF FOLIC ACID SERUM: CPT

## 2022-03-11 PROCEDURE — 65660000000 HC RM CCU STEPDOWN

## 2022-03-11 PROCEDURE — 94761 N-INVAS EAR/PLS OXIMETRY MLT: CPT

## 2022-03-11 RX ADMIN — DIPHENOXYLATE HYDROCHLORIDE AND ATROPINE SULFATE 2 TABLET: 2.5; .025 TABLET ORAL at 13:08

## 2022-03-11 RX ADMIN — DIPHENOXYLATE HYDROCHLORIDE AND ATROPINE SULFATE 2 TABLET: 2.5; .025 TABLET ORAL at 21:01

## 2022-03-11 RX ADMIN — CARVEDILOL 12.5 MG: 12.5 TABLET, FILM COATED ORAL at 17:25

## 2022-03-11 RX ADMIN — SODIUM CHLORIDE, PRESERVATIVE FREE 10 ML: 5 INJECTION INTRAVENOUS at 13:08

## 2022-03-11 RX ADMIN — SODIUM CHLORIDE, PRESERVATIVE FREE 10 ML: 5 INJECTION INTRAVENOUS at 21:01

## 2022-03-11 RX ADMIN — Medication 1000 UNITS: at 09:14

## 2022-03-11 RX ADMIN — APIXABAN 2.5 MG: 2.5 TABLET, FILM COATED ORAL at 17:25

## 2022-03-11 RX ADMIN — ASPIRIN 81 MG CHEWABLE TABLET 81 MG: 81 TABLET CHEWABLE at 09:15

## 2022-03-11 RX ADMIN — DIPHENOXYLATE HYDROCHLORIDE AND ATROPINE SULFATE 2 TABLET: 2.5; .025 TABLET ORAL at 09:14

## 2022-03-11 RX ADMIN — CARVEDILOL 12.5 MG: 12.5 TABLET, FILM COATED ORAL at 09:15

## 2022-03-11 RX ADMIN — Medication 1 CAPSULE: at 09:15

## 2022-03-11 RX ADMIN — CITALOPRAM HYDROBROMIDE 20 MG: 20 TABLET ORAL at 09:14

## 2022-03-11 RX ADMIN — APIXABAN 2.5 MG: 2.5 TABLET, FILM COATED ORAL at 09:15

## 2022-03-11 RX ADMIN — DIPHENOXYLATE HYDROCHLORIDE AND ATROPINE SULFATE 2 TABLET: 2.5; .025 TABLET ORAL at 17:24

## 2022-03-11 RX ADMIN — Medication 4.5 MG: at 21:01

## 2022-03-11 RX ADMIN — DILTIAZEM HYDROCHLORIDE 120 MG: 120 CAPSULE, COATED, EXTENDED RELEASE ORAL at 09:15

## 2022-03-11 RX ADMIN — CEFTRIAXONE SODIUM 1 G: 1 INJECTION, POWDER, FOR SOLUTION INTRAMUSCULAR; INTRAVENOUS at 17:24

## 2022-03-11 NOTE — PROGRESS NOTES
Bedside shift change report given to Abby (oncoming nurse) by Dominique Cleaning and [address] [place] [address] (offgoing nurse). Report included the following information SBAR, Kardex, Intake/Output, MAR, Accordion, Recent Results and Cardiac Rhythm NSR.

## 2022-03-11 NOTE — PROGRESS NOTES
Transition of Care  1. RUR 24% High  2. Disposition TBD  3. DME walker, wheelchair  4. Transportation BLS  5. Follow Up PCP, Specialist      Patient is medically cleared for DC today. [facility] CM received response that they are not sure when they will have a bed for patient. CM met with patient to inform and followed up with daughter Nick Martinez. Latasha stated she will be contacting [facility] and will follow up with CM. CM to monitor. Joon Perez MS      3:11 CM and daughter have attempted to reach [facility] via phone/[phone] and [phone]- but there are no working numbers at this time. CM to monitor.      Joon Perez MS

## 2022-03-11 NOTE — PROGRESS NOTES
[facility]  Hospitalist Group                                                                                          Hospitalist Progress Note  Kaity Allen DO  Answering service: [phone] from in house phone        Date of Service:  3/11/2022  NAME:  Bhanu Aguilar  :  3/13/1933  MRN:  [id number]      Admission Summary:   63-year-old woman with a past medical history significant for chronic atrial fibrillation on Eliquis for anticoagulation, hypertension, congestive heart failure, chronic kidney disease, who was in her usual state of health until the day of presentation at the emergency room when it was reported that the patient developed change in mental status at the assisted living facility where the patient resides. The change in mental status is in the form of agitation and outburst of anger. It was also reported that the patient has blood in her urine. Because of this abnormality and change in mental status, the patient was sent to the emergency room at [address] for further evaluation. When the patient arrived at the emergency room, her urinalysis was consistent with urinary tract infection. She was subsequently referred to the hospitalist service for evaluation for admission. She was last admitted to this hospital from 2022 to 02/10/2022. The patient was admitted for evaluation and treatment of septic shock secondary to infected sacral wound and urinary tract infection. The patient denies fever, rigors, or chills. She does not know why she was sent from the nursing home to the emergency room. Interval history / Subjective: Follow up delirium. Patient seen and examined earlier today. Stated her food fell on the floor earlier. Doing overall well. Urine cultures with Hafnia Alvei.       Assessment & Plan:     Acute metabolic encephalopathy secondary to UTI:  -Urine culture with Mookiefbradford Alvei  -Continue ceftriaxone x 3 days    Paroxysmal atrial fibrillation:  -Continue home Eliquis, diltiazem ER, carvedilol    Hypertension:  -Continue home carvedilol, diltiazem    Hypokalemia: Replete as needed    Anemia of chronic disease: Stable    Pressure injury of sacral region, unstageable  -Wound care consulted, recommended removing wound VAC due to improvement    Hypocalcemia: Stable, repleted, monitor              Code status:  DNR- has DDNR on file  Prophylaxis: [address] discussed with: Patient, daughter  Anticipated Disposition: SNF when able     Hospital Problems  Date Reviewed: 3/9/2022          Codes Class Noted POA    * (Principal) Acute delirium ICD-10-CM: R41.0  ICD-9-CM: 780.09  3/9/2022 Yes                Review of Systems:   Negative unless stated above      Vital Signs:    Last 24hrs VS reviewed since prior progress note. Most recent are:  Visit Vitals  BP (!) 150/50   Pulse 83   Temp 97.8 °F (36.6 °C)   Resp 15   Ht 5' 4\" (1.626 m)   Wt 49.3 kg (108 lb 11 oz)   SpO2 92%   BMI 18.66 kg/m²         Intake/Output Summary (Last 24 hours) at 3/11/2022 1650  Last data filed at 3/11/2022 1604  Gross per 24 hour   Intake 510 ml   Output 175 ml   Net 335 ml        Physical Examination:     I had a face to face encounter with this patient and independently examined them on 3/11/2022 as outlined below:          Constitutional:  No acute distress, cooperative, pleasant, thin appearing   ENT:  Oral mucosa moist, oropharynx benign. Resp:  CTA bilaterally. No wheezing/rhonchi/rales. No accessory muscle use. CV:  Regular rhythm, normal rate, no murmurs, gallops, rubs    GI:  Soft, non distended, abdominal scar, non tender.  normoactive bowel sounds, no hepatosplenomegaly     Musculoskeletal:  No edema, warm, 2+ pulses throughout    Neurologic:  Moves all extremities            Data Review:    Review and/or order of clinical lab test  Review and/or order of tests in the radiology section of CPT  Review and/or order of tests in the medicine section of CPT      Labs:     Recent Labs     03/10/22  0634 03/09/22  1520   WBC 6.6 7.7   HGB 9.6* 10.3*   HCT 30.7* 32.5*    299     Recent Labs     03/11/22  0418 03/10/22  0634 03/09/22  1520    141 139   K 3.5 3.9 3.1*   * 115* 112*   CO2 19* 19* 20*   BUN 12 14 20   CREA 0.78 0.85 1.07*   GLU 81 76 124*   CA 7.6* 7.1* 6.6*   MG  --  1.0* 0.6*   PHOS  --  3.2  --      Recent Labs     03/10/22  0634 03/09/22  1520   * 110*   * 120*   TBILI 0.1* 0.2   TP 6.1* 6.5   ALB 2.4* 2.3*   GLOB 3.7 4.2*   LPSE 233  --      No results for input(s): INR, PTP, APTT, INREXT, INREXT in the last 72 hours. Recent Labs     03/10/22  0634   FERR 210      Lab Results   Component Value Date/Time    Folate 29.1 (H) 03/11/2022 04:18 AM      No results for input(s): PH, PCO2, PO2 in the last 72 hours. No results for input(s): CPK, CKNDX, TROIQ in the last 72 hours.     No lab exists for component: CPKMB  No results found for: CHOL, CHOLX, CHLST, CHOLV, HDL, HDLP, LDL, LDLC, DLDLP, TGLX, TRIGL, TRIGP, CHHD, CHHDX  Lab Results   Component Value Date/Time    Glucose (POC) 75 02/06/2022 11:08 PM    Glucose (POC) 78 02/06/2022 01:07 PM    Glucose (POC) 102 12/07/2021 06:45 AM    Glucose (POC) 105 12/02/2021 05:39 AM    Glucose (POC) 161 (H) 12/01/2021 11:27 PM     Lab Results   Component Value Date/Time    Color YELLOW/STRAW 03/09/2022 05:26 PM    Appearance TURBID (A) 03/09/2022 05:26 PM    Specific gravity 1.020 03/09/2022 05:26 PM    Specific gravity 1.015 02/06/2022 11:33 AM    pH (UA) 5.0 03/09/2022 05:26 PM    Protein 30 (A) 03/09/2022 05:26 PM    Glucose Negative 03/09/2022 05:26 PM    Ketone Negative 03/09/2022 05:26 PM    Bilirubin Negative 03/09/2022 05:26 PM    Urobilinogen 0.2 03/09/2022 05:26 PM    Nitrites Negative 03/09/2022 05:26 PM    Leukocyte Esterase LARGE (A) 03/09/2022 05:26 PM    Epithelial cells FEW 03/09/2022 05:26 PM    Bacteria 2+ (A) 03/09/2022 05:26 PM    WBC >100 (H) 03/09/2022 05:26 PM    RBC 0-5 03/09/2022 05:26 PM         Medications Reviewed:     Current Facility-Administered Medications   Medication Dose Route Frequency    apixaban (ELIQUIS) tablet 2.5 mg  2.5 mg Oral BID    aspirin chewable tablet 81 mg  81 mg Oral DAILY    carvediloL (COREG) tablet 12.5 mg  12.5 mg Oral BID WITH MEALS    cholecalciferol (VITAMIN D3) (1000 Units /25 mcg) tablet 1,000 Units  1,000 Units Oral DAILY    citalopram (CELEXA) tablet 20 mg  20 mg Oral DAILY    colestipoL (COLESTID) packet 5 g  5 g Oral BID    dilTIAZem ER (CARDIZEM CD) capsule 120 mg  120 mg Oral DAILY    diphenoxylate-atropine (LOMOTIL) tablet 2 Tablet  2 Tablet Oral QID    L.acidophilus-paracasei-S.thermophil-bifidobacter (RISAQUAD) 8 billion cell capsule  1 Capsule Oral DAILY    melatonin tablet 4.5 mg  4.5 mg Oral QHS    midodrine (PROAMATINE) tablet 2.5 mg  2.5 mg Oral Q12H    sodium chloride (NS) flush 5-40 mL  5-40 mL IntraVENous Q8H    sodium chloride (NS) flush 5-40 mL  5-40 mL IntraVENous PRN    acetaminophen (TYLENOL) tablet 650 mg  650 mg Oral Q6H PRN    Or    acetaminophen (TYLENOL) suppository 650 mg  650 mg Rectal Q6H PRN    polyethylene glycol (MIRALAX) packet 17 g  17 g Oral DAILY PRN    ondansetron (ZOFRAN ODT) tablet 4 mg  4 mg Oral Q8H PRN    Or    ondansetron (ZOFRAN) injection 4 mg  4 mg IntraVENous Q6H PRN    cefTRIAXone (ROCEPHIN) 1 g in sterile water (preservative free) 10 mL IV syringe  1 g IntraVENous Q24H     ______________________________________________________________________  EXPECTED LENGTH OF STAY: 3d 19h  ACTUAL LENGTH OF STAY:          [address], DO

## 2022-03-11 NOTE — PROGRESS NOTES
Bedside shift change report given to David Jolley (oncoming nurse) by Marcos Gordillo (offgoing nurse). Report included the following information SBAR, Intake/Output, MAR, Recent Results and Cardiac Rhythm NSR. Pt has brief on and purwick in place.

## 2022-03-11 NOTE — PROGRESS NOTES
Problem: Falls - Risk of  Goal: *Absence of Falls  Description: Document Teresa Masters Fall Risk and appropriate interventions in the flowsheet. Outcome: Progressing Towards Goal  Note: Fall Risk Interventions:  Mobility Interventions: Assess mobility with egress test,Bed/chair exit alarm,Communicate number of staff needed for ambulation/transfer,OT consult for ADLs,Patient to call before getting OOB,PT Consult for mobility concerns,Strengthening exercises (ROM-active/passive)         Medication Interventions: Bed/chair exit alarm,Evaluate medications/consider consulting pharmacy,Patient to call before getting OOB,Teach patient to arise slowly    Elimination Interventions: Bed/chair exit alarm,Call light in reach,Patient to call for help with toileting needs,Stay With Me (per policy),Toileting schedule/hourly rounds    History of Falls Interventions: Bed/chair exit alarm,Door open when patient unattended,Evaluate medications/consider consulting pharmacy,Room close to nurse's station         Problem: Patient Education: Go to Patient Education Activity  Goal: Patient/Family Education  Outcome: Progressing Towards Goal     Problem: Falls - Risk of  Goal: *Absence of Falls  Description: Document Teresaradha Masters Fall Risk and appropriate interventions in the flowsheet.   Outcome: Progressing Towards Goal  Note: Fall Risk Interventions:  Mobility Interventions: Assess mobility with egress test,Bed/chair exit alarm,Communicate number of staff needed for ambulation/transfer,OT consult for ADLs,Patient to call before getting OOB,PT Consult for mobility concerns,Strengthening exercises (ROM-active/passive)         Medication Interventions: Bed/chair exit alarm,Evaluate medications/consider consulting pharmacy,Patient to call before getting OOB,Teach patient to arise slowly    Elimination Interventions: Bed/chair exit alarm,Call light in reach,Patient to call for help with toileting needs,Stay With Me (per policy),Toileting schedule/hourly rounds    History of Falls Interventions: Bed/chair exit alarm,Door open when patient unattended,Evaluate medications/consider consulting pharmacy,Room close to nurse's station

## 2022-03-12 PROCEDURE — 74011250637 HC RX REV CODE- 250/637: Performed by: INTERNAL MEDICINE

## 2022-03-12 PROCEDURE — 65660000000 HC RM CCU STEPDOWN

## 2022-03-12 PROCEDURE — 74011000250 HC RX REV CODE- 250: Performed by: INTERNAL MEDICINE

## 2022-03-12 RX ADMIN — CITALOPRAM HYDROBROMIDE 20 MG: 20 TABLET ORAL at 10:12

## 2022-03-12 RX ADMIN — APIXABAN 2.5 MG: 2.5 TABLET, FILM COATED ORAL at 17:35

## 2022-03-12 RX ADMIN — DIPHENOXYLATE HYDROCHLORIDE AND ATROPINE SULFATE 2 TABLET: 2.5; .025 TABLET ORAL at 21:17

## 2022-03-12 RX ADMIN — CARVEDILOL 12.5 MG: 12.5 TABLET, FILM COATED ORAL at 10:17

## 2022-03-12 RX ADMIN — Medication 1 CAPSULE: at 10:12

## 2022-03-12 RX ADMIN — APIXABAN 2.5 MG: 2.5 TABLET, FILM COATED ORAL at 10:12

## 2022-03-12 RX ADMIN — DIPHENOXYLATE HYDROCHLORIDE AND ATROPINE SULFATE 2 TABLET: 2.5; .025 TABLET ORAL at 13:05

## 2022-03-12 RX ADMIN — ASPIRIN 81 MG CHEWABLE TABLET 81 MG: 81 TABLET CHEWABLE at 10:12

## 2022-03-12 RX ADMIN — DIPHENOXYLATE HYDROCHLORIDE AND ATROPINE SULFATE 2 TABLET: 2.5; .025 TABLET ORAL at 10:11

## 2022-03-12 RX ADMIN — SODIUM CHLORIDE, PRESERVATIVE FREE 10 ML: 5 INJECTION INTRAVENOUS at 13:05

## 2022-03-12 RX ADMIN — Medication 1000 UNITS: at 10:12

## 2022-03-12 RX ADMIN — Medication 4.5 MG: at 21:17

## 2022-03-12 RX ADMIN — SODIUM CHLORIDE, PRESERVATIVE FREE 10 ML: 5 INJECTION INTRAVENOUS at 21:18

## 2022-03-12 RX ADMIN — DILTIAZEM HYDROCHLORIDE 120 MG: 120 CAPSULE, COATED, EXTENDED RELEASE ORAL at 10:23

## 2022-03-12 RX ADMIN — CARVEDILOL 12.5 MG: 12.5 TABLET, FILM COATED ORAL at 17:35

## 2022-03-12 RX ADMIN — COLESTIPOL HYDROCHLORIDE 5 G: 5 GRANULE, FOR SUSPENSION ORAL at 10:23

## 2022-03-12 RX ADMIN — DIPHENOXYLATE HYDROCHLORIDE AND ATROPINE SULFATE 2 TABLET: 2.5; .025 TABLET ORAL at 17:35

## 2022-03-12 NOTE — PROGRESS NOTES
[address] [facility]  Hospitalist Group                                                                                          Hospitalist Progress Note  Kaity Rees DO  Answering service: [phone] from in house phone        Date of Service:  3/12/2022  NAME:  Bird Hunter  :  3/13/1933  MRN:  [id number]      Admission Summary:   49-year-old woman with a past medical history significant for chronic atrial fibrillation on Eliquis for anticoagulation, hypertension, congestive heart failure, chronic kidney disease, who was in her usual state of health until the day of presentation at the emergency room when it was reported that the patient developed change in mental status at the assisted living facility where the patient resides. The change in mental status is in the form of agitation and outburst of anger. It was also reported that the patient has blood in her urine. Because of this abnormality and change in mental status, the patient was sent to the emergency room at [facility] for further evaluation. When the patient arrived at the emergency room, her urinalysis was consistent with urinary tract infection. She was subsequently referred to the hospitalist service for evaluation for admission. She was last admitted to this hospital from 2022 to 02/10/2022. The patient was admitted for evaluation and treatment of septic shock secondary to infected sacral wound and urinary tract infection. The patient denies fever, rigors, or chills. She does not know why she was sent from the nursing home to the emergency room. Interval history / Subjective: Follow up delirium. Patient seen and examined. No acute complaints. Finished treatment for UTI.       Assessment & Plan:     Acute metabolic encephalopathy secondary to UTI:  -Urine culture with Justin Sargent  -s/p ceftriaxone x 3 days    Paroxysmal atrial fibrillation:  -Continue home Eliquis, diltiazem ER, carvedilol    Hypertension:  -Continue home carvedilol, diltiazem    Hypokalemia: Replete as needed    Anemia of chronic disease: Stable    Pressure injury of sacral region, unstageable  -Wound care consulted, recommended removing wound VAC due to improvement    Hypocalcemia: Stable, repleted, monitor              Code status:  DNR- has DDNR on file  Prophylaxis: [address] discussed with: Patient, CM  Anticipated Disposition: SNF when able     Hospital Problems  Date Reviewed: 3/9/2022          Codes Class Noted POA    * (Principal) Acute delirium ICD-10-CM: R41.0  ICD-9-CM: 780.09  3/9/2022 Yes                Review of Systems:   Negative unless stated above      Vital Signs:    Last 24hrs VS reviewed since prior progress note. Most recent are:  Visit Vitals  BP (!) 137/50 (BP 1 Location: Left upper arm, BP Patient Position: Lying)   Pulse 63   Temp 97.9 °F (36.6 °C)   Resp 17   Ht 5' 4\" (1.626 m)   Wt 49.4 kg (108 lb 14.5 oz)   SpO2 96%   BMI 18.69 kg/m²         Intake/Output Summary (Last 24 hours) at 3/12/2022 1210  Last data filed at 3/12/2022 0359  Gross per 24 hour   Intake 310 ml   Output 175 ml   Net 135 ml        Physical Examination:     I had a face to face encounter with this patient and independently examined them on 3/12/2022 as outlined below:          Constitutional:  No acute distress, cooperative, pleasant, thin appearing   ENT:  Oral mucosa moist, oropharynx benign. Resp:  CTA bilaterally. No wheezing/rhonchi/rales. No accessory muscle use. CV:  Regular rhythm, normal rate, no murmurs, gallops, rubs    GI:  Soft, non distended, abdominal scar, non tender.  normoactive bowel sounds, no hepatosplenomegaly     Musculoskeletal:  No edema, warm, 2+ pulses throughout    Neurologic:  Moves all extremities            Data Review:    Review and/or order of clinical lab test  Review and/or order of tests in the radiology section of CPT  Review and/or order of tests in the medicine section of CPT      Labs:     Recent Labs     03/10/22  0634 03/09/22  1520   WBC 6.6 7.7   HGB 9.6* 10.3*   HCT 30.7* 32.5*    299     Recent Labs     03/11/22  0418 03/10/22  0634 03/09/22  1520    141 139   K 3.5 3.9 3.1*   * 115* 112*   CO2 19* 19* 20*   BUN 12 14 20   CREA 0.78 0.85 1.07*   GLU 81 76 124*   CA 7.6* 7.1* 6.6*   MG  --  1.0* 0.6*   PHOS  --  3.2  --      Recent Labs     03/10/22  0634 03/09/22  1520   * 110*   * 120*   TBILI 0.1* 0.2   TP 6.1* 6.5   ALB 2.4* 2.3*   GLOB 3.7 4.2*   LPSE 233  --      No results for input(s): INR, PTP, APTT, INREXT, INREXT in the last 72 hours. Recent Labs     03/10/22  0634   FERR 210      Lab Results   Component Value Date/Time    Folate 29.1 (H) 03/11/2022 04:18 AM      No results for input(s): PH, PCO2, PO2 in the last 72 hours. No results for input(s): CPK, CKNDX, TROIQ in the last 72 hours.     No lab exists for component: CPKMB  No results found for: CHOL, CHOLX, CHLST, CHOLV, HDL, HDLP, LDL, LDLC, DLDLP, TGLX, TRIGL, TRIGP, CHHD, CHHDX  Lab Results   Component Value Date/Time    Glucose (POC) 75 02/06/2022 11:08 PM    Glucose (POC) 78 02/06/2022 01:07 PM    Glucose (POC) 102 12/07/2021 06:45 AM    Glucose (POC) 105 12/02/2021 05:39 AM    Glucose (POC) 161 (H) 12/01/2021 11:27 PM     Lab Results   Component Value Date/Time    Color YELLOW/STRAW 03/09/2022 05:26 PM    Appearance TURBID (A) 03/09/2022 05:26 PM    Specific gravity 1.020 03/09/2022 05:26 PM    Specific gravity 1.015 02/06/2022 11:33 AM    pH (UA) 5.0 03/09/2022 05:26 PM    Protein 30 (A) 03/09/2022 05:26 PM    Glucose Negative 03/09/2022 05:26 PM    Ketone Negative 03/09/2022 05:26 PM    Bilirubin Negative 03/09/2022 05:26 PM    Urobilinogen 0.2 03/09/2022 05:26 PM    Nitrites Negative 03/09/2022 05:26 PM    Leukocyte Esterase LARGE (A) 03/09/2022 05:26 PM    Epithelial cells FEW 03/09/2022 05:26 PM    Bacteria 2+ (A) 03/09/2022 05:26 PM    WBC >100 (H) 03/09/2022 05:26 PM RBC 0-5 03/09/2022 05:26 PM         Medications Reviewed:     Current Facility-Administered Medications   Medication Dose Route Frequency    apixaban (ELIQUIS) tablet 2.5 mg  2.5 mg Oral BID    aspirin chewable tablet 81 mg  81 mg Oral DAILY    carvediloL (COREG) tablet 12.5 mg  12.5 mg Oral BID WITH MEALS    cholecalciferol (VITAMIN D3) (1000 Units /25 mcg) tablet 1,000 Units  1,000 Units Oral DAILY    citalopram (CELEXA) tablet 20 mg  20 mg Oral DAILY    colestipoL (COLESTID) packet 5 g  5 g Oral BID    dilTIAZem ER (CARDIZEM CD) capsule 120 mg  120 mg Oral DAILY    diphenoxylate-atropine (LOMOTIL) tablet 2 Tablet  2 Tablet Oral QID    L.acidophilus-paracasei-S.thermophil-bifidobacter (RISAQUAD) 8 billion cell capsule  1 Capsule Oral DAILY    melatonin tablet 4.5 mg  4.5 mg Oral QHS    midodrine (PROAMATINE) tablet 2.5 mg  2.5 mg Oral Q12H    sodium chloride (NS) flush 5-40 mL  5-40 mL IntraVENous Q8H    sodium chloride (NS) flush 5-40 mL  5-40 mL IntraVENous PRN    acetaminophen (TYLENOL) tablet 650 mg  650 mg Oral Q6H PRN    Or    acetaminophen (TYLENOL) suppository 650 mg  650 mg Rectal Q6H PRN    polyethylene glycol (MIRALAX) packet 17 g  17 g Oral DAILY PRN    ondansetron (ZOFRAN ODT) tablet 4 mg  4 mg Oral Q8H PRN    Or    ondansetron (ZOFRAN) injection 4 mg  4 mg IntraVENous Q6H PRN     ______________________________________________________________________  EXPECTED LENGTH OF STAY: 3d 19h  ACTUAL LENGTH OF STAY:          [address] [facility],

## 2022-03-12 NOTE — PROGRESS NOTES
Problem: Falls - Risk of  Goal: *Absence of Falls  Description: Document Donavon Live Fall Risk and appropriate interventions in the flowsheet. Outcome: Progressing Towards Goal  Note: Fall Risk Interventions:  Mobility Interventions: Assess mobility with egress test,Bed/chair exit alarm,Communicate number of staff needed for ambulation/transfer,Patient to call before getting OOB         Medication Interventions: Bed/chair exit alarm,Evaluate medications/consider consulting pharmacy,Patient to call before getting OOB    Elimination Interventions: Bed/chair exit alarm,Call light in reach,Patient to call for help with toileting needs    History of Falls Interventions: Bed/chair exit alarm,Door open when patient unattended,Room close to nurse's station         Problem: Patient Education: Go to Patient Education Activity  Goal: Patient/Family Education  Outcome: Progressing Towards Goal     Problem: Pressure Injury - Risk of  Goal: *Prevention of pressure injury  Description: Document Brian Scale and appropriate interventions in the flowsheet.   Outcome: Progressing Towards Goal  Note: Pressure Injury Interventions:  Sensory Interventions: Assess changes in LOC,Assess need for specialty bed,Avoid rigorous massage over bony prominences,Float heels,Keep linens dry and wrinkle-free,Maintain/enhance activity level,Minimize linen layers,Monitor skin under medical devices,Pad between skin to skin    Moisture Interventions: Absorbent underpads,Apply protective barrier, creams and emollients,Check for incontinence Q2 hours and as needed,Internal/External urinary devices    Activity Interventions: Assess need for specialty bed,Increase time out of bed,Pressure redistribution bed/mattress(bed type)    Mobility Interventions: Assess need for specialty bed    Nutrition Interventions: Document food/fluid/supplement intake    Friction and Shear Interventions: Apply protective barrier, creams and emollients,Feet elevated on foot rest,Foam dressings/transparent film/skin sealants,Minimize layers                Problem: Patient Education: Go to Patient Education Activity  Goal: Patient/Family Education  Outcome: Progressing Towards Goal     Problem: Falls - Risk of  Goal: *Absence of Falls  Description: Document Shauna Litter Fall Risk and appropriate interventions in the flowsheet. Outcome: Progressing Towards Goal  Note: Fall Risk Interventions:  Mobility Interventions: Assess mobility with egress test,Bed/chair exit alarm,Communicate number of staff needed for ambulation/transfer,Patient to call before getting OOB         Medication Interventions: Bed/chair exit alarm,Evaluate medications/consider consulting pharmacy,Patient to call before getting OOB    Elimination Interventions: Bed/chair exit alarm,Call light in reach,Patient to call for help with toileting needs    History of Falls Interventions: Bed/chair exit alarm,Door open when patient unattended,Room close to nurse's station         Problem: Patient Education: Go to Patient Education Activity  Goal: Patient/Family Education  Outcome: Progressing Towards Goal     Problem: Impaired Skin Integrity/Pressure Injury Treatment  Goal: *Improvement of Existing Pressure Injury  Outcome: Progressing Towards Goal  Goal: *Prevention of pressure injury  Description: Document Brian Scale and appropriate interventions in the flowsheet.   Outcome: Progressing Towards Goal  Note: Pressure Injury Interventions:  Sensory Interventions: Assess changes in LOC,Assess need for specialty bed,Avoid rigorous massage over bony prominences,Float heels,Keep linens dry and wrinkle-free,Maintain/enhance activity level,Minimize linen layers,Monitor skin under medical devices,Pad between skin to skin    Moisture Interventions: Absorbent underpads,Apply protective barrier, creams and emollients,Check for incontinence Q2 hours and as needed,Internal/External urinary devices    Activity Interventions: Assess need for specialty bed,Increase time out of bed,Pressure redistribution bed/mattress(bed type)    Mobility Interventions: Assess need for specialty bed    Nutrition Interventions: Document food/fluid/supplement intake    Friction and Shear Interventions: Apply protective barrier, creams and emollients,Feet elevated on foot rest,Foam dressings/transparent film/skin sealants,Minimize layers                Problem: Patient Education: Go to Patient Education Activity  Goal: Patient/Family Education  Outcome: Progressing Towards Goal     Problem: Patient Education: Go to Patient Education Activity  Goal: Patient/Family Education  Outcome: Progressing Towards Goal     Problem: General Medical Care Plan  Goal: *Vital signs within specified parameters  Outcome: Progressing Towards Goal  Goal: *Labs within defined limits  Outcome: Progressing Towards Goal  Goal: *Absence of infection signs and symptoms  Outcome: Progressing Towards Goal  Goal: *Optimal pain control at patient's stated goal  Outcome: Progressing Towards Goal  Goal: *Skin integrity maintained  Outcome: Progressing Towards Goal  Goal: *Fluid volume balance  Outcome: Progressing Towards Goal  Goal: *Optimize nutritional status  Outcome: Progressing Towards Goal  Goal: *Anxiety reduced or absent  Outcome: Progressing Towards Goal  Goal: *Progressive mobility and function (eg: ADL's)  Outcome: Progressing Towards Goal     Problem: Patient Education: Go to Patient Education Activity  Goal: Patient/Family Education  Outcome: Progressing Towards Goal

## 2022-03-12 NOTE — PROGRESS NOTES
TEREZA:  RUR: 24%    Disposition: Return to [facility] when bed is available    Chart reviewed. CM spoke with Dr. Marck Sanchez and was informed of patient's readiness for d/c today. CM received call from [facility]- [phone]). Patient's family did not opt to hold bed at the facility. Today, there is no bed availability. CM sending perfect serve message with update to Dr. Marck Sanchez.     Tomy Coral, [place], [address]

## 2022-03-12 NOTE — PROGRESS NOTES
Patient alert and oriented during entire shift. Patient awaiting placement to NH. CM consulted and handling placement. Patient had BM x 4 during shift of which immodium was administered per MD order. Bedside report given to oncoming nurse Marnie Ashley RN of which included SBAR, MAR, plan of care, and recent labs and test results. Patient stable. Will continue to monitor.

## 2022-03-12 NOTE — PROGRESS NOTES
Physician Progress Note      PATIENT:               Viviana Mederos  CSN #:                  [id number]  :                       3/13/1933  ADMIT DATE:       3/9/2022 3:07 PM  [address] [place] DATE:  RESPONDING  PROVIDER #:        NICOLAS Peterson DO          QUERY TEXT:    Patient admitted with AMS d/t UTI. If possible, please document in progress notes and discharge summary if you are evaluating and /or treating any of the following: The medical record reflects the following:  Risk Factors: 81 y/o presents with AMS, admitted for UTI, has unstageable sacral wound  with reported weight loss over 3 months. PMhx: chronic Afib, HTN, CHF, CKD, ischemic colon s/p colectomy and small bowel resection  Clinical Indicators: BMI 18, poor intake, \"trys to eat more, but stays in bathroom,\" per pt, chronic diarrhea, weight loss, per RD consult \"Severe malnutrition, Increased nutrient needs related to altered GI structure as evidenced by diarrhea, GI abnormality, weight loss Greater than 7.5% over 3 months, 7 - Moderate body fat loss, Orbital, 7 - Moderate muscle mass loss, Temples (temporalis),Clavicles (pectoralis & deltoids)  Treatment: Nutrition consulted, Ensure Enlive (high calorie, high protein) TID + snacks, Low fat/Low Chol/NAVNEET diet, Calcium, Potassium, IVF bolus, Lomotil, Rocehin,  ?    ASPEN Criteria:  https://aspenjournals. onlinelibrary. adam. com/doi/full/10.1177/1093499619024383  Options provided:  -- Protein calorie malnutrition mild  -- Protein calorie malnutrition moderate  -- Protein calorie malnutrition severe  -- Underweight with BMI 18  -- Other - I will add my own diagnosis  -- Disagree - Not applicable / Not valid  -- Disagree - Clinically unable to determine / Unknown  -- Refer to Clinical Documentation Reviewer    PROVIDER RESPONSE TEXT:    This patient has severe protein calorie malnutrition. Query created by:  Keith Washburn on 3/11/2022 2:04 PM      Electronically signed by:  Mariah Hatch 3/12/2022 6:09 PM

## 2022-03-12 NOTE — PROGRESS NOTES
Bedside shift change report given to Norma (oncoming nurse) by Gera Franco (offgoing nurse). Report included the following information SBAR, Kardex, Intake/Output, MAR and Cardiac Rhythm SR. Pt is ready for discharge once has bed available at placement center.

## 2022-03-13 PROCEDURE — 65660000000 HC RM CCU STEPDOWN

## 2022-03-13 PROCEDURE — 74011000250 HC RX REV CODE- 250: Performed by: INTERNAL MEDICINE

## 2022-03-13 PROCEDURE — 74011250637 HC RX REV CODE- 250/637: Performed by: INTERNAL MEDICINE

## 2022-03-13 RX ADMIN — Medication 1 CAPSULE: at 10:00

## 2022-03-13 RX ADMIN — SODIUM CHLORIDE, PRESERVATIVE FREE 10 ML: 5 INJECTION INTRAVENOUS at 06:04

## 2022-03-13 RX ADMIN — CITALOPRAM HYDROBROMIDE 20 MG: 20 TABLET ORAL at 10:00

## 2022-03-13 RX ADMIN — DIPHENOXYLATE HYDROCHLORIDE AND ATROPINE SULFATE 2 TABLET: 2.5; .025 TABLET ORAL at 10:01

## 2022-03-13 RX ADMIN — DILTIAZEM HYDROCHLORIDE 120 MG: 120 CAPSULE, COATED, EXTENDED RELEASE ORAL at 10:05

## 2022-03-13 RX ADMIN — SODIUM CHLORIDE, PRESERVATIVE FREE 10 ML: 5 INJECTION INTRAVENOUS at 18:48

## 2022-03-13 RX ADMIN — APIXABAN 2.5 MG: 2.5 TABLET, FILM COATED ORAL at 18:48

## 2022-03-13 RX ADMIN — MIDODRINE HYDROCHLORIDE 2.5 MG: 5 TABLET ORAL at 21:44

## 2022-03-13 RX ADMIN — APIXABAN 2.5 MG: 2.5 TABLET, FILM COATED ORAL at 10:01

## 2022-03-13 RX ADMIN — Medication 1000 UNITS: at 10:00

## 2022-03-13 RX ADMIN — CARVEDILOL 12.5 MG: 12.5 TABLET, FILM COATED ORAL at 10:00

## 2022-03-13 RX ADMIN — SODIUM CHLORIDE, PRESERVATIVE FREE 10 ML: 5 INJECTION INTRAVENOUS at 21:45

## 2022-03-13 RX ADMIN — Medication 4.5 MG: at 21:44

## 2022-03-13 RX ADMIN — ASPIRIN 81 MG CHEWABLE TABLET 81 MG: 81 TABLET CHEWABLE at 10:00

## 2022-03-13 RX ADMIN — DIPHENOXYLATE HYDROCHLORIDE AND ATROPINE SULFATE 2 TABLET: 2.5; .025 TABLET ORAL at 21:43

## 2022-03-13 RX ADMIN — CARVEDILOL 12.5 MG: 12.5 TABLET, FILM COATED ORAL at 18:49

## 2022-03-13 NOTE — PROGRESS NOTES
Problem: Falls - Risk of  Goal: *Absence of Falls  Description: Document Kayleigh Gill Fall Risk and appropriate interventions in the flowsheet. Outcome: Progressing Towards Goal  Note: Fall Risk Interventions:  Mobility Interventions: Assess mobility with egress test,Bed/chair exit alarm,Communicate number of staff needed for ambulation/transfer,Patient to call before getting OOB         Medication Interventions: Bed/chair exit alarm,Evaluate medications/consider consulting pharmacy,Patient to call before getting OOB    Elimination Interventions: Bed/chair exit alarm,Call light in reach    History of Falls Interventions: Bed/chair exit alarm,Door open when patient unattended,Room close to nurse's station         Problem: Patient Education: Go to Patient Education Activity  Goal: Patient/Family Education  Outcome: Progressing Towards Goal     Problem: Pressure Injury - Risk of  Goal: *Prevention of pressure injury  Description: Document Brian Scale and appropriate interventions in the flowsheet. Outcome: Progressing Towards Goal  Note: Pressure Injury Interventions:  Sensory Interventions: Assess changes in LOC,Assess need for specialty bed,Avoid rigorous massage over bony prominences,Keep linens dry and wrinkle-free    Moisture Interventions: Absorbent underpads,Apply protective barrier, creams and emollients,Check for incontinence Q2 hours and as needed,Internal/External fecal devices,Limit adult briefs,Minimize layers    Activity Interventions: Assess need for specialty bed,Increase time out of bed,Pressure redistribution bed/mattress(bed type)    Mobility Interventions: Assess need for specialty bed,Float heels,HOB 30 degrees or less,Turn and reposition approx.  every two hours(pillow and wedges)    Nutrition Interventions: Document food/fluid/supplement intake    Friction and Shear Interventions: Apply protective barrier, creams and emollients,HOB 30 degrees or less                Problem: Patient Education: Go to Patient Education Activity  Goal: Patient/Family Education  Outcome: Progressing Towards Goal     Problem: Falls - Risk of  Goal: *Absence of Falls  Description: Document Ashely Ground Fall Risk and appropriate interventions in the flowsheet. Outcome: Progressing Towards Goal  Note: Fall Risk Interventions:  Mobility Interventions: Assess mobility with egress test,Bed/chair exit alarm,Communicate number of staff needed for ambulation/transfer,Patient to call before getting OOB         Medication Interventions: Bed/chair exit alarm,Evaluate medications/consider consulting pharmacy,Patient to call before getting OOB    Elimination Interventions: Bed/chair exit alarm,Call light in reach    History of Falls Interventions: Bed/chair exit alarm,Door open when patient unattended,Room close to nurse's station         Problem: Patient Education: Go to Patient Education Activity  Goal: Patient/Family Education  Outcome: Progressing Towards Goal     Problem: Impaired Skin Integrity/Pressure Injury Treatment  Goal: *Improvement of Existing Pressure Injury  Outcome: Progressing Towards Goal  Goal: *Prevention of pressure injury  Description: Document Brian Scale and appropriate interventions in the flowsheet. Outcome: Progressing Towards Goal  Note: Pressure Injury Interventions:  Sensory Interventions: Assess changes in LOC,Assess need for specialty bed,Avoid rigorous massage over bony prominences,Keep linens dry and wrinkle-free    Moisture Interventions: Absorbent underpads,Apply protective barrier, creams and emollients,Check for incontinence Q2 hours and as needed,Internal/External fecal devices,Limit adult briefs,Minimize layers    Activity Interventions: Assess need for specialty bed,Increase time out of bed,Pressure redistribution bed/mattress(bed type)    Mobility Interventions: Assess need for specialty bed,Float heels,HOB 30 degrees or less,Turn and reposition approx.  every two hours(pillow and wedges)    Nutrition Interventions: Document food/fluid/supplement intake    Friction and Shear Interventions: Apply protective barrier, creams and emollients,HOB 30 degrees or less                Problem: Patient Education: Go to Patient Education Activity  Goal: Patient/Family Education  Outcome: Progressing Towards Goal     Problem: Patient Education: Go to Patient Education Activity  Goal: Patient/Family Education  Outcome: Progressing Towards Goal     Problem: General Medical Care Plan  Goal: *Vital signs within specified parameters  Outcome: Progressing Towards Goal  Goal: *Labs within defined limits  Outcome: Progressing Towards Goal  Goal: *Absence of infection signs and symptoms  Outcome: Progressing Towards Goal  Goal: *Optimal pain control at patient's stated goal  Outcome: Progressing Towards Goal  Goal: *Skin integrity maintained  Outcome: Progressing Towards Goal  Goal: *Fluid volume balance  Outcome: Progressing Towards Goal  Goal: *Optimize nutritional status  Outcome: Progressing Towards Goal  Goal: *Anxiety reduced or absent  Outcome: Progressing Towards Goal  Goal: *Progressive mobility and function (eg: ADL's)  Outcome: Progressing Towards Goal     Problem: Patient Education: Go to Patient Education Activity  Goal: Patient/Family Education  Outcome: Progressing Towards Goal

## 2022-03-13 NOTE — PROGRESS NOTES
[address] [facility] Adult  Hospitalist Group                                                                                          Hospitalist Progress Note  Boubacar Aden MD  Answering service: [phone] OR [phone] from in house phone        Date of Service:  3/13/2022  NAME:  Chayo Stover  :  3/13/1933  MRN:  [id number]      Admission Summary:   80-year-old woman with a past medical history significant for chronic atrial fibrillation on Eliquis for anticoagulation, hypertension, congestive heart failure, chronic kidney disease, who was in her usual state of health until the day of presentation at the emergency room when it was reported that the patient developed change in mental status at the assisted living facility where the patient resides. The change in mental status is in the form of agitation and outburst of anger. It was also reported that the patient has blood in her urine. Because of this abnormality and change in mental status, the patient was sent to the emergency room at [facility] for further evaluation. When the patient arrived at the emergency room, her urinalysis was consistent with urinary tract infection. She was subsequently referred to the hospitalist service for evaluation for admission. She was last admitted to this hospital from 2022 to 02/10/2022. The patient was admitted for evaluation and treatment of septic shock secondary to infected sacral wound and urinary tract infection. The patient denies fever, rigors, or chills. She does not know why she was sent from the nursing home to the emergency room. Interval history / Subjective:   Pt doing well, mentation improved. Its [age] today. Medically ready for DC, awaiting bed at [facility].        Assessment & Plan:     Acute metabolic encephalopathy secondary to UTI:  -Urine culture with Justin Sargent  -s/p ceftriaxone x 3 days    Paroxysmal atrial fibrillation:  -Continue home Eliquis, diltiazem ER, carvedilol    Hypertension:  -Continue home carvedilol, diltiazem    Hypokalemia: Replete as needed    Anemia of chronic disease: Stable, transfuse for hemoglobin less than 7     Pressure injury of sacral region, unstageable  -Wound care consulted, recommended removing wound VAC due to improvement    Hypocalcemia: Stable, repleted, monitor        Code status:  DNR- has DDNR on file  Prophylaxis: [address] discussed with: Patient, CM  Anticipated Disposition: SNF when able     Hospital Problems  Date Reviewed: 3/9/2022          Codes Class Noted POA    * (Principal) Acute delirium ICD-10-CM: R41.0  ICD-9-CM: 780.09  3/9/2022 Yes                Review of Systems:   Negative unless stated above      Vital Signs:    Last 24hrs VS reviewed since prior progress note. Most recent are:  Visit Vitals  BP (!) 128/54 (BP 1 Location: Left upper arm, BP Patient Position: At rest)   Pulse (!) 53   Temp 98.4 °F (36.9 °C)   Resp 20   Ht 5' 4\" (1.626 m)   Wt 49.5 kg (109 lb 2 oz)   SpO2 99%   BMI 18.73 kg/m²         Intake/Output Summary (Last 24 hours) at 3/13/2022 1433  Last data filed at 3/12/2022 1644  Gross per 24 hour   Intake 360 ml   Output    Net 360 ml        Physical Examination:     I had a face to face encounter with this patient and independently examined them on 3/13/2022 as outlined below:          Constitutional:  No acute distress, cooperative, pleasant, thin appearing   ENT:  Oral mucosa moist, oropharynx benign. Resp:  CTA bilaterally. No wheezing/rhonchi/rales. No accessory muscle use. CV:  Regular rhythm, normal rate, no murmurs, gallops, rubs    GI:  Soft, non distended, abdominal scar, non tender.  normoactive bowel sounds, no hepatosplenomegaly     Musculoskeletal:  No edema, warm, 2+ pulses throughout    Neurologic:  Moves all extremities            Data Review:    Review and/or order of clinical lab test  Review and/or order of tests in the radiology section of CPT  Review and/or order of tests in the medicine section of CPT      Labs:     No results for input(s): WBC, HGB, HCT, PLT, HGBEXT, HCTEXT, PLTEXT, HGBEXT, HCTEXT, PLTEXT in the last 72 hours. Recent Labs     03/11/22  0418      K 3.5   *   CO2 19*   BUN 12   CREA 0.78   GLU 81   CA 7.6*     No results for input(s): ALT, AP, TBIL, TBILI, TP, ALB, GLOB, GGT, AML, LPSE in the last 72 hours. No lab exists for component: SGOT, GPT, AMYP, HLPSE  No results for input(s): INR, PTP, APTT, INREXT, INREXT in the last 72 hours. No results for input(s): FE, TIBC, PSAT, FERR in the last 72 hours. Lab Results   Component Value Date/Time    Folate 29.1 (H) 03/11/2022 04:18 AM      No results for input(s): PH, PCO2, PO2 in the last 72 hours. No results for input(s): CPK, CKNDX, TROIQ in the last 72 hours.     No lab exists for component: CPKMB  No results found for: CHOL, CHOLX, CHLST, CHOLV, HDL, HDLP, LDL, LDLC, DLDLP, TGLX, TRIGL, TRIGP, CHHD, CHHDX  Lab Results   Component Value Date/Time    Glucose (POC) 75 02/06/2022 11:08 PM    Glucose (POC) 78 02/06/2022 01:07 PM    Glucose (POC) 102 12/07/2021 06:45 AM    Glucose (POC) 105 12/02/2021 05:39 AM    Glucose (POC) 161 (H) 12/01/2021 11:27 PM     Lab Results   Component Value Date/Time    Color YELLOW/STRAW 03/09/2022 05:26 PM    Appearance TURBID (A) 03/09/2022 05:26 PM    Specific gravity 1.020 03/09/2022 05:26 PM    Specific gravity 1.015 02/06/2022 11:33 AM    pH (UA) 5.0 03/09/2022 05:26 PM    Protein 30 (A) 03/09/2022 05:26 PM    Glucose Negative 03/09/2022 05:26 PM    Ketone Negative 03/09/2022 05:26 PM    Bilirubin Negative 03/09/2022 05:26 PM    Urobilinogen 0.2 03/09/2022 05:26 PM    Nitrites Negative 03/09/2022 05:26 PM    Leukocyte Esterase LARGE (A) 03/09/2022 05:26 PM    Epithelial cells FEW 03/09/2022 05:26 PM    Bacteria 2+ (A) 03/09/2022 05:26 PM    WBC >100 (H) 03/09/2022 05:26 PM    RBC 0-5 03/09/2022 05:26 PM         Medications Reviewed: Current Facility-Administered Medications   Medication Dose Route Frequency    apixaban (ELIQUIS) tablet 2.5 mg  2.5 mg Oral BID    aspirin chewable tablet 81 mg  81 mg Oral DAILY    carvediloL (COREG) tablet 12.5 mg  12.5 mg Oral BID WITH MEALS    cholecalciferol (VITAMIN D3) (1000 Units /25 mcg) tablet 1,000 Units  1,000 Units Oral DAILY    citalopram (CELEXA) tablet 20 mg  20 mg Oral DAILY    colestipoL (COLESTID) packet 5 g  5 g Oral BID    dilTIAZem ER (CARDIZEM CD) capsule 120 mg  120 mg Oral DAILY    diphenoxylate-atropine (LOMOTIL) tablet 2 Tablet  2 Tablet Oral QID    L.acidophilus-paracasei-S.thermophil-bifidobacter (RISAQUAD) 8 billion cell capsule  1 Capsule Oral DAILY    melatonin tablet 4.5 mg  4.5 mg Oral QHS    midodrine (PROAMATINE) tablet 2.5 mg  2.5 mg Oral Q12H    sodium chloride (NS) flush 5-40 mL  5-40 mL IntraVENous Q8H    sodium chloride (NS) flush 5-40 mL  5-40 mL IntraVENous PRN    acetaminophen (TYLENOL) tablet 650 mg  650 mg Oral Q6H PRN    Or    acetaminophen (TYLENOL) suppository 650 mg  650 mg Rectal Q6H PRN    polyethylene glycol (MIRALAX) packet 17 g  17 g Oral DAILY PRN    ondansetron (ZOFRAN ODT) tablet 4 mg  4 mg Oral Q8H PRN    Or    ondansetron (ZOFRAN) injection 4 mg  4 mg IntraVENous Q6H PRN     ______________________________________________________________________  EXPECTED LENGTH OF STAY: 3d 19h  ACTUAL LENGTH OF STAY:          4                 Devon Hamilton MD

## 2022-03-13 NOTE — PROGRESS NOTES
Bedside shift change report given to Patricia Calloway RN (oncoming nurse) by Reyna Ventura and Jazz Salmon RN (offgoing nurse). Report included the following information SBAR, Kardex, Intake/Output, MAR, Accordion, Recent Results and Cardiac Rhythm NSR. Jose Cullen

## 2022-03-13 NOTE — PROGRESS NOTES
Bedside and Verbal shift change report given to [address] (oncoming nurse) by Angela Hicks (offgoing nurse). Report included the following information SBAR, Kardex, ED Summary, Procedure Summary, Intake/Output, MAR, Recent Results and Cardiac Rhythm NSR.

## 2022-03-14 PROCEDURE — 65660000000 HC RM CCU STEPDOWN

## 2022-03-14 PROCEDURE — 74011000250 HC RX REV CODE- 250: Performed by: INTERNAL MEDICINE

## 2022-03-14 PROCEDURE — 94760 N-INVAS EAR/PLS OXIMETRY 1: CPT

## 2022-03-14 PROCEDURE — 74011250637 HC RX REV CODE- 250/637: Performed by: INTERNAL MEDICINE

## 2022-03-14 RX ADMIN — CARVEDILOL 12.5 MG: 12.5 TABLET, FILM COATED ORAL at 09:25

## 2022-03-14 RX ADMIN — ASPIRIN 81 MG CHEWABLE TABLET 81 MG: 81 TABLET CHEWABLE at 09:24

## 2022-03-14 RX ADMIN — SODIUM CHLORIDE, PRESERVATIVE FREE 10 ML: 5 INJECTION INTRAVENOUS at 22:25

## 2022-03-14 RX ADMIN — DIPHENOXYLATE HYDROCHLORIDE AND ATROPINE SULFATE 2 TABLET: 2.5; .025 TABLET ORAL at 22:21

## 2022-03-14 RX ADMIN — APIXABAN 2.5 MG: 2.5 TABLET, FILM COATED ORAL at 17:21

## 2022-03-14 RX ADMIN — MIDODRINE HYDROCHLORIDE 2.5 MG: 5 TABLET ORAL at 22:23

## 2022-03-14 RX ADMIN — CITALOPRAM HYDROBROMIDE 20 MG: 20 TABLET ORAL at 09:24

## 2022-03-14 RX ADMIN — SODIUM CHLORIDE, PRESERVATIVE FREE 10 ML: 5 INJECTION INTRAVENOUS at 06:00

## 2022-03-14 RX ADMIN — APIXABAN 2.5 MG: 2.5 TABLET, FILM COATED ORAL at 09:24

## 2022-03-14 RX ADMIN — Medication 1000 UNITS: at 09:24

## 2022-03-14 RX ADMIN — Medication 4.5 MG: at 22:21

## 2022-03-14 RX ADMIN — DIPHENOXYLATE HYDROCHLORIDE AND ATROPINE SULFATE 2 TABLET: 2.5; .025 TABLET ORAL at 12:13

## 2022-03-14 RX ADMIN — MIDODRINE HYDROCHLORIDE 2.5 MG: 5 TABLET ORAL at 09:24

## 2022-03-14 RX ADMIN — Medication 1 CAPSULE: at 09:24

## 2022-03-14 RX ADMIN — DIPHENOXYLATE HYDROCHLORIDE AND ATROPINE SULFATE 2 TABLET: 2.5; .025 TABLET ORAL at 17:21

## 2022-03-14 RX ADMIN — DILTIAZEM HYDROCHLORIDE 120 MG: 120 CAPSULE, COATED, EXTENDED RELEASE ORAL at 09:30

## 2022-03-14 RX ADMIN — SODIUM CHLORIDE, PRESERVATIVE FREE 10 ML: 5 INJECTION INTRAVENOUS at 14:00

## 2022-03-14 RX ADMIN — DIPHENOXYLATE HYDROCHLORIDE AND ATROPINE SULFATE 2 TABLET: 2.5; .025 TABLET ORAL at 09:24

## 2022-03-14 NOTE — PROGRESS NOTES
Physician Progress Note      PATIENT:               Glo Tiwari  CSN #:                  [id number]  :                       3/13/1933  ADMIT DATE:       3/9/2022 3:07 PM  [address] [place] DATE:  RESPONDING  PROVIDER #:        Elysia Silvestre MD          QUERY TEXT:    Pt admitted with UTI and has Chronic CHF documented. If possible, please document in progress notes and discharge summary further specificity regarding the type and acuity of CHF:    The medical record reflects the following:  Risk Factors: 49-year-old woman with a past medical history significant for chronic atrial fibrillation on Eliquis for anticoagulation, CHF, HTN, CKD  Clinical Indicators: AMS,  NT pro BNP 2,402, per last ECHO  Estimated LVEF is 55 - 60%, per H&P, PNs \"congestive heart failure,\" per Chest x-ray did not show any vascular congestion, although the patient's BNP level is elevated,  Treatment: Coreg, Diltiazem,  Options provided:  -- Chronic Systolic CHF/HFrEF  -- Chronic Diastolic CHF/HFpEF  -- Chronic Systolic and Diastolic CHF  -- Other - I will add my own diagnosis  -- Disagree - Not applicable / Not valid  -- Disagree - Clinically unable to determine / Unknown  -- Refer to Clinical Documentation Reviewer    PROVIDER RESPONSE TEXT:    This patient has chronic diastolic CHF/HFpEF. Query created by:  Louisa Heredia on 3/14/2022 10:51 AM      Electronically signed by:  Elysia Silvestre MD 3/14/2022 11:03 AM

## 2022-03-14 NOTE — PROGRESS NOTES
Bedside shift change report given to Travon Fuller RN (oncoming nurse) by Og Banerjee RN (offgoing nurse). Report included the following information SBAR, Kardex, Intake/Output and Recent Results.

## 2022-03-14 NOTE — PROGRESS NOTES
Bedside shift change report given to RN Gabrielle Ernandez (oncoming nurse) by Bryan Damian (offgoing nurse). Report included the following information SBAR, Kardex, Intake/Output, MAR and Recent Results.

## 2022-03-14 NOTE — PROGRESS NOTES
[address] [facility] Adult  Hospitalist Group                                                                                          Hospitalist Progress Note  Venecia Allen MD  Answering service: [phone] from in house phone        Date of Service:  3/14/2022  NAME:  Bhanu Aguilar  :  3/13/1933  MRN:  [id number]      Admission Summary:   70-year-old woman with a past medical history significant for chronic atrial fibrillation on Eliquis for anticoagulation, hypertension, congestive heart failure, chronic kidney disease, who was in her usual state of health until the day of presentation at the emergency room when it was reported that the patient developed change in mental status at the assisted living facility where the patient resides. The change in mental status is in the form of agitation and outburst of anger. It was also reported that the patient has blood in her urine. Because of this abnormality and change in mental status, the patient was sent to the emergency room at [facility] for further evaluation. When the patient arrived at the emergency room, her urinalysis was consistent with urinary tract infection. She was subsequently referred to the hospitalist service for evaluation for admission. She was last admitted to this hospital from 2022 to 02/10/2022. The patient was admitted for evaluation and treatment of septic shock secondary to infected sacral wound and urinary tract infection. The patient denies fever, rigors, or chills. She does not know why she was sent from the nursing home to the emergency room. Interval history / Subjective:   No complaints today, denies abdominal pain or CP, or SOB.   Medically ready for discharge, awaiting bed at [address] [place] [address]:     Acute metabolic encephalopathy secondary to UTI:  -Urine culture with Justin Sargent  -s/p ceftriaxone x 3 days    Paroxysmal atrial fibrillation:  -Continue home Eliquis, diltiazem ER, carvedilol    Hypertension:  -Continue home carvedilol, diltiazem    Hypokalemia: Replete as needed    Anemia of chronic disease: Stable, transfuse for hemoglobin less than 7     Pressure injury of sacral region, unstageable  -Wound care consulted, recommended removing wound VAC due to improvement    Hypocalcemia: Stable, repleted, monitor        Code status:  DNR- has DDNR on file  Prophylaxis: [address] discussed with: Patient, CM  Anticipated Disposition: SNF when able     Hospital Problems  Date Reviewed: 3/9/2022          Codes Class Noted POA    * (Principal) Acute delirium ICD-10-CM: R41.0  ICD-9-CM: 780.09  3/9/2022 Yes                Review of Systems:   Negative unless stated above      Vital Signs:    Last 24hrs VS reviewed since prior progress note. Most recent are:  Visit Vitals  /83 (BP Patient Position: At rest;Sitting)   Pulse 65   Temp 97.4 °F (36.3 °C)   Resp 18   Ht 5' 4\" (1.626 m)   Wt 49.2 kg (108 lb 7.5 oz)   SpO2 94%   BMI 18.62 kg/m²         Intake/Output Summary (Last 24 hours) at 3/14/2022 1016  Last data filed at 3/14/2022 0525  Gross per 24 hour   Intake 120 ml   Output    Net 120 ml        Physical Examination:     I had a face to face encounter with this patient and independently examined them on 3/14/2022 as outlined below:          Constitutional:  No acute distress, cooperative, pleasant, thin appearing   ENT:  Oral mucosa moist, oropharynx benign. Resp:  CTA bilaterally. No wheezing/rhonchi/rales. No accessory muscle use. CV:  Regular rhythm, normal rate, no murmurs, gallops, rubs    GI:  Soft, non distended, abdominal scar, non tender.  normoactive bowel sounds, no hepatosplenomegaly     Musculoskeletal:  No edema, warm, 2+ pulses throughout    Neurologic:  Moves all extremities            Data Review:    Review and/or order of clinical lab test  Review and/or order of tests in the radiology section of CPT  Review and/or order of tests in the medicine section of [facility]      Labs:     No results for input(s): WBC, HGB, HCT, PLT, HGBEXT, HCTEXT, PLTEXT, HGBEXT, HCTEXT, PLTEXT in the last 72 hours. No results for input(s): NA, K, CL, CO2, BUN, CREA, GLU, CA, MG, PHOS, URICA in the last 72 hours. No results for input(s): ALT, AP, TBIL, TBILI, TP, ALB, GLOB, GGT, AML, LPSE in the last 72 hours. No lab exists for component: SGOT, GPT, AMYP, HLPSE  No results for input(s): INR, PTP, APTT, INREXT, INREXT in the last 72 hours. No results for input(s): FE, TIBC, PSAT, FERR in the last 72 hours. Lab Results   Component Value Date/Time    Folate 29.1 (H) 03/11/2022 04:18 AM      No results for input(s): PH, PCO2, PO2 in the last 72 hours. No results for input(s): CPK, CKNDX, TROIQ in the last 72 hours.     No lab exists for component: CPKMB  No results found for: CHOL, CHOLX, CHLST, CHOLV, HDL, HDLP, LDL, LDLC, DLDLP, TGLX, TRIGL, TRIGP, CHHD, CHHDX  Lab Results   Component Value Date/Time    Glucose (POC) 75 02/06/2022 11:08 PM    Glucose (POC) 78 02/06/2022 01:07 PM    Glucose (POC) 102 12/07/2021 06:45 AM    Glucose (POC) 105 12/02/2021 05:39 AM    Glucose (POC) 161 (H) 12/01/2021 11:27 PM     Lab Results   Component Value Date/Time    Color YELLOW/STRAW 03/09/2022 05:26 PM    Appearance TURBID (A) 03/09/2022 05:26 PM    Specific gravity 1.020 03/09/2022 05:26 PM    Specific gravity 1.015 02/06/2022 11:33 AM    pH (UA) 5.0 03/09/2022 05:26 PM    Protein 30 (A) 03/09/2022 05:26 PM    Glucose Negative 03/09/2022 05:26 PM    Ketone Negative 03/09/2022 05:26 PM    Bilirubin Negative 03/09/2022 05:26 PM    Urobilinogen 0.2 03/09/2022 05:26 PM    Nitrites Negative 03/09/2022 05:26 PM    Leukocyte Esterase LARGE (A) 03/09/2022 05:26 PM    Epithelial cells FEW 03/09/2022 05:26 PM    Bacteria 2+ (A) 03/09/2022 05:26 PM    WBC >100 (H) 03/09/2022 05:26 PM    RBC 0-5 03/09/2022 05:26 PM         Medications Reviewed:     Current Facility-Administered Medications   Medication Dose Route Frequency    apixaban (ELIQUIS) tablet 2.5 mg  2.5 mg Oral BID    aspirin chewable tablet 81 mg  81 mg Oral DAILY    carvediloL (COREG) tablet 12.5 mg  12.5 mg Oral BID WITH MEALS    cholecalciferol (VITAMIN D3) (1000 Units /25 mcg) tablet 1,000 Units  1,000 Units Oral DAILY    citalopram (CELEXA) tablet 20 mg  20 mg Oral DAILY    colestipoL (COLESTID) packet 5 g  5 g Oral BID    dilTIAZem ER (CARDIZEM CD) capsule 120 mg  120 mg Oral DAILY    diphenoxylate-atropine (LOMOTIL) tablet 2 Tablet  2 Tablet Oral QID    L.acidophilus-paracasei-S.thermophil-bifidobacter (RISAQUAD) 8 billion cell capsule  1 Capsule Oral DAILY    melatonin tablet 4.5 mg  4.5 mg Oral QHS    midodrine (PROAMATINE) tablet 2.5 mg  2.5 mg Oral Q12H    sodium chloride (NS) flush 5-40 mL  5-40 mL IntraVENous Q8H    sodium chloride (NS) flush 5-40 mL  5-40 mL IntraVENous PRN    acetaminophen (TYLENOL) tablet 650 mg  650 mg Oral Q6H PRN    Or    acetaminophen (TYLENOL) suppository 650 mg  650 mg Rectal Q6H PRN    polyethylene glycol (MIRALAX) packet 17 g  17 g Oral DAILY PRN    ondansetron (ZOFRAN ODT) tablet 4 mg  4 mg Oral Q8H PRN    Or    ondansetron (ZOFRAN) injection 4 mg  4 mg IntraVENous Q6H PRN     ______________________________________________________________________  EXPECTED LENGTH OF STAY: 3d 19h  ACTUAL LENGTH OF STAY:          5                 Mica Lu MD No

## 2022-03-14 NOTE — PROGRESS NOTES
Problem: Falls - Risk of  Goal: *Absence of Falls  Description: Document Sarthak Rossi Fall Risk and appropriate interventions in the flowsheet. Outcome: Progressing Towards Goal  Note: Fall Risk Interventions:  Mobility Interventions: Assess mobility with egress test,Bed/chair exit alarm,PT Consult for mobility concerns,Strengthening exercises (ROM-active/passive)         Medication Interventions: Bed/chair exit alarm,Evaluate medications/consider consulting pharmacy,Patient to call before getting OOB,Teach patient to arise slowly    Elimination Interventions: Bed/chair exit alarm,Call light in reach,Patient to call for help with toileting needs,Toileting schedule/hourly rounds    History of Falls Interventions: Bed/chair exit alarm         Problem: Patient Education: Go to Patient Education Activity  Goal: Patient/Family Education  Outcome: Progressing Towards Goal     Problem: Pressure Injury - Risk of  Goal: *Prevention of pressure injury  Description: Document Brian Scale and appropriate interventions in the flowsheet.   Outcome: Progressing Towards Goal  Note: Pressure Injury Interventions:  Sensory Interventions: Assess changes in LOC,Assess need for specialty bed,Avoid rigorous massage over bony prominences,Check visual cues for pain,Discuss PT/OT consult with provider,Float heels,Keep linens dry and wrinkle-free,Maintain/enhance activity level,Minimize linen layers,Pad between skin to skin    Moisture Interventions: Absorbent underpads,Apply protective barrier, creams and emollients,Assess need for specialty bed,Check for incontinence Q2 hours and as needed,Limit adult briefs,Maintain skin hydration (lotion/cream),Minimize layers,Moisture barrier    Activity Interventions: Assess need for specialty bed,Pressure redistribution bed/mattress(bed type),PT/OT evaluation    Mobility Interventions: Assess need for specialty bed,Pressure redistribution bed/mattress (bed type),PT/OT evaluation,Turn and reposition approx. every two hours(pillow and wedges)    Nutrition Interventions: Document food/fluid/supplement intake    Friction and Shear Interventions: Feet elevated on foot rest,Foam dressings/transparent film/skin sealants,HOB 30 degrees or less,Minimize layers                Problem: Patient Education: Go to Patient Education Activity  Goal: Patient/Family Education  Outcome: Progressing Towards Goal     Problem: Falls - Risk of  Goal: *Absence of Falls  Description: Document Carson Fall Risk and appropriate interventions in the flowsheet.   Outcome: Progressing Towards Goal  Note: Fall Risk Interventions:  Mobility Interventions: Assess mobility with egress test,Bed/chair exit alarm,PT Consult for mobility concerns,Strengthening exercises (ROM-active/passive)         Medication Interventions: Bed/chair exit alarm,Evaluate medications/consider consulting pharmacy,Patient to call before getting OOB,Teach patient to arise slowly    Elimination Interventions: Bed/chair exit alarm,Call light in reach,Patient to call for help with toileting needs,Toileting schedule/hourly rounds    History of Falls Interventions: Bed/chair exit alarm         Problem: Patient Education: Go to Patient Education Activity  Goal: Patient/Family Education  Outcome: Progressing Towards Goal     Problem: Impaired Skin Integrity/Pressure Injury Treatment  Goal: *Improvement of Existing Pressure Injury  Outcome: Progressing Towards Goal     Problem: Patient Education: Go to Patient Education Activity  Goal: Patient/Family Education  Outcome: Progressing Towards Goal     Problem: Patient Education: Go to Patient Education Activity  Goal: Patient/Family Education  Outcome: Progressing Towards Goal     Problem: General Medical Care Plan  Goal: *Vital signs within specified parameters  Outcome: Progressing Towards Goal  Goal: *Labs within defined limits  Outcome: Progressing Towards Goal  Goal: *Absence of infection signs and symptoms  Outcome: Progressing Towards Goal  Goal: *Optimal pain control at patient's stated goal  Outcome: Progressing Towards Goal  Goal: *Skin integrity maintained  Outcome: Progressing Towards Goal  Goal: *Fluid volume balance  Outcome: Progressing Towards Goal  Goal: *Optimize nutritional status  Outcome: Progressing Towards Goal  Goal: *Anxiety reduced or absent  Outcome: Progressing Towards Goal  Goal: *Progressive mobility and function (eg: ADL's)  Outcome: Progressing Towards Goal     Problem: Patient Education: Go to Patient Education Activity  Goal: Patient/Family Education  Outcome: Progressing Towards Goal

## 2022-03-14 NOTE — PROGRESS NOTES
Transition of Care  1. RUR 19% Moderate  2. Disposition Return to [facility] when bed becomes available  3. DME walker, wheelchair  4. Transportation BLS  5. Follow Up PCP, Specialist      CM left message with admissions at [facility] via [address] and voice mail [phone]. Patient remains medically ready for DC but as of 3/12/22 the facility did not have a bed available. Patient's family also did not hold bed at facility at the time of hospital admission. CM to monitor.      Sana Nicole, MS

## 2022-03-15 LAB
ANION GAP SERPL CALC-SCNC: 7 MMOL/L (ref 5–15)
BUN SERPL-MCNC: 27 MG/DL (ref 6–20)
BUN/CREAT SERPL: 25 (ref 12–20)
CALCIUM SERPL-MCNC: 7.7 MG/DL (ref 8.5–10.1)
CHLORIDE SERPL-SCNC: 115 MMOL/L (ref 97–108)
CO2 SERPL-SCNC: 18 MMOL/L (ref 21–32)
CREAT SERPL-MCNC: 1.07 MG/DL (ref 0.55–1.02)
ERYTHROCYTE [DISTWIDTH] IN BLOOD BY AUTOMATED COUNT: 16 % (ref 11.5–14.5)
GLUCOSE SERPL-MCNC: 96 MG/DL (ref 65–100)
HCT VFR BLD AUTO: 32.3 % (ref 35–47)
HGB BLD-MCNC: 9.9 G/DL (ref 11.5–16)
MAGNESIUM SERPL-MCNC: 0.6 MG/DL (ref 1.6–2.4)
MAGNESIUM SERPL-MCNC: 3.4 MG/DL (ref 1.6–2.4)
MCH RBC QN AUTO: 27.9 PG (ref 26–34)
MCHC RBC AUTO-ENTMCNC: 30.7 G/DL (ref 30–36.5)
MCV RBC AUTO: 91 FL (ref 80–99)
NRBC # BLD: 0 K/UL (ref 0–0.01)
NRBC BLD-RTO: 0 PER 100 WBC
PLATELET # BLD AUTO: 303 K/UL (ref 150–400)
PMV BLD AUTO: 11.1 FL (ref 8.9–12.9)
POTASSIUM SERPL-SCNC: 3.6 MMOL/L (ref 3.5–5.1)
RBC # BLD AUTO: 3.55 M/UL (ref 3.8–5.2)
SODIUM SERPL-SCNC: 140 MMOL/L (ref 136–145)
WBC # BLD AUTO: 8.1 K/UL (ref 3.6–11)

## 2022-03-15 PROCEDURE — 74011000250 HC RX REV CODE- 250: Performed by: INTERNAL MEDICINE

## 2022-03-15 PROCEDURE — 74011250637 HC RX REV CODE- 250/637: Performed by: INTERNAL MEDICINE

## 2022-03-15 PROCEDURE — 65660000000 HC RM CCU STEPDOWN

## 2022-03-15 PROCEDURE — 74011250636 HC RX REV CODE- 250/636: Performed by: INTERNAL MEDICINE

## 2022-03-15 PROCEDURE — 36415 COLL VENOUS BLD VENIPUNCTURE: CPT

## 2022-03-15 PROCEDURE — 83735 ASSAY OF MAGNESIUM: CPT

## 2022-03-15 PROCEDURE — 74011250636 HC RX REV CODE- 250/636: Performed by: NURSE PRACTITIONER

## 2022-03-15 PROCEDURE — 85027 COMPLETE CBC AUTOMATED: CPT

## 2022-03-15 PROCEDURE — 80048 BASIC METABOLIC PNL TOTAL CA: CPT

## 2022-03-15 RX ORDER — MAGNESIUM SULFATE HEPTAHYDRATE 40 MG/ML
2 INJECTION, SOLUTION INTRAVENOUS
Status: COMPLETED | OUTPATIENT
Start: 2022-03-15 | End: 2022-03-15

## 2022-03-15 RX ORDER — MAGNESIUM SULFATE HEPTAHYDRATE 40 MG/ML
2 INJECTION, SOLUTION INTRAVENOUS ONCE
Status: COMPLETED | OUTPATIENT
Start: 2022-03-15 | End: 2022-03-15

## 2022-03-15 RX ADMIN — DIPHENOXYLATE HYDROCHLORIDE AND ATROPINE SULFATE 2 TABLET: 2.5; .025 TABLET ORAL at 17:37

## 2022-03-15 RX ADMIN — DILTIAZEM HYDROCHLORIDE 120 MG: 120 CAPSULE, COATED, EXTENDED RELEASE ORAL at 08:50

## 2022-03-15 RX ADMIN — SODIUM CHLORIDE, PRESERVATIVE FREE 10 ML: 5 INJECTION INTRAVENOUS at 07:00

## 2022-03-15 RX ADMIN — MAGNESIUM SULFATE IN WATER 2 G: 40 INJECTION, SOLUTION INTRAVENOUS at 11:52

## 2022-03-15 RX ADMIN — ASPIRIN 81 MG CHEWABLE TABLET 81 MG: 81 TABLET CHEWABLE at 08:49

## 2022-03-15 RX ADMIN — DIAPER RASH SKIN PROTECTENT: at 13:00

## 2022-03-15 RX ADMIN — APIXABAN 2.5 MG: 2.5 TABLET, FILM COATED ORAL at 08:49

## 2022-03-15 RX ADMIN — MAGNESIUM SULFATE IN WATER 2 G: 40 INJECTION, SOLUTION INTRAVENOUS at 10:48

## 2022-03-15 RX ADMIN — SODIUM CHLORIDE, PRESERVATIVE FREE 10 ML: 5 INJECTION INTRAVENOUS at 21:07

## 2022-03-15 RX ADMIN — CITALOPRAM HYDROBROMIDE 20 MG: 20 TABLET ORAL at 08:49

## 2022-03-15 RX ADMIN — DIPHENOXYLATE HYDROCHLORIDE AND ATROPINE SULFATE 2 TABLET: 2.5; .025 TABLET ORAL at 08:47

## 2022-03-15 RX ADMIN — DIAPER RASH SKIN PROTECTENT: at 21:07

## 2022-03-15 RX ADMIN — Medication 4.5 MG: at 21:06

## 2022-03-15 RX ADMIN — SODIUM CHLORIDE, PRESERVATIVE FREE 10 ML: 5 INJECTION INTRAVENOUS at 13:31

## 2022-03-15 RX ADMIN — Medication 1 CAPSULE: at 08:47

## 2022-03-15 RX ADMIN — CARVEDILOL 12.5 MG: 12.5 TABLET, FILM COATED ORAL at 17:38

## 2022-03-15 RX ADMIN — CARVEDILOL 12.5 MG: 12.5 TABLET, FILM COATED ORAL at 08:49

## 2022-03-15 RX ADMIN — APIXABAN 2.5 MG: 2.5 TABLET, FILM COATED ORAL at 17:38

## 2022-03-15 RX ADMIN — MAGNESIUM SULFATE IN WATER 2 G: 40 INJECTION, SOLUTION INTRAVENOUS at 06:58

## 2022-03-15 RX ADMIN — Medication 1000 UNITS: at 08:47

## 2022-03-15 RX ADMIN — MIDODRINE HYDROCHLORIDE 2.5 MG: 5 TABLET ORAL at 08:48

## 2022-03-15 RX ADMIN — DIPHENOXYLATE HYDROCHLORIDE AND ATROPINE SULFATE 2 TABLET: 2.5; .025 TABLET ORAL at 13:30

## 2022-03-15 RX ADMIN — ACETAMINOPHEN 650 MG: 325 TABLET ORAL at 21:06

## 2022-03-15 RX ADMIN — DIPHENOXYLATE HYDROCHLORIDE AND ATROPINE SULFATE 2 TABLET: 2.5; .025 TABLET ORAL at 21:07

## 2022-03-15 RX ADMIN — MAGNESIUM SULFATE IN WATER 2 G: 40 INJECTION, SOLUTION INTRAVENOUS at 13:00

## 2022-03-15 NOTE — PROGRESS NOTES
Problem: Falls - Risk of  Goal: *Absence of Falls  Description: Document Rogers Sonal Fall Risk and appropriate interventions in the flowsheet.   Outcome: Progressing Towards Goal  Note: Fall Risk Interventions:  Mobility Interventions: Assess mobility with egress test,Communicate number of staff needed for ambulation/transfer,Patient to call before getting OOB,PT Consult for mobility concerns,PT Consult for assist device competence         Medication Interventions: Evaluate medications/consider consulting pharmacy,Patient to call before getting OOB,Teach patient to arise slowly    Elimination Interventions: Call light in reach,Patient to call for help with toileting needs,Toileting schedule/hourly rounds    History of Falls Interventions: Consult care management for discharge planning,Door open when patient unattended,Evaluate medications/consider consulting pharmacy

## 2022-03-15 NOTE — PROGRESS NOTES
Problem: Falls - Risk of  Goal: *Absence of Falls  Description: Document Yang Reed Fall Risk and appropriate interventions in the flowsheet.   3/15/2022 1844 by Dena Farfan RN  Outcome: Progressing Towards Goal  Note: Fall Risk Interventions:  Mobility Interventions: Assess mobility with egress test,Communicate number of staff needed for ambulation/transfer,Patient to call before getting OOB,PT Consult for mobility concerns,PT Consult for assist device competence         Medication Interventions: Evaluate medications/consider consulting pharmacy,Patient to call before getting OOB,Teach patient to arise slowly    Elimination Interventions: Call light in reach,Patient to call for help with toileting needs,Toileting schedule/hourly rounds    History of Falls Interventions: Consult care management for discharge planning,Door open when patient unattended,Evaluate medications/consider consulting pharmacy      3/15/2022 1844 by Dena Farfan RN  Outcome: Progressing Towards Goal  Note: Fall Risk Interventions:  Mobility Interventions: Assess mobility with egress test,Communicate number of staff needed for ambulation/transfer,Patient to call before getting OOB,PT Consult for mobility concerns,PT Consult for assist device competence         Medication Interventions: Evaluate medications/consider consulting pharmacy,Patient to call before getting OOB,Teach patient to arise slowly    Elimination Interventions: Call light in reach,Patient to call for help with toileting needs,Toileting schedule/hourly rounds    History of Falls Interventions: Consult care management for discharge planning,Door open when patient unattended,Evaluate medications/consider consulting pharmacy

## 2022-03-15 NOTE — PROGRESS NOTES
[facility] Adult  Hospitalist Group                                                                                          Hospitalist Progress Note  Rosaline Richard MD  Answering service: [phone] from in house phone        Date of Service:  3/15/2022  NAME:  Thomas Lemus  :  3/13/1933  MRN:  [id number]      Admission Summary:   49-year-old woman with a past medical history significant for chronic atrial fibrillation on Eliquis for anticoagulation, hypertension, congestive heart failure, chronic kidney disease, who was in her usual state of health until the day of presentation at the emergency room when it was reported that the patient developed change in mental status at the assisted living facility where the patient resides. The change in mental status is in the form of agitation and outburst of anger. It was also reported that the patient has blood in her urine. Because of this abnormality and change in mental status, the patient was sent to the emergency room at [facility] for further evaluation. When the patient arrived at the emergency room, her urinalysis was consistent with urinary tract infection. She was subsequently referred to the hospitalist service for evaluation for admission. She was last admitted to this hospital from 2022 to 02/10/2022. The patient was admitted for evaluation and treatment of septic shock secondary to infected sacral wound and urinary tract infection. The patient denies fever, rigors, or chills. She does not know why she was sent from the nursing home to the emergency room. Interval history / Subjective:   Ongoing chronic diarrhea. Magnesium low. Patient without acute complaints. Awaiting SNF bed.       Assessment & Plan:     Acute metabolic encephalopathy secondary to UTI:  -Urine culture with Justin Sargent  -s/p ceftriaxone x 3 days    Paroxysmal atrial fibrillation:  -Continue home Eliquis, diltiazem ER, carvedilol    Hypertension:  -Continue home carvedilol, diltiazem    Hypokalemia: Replete as needed  Hypomagnesemia - replace as needed, 6gm today and recheck     Anemia of chronic disease: Stable, transfuse for hemoglobin less than 7     Pressure injury of sacral region, unstageable  -Wound care consulted, recommended removing wound VAC due to improvement    Hypocalcemia: Stable, repleted, monitor     Chronic diarrhea - continue home lomotil, nutrition consult today      Code status:  DNR- has DDNR on file  Prophylaxis: [address] discussed with: Patient, CM  Anticipated Disposition: SNF when able     Hospital Problems  Date Reviewed: 3/9/2022          Codes Class Noted POA    * (Principal) Acute delirium ICD-10-CM: R41.0  ICD-9-CM: 780.09  3/9/2022 Yes                Review of Systems:   Negative unless stated above      Vital Signs:    Last 24hrs VS reviewed since prior progress note. Most recent are:  Visit Vitals  BP (!) 149/46 (BP 1 Location: Left upper arm, BP Patient Position: At rest;Supine)   Pulse 61   Temp 97.4 °F (36.3 °C)   Resp 17   Ht 5' 4\" (1.626 m)   Wt 49.2 kg (108 lb 7.5 oz)   SpO2 95%   BMI 18.62 kg/m²         Intake/Output Summary (Last 24 hours) at 3/15/2022 1642  Last data filed at 3/15/2022 1121  Gross per 24 hour   Intake 730 ml   Output    Net 730 ml        Physical Examination:     I had a face to face encounter with this patient and independently examined them on 3/15/2022 as outlined below:          Constitutional:  No acute distress, cooperative, pleasant, thin appearing   ENT:  Oral mucosa moist, oropharynx benign. Resp:  CTA bilaterally. No wheezing/rhonchi/rales. No accessory muscle use. CV:  Regular rhythm, normal rate, no murmurs, gallops, rubs    GI:  Soft, non distended, abdominal scar, non tender.  normoactive bowel sounds, no hepatosplenomegaly     Musculoskeletal:  No edema, warm, 2+ pulses throughout    Neurologic:  Moves all extremities            Data Review: Review and/or order of clinical lab test  Review and/or order of tests in the radiology section of CPT  Review and/or order of tests in the medicine section of CPT      Labs:     Recent Labs     03/15/22  0408   WBC 8.1   HGB 9.9*   HCT 32.3*        Recent Labs     03/15/22  1403 03/15/22  0408   NA  --  140   K  --  3.6   CL  --  115*   CO2  --  18*   BUN  --  27*   CREA  --  1.07*   GLU  --  96   CA  --  7.7*   MG 3.4* 0.6*     No results for input(s): ALT, AP, TBIL, TBILI, TP, ALB, GLOB, GGT, AML, LPSE in the last 72 hours. No lab exists for component: SGOT, GPT, AMYP, HLPSE  No results for input(s): INR, PTP, APTT, INREXT, INREXT in the last 72 hours. No results for input(s): FE, TIBC, PSAT, FERR in the last 72 hours. Lab Results   Component Value Date/Time    Folate 29.1 (H) 03/11/2022 04:18 AM      No results for input(s): PH, PCO2, PO2 in the last 72 hours. No results for input(s): CPK, CKNDX, TROIQ in the last 72 hours.     No lab exists for component: CPKMB  No results found for: CHOL, CHOLX, CHLST, CHOLV, HDL, HDLP, LDL, LDLC, DLDLP, TGLX, TRIGL, TRIGP, CHHD, CHHDX  Lab Results   Component Value Date/Time    Glucose (POC) 75 02/06/2022 11:08 PM    Glucose (POC) 78 02/06/2022 01:07 PM    Glucose (POC) 102 12/07/2021 06:45 AM    Glucose (POC) 105 12/02/2021 05:39 AM    Glucose (POC) 161 (H) 12/01/2021 11:27 PM     Lab Results   Component Value Date/Time    Color YELLOW/STRAW 03/09/2022 05:26 PM    Appearance TURBID (A) 03/09/2022 05:26 PM    Specific gravity 1.020 03/09/2022 05:26 PM    Specific gravity 1.015 02/06/2022 11:33 AM    pH (UA) 5.0 03/09/2022 05:26 PM    Protein 30 (A) 03/09/2022 05:26 PM    Glucose Negative 03/09/2022 05:26 PM    Ketone Negative 03/09/2022 05:26 PM    Bilirubin Negative 03/09/2022 05:26 PM    Urobilinogen 0.2 03/09/2022 05:26 PM    Nitrites Negative 03/09/2022 05:26 PM    Leukocyte Esterase LARGE (A) 03/09/2022 05:26 PM    Epithelial cells FEW 03/09/2022 05:26 PM    Bacteria 2+ (A) 03/09/2022 05:26 PM    WBC >100 (H) 03/09/2022 05:26 PM    RBC 0-5 03/09/2022 05:26 PM         Medications Reviewed:     Current Facility-Administered Medications   Medication Dose Route Frequency    zinc oxide-cod liver oil (DESITIN) 40 % paste   Topical Q8H    apixaban (ELIQUIS) tablet 2.5 mg  2.5 mg Oral BID    aspirin chewable tablet 81 mg  81 mg Oral DAILY    carvediloL (COREG) tablet 12.5 mg  12.5 mg Oral BID WITH MEALS    cholecalciferol (VITAMIN D3) (1000 Units /25 mcg) tablet 1,000 Units  1,000 Units Oral DAILY    citalopram (CELEXA) tablet 20 mg  20 mg Oral DAILY    colestipoL (COLESTID) packet 5 g  5 g Oral BID    dilTIAZem ER (CARDIZEM CD) capsule 120 mg  120 mg Oral DAILY    diphenoxylate-atropine (LOMOTIL) tablet 2 Tablet  2 Tablet Oral QID    L.acidophilus-paracasei-S.thermophil-bifidobacter (RISAQUAD) 8 billion cell capsule  1 Capsule Oral DAILY    melatonin tablet 4.5 mg  4.5 mg Oral QHS    midodrine (PROAMATINE) tablet 2.5 mg  2.5 mg Oral Q12H    sodium chloride (NS) flush 5-40 mL  5-40 mL IntraVENous Q8H    sodium chloride (NS) flush 5-40 mL  5-40 mL IntraVENous PRN    acetaminophen (TYLENOL) tablet 650 mg  650 mg Oral Q6H PRN    Or    acetaminophen (TYLENOL) suppository 650 mg  650 mg Rectal Q6H PRN    polyethylene glycol (MIRALAX) packet 17 g  17 g Oral DAILY PRN    ondansetron (ZOFRAN ODT) tablet 4 mg  4 mg Oral Q8H PRN    Or    ondansetron (ZOFRAN) injection 4 mg  4 mg IntraVENous Q6H PRN     ______________________________________________________________________  EXPECTED LENGTH OF STAY: 3d 19h  ACTUAL LENGTH OF STAY:          6                 Kathleen Barry MD

## 2022-03-15 NOTE — PROGRESS NOTES
Bedside shift change report given to Susana Choudhury RN (oncoming nurse) by Rigoberto Avalos RN (offgoing nurse). Report included the following information SBAR, Kardex, Intake/Output and Cardiac Rhythm Sinus Shanelle Armas.

## 2022-03-15 NOTE — PROGRESS NOTES
Transition of Care  1. RUR 24% High  2. Disposition LTC  3. DME Walker, wheelchair  4. Transportation BLS  5. Follow Up PCP, Specialist      CM placed follow up call to daughter Madeline Hidalgo [phone]. CM discussed the need to look into other SNFs as [facility] does not have a bed available for patient. Zoya in agreement. CM to e-mail Zoya a list and submit referrals for review. Patient will nee long term care placement and this was something that the SW at [facility] was assisting with prior to patient admitting to the hospital.CM to monitor. Transition of Care Plan:     The Plan for Transition of Care is related to the following treatment goals: LTC    The Patient and/or patient representative was provided with a choice of provider and agrees  with the discharge plan. Yes [x] No []    A Freedom of choice list was provided with basic dialogue that supports the patient's individualized plan of care/goals and shares the quality data associated with the providers.        Yes [x] No []    Readmission Assessment  Number of days since last admission?: 8-30 days  Previous disposition: SNF [facility])  Who is being interviewed?: Caregiver  What was the patient's/caregiver's perception as to why they think they needed to return back to the hospital?: Other (Comment)  Did you visit your Primary Care Physician after you left the hospital, before you returned this time?: No  Why weren't you able to visit your PCP?: Did not have an appointment  Did you see a specialist, such as Cardiac, Pulmonary, Orthopedic Physician, etc. after you left the hospital?: No  Who advised the patient to return to the hospital?: Skilled Unit  Does the patient report anything that got in the way of taking their medications?: No  In our efforts to provide the best possible care to you and others like you, can you think of anything that we could have done to help you after you left the hospital the first time, so that you might not have needed to return so soon?: Other (Comment)      Leonid Allen, MS

## 2022-03-15 NOTE — PROGRESS NOTES
Bedside shift change report given to Lizbeth Vaughn RN (oncoming nurse) by Susana Choudhury RN (offgoing nurse). Report included the following information SBAR, Kardex, MAR, Recent Results and Cardiac Rhythm NSR.

## 2022-03-15 NOTE — WOUND CARE
WOCN Note:     Follow up wound care visit to reassess coccyx wound  Assessed in room 637  Isolation: no    Chart shows:  Patient admitted on 3/9/22 with acute delirium  Past Medical History:   Diagnosis Date    Atrial fibrillation ([address]     Atrial flutter (HCC)     Edema     Hypertension     Hypokalemia     Hypothyroidism     Insomnia     Major depressive disorder     Pain     UTI (urinary tract infection)     Vitamin D deficiency       Admitted from [facility]    3/15/22  WBC = 8.1  Hgb = 9.9    Assessment:   Alert and oriented x3. Pleasant mood  Reports no pain  Mobility: minimal assistance with repositioning and turning  Continence: Incontinent of urine and stool  Restraints: no   Last Brian Score: 13  Surface: P500 IRA mattress  Diet: Regular, low fat, low chol, high fiber, NAVNEET    Wt Readings from Last 1 Encounters:   03/14/22 49.2 kg (108 lb 7.5 oz)     Bilateral heel intact and without erythema   Heels offloaded with pillows        Wound History: Patient goes to the outpatient [facility] wound care clinic under the care of Dr. Otilio Ruano. Patient had a wound vac at facility. 1. 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 Patient is incontinent of watery BMs, tegaderm used to prevent fecal contamination of wound    2. Incontinent of very frequent watery diarrhea, Skin to perianal area is denuded and very painful. Skin to buttocks, perineum, bilateral groin, labia majora, posterior and medial upper thighs with Incontinence-associated dermatitis.     Attempted to apply a external fecal pouch to contain stool however the pouch was not sticking due to the perianal skin being denuded and wet    Per chart review, on 11/16/21 the patient underwent a Laparoscopy converted to open right colectomy with extended small bowel resection of 150 cm separate from right colectomy by Dr. Genia Andrea. Per conversation with Dr. Clau Sanz, patient has chronic diarrhea    Placed dietician consult for consideration of Banatrol or any recommendations to bulk stool    Wound Recommendations:      Cleanse buttocks, medial and lateral thighs, groin, labia majora with Remedy foaming cleanser, pat dry, Apply stoma powder to perianal breakdown then Desitin barrier cream  8 hours  and as needed with incontinence care    Continue current wound care to coccyx wound:  Cleanse coccyx wound with normal saline, apply skin prep to periwound, place puracol plus ag dressing on top of wound and moisten with saline until dressing forms a gel. Then fill wound with Opticel ag rope, while loose stools present- cover with gauze and secure with tegaderm, once loose stools resolve- cover with foam border dressing , every other day and prn if becomes soiled     PI Prevention:  Turn/reposition approximately every 2 hours  Offload heels with pillows at all times in bed. Sacral Foam dressing: lift to assess regularly; change as needed. Discontinue if incontinent. Z-guard cream to buttocks and sacrum TID  and as needed with incontinence care   Pad bony prominences   Keep HOB 30 degrees or less to decrease shearing and pressure unless medically contraindicated.  If HOB is to be over 30 degrees, raise knees first then [facility] to prevent sliding   Minimize layers of linen/pads under patient to optimize support surface to one flat sheet and one incontinence pad     Orders received from Dr. Clau Sanz  Discussed with RN, Adali Valdovinos    Transition of Care: Plan to follow weekly and as needed while admitted to hospital.      Andriy Cervantes MSN, RN, [facility]  Certified Wound and Ostomy Nurse  office [phone]  Can be reached through [address] [place] [address]

## 2022-03-16 LAB
ANION GAP SERPL CALC-SCNC: 6 MMOL/L (ref 5–15)
BASOPHILS # BLD: 0.1 K/UL (ref 0–0.1)
BASOPHILS NFR BLD: 1 % (ref 0–1)
BUN SERPL-MCNC: 30 MG/DL (ref 6–20)
BUN/CREAT SERPL: 32 (ref 12–20)
CALCIUM SERPL-MCNC: 8.1 MG/DL (ref 8.5–10.1)
CHLORIDE SERPL-SCNC: 112 MMOL/L (ref 97–108)
CO2 SERPL-SCNC: 19 MMOL/L (ref 21–32)
CREAT SERPL-MCNC: 0.94 MG/DL (ref 0.55–1.02)
DIFFERENTIAL METHOD BLD: ABNORMAL
EOSINOPHIL # BLD: 0.3 K/UL (ref 0–0.4)
EOSINOPHIL NFR BLD: 3 % (ref 0–7)
ERYTHROCYTE [DISTWIDTH] IN BLOOD BY AUTOMATED COUNT: 15.8 % (ref 11.5–14.5)
GLUCOSE SERPL-MCNC: 96 MG/DL (ref 65–100)
HCT VFR BLD AUTO: 31.1 % (ref 35–47)
HGB BLD-MCNC: 9.9 G/DL (ref 11.5–16)
IMM GRANULOCYTES # BLD AUTO: 0.1 K/UL (ref 0–0.04)
IMM GRANULOCYTES NFR BLD AUTO: 1 % (ref 0–0.5)
LYMPHOCYTES # BLD: 2.5 K/UL (ref 0.8–3.5)
LYMPHOCYTES NFR BLD: 25 % (ref 12–49)
MAGNESIUM SERPL-MCNC: 2.3 MG/DL (ref 1.6–2.4)
MCH RBC QN AUTO: 28.5 PG (ref 26–34)
MCHC RBC AUTO-ENTMCNC: 31.8 G/DL (ref 30–36.5)
MCV RBC AUTO: 89.6 FL (ref 80–99)
MONOCYTES # BLD: 0.7 K/UL (ref 0–1)
MONOCYTES NFR BLD: 7 % (ref 5–13)
NEUTS SEG # BLD: 6.2 K/UL (ref 1.8–8)
NEUTS SEG NFR BLD: 63 % (ref 32–75)
NRBC # BLD: 0 K/UL (ref 0–0.01)
NRBC BLD-RTO: 0 PER 100 WBC
PHOSPHATE SERPL-MCNC: 2.7 MG/DL (ref 2.6–4.7)
PLATELET # BLD AUTO: 308 K/UL (ref 150–400)
PMV BLD AUTO: 10.9 FL (ref 8.9–12.9)
POTASSIUM SERPL-SCNC: 3.8 MMOL/L (ref 3.5–5.1)
RBC # BLD AUTO: 3.47 M/UL (ref 3.8–5.2)
SODIUM SERPL-SCNC: 137 MMOL/L (ref 136–145)
WBC # BLD AUTO: 9.8 K/UL (ref 3.6–11)

## 2022-03-16 PROCEDURE — 80048 BASIC METABOLIC PNL TOTAL CA: CPT

## 2022-03-16 PROCEDURE — 74011250637 HC RX REV CODE- 250/637: Performed by: INTERNAL MEDICINE

## 2022-03-16 PROCEDURE — 74011000250 HC RX REV CODE- 250: Performed by: INTERNAL MEDICINE

## 2022-03-16 PROCEDURE — 65660000000 HC RM CCU STEPDOWN

## 2022-03-16 PROCEDURE — 85025 COMPLETE CBC W/AUTO DIFF WBC: CPT

## 2022-03-16 PROCEDURE — 36415 COLL VENOUS BLD VENIPUNCTURE: CPT

## 2022-03-16 PROCEDURE — 84100 ASSAY OF PHOSPHORUS: CPT

## 2022-03-16 PROCEDURE — 83735 ASSAY OF MAGNESIUM: CPT

## 2022-03-16 PROCEDURE — 94760 N-INVAS EAR/PLS OXIMETRY 1: CPT

## 2022-03-16 RX ADMIN — DIPHENOXYLATE HYDROCHLORIDE AND ATROPINE SULFATE 2 TABLET: 2.5; .025 TABLET ORAL at 08:47

## 2022-03-16 RX ADMIN — SODIUM CHLORIDE, PRESERVATIVE FREE 10 ML: 5 INJECTION INTRAVENOUS at 07:03

## 2022-03-16 RX ADMIN — APIXABAN 2.5 MG: 2.5 TABLET, FILM COATED ORAL at 18:09

## 2022-03-16 RX ADMIN — DIPHENOXYLATE HYDROCHLORIDE AND ATROPINE SULFATE 2 TABLET: 2.5; .025 TABLET ORAL at 18:08

## 2022-03-16 RX ADMIN — APIXABAN 2.5 MG: 2.5 TABLET, FILM COATED ORAL at 08:47

## 2022-03-16 RX ADMIN — CARVEDILOL 12.5 MG: 12.5 TABLET, FILM COATED ORAL at 08:47

## 2022-03-16 RX ADMIN — SODIUM CHLORIDE, PRESERVATIVE FREE 10 ML: 5 INJECTION INTRAVENOUS at 14:00

## 2022-03-16 RX ADMIN — MIDODRINE HYDROCHLORIDE 2.5 MG: 5 TABLET ORAL at 21:18

## 2022-03-16 RX ADMIN — DIPHENOXYLATE HYDROCHLORIDE AND ATROPINE SULFATE 2 TABLET: 2.5; .025 TABLET ORAL at 21:19

## 2022-03-16 RX ADMIN — CARVEDILOL 12.5 MG: 12.5 TABLET, FILM COATED ORAL at 18:09

## 2022-03-16 RX ADMIN — DIAPER RASH SKIN PROTECTENT: at 14:29

## 2022-03-16 RX ADMIN — DIAPER RASH SKIN PROTECTENT: at 21:18

## 2022-03-16 RX ADMIN — Medication 1000 UNITS: at 08:47

## 2022-03-16 RX ADMIN — Medication 1 CAPSULE: at 08:47

## 2022-03-16 RX ADMIN — DILTIAZEM HYDROCHLORIDE 120 MG: 120 CAPSULE, COATED, EXTENDED RELEASE ORAL at 08:46

## 2022-03-16 RX ADMIN — ASPIRIN 81 MG CHEWABLE TABLET 81 MG: 81 TABLET CHEWABLE at 08:47

## 2022-03-16 RX ADMIN — SODIUM CHLORIDE, PRESERVATIVE FREE 10 ML: 5 INJECTION INTRAVENOUS at 21:18

## 2022-03-16 RX ADMIN — DIAPER RASH SKIN PROTECTENT: at 07:02

## 2022-03-16 RX ADMIN — CITALOPRAM HYDROBROMIDE 20 MG: 20 TABLET ORAL at 08:47

## 2022-03-16 RX ADMIN — DIPHENOXYLATE HYDROCHLORIDE AND ATROPINE SULFATE 2 TABLET: 2.5; .025 TABLET ORAL at 14:29

## 2022-03-16 RX ADMIN — Medication 4.5 MG: at 21:19

## 2022-03-16 RX ADMIN — MIDODRINE HYDROCHLORIDE 2.5 MG: 5 TABLET ORAL at 08:46

## 2022-03-16 NOTE — PROGRESS NOTES
[facility] Adult  Hospitalist Group                                                                                          Hospitalist Progress Note  Kathleen Barry MD  Answering service: [phone] or [phone] from in house phone        Date of Service:  3/16/2022  NAME:  Bird Hunter  :  3/13/1933  MRN:  [id number]      Admission Summary:   66-year-old woman with a past medical history significant for chronic atrial fibrillation on Eliquis for anticoagulation, hypertension, congestive heart failure, chronic kidney disease, who was in her usual state of health until the day of presentation at the emergency room when it was reported that the patient developed change in mental status at the assisted living facility where the patient resides. The change in mental status is in the form of agitation and outburst of anger. It was also reported that the patient has blood in her urine. Because of this abnormality and change in mental status, the patient was sent to the emergency room at [facility] for further evaluation. When the patient arrived at the emergency room, her urinalysis was consistent with urinary tract infection. She was subsequently referred to the hospitalist service for evaluation for admission. She was last admitted to this hospital from 2022 to 02/10/2022. The patient was admitted for evaluation and treatment of septic shock secondary to infected sacral wound and urinary tract infection. The patient denies fever, rigors, or chills. She does not know why she was sent from the nursing home to the emergency room. Interval history / Subjective:   Doing ok today, continues to have chronic diarrhea.       Assessment & Plan:     Acute metabolic encephalopathy secondary to UTI:  -Urine culture with Justin Sargent  -s/p ceftriaxone x 3 days    Paroxysmal atrial fibrillation:  -Continue home Eliquis, diltiazem ER, carvedilol    Hypertension:  -Continue home carvedilol, diltiazem    Hypokalemia: Replete as needed  Hypomagnesemia - replace as needed, 6gm today and recheck     Anemia of chronic disease: Stable, transfuse for hemoglobin less than 7     Pressure injury of sacral region, unstageable  -Wound care consulted, recommended removing wound VAC due to improvement    Hypocalcemia: Stable, repleted, monitor     Chronic diarrhea - continue home lomotil, nutrition consult today      Code status:  DNR- has DDNR on file  Prophylaxis: [address] discussed with: Patient, CM  Anticipated Disposition: SNF when able, medically ready      Hospital Problems  Date Reviewed: 3/9/2022          Codes Class Noted POA    * (Principal) Acute delirium ICD-10-CM: R41.0  ICD-9-CM: 780.09  3/9/2022 Yes                Review of Systems:   Negative unless stated above      Vital Signs:    Last 24hrs VS reviewed since prior progress note. Most recent are:  Visit Vitals  BP (!) 133/49 (BP 1 Location: Left upper arm, BP Patient Position: At rest)   Pulse 63   Temp 97.6 °F (36.4 °C)   Resp 12   Ht 5' 4\" (1.626 m)   Wt 49.3 kg (108 lb 11 oz)   SpO2 100%   BMI 18.66 kg/m²         Intake/Output Summary (Last 24 hours) at 3/16/2022 1534  Last data filed at 3/15/2022 2000  Gross per 24 hour   Intake    Output 125 ml   Net -125 ml        Physical Examination:     I had a face to face encounter with this patient and independently examined them on 3/16/2022 as outlined below:          Constitutional:  No acute distress, cooperative, pleasant, thin appearing   ENT:  Oral mucosa moist, oropharynx benign. Resp:  CTA bilaterally. No wheezing/rhonchi/rales. No accessory muscle use. CV:  Regular rhythm, normal rate, no murmurs, gallops, rubs    GI:  Soft, non distended, abdominal scar, non tender.  normoactive bowel sounds, no hepatosplenomegaly     Musculoskeletal:  No edema, warm, 2+ pulses throughout    Neurologic:  Moves all extremities            Data Review:    Review and/or order of clinical lab test  Review and/or order of tests in the radiology section of CPT  Review and/or order of tests in the medicine section of [facility]      Labs:     Recent Labs     03/16/22  0045 03/15/22  0408   WBC 9.8 8.1   HGB 9.9* 9.9*   HCT 31.1* 32.3*    303     Recent Labs     03/16/22  0045 03/15/22  1403 03/15/22  0408     --  140   K 3.8  --  3.6   *  --  115*   CO2 19*  --  18*   BUN 30*  --  27*   CREA 0.94  --  1.07*   GLU 96  --  96   CA 8.1*  --  7.7*   MG 2.3 3.4* 0.6*   PHOS 2.7  --   --      No results for input(s): ALT, AP, TBIL, TBILI, TP, ALB, GLOB, GGT, AML, LPSE in the last 72 hours. No lab exists for component: SGOT, GPT, AMYP, HLPSE  No results for input(s): INR, PTP, APTT, INREXT, INREXT in the last 72 hours. No results for input(s): FE, TIBC, PSAT, FERR in the last 72 hours. Lab Results   Component Value Date/Time    Folate 29.1 (H) 03/11/2022 04:18 AM      No results for input(s): PH, PCO2, PO2 in the last 72 hours. No results for input(s): CPK, CKNDX, TROIQ in the last 72 hours.     No lab exists for component: CPKMB  No results found for: CHOL, CHOLX, CHLST, CHOLV, HDL, HDLP, LDL, LDLC, DLDLP, TGLX, TRIGL, TRIGP, CHHD, CHHDX  Lab Results   Component Value Date/Time    Glucose (POC) 75 02/06/2022 11:08 PM    Glucose (POC) 78 02/06/2022 01:07 PM    Glucose (POC) 102 12/07/2021 06:45 AM    Glucose (POC) 105 12/02/2021 05:39 AM    Glucose (POC) 161 (H) 12/01/2021 11:27 PM     Lab Results   Component Value Date/Time    Color YELLOW/STRAW 03/09/2022 05:26 PM    Appearance TURBID (A) 03/09/2022 05:26 PM    Specific gravity 1.020 03/09/2022 05:26 PM    Specific gravity 1.015 02/06/2022 11:33 AM    pH (UA) 5.0 03/09/2022 05:26 PM    Protein 30 (A) 03/09/2022 05:26 PM    Glucose Negative 03/09/2022 05:26 PM    Ketone Negative 03/09/2022 05:26 PM    Bilirubin Negative 03/09/2022 05:26 PM    Urobilinogen 0.2 03/09/2022 05:26 PM    Nitrites Negative 03/09/2022 05:26 PM    Leukocyte Esterase LARGE (A) 03/09/2022 05:26 PM    Epithelial cells FEW 03/09/2022 05:26 PM    Bacteria 2+ (A) 03/09/2022 05:26 PM    WBC >100 (H) 03/09/2022 05:26 PM    RBC 0-5 03/09/2022 05:26 PM         Medications Reviewed:     Current Facility-Administered Medications   Medication Dose Route Frequency    zinc oxide-cod liver oil (DESITIN) 40 % paste   Topical Q8H    apixaban (ELIQUIS) tablet 2.5 mg  2.5 mg Oral BID    aspirin chewable tablet 81 mg  81 mg Oral DAILY    carvediloL (COREG) tablet 12.5 mg  12.5 mg Oral BID WITH MEALS    cholecalciferol (VITAMIN D3) (1000 Units /25 mcg) tablet 1,000 Units  1,000 Units Oral DAILY    citalopram (CELEXA) tablet 20 mg  20 mg Oral DAILY    colestipoL (COLESTID) packet 5 g  5 g Oral BID    dilTIAZem ER (CARDIZEM CD) capsule 120 mg  120 mg Oral DAILY    diphenoxylate-atropine (LOMOTIL) tablet 2 Tablet  2 Tablet Oral QID    L.acidophilus-paracasei-S.thermophil-bifidobacter (RISAQUAD) 8 billion cell capsule  1 Capsule Oral DAILY    melatonin tablet 4.5 mg  4.5 mg Oral QHS    midodrine (PROAMATINE) tablet 2.5 mg  2.5 mg Oral Q12H    sodium chloride (NS) flush 5-40 mL  5-40 mL IntraVENous Q8H    sodium chloride (NS) flush 5-40 mL  5-40 mL IntraVENous PRN    acetaminophen (TYLENOL) tablet 650 mg  650 mg Oral Q6H PRN    Or    acetaminophen (TYLENOL) suppository 650 mg  650 mg Rectal Q6H PRN    polyethylene glycol (MIRALAX) packet 17 g  17 g Oral DAILY PRN    ondansetron (ZOFRAN ODT) tablet 4 mg  4 mg Oral Q8H PRN    Or    ondansetron (ZOFRAN) injection 4 mg  4 mg IntraVENous Q6H PRN     ______________________________________________________________________  EXPECTED LENGTH OF STAY: 3d 19h  ACTUAL LENGTH OF STAY:          7                 Sahil Rucker MD

## 2022-03-16 NOTE — PROGRESS NOTES
Bedside shift change report given to Kathryn Fabian RN (oncoming nurse) by Rocio Vyas and TAMMY Chan (offgoing nurse). Report included the following information SBAR, Kardex, Intake/Output, MAR, Accordion, Recent Results and Cardiac Rhythm NSR/SB.

## 2022-03-16 NOTE — PROGRESS NOTES
Transition of Care  1. RUR 23% High  2. Disposition SNF-LTC  3. DME Daniela Rocha, Wheelchair  4. Transportation BLS  5. Follow Up PCP, Specialist      CM met with patient to discuss transitional care plan. Patient provided SNF list. Patient admitted from [facility] but can not return as there are no beds available and bed hold agreement was not completed. CM has sent additional SNF referrals via care port and cclink. Patient states that she would like to DC to SNF rehab and transition to an apt with 24/7 personal care as [facility]. Patient however, did consent to Medicaid screen for LTC planning. Prior to [facility], patient was at [facility]. CM to monitor.      Christen Nunes MS

## 2022-03-16 NOTE — WOUND CARE
In to perform wound care. Patient having almost continuous thin watery stools, light brown in color. Due to constant stools, wound care dressing is becoming soiled frequently. Wound cleansed well with saline, placed Puracol Plus Ag placed in wound, then Opticel rope, covered with 4x4 gauze. Skin prep to periwound, piece of ostomy ring placed to distal periwound to help prevent stool from leaking under transparent drape, then wound covered with wound vac transparent drape.     Guerline Bergeron MSN, RN, [facility]  Certified Wound and Ostomy Nurse  office [phone]  Can be reached through [facility]

## 2022-03-16 NOTE — PROGRESS NOTES
Problem: Falls - Risk of  Goal: *Absence of Falls  Description: Document Get Cedeno Fall Risk and appropriate interventions in the flowsheet.   Outcome: Progressing Towards Goal  Note: Fall Risk Interventions:  Mobility Interventions: Assess mobility with egress test,Communicate number of staff needed for ambulation/transfer,Patient to call before getting OOB,PT Consult for mobility concerns,Bed/chair exit alarm         Medication Interventions: Evaluate medications/consider consulting pharmacy,Patient to call before getting OOB,Teach patient to arise slowly,Bed/chair exit alarm    Elimination Interventions: Patient to call for help with toileting needs,Toileting schedule/hourly rounds,Bed/chair exit alarm,Call light in reach    History of Falls Interventions: Bed/chair exit alarm,Consult care management for discharge planning,Evaluate medications/consider consulting pharmacy         Problem: Patient Education: Go to Patient Education Activity  Goal: Patient/Family Education  Outcome: Progressing Towards Goal

## 2022-03-17 PROCEDURE — 74011250637 HC RX REV CODE- 250/637: Performed by: INTERNAL MEDICINE

## 2022-03-17 PROCEDURE — 74011000250 HC RX REV CODE- 250: Performed by: INTERNAL MEDICINE

## 2022-03-17 PROCEDURE — 65660000000 HC RM CCU STEPDOWN

## 2022-03-17 PROCEDURE — 74011250637 HC RX REV CODE- 250/637: Performed by: HOSPITALIST

## 2022-03-17 RX ORDER — LOPERAMIDE HYDROCHLORIDE 2 MG/1
2 CAPSULE ORAL 2 TIMES DAILY
Status: DISCONTINUED | OUTPATIENT
Start: 2022-03-17 | End: 2022-03-20 | Stop reason: HOSPADM

## 2022-03-17 RX ADMIN — SODIUM CHLORIDE, PRESERVATIVE FREE 10 ML: 5 INJECTION INTRAVENOUS at 06:00

## 2022-03-17 RX ADMIN — Medication 1000 UNITS: at 08:48

## 2022-03-17 RX ADMIN — SODIUM CHLORIDE, PRESERVATIVE FREE 10 ML: 5 INJECTION INTRAVENOUS at 14:00

## 2022-03-17 RX ADMIN — DIAPER RASH SKIN PROTECTENT: at 22:00

## 2022-03-17 RX ADMIN — DIAPER RASH SKIN PROTECTENT: at 13:17

## 2022-03-17 RX ADMIN — DIPHENOXYLATE HYDROCHLORIDE AND ATROPINE SULFATE 2 TABLET: 2.5; .025 TABLET ORAL at 18:11

## 2022-03-17 RX ADMIN — CARVEDILOL 12.5 MG: 12.5 TABLET, FILM COATED ORAL at 18:11

## 2022-03-17 RX ADMIN — DIPHENOXYLATE HYDROCHLORIDE AND ATROPINE SULFATE 2 TABLET: 2.5; .025 TABLET ORAL at 08:48

## 2022-03-17 RX ADMIN — LOPERAMIDE HYDROCHLORIDE 2 MG: 2 CAPSULE ORAL at 18:11

## 2022-03-17 RX ADMIN — DIPHENOXYLATE HYDROCHLORIDE AND ATROPINE SULFATE 2 TABLET: 2.5; .025 TABLET ORAL at 13:17

## 2022-03-17 RX ADMIN — DIAPER RASH SKIN PROTECTENT: at 07:11

## 2022-03-17 RX ADMIN — CITALOPRAM HYDROBROMIDE 20 MG: 20 TABLET ORAL at 08:48

## 2022-03-17 RX ADMIN — CARVEDILOL 12.5 MG: 12.5 TABLET, FILM COATED ORAL at 08:48

## 2022-03-17 RX ADMIN — SODIUM CHLORIDE, PRESERVATIVE FREE 10 ML: 5 INJECTION INTRAVENOUS at 22:00

## 2022-03-17 RX ADMIN — DILTIAZEM HYDROCHLORIDE 120 MG: 120 CAPSULE, COATED, EXTENDED RELEASE ORAL at 08:50

## 2022-03-17 RX ADMIN — ASPIRIN 81 MG CHEWABLE TABLET 81 MG: 81 TABLET CHEWABLE at 08:48

## 2022-03-17 RX ADMIN — Medication 4.5 MG: at 22:43

## 2022-03-17 RX ADMIN — DIPHENOXYLATE HYDROCHLORIDE AND ATROPINE SULFATE 2 TABLET: 2.5; .025 TABLET ORAL at 22:43

## 2022-03-17 RX ADMIN — MIDODRINE HYDROCHLORIDE 2.5 MG: 5 TABLET ORAL at 08:48

## 2022-03-17 RX ADMIN — APIXABAN 2.5 MG: 2.5 TABLET, FILM COATED ORAL at 18:11

## 2022-03-17 RX ADMIN — Medication 1 CAPSULE: at 08:48

## 2022-03-17 RX ADMIN — APIXABAN 2.5 MG: 2.5 TABLET, FILM COATED ORAL at 08:48

## 2022-03-17 RX ADMIN — LOPERAMIDE HYDROCHLORIDE 2 MG: 2 CAPSULE ORAL at 11:29

## 2022-03-17 NOTE — PROGRESS NOTES
Comprehensive Nutrition Assessment    Type and Reason for Visit: Reassess    Nutrition Recommendations/Plan:      1. Continue with Low Fat/Low Chol/NAVNEET diet order. 2. Continue with Ensure Enlive BID, Banatrol TID, and snacks. 3. Recommend ordering daily MVI. 4. Consider testing pt for Celiac Disease. Nutrition Assessment:    79 yo female admitted for acute delirium. PMhx: chronic Afib, HTN, CHF, CKD, ischemic colon s/p colectomy and small bowel resection 11/2021. Pt now has chronic diarrhea. Takes lomotil at home, ordered here as well. States she needs to [employer]" to try and gain weight but every time she eats she has to go to the bathroom. Reports crackers before meals helps with her diarrhea, brought her several packets of saltine crackers. Reiterated the need for increased calorie intake, pt has been in nursing facilities so she has limited control over her food. States the place she is at now will let her have more and provides Ensure shakes which she enjoys, will order. Reports good appetite. Intakes % of meals today, states she did not like all the lunch food and was starving at time of my visit, eating peanut butter crackers. Gluten allergy noted in chart. Pt states she does not know where that came from but she denies having any food allergies and does not avoid gluten. Had gluten allergy removed from her chart. Weight hx in EMR indicates weight loss of 29% over last 3 months, which is significant for time frame, pt confirms this weight loss. 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 Labs: LFT's elevated     3/15:  Consult received for very frequent watery diarrhea. WOCN notes unable to place an external fecal pouch to contain stool due to perianal skin being wet. Will order Banatrol TID.     3/16: F/u. Pt eating well. Snacking on peanut butter crackers during my visit. Follow up on ONS intakes.   She is drinking Ensure (needs someone to open it for her and prefers it over ice) and taking in [place] (again needs someone to mix it for her). I opened an Ensure and poured it over ice and mixed a Banatrol in pudding cup for her. She ate the Banatrol/pudding during my visit. Obtained preference for her snack orders, only wants peanut butter crackers. RN expressed concern for pt having celiac disease, states she will discuss testing for it with MD.  Pt only wanting gluten containing foods such as crackers. Diarrhea has not improved at all. Labs reviewed.       Malnutrition Assessment:  Malnutrition Status:  Severe malnutrition    Context:  Acute illness     Findings of the 6 clinical characteristics of malnutrition:   Energy Intake:  No significant decrease in energy intake  Weight Loss:  7.00 - Greater than 7.5% over 3 months     Body Fat Loss:  7 - Moderate body fat loss, Orbital   Muscle Mass Loss:  7 - Moderate muscle mass loss, Temples (temporalis),Clavicles (pectoralis & deltoids)  Fluid Accumulation:  No significant fluid accumulation,     Strength:  Not performed       Nutritionally Significant Medications: Vit D3, Lomotil, Probiotic    Estimated Daily Nutrient Needs:  Energy (kcal): 7941-2791 (35-40 gm/kg); Weight Used for Energy Requirements: Current  Protein (g): 78-83 (1.5-1.6 gm/kg);  Weight Used for Protein Requirements: Current  Fluid (ml/day): 5281-2953; Method Used for Fluid Requirements: 1 ml/kcal    Nutrition Related Findings:       BM: diarrhea 3/16  Edema: none  Wounds:  Stage III,Unstageable (sacrum)       Current Nutrition Therapies:   Diet: Low Fat/Low Chol/NAVNEET  Supplements: Ensure Enlive BID, Banatrol TID  Additional Caloric Sources: none    Meal intake: Patient Vitals for the past 168 hrs:   % Diet Eaten   03/15/22 0956 76 - 100%   03/14/22 1722 51 - 75%   03/14/22 1317 26 - 50%   03/14/22 0924 26 - 50%   03/12/22 1644 76 - 100%   03/11/22 1230 26 - 50%   03/11/22 1013 51 - 75%     Supplement Intake:   Patient Vitals for the past 168 hrs:   Supplement intake %   03/14/22 0924 1 - 25%   03/11/22 1013 1 - 25%       Anthropometric Measures:  · Height:  5' 4\" (162.6 cm)  · Current Body Wt:  49.2 kg (108 lb 7.5 oz)   · Admission Body Wt:       · Usual Body Wt:        · Ideal Body Wt:  120:  95.3 %   · Adjusted Body Weight:   ; Weight Adjustment for:     · Adjusted BMI:       · BMI Categories:  Underweight (BMI less than 22) age over 72     Wt Readings from Last 10 Encounters:   03/17/22 49.3 kg (108 lb 9.6 oz)   02/06/22 52.5 kg (115 lb 11.9 oz)   12/07/21 73.5 kg (162 lb 0.6 oz)       Nutrition Diagnosis:   · Increased nutrient needs related to altered GI structure as evidenced by diarrhea,GI abnormality,weight loss    · Altered GI function related to altered GI function,altered GI structure as evidenced by diarrhea,GI abnormality      Nutrition Interventions:   Food and/or Nutrient Delivery: Continue current diet,Continue oral nutrition supplement  Nutrition Education and Counseling: No recommendations at this time  Coordination of Nutrition Care: Continue to monitor while inpatient    Goals:  PO intakes > 75% meals + snacks + 2-3 ONS each day within next 5- 7 days       Nutrition Monitoring and Evaluation:   Behavioral-Environmental Outcomes: None identified  Food/Nutrient Intake Outcomes: Food and nutrient intake,Supplement intake  Physical Signs/Symptoms Outcomes: Biochemical data,GI status,Weight    Discharge Planning:    Continue current diet,Continue oral nutrition supplement     Mary Kay Alexandra RD  Contact via [employer]

## 2022-03-17 NOTE — PROGRESS NOTES
Problem: Falls - Risk of  Goal: *Absence of Falls  Description: Document Jamila Embs Fall Risk and appropriate interventions in the flowsheet.   Outcome: Progressing Towards Goal  Note: Fall Risk Interventions:  Mobility Interventions: Assess mobility with egress test,Bed/chair exit alarm,Communicate number of staff needed for ambulation/transfer,Patient to call before getting OOB,PT Consult for mobility concerns         Medication Interventions: Assess postural VS orthostatic hypotension,Bed/chair exit alarm,Evaluate medications/consider consulting pharmacy,Patient to call before getting OOB,Teach patient to arise slowly    Elimination Interventions: Bed/chair exit alarm,Call light in reach,Patient to call for help with toileting needs,Toileting schedule/hourly rounds    History of Falls Interventions: Bed/chair exit alarm,Consult care management for discharge planning,Evaluate medications/consider consulting pharmacy         Problem: Patient Education: Go to Patient Education Activity  Goal: Patient/Family Education  Outcome: Progressing Towards Goal

## 2022-03-17 NOTE — PROGRESS NOTES
Transition of Care  1. RUR 23% High  2. Disposition SNF-LTC  3. DME Ofe Hope, Wheelchair  4. Transportation BLS  5. Follow Up PCP, Specialist    The following SNFs have accepted patient for admisson:   [facility], [facility], [facility], [facility], and [facility]. [facility] declined. Yolanda Viry, [facility], have no beds available. The following referrals are still pending: Brock Vincent, [address] [facility], [address] [facility], [facility], [facility], and, [place] [facility] (first choice). Referral to [facility] for Medicaid screen has been submitted. CM to follow up with patient and daughter. Transition of Care Plan:     The Plan for Transition of Care is related to the following treatment goals: SNF    The Patient and/or patient representative was provided with a choice of provider and agrees  with the discharge plan. Yes [x] No []    A Freedom of choice list was provided with basic dialogue that supports the patient's individualized plan of care/goals and shares the quality data associated with the providers. Yes [x] No []    CM offered screening for Medicaid Long-Term Services & Supports. Patient Consented to screening. Daniela Mills MS    1:08 CM met with patient and daughter Archie Long to review choices. Patient unavailable at this time but daughter informed of facilities that have accepted and those that are still pending. Archie Long will review with patient and CM will follow up.      Daniela Mills MS

## 2022-03-17 NOTE — PROGRESS NOTES
Bedside shift change report given to Martina Mi RN (oncoming nurse) by Brendon Lala RN (offgoing nurse). Report included the following information SBAR, Kardex, MAR, Recent Results and Cardiac Rhythm NSR.

## 2022-03-17 NOTE — PROGRESS NOTES
[facility]  Hospitalist Group                                                                                          Hospitalist Progress Note  Shilpi Moreau MD  Answering service: [phone] OR 36 from in house phone        Date of Service:  3/17/2022  NAME:  Sridhar Garnica  :  3/13/1933  MRN:  [id number]      Admission Summary:   80-year-old woman with a past medical history significant for chronic atrial fibrillation on Eliquis for anticoagulation, hypertension, congestive heart failure, chronic kidney disease, who was in her usual state of health until the day of presentation at the emergency room when it was reported that the patient developed change in mental status at the assisted living facility where the patient resides. The change in mental status is in the form of agitation and outburst of anger. It was also reported that the patient has blood in her urine. Because of this abnormality and change in mental status, the patient was sent to the emergency room at [facility] for further evaluation. When the patient arrived at the emergency room, her urinalysis was consistent with urinary tract infection. She was subsequently referred to the hospitalist service for evaluation for admission. She was last admitted to this hospital from 2022 to 02/10/2022. The patient was admitted for evaluation and treatment of septic shock secondary to infected sacral wound and urinary tract infection. The patient denies fever, rigors, or chills. She does not know why she was sent from the nursing home to the emergency room. Interval history / Subjective:   Doing ok today, continues to have chronic diarrhea.    Asking for diarrhea to stop        Assessment & Plan:     Acute metabolic encephalopathy secondary to UTI:  -Urine culture with Justin Sargent  -s/p ceftriaxone x 3 days    Paroxysmal atrial fibrillation:  -Continue home Eliquis, diltiazem ER, carvedilol    Hypertension:  -Continue home carvedilol, diltiazem    Hypokalemia: Replete as needed  Hypomagnesemia - replace as needed, 6gm today and recheck     Anemia of chronic disease: Stable, transfuse for hemoglobin less than 7     Pressure injury of sacral region, unstageable  -Wound care consulted, recommended removing wound VAC due to improvement    Hypocalcemia: Stable, repleted, monitor     Chronic diarrhea - continue home lomotil, nutrition consult today      Code status:  DNR- has DDNR on file  Prophylaxis: [address] discussed with: Patient, CM  Anticipated Disposition: SNF when able, medically ready      Hospital Problems  Date Reviewed: 3/9/2022          Codes Class Noted POA    * (Principal) Acute delirium ICD-10-CM: R41.0  ICD-9-CM: 780.09  3/9/2022 Yes                Review of Systems:   Negative unless stated above      Vital Signs:    Last 24hrs VS reviewed since prior progress note. Most recent are:  Visit Vitals  BP (!) 134/52 (BP 1 Location: Right upper arm, BP Patient Position: At rest)   Pulse 65   Temp 97.8 °F (36.6 °C)   Resp 14   Ht 5' 4\" (1.626 m)   Wt 49.3 kg (108 lb 9.6 oz)   SpO2 99%   BMI 18.64 kg/m²       No intake or output data in the 24 hours ending 03/17/22 1509     Physical Examination:     I had a face to face encounter with this patient and independently examined them on 3/17/2022 as outlined below:          Constitutional:  No acute distress, cooperative, pleasant, thin appearing   ENT:  Oral mucosa moist, oropharynx benign. Resp:  CTA bilaterally. CV:  Regular rhythm, normal rate,    GI:  Soft, non distended, abdominal scar, non tender.  normoactive bowel sounds    Musculoskeletal:  No edema, warm, 2+ pulses throughout    Neurologic:  Moves all extremities            Data Review:    Review and/or order of clinical lab test  Review and/or order of tests in the radiology section of CPT  Review and/or order of tests in the medicine section of CPT      Labs: Recent Labs     03/16/22  0045 03/15/22  0408   WBC 9.8 8.1   HGB 9.9* 9.9*   HCT 31.1* 32.3*    303     Recent Labs     03/16/22  0045 03/15/22  1403 03/15/22  0408     --  140   K 3.8  --  3.6   *  --  115*   CO2 19*  --  18*   BUN 30*  --  27*   CREA 0.94  --  1.07*   GLU 96  --  96   CA 8.1*  --  7.7*   MG 2.3 3.4* 0.6*   PHOS 2.7  --   --      No results for input(s): ALT, AP, TBIL, TBILI, TP, ALB, GLOB, GGT, AML, LPSE in the last 72 hours. No lab exists for component: SGOT, GPT, AMYP, HLPSE  No results for input(s): INR, PTP, APTT, INREXT, INREXT in the last 72 hours. No results for input(s): FE, TIBC, PSAT, FERR in the last 72 hours. Lab Results   Component Value Date/Time    Folate 29.1 (H) 03/11/2022 04:18 AM      No results for input(s): PH, PCO2, PO2 in the last 72 hours. No results for input(s): CPK, CKNDX, TROIQ in the last 72 hours.     No lab exists for component: CPKMB  No results found for: CHOL, CHOLX, CHLST, CHOLV, HDL, HDLP, LDL, LDLC, DLDLP, TGLX, TRIGL, TRIGP, CHHD, CHHDX  Lab Results   Component Value Date/Time    Glucose (POC) 75 02/06/2022 11:08 PM    Glucose (POC) 78 02/06/2022 01:07 PM    Glucose (POC) 102 12/07/2021 06:45 AM    Glucose (POC) 105 12/02/2021 05:39 AM    Glucose (POC) 161 (H) 12/01/2021 11:27 PM     Lab Results   Component Value Date/Time    Color YELLOW/STRAW 03/09/2022 05:26 PM    Appearance TURBID (A) 03/09/2022 05:26 PM    Specific gravity 1.020 03/09/2022 05:26 PM    Specific gravity 1.015 02/06/2022 11:33 AM    pH (UA) 5.0 03/09/2022 05:26 PM    Protein 30 (A) 03/09/2022 05:26 PM    Glucose Negative 03/09/2022 05:26 PM    Ketone Negative 03/09/2022 05:26 PM    Bilirubin Negative 03/09/2022 05:26 PM    Urobilinogen 0.2 03/09/2022 05:26 PM    Nitrites Negative 03/09/2022 05:26 PM    Leukocyte Esterase LARGE (A) 03/09/2022 05:26 PM    Epithelial cells FEW 03/09/2022 05:26 PM    Bacteria 2+ (A) 03/09/2022 05:26 PM    WBC >100 (H) 03/09/2022 05:26 PM    RBC 0-5 03/09/2022 05:26 PM         Medications Reviewed:     Current Facility-Administered Medications   Medication Dose Route Frequency    loperamide (IMODIUM) capsule 2 mg  2 mg Oral BID    zinc oxide-cod liver oil (DESITIN) 40 % paste   Topical Q8H    apixaban (ELIQUIS) tablet 2.5 mg  2.5 mg Oral BID    aspirin chewable tablet 81 mg  81 mg Oral DAILY    carvediloL (COREG) tablet 12.5 mg  12.5 mg Oral BID WITH MEALS    cholecalciferol (VITAMIN D3) (1000 Units /25 mcg) tablet 1,000 Units  1,000 Units Oral DAILY    citalopram (CELEXA) tablet 20 mg  20 mg Oral DAILY    colestipoL (COLESTID) packet 5 g  5 g Oral BID    dilTIAZem ER (CARDIZEM CD) capsule 120 mg  120 mg Oral DAILY    diphenoxylate-atropine (LOMOTIL) tablet 2 Tablet  2 Tablet Oral QID    L.acidophilus-paracasei-S.thermophil-bifidobacter (RISAQUAD) 8 billion cell capsule  1 Capsule Oral DAILY    melatonin tablet 4.5 mg  4.5 mg Oral QHS    midodrine (PROAMATINE) tablet 2.5 mg  2.5 mg Oral Q12H    sodium chloride (NS) flush 5-40 mL  5-40 mL IntraVENous Q8H    sodium chloride (NS) flush 5-40 mL  5-40 mL IntraVENous PRN    acetaminophen (TYLENOL) tablet 650 mg  650 mg Oral Q6H PRN    Or    acetaminophen (TYLENOL) suppository 650 mg  650 mg Rectal Q6H PRN    polyethylene glycol (MIRALAX) packet 17 g  17 g Oral DAILY PRN    ondansetron (ZOFRAN ODT) tablet 4 mg  4 mg Oral Q8H PRN    Or    ondansetron (ZOFRAN) injection 4 mg  4 mg IntraVENous Q6H PRN     ______________________________________________________________________  EXPECTED LENGTH OF STAY: 3d 19h  ACTUAL LENGTH OF STAY:          8                 Darius Vazquez MD

## 2022-03-17 NOTE — PROGRESS NOTES
Bedside shift change report given to Theresa Brown RN (oncoming nurse) by Greg Swenson and Hang Keen RN (offgoing nurse). Report included the following information SBAR, Kardex, Intake/Output, MAR, Accordion, Recent Results and Cardiac Rhythm NSR. Regis Camejo

## 2022-03-18 PROBLEM — R41.0 ACUTE DELIRIUM: Status: ACTIVE | Noted: 2022-03-09

## 2022-03-18 PROBLEM — L89.153 STAGE III PRESSURE ULCER OF SACRAL REGION (HCC): Status: ACTIVE | Noted: 2022-01-06

## 2022-03-18 PROBLEM — E86.0 DEHYDRATION: Status: ACTIVE | Noted: 2022-01-19

## 2022-03-18 PROBLEM — N39.0 UTI (URINARY TRACT INFECTION): Status: ACTIVE | Noted: 2022-02-06

## 2022-03-18 PROCEDURE — 74011000250 HC RX REV CODE- 250: Performed by: INTERNAL MEDICINE

## 2022-03-18 PROCEDURE — 74011250637 HC RX REV CODE- 250/637: Performed by: INTERNAL MEDICINE

## 2022-03-18 PROCEDURE — 74011250637 HC RX REV CODE- 250/637: Performed by: HOSPITALIST

## 2022-03-18 PROCEDURE — 65660000000 HC RM CCU STEPDOWN

## 2022-03-18 RX ADMIN — Medication 4.5 MG: at 21:00

## 2022-03-18 RX ADMIN — ASPIRIN 81 MG CHEWABLE TABLET 81 MG: 81 TABLET CHEWABLE at 09:16

## 2022-03-18 RX ADMIN — Medication 1 CAPSULE: at 09:16

## 2022-03-18 RX ADMIN — APIXABAN 2.5 MG: 2.5 TABLET, FILM COATED ORAL at 17:34

## 2022-03-18 RX ADMIN — DIAPER RASH SKIN PROTECTENT: at 21:02

## 2022-03-18 RX ADMIN — DIPHENOXYLATE HYDROCHLORIDE AND ATROPINE SULFATE 2 TABLET: 2.5; .025 TABLET ORAL at 14:25

## 2022-03-18 RX ADMIN — LOPERAMIDE HYDROCHLORIDE 2 MG: 2 CAPSULE ORAL at 09:16

## 2022-03-18 RX ADMIN — Medication 1000 UNITS: at 09:16

## 2022-03-18 RX ADMIN — APIXABAN 2.5 MG: 2.5 TABLET, FILM COATED ORAL at 09:17

## 2022-03-18 RX ADMIN — MIDODRINE HYDROCHLORIDE 2.5 MG: 5 TABLET ORAL at 09:16

## 2022-03-18 RX ADMIN — SODIUM CHLORIDE, PRESERVATIVE FREE 10 ML: 5 INJECTION INTRAVENOUS at 21:01

## 2022-03-18 RX ADMIN — LOPERAMIDE HYDROCHLORIDE 2 MG: 2 CAPSULE ORAL at 17:34

## 2022-03-18 RX ADMIN — CARVEDILOL 12.5 MG: 12.5 TABLET, FILM COATED ORAL at 17:34

## 2022-03-18 RX ADMIN — DIPHENOXYLATE HYDROCHLORIDE AND ATROPINE SULFATE 2 TABLET: 2.5; .025 TABLET ORAL at 09:16

## 2022-03-18 RX ADMIN — DIAPER RASH SKIN PROTECTENT: at 06:00

## 2022-03-18 RX ADMIN — CARVEDILOL 12.5 MG: 12.5 TABLET, FILM COATED ORAL at 09:16

## 2022-03-18 RX ADMIN — DIAPER RASH SKIN PROTECTENT: at 14:00

## 2022-03-18 RX ADMIN — SODIUM CHLORIDE, PRESERVATIVE FREE 10 ML: 5 INJECTION INTRAVENOUS at 14:26

## 2022-03-18 RX ADMIN — DILTIAZEM HYDROCHLORIDE 120 MG: 120 CAPSULE, COATED, EXTENDED RELEASE ORAL at 09:16

## 2022-03-18 RX ADMIN — SODIUM CHLORIDE, PRESERVATIVE FREE 10 ML: 5 INJECTION INTRAVENOUS at 06:00

## 2022-03-18 RX ADMIN — DIPHENOXYLATE HYDROCHLORIDE AND ATROPINE SULFATE 2 TABLET: 2.5; .025 TABLET ORAL at 17:34

## 2022-03-18 RX ADMIN — CITALOPRAM HYDROBROMIDE 20 MG: 20 TABLET ORAL at 09:16

## 2022-03-18 RX ADMIN — DIPHENOXYLATE HYDROCHLORIDE AND ATROPINE SULFATE 2 TABLET: 2.5; .025 TABLET ORAL at 21:00

## 2022-03-18 RX ADMIN — MIDODRINE HYDROCHLORIDE 2.5 MG: 5 TABLET ORAL at 21:00

## 2022-03-18 NOTE — PROGRESS NOTES
Bedside shift change report given to Savi Henriquez RN by Conrado Laura RN. Report included the following information SBAR, Kardex, Intake/Output, MAR, Accordion, Recent Results and Cardiac Rhythm NSR/Sinus Ignacio Hernandez .

## 2022-03-18 NOTE — PROGRESS NOTES
Transition of Care  1. RUR 23% High  2. Disposition SNF rehab-referrals pending  3. DME Clide Seashore, Wheelchair  4. Transportation BLS  5. Follow Up PCP, Specialist      CM attempted phone call to patient's daughter Roshan Chau, left voice message. CM attempted to meet with patient but she is asleep. CM to monitor.      Sana Nicole, MS

## 2022-03-18 NOTE — PROGRESS NOTES
[address] [facility] Adult  Hospitalist Group                                                                                          Hospitalist Progress Note  Jones Mahoney MD  Answering service: [phone] from in house phone        Date of Service:  3/18/2022  NAME:  Edison Linares  :  3/13/1933  MRN:  [id number]      Admission Summary:   19-year-old woman with a past medical history significant for chronic atrial fibrillation on Eliquis for anticoagulation, hypertension, congestive heart failure, chronic kidney disease, who was in her usual state of health until the day of presentation at the emergency room when it was reported that the patient developed change in mental status at the assisted living facility where the patient resides. The change in mental status is in the form of agitation and outburst of anger. It was also reported that the patient has blood in her urine. Because of this abnormality and change in mental status, the patient was sent to the emergency room at [facility] for further evaluation. When the patient arrived at the emergency room, her urinalysis was consistent with urinary tract infection. She was subsequently referred to the hospitalist service for evaluation for admission. She was last admitted to this hospital from 2022 to 02/10/2022. The patient was admitted for evaluation and treatment of septic shock secondary to infected sacral wound and urinary tract infection. The patient denies fever, rigors, or chills. She does not know why she was sent from the nursing home to the emergency room. Interval history / Subjective:   Doing ok today, continues to have chronic diarrhea.    Sleeping peacefully       Assessment & Plan:     Acute metabolic encephalopathy secondary to UTI:  -Urine culture with Justin Sargent  -s/p ceftriaxone x 3 days    Paroxysmal atrial fibrillation:  -Continue home Eliquis, diltiazem ER, carvedilol    Hypertension:  -Continue home carvedilol, diltiazem    Hypokalemia: Replete as needed  Hypomagnesemia - replace as needed, 6gm today and recheck     Anemia of chronic disease: Stable, transfuse for hemoglobin less than 7     Pressure injury of sacral region, unstageable  -Wound care consulted, recommended removing wound VAC due to improvement    Hypocalcemia: Stable, repleted, monitor     Chronic diarrhea - continue home lomotil, nutrition consult today      Code status:  DNR- has DDNR on file  Prophylaxis: [address] discussed with: Patient, CM  Anticipated Disposition: SNF when able, medically ready      Hospital Problems  Date Reviewed: 3/9/2022          Codes Class Noted POA    * (Principal) Acute delirium ICD-10-CM: R41.0  ICD-9-CM: 780.09  3/9/2022 Yes                Review of Systems:   Negative unless stated above      Vital Signs:    Last 24hrs VS reviewed since prior progress note. Most recent are:  Visit Vitals  BP (!) 124/43 (BP 1 Location: Left upper arm, BP Patient Position: At rest)   Pulse 60   Temp 98.1 °F (36.7 °C)   Resp 17   Ht 5' 4\" (1.626 m)   Wt 49.1 kg (108 lb 3.2 oz)   SpO2 98%   BMI 18.57 kg/m²         Intake/Output Summary (Last 24 hours) at 3/18/2022 1324  Last data filed at 3/18/2022 1243  Gross per 24 hour   Intake 477 ml   Output    Net 477 ml        Physical Examination:     I had a face to face encounter with this patient and independently examined them on 3/18/2022 as outlined below:          Constitutional:  No acute distress, cooperative, pleasant, thin appearing   ENT:  Oral mucosa moist, oropharynx benign. Resp:  CTA bilaterally. CV:  Regular rhythm, normal rate,    GI:  Soft, non distended, abdominal scar, non tender.  normoactive bowel sounds    Musculoskeletal:  No edema, warm, 2+ pulses throughout    Neurologic:  Moves all extremities            Data Review:    Review and/or order of clinical lab test  Review and/or order of tests in the radiology section of CPT  Review and/or order of tests in the medicine section of CPT      Labs:     Recent Labs     03/16/22 0045   WBC 9.8   HGB 9.9*   HCT 31.1*        Recent Labs     03/16/22  0045 03/15/22  1403     --    K 3.8  --    *  --    CO2 19*  --    BUN 30*  --    CREA 0.94  --    GLU 96  --    CA 8.1*  --    MG 2.3 3.4*   PHOS 2.7  --      No results for input(s): ALT, AP, TBIL, TBILI, TP, ALB, GLOB, GGT, AML, LPSE in the last 72 hours. No lab exists for component: SGOT, GPT, AMYP, HLPSE  No results for input(s): INR, PTP, APTT, INREXT, INREXT in the last 72 hours. No results for input(s): FE, TIBC, PSAT, FERR in the last 72 hours. Lab Results   Component Value Date/Time    Folate 29.1 (H) 03/11/2022 04:18 AM      No results for input(s): PH, PCO2, PO2 in the last 72 hours. No results for input(s): CPK, CKNDX, TROIQ in the last 72 hours.     No lab exists for component: CPKMB  No results found for: CHOL, CHOLX, CHLST, CHOLV, HDL, HDLP, LDL, LDLC, DLDLP, TGLX, TRIGL, TRIGP, CHHD, CHHDX  Lab Results   Component Value Date/Time    Glucose (POC) 75 02/06/2022 11:08 PM    Glucose (POC) 78 02/06/2022 01:07 PM    Glucose (POC) 102 12/07/2021 06:45 AM    Glucose (POC) 105 12/02/2021 05:39 AM    Glucose (POC) 161 (H) 12/01/2021 11:27 PM     Lab Results   Component Value Date/Time    Color YELLOW/STRAW 03/09/2022 05:26 PM    Appearance TURBID (A) 03/09/2022 05:26 PM    Specific gravity 1.020 03/09/2022 05:26 PM    Specific gravity 1.015 02/06/2022 11:33 AM    pH (UA) 5.0 03/09/2022 05:26 PM    Protein 30 (A) 03/09/2022 05:26 PM    Glucose Negative 03/09/2022 05:26 PM    Ketone Negative 03/09/2022 05:26 PM    Bilirubin Negative 03/09/2022 05:26 PM    Urobilinogen 0.2 03/09/2022 05:26 PM    Nitrites Negative 03/09/2022 05:26 PM    Leukocyte Esterase LARGE (A) 03/09/2022 05:26 PM    Epithelial cells FEW 03/09/2022 05:26 PM    Bacteria 2+ (A) 03/09/2022 05:26 PM    WBC >100 (H) 03/09/2022 05:26 PM RBC 0-5 03/09/2022 05:26 PM         Medications Reviewed:     Current Facility-Administered Medications   Medication Dose Route Frequency    loperamide (IMODIUM) capsule 2 mg  2 mg Oral BID    zinc oxide-cod liver oil (DESITIN) 40 % paste   Topical Q8H    apixaban (ELIQUIS) tablet 2.5 mg  2.5 mg Oral BID    aspirin chewable tablet 81 mg  81 mg Oral DAILY    carvediloL (COREG) tablet 12.5 mg  12.5 mg Oral BID WITH MEALS    cholecalciferol (VITAMIN D3) (1000 Units /25 mcg) tablet 1,000 Units  1,000 Units Oral DAILY    citalopram (CELEXA) tablet 20 mg  20 mg Oral DAILY    colestipoL (COLESTID) packet 5 g  5 g Oral BID    dilTIAZem ER (CARDIZEM CD) capsule 120 mg  120 mg Oral DAILY    diphenoxylate-atropine (LOMOTIL) tablet 2 Tablet  2 Tablet Oral QID    L.acidophilus-paracasei-S.thermophil-bifidobacter (RISAQUAD) 8 billion cell capsule  1 Capsule Oral DAILY    melatonin tablet 4.5 mg  4.5 mg Oral QHS    midodrine (PROAMATINE) tablet 2.5 mg  2.5 mg Oral Q12H    sodium chloride (NS) flush 5-40 mL  5-40 mL IntraVENous Q8H    sodium chloride (NS) flush 5-40 mL  5-40 mL IntraVENous PRN    acetaminophen (TYLENOL) tablet 650 mg  650 mg Oral Q6H PRN    Or    acetaminophen (TYLENOL) suppository 650 mg  650 mg Rectal Q6H PRN    polyethylene glycol (MIRALAX) packet 17 g  17 g Oral DAILY PRN    ondansetron (ZOFRAN ODT) tablet 4 mg  4 mg Oral Q8H PRN    Or    ondansetron (ZOFRAN) injection 4 mg  4 mg IntraVENous Q6H PRN     ______________________________________________________________________  EXPECTED LENGTH OF STAY: 3d 19h  ACTUAL LENGTH OF STAY:          9                 Jones Mahoney MD

## 2022-03-19 PROBLEM — K55.9 SMALL BOWEL ISCHEMIA (HCC): Status: ACTIVE | Noted: 2021-11-16

## 2022-03-19 PROBLEM — E43 SEVERE PROTEIN-CALORIE MALNUTRITION (HCC): Status: ACTIVE | Noted: 2022-02-08

## 2022-03-19 PROBLEM — A41.9 SEPSIS (HCC): Status: ACTIVE | Noted: 2022-02-06

## 2022-03-19 PROBLEM — R19.7 DIARRHEA: Status: ACTIVE | Noted: 2022-01-19

## 2022-03-19 LAB
COVID-19 RAPID TEST, COVR: NOT DETECTED
SOURCE, COVRS: NORMAL

## 2022-03-19 PROCEDURE — 87635 SARS-COV-2 COVID-19 AMP PRB: CPT

## 2022-03-19 PROCEDURE — 94760 N-INVAS EAR/PLS OXIMETRY 1: CPT

## 2022-03-19 PROCEDURE — 74011250637 HC RX REV CODE- 250/637: Performed by: HOSPITALIST

## 2022-03-19 PROCEDURE — 74011250637 HC RX REV CODE- 250/637: Performed by: INTERNAL MEDICINE

## 2022-03-19 PROCEDURE — 65410000002 HC RM PRIVATE OB

## 2022-03-19 PROCEDURE — 74011000250 HC RX REV CODE- 250: Performed by: INTERNAL MEDICINE

## 2022-03-19 RX ORDER — CARVEDILOL 6.25 MG/1
6.25 TABLET ORAL 2 TIMES DAILY WITH MEALS
Status: DISCONTINUED | OUTPATIENT
Start: 2022-03-19 | End: 2022-03-20

## 2022-03-19 RX ADMIN — MIDODRINE HYDROCHLORIDE 2.5 MG: 5 TABLET ORAL at 09:51

## 2022-03-19 RX ADMIN — DIPHENOXYLATE HYDROCHLORIDE AND ATROPINE SULFATE 2 TABLET: 2.5; .025 TABLET ORAL at 17:26

## 2022-03-19 RX ADMIN — LOPERAMIDE HYDROCHLORIDE 2 MG: 2 CAPSULE ORAL at 09:51

## 2022-03-19 RX ADMIN — Medication 4.5 MG: at 21:56

## 2022-03-19 RX ADMIN — DIPHENOXYLATE HYDROCHLORIDE AND ATROPINE SULFATE 2 TABLET: 2.5; .025 TABLET ORAL at 14:23

## 2022-03-19 RX ADMIN — DIAPER RASH SKIN PROTECTENT: at 22:00

## 2022-03-19 RX ADMIN — SODIUM CHLORIDE, PRESERVATIVE FREE 10 ML: 5 INJECTION INTRAVENOUS at 22:00

## 2022-03-19 RX ADMIN — DIAPER RASH SKIN PROTECTENT: at 06:42

## 2022-03-19 RX ADMIN — ASPIRIN 81 MG CHEWABLE TABLET 81 MG: 81 TABLET CHEWABLE at 09:51

## 2022-03-19 RX ADMIN — Medication 1 CAPSULE: at 09:51

## 2022-03-19 RX ADMIN — SODIUM CHLORIDE, PRESERVATIVE FREE 10 ML: 5 INJECTION INTRAVENOUS at 15:23

## 2022-03-19 RX ADMIN — CITALOPRAM HYDROBROMIDE 20 MG: 20 TABLET ORAL at 09:51

## 2022-03-19 RX ADMIN — APIXABAN 2.5 MG: 2.5 TABLET, FILM COATED ORAL at 09:52

## 2022-03-19 RX ADMIN — DIAPER RASH SKIN PROTECTENT: at 15:23

## 2022-03-19 RX ADMIN — Medication 1000 UNITS: at 09:51

## 2022-03-19 RX ADMIN — CARVEDILOL 6.25 MG: 3.12 TABLET, FILM COATED ORAL at 17:24

## 2022-03-19 RX ADMIN — DIPHENOXYLATE HYDROCHLORIDE AND ATROPINE SULFATE 2 TABLET: 2.5; .025 TABLET ORAL at 21:56

## 2022-03-19 RX ADMIN — APIXABAN 2.5 MG: 2.5 TABLET, FILM COATED ORAL at 17:24

## 2022-03-19 RX ADMIN — SODIUM CHLORIDE, PRESERVATIVE FREE 10 ML: 5 INJECTION INTRAVENOUS at 06:42

## 2022-03-19 RX ADMIN — DIPHENOXYLATE HYDROCHLORIDE AND ATROPINE SULFATE 2 TABLET: 2.5; .025 TABLET ORAL at 09:51

## 2022-03-19 RX ADMIN — DILTIAZEM HYDROCHLORIDE 120 MG: 120 CAPSULE, COATED, EXTENDED RELEASE ORAL at 09:52

## 2022-03-19 RX ADMIN — CARVEDILOL 6.25 MG: 3.12 TABLET, FILM COATED ORAL at 09:51

## 2022-03-19 RX ADMIN — LOPERAMIDE HYDROCHLORIDE 2 MG: 2 CAPSULE ORAL at 17:24

## 2022-03-19 NOTE — PROGRESS NOTES
Bedside and Verbal shift change report given to [facility] (oncoming nurse) by Pradip Adams (offgoing nurse). Report included the following information SBAR, Kardex, ED Summary, Procedure Summary, Intake/Output, MAR, Recent Results and Cardiac Rhythm NSR.

## 2022-03-19 NOTE — PROGRESS NOTES
[address] [facility] Adult  Hospitalist Group                                                                                          Hospitalist Progress Note  Chris Paulino MD  Answering service: [phone] or [phone] from in house phone        Date of Service:  3/19/2022  NAME:  Thomas Lemus  :  3/13/1933  MRN:  [id number]      Admission Summary:   70-year-old woman with a past medical history significant for chronic atrial fibrillation on Eliquis for anticoagulation, hypertension, congestive heart failure, chronic kidney disease, who was in her usual state of health until the day of presentation at the emergency room when it was reported that the patient developed change in mental status at the assisted living facility where the patient resides. The change in mental status is in the form of agitation and outburst of anger. It was also reported that the patient has blood in her urine. Because of this abnormality and change in mental status, the patient was sent to the emergency room at [facility] for further evaluation. When the patient arrived at the emergency room, her urinalysis was consistent with urinary tract infection. She was subsequently referred to the hospitalist service for evaluation for admission. She was last admitted to this hospital from 2022 to 02/10/2022. The patient was admitted for evaluation and treatment of septic shock secondary to infected sacral wound and urinary tract infection. The patient denies fever, rigors, or chills. She does not know why she was sent from the nursing home to the emergency room.        Interval history / Subjective:   Doing ok today,   Diarrhea improved with imodium   no n/v          Assessment & Plan:     Acute metabolic encephalopathy secondary to UTI:  -Urine culture with Justin Sargent  -s/p ceftriaxone x 3 days    Paroxysmal atrial fibrillation:  -Continue home Eliquis, diltiazem ER, carvedilol    Hypertension:  -Continue home carvedilol, diltiazem    Hypokalemia: Replete as needed  Hypomagnesemia - replace as needed, 6gm today and recheck     Anemia of chronic disease: Stable, transfuse for hemoglobin less than 7     Pressure injury of sacral region, unstageable  -Wound care consulted, recommended removing wound VAC due to improvement    Hypocalcemia: Stable, repleted, monitor     Chronic diarrhea - continue home lomotil, nutrition consult today      Code status:  DNR- has DDNR on file  Prophylaxis: [address] discussed with: Patient, CM  Anticipated Disposition: SNF when able, medically ready , d/c to SNF in AM      Hospital Problems  Date Reviewed: 3/9/2022          Codes Class Noted POA    * (Principal) Acute delirium ICD-10-CM: R41.0  ICD-9-CM: 780.09  3/9/2022 Yes                Review of Systems:   Negative unless stated above      Vital Signs:    Last 24hrs VS reviewed since prior progress note. Most recent are:  Visit Vitals  BP (!) 155/48   Pulse (!) 53   Temp 98.1 °F (36.7 °C)   Resp 12   Ht 5' 4\" (1.626 m)   Wt 49.5 kg (109 lb 3.2 oz)   SpO2 98%   BMI 18.74 kg/m²       No intake or output data in the 24 hours ending 03/19/22 1304     Physical Examination:     I had a face to face encounter with this patient and independently examined them on 3/19/2022 as outlined below:          Constitutional:  No acute distress, cooperative, pleasant, thin appearing   ENT:  Oral mucosa moist, oropharynx benign. Resp:  CTA bilaterally. CV:  Regular rhythm, normal rate,    GI:  Soft, non distended, abdominal scar, non tender.  normoactive bowel sounds    Musculoskeletal:  No edema, warm, 2+ pulses throughout    Neurologic:  Moves all extremities            Data Review:    Review and/or order of clinical lab test  Review and/or order of tests in the radiology section of CPT  Review and/or order of tests in the medicine section of CPT      Labs:     No results for input(s): WBC, HGB, HCT, PLT, HGBEXT, HCTEXT, PLTEXT, HGBEXT, HCTEXT, PLTEXT in the last 72 hours. No results for input(s): NA, K, CL, CO2, BUN, CREA, GLU, CA, MG, PHOS, URICA in the last 72 hours. No results for input(s): ALT, AP, TBIL, TBILI, TP, ALB, GLOB, GGT, AML, LPSE in the last 72 hours. No lab exists for component: SGOT, GPT, AMYP, HLPSE  No results for input(s): INR, PTP, APTT, INREXT, INREXT in the last 72 hours. No results for input(s): FE, TIBC, PSAT, FERR in the last 72 hours. Lab Results   Component Value Date/Time    Folate 29.1 (H) 03/11/2022 04:18 AM      No results for input(s): PH, PCO2, PO2 in the last 72 hours. No results for input(s): CPK, CKNDX, TROIQ in the last 72 hours.     No lab exists for component: CPKMB  No results found for: CHOL, CHOLX, CHLST, CHOLV, HDL, HDLP, LDL, LDLC, DLDLP, TGLX, TRIGL, TRIGP, CHHD, CHHDX  Lab Results   Component Value Date/Time    Glucose (POC) 75 02/06/2022 11:08 PM    Glucose (POC) 78 02/06/2022 01:07 PM    Glucose (POC) 102 12/07/2021 06:45 AM    Glucose (POC) 105 12/02/2021 05:39 AM    Glucose (POC) 161 (H) 12/01/2021 11:27 PM     Lab Results   Component Value Date/Time    Color YELLOW/STRAW 03/09/2022 05:26 PM    Appearance TURBID (A) 03/09/2022 05:26 PM    Specific gravity 1.020 03/09/2022 05:26 PM    Specific gravity 1.015 02/06/2022 11:33 AM    pH (UA) 5.0 03/09/2022 05:26 PM    Protein 30 (A) 03/09/2022 05:26 PM    Glucose Negative 03/09/2022 05:26 PM    Ketone Negative 03/09/2022 05:26 PM    Bilirubin Negative 03/09/2022 05:26 PM    Urobilinogen 0.2 03/09/2022 05:26 PM    Nitrites Negative 03/09/2022 05:26 PM    Leukocyte Esterase LARGE (A) 03/09/2022 05:26 PM    Epithelial cells FEW 03/09/2022 05:26 PM    Bacteria 2+ (A) 03/09/2022 05:26 PM    WBC >100 (H) 03/09/2022 05:26 PM    RBC 0-5 03/09/2022 05:26 PM         Medications Reviewed:     Current Facility-Administered Medications   Medication Dose Route Frequency    carvediloL (COREG) tablet 6.25 mg  6.25 mg Oral BID WITH MEALS    loperamide (IMODIUM) capsule 2 mg  2 mg Oral BID    zinc oxide-cod liver oil (DESITIN) 40 % paste   Topical Q8H    apixaban (ELIQUIS) tablet 2.5 mg  2.5 mg Oral BID    aspirin chewable tablet 81 mg  81 mg Oral DAILY    cholecalciferol (VITAMIN D3) (1000 Units /25 mcg) tablet 1,000 Units  1,000 Units Oral DAILY    citalopram (CELEXA) tablet 20 mg  20 mg Oral DAILY    colestipoL (COLESTID) packet 5 g  5 g Oral BID    dilTIAZem ER (CARDIZEM CD) capsule 120 mg  120 mg Oral DAILY    diphenoxylate-atropine (LOMOTIL) tablet 2 Tablet  2 Tablet Oral QID    L.acidophilus-paracasei-S.thermophil-bifidobacter (RISAQUAD) 8 billion cell capsule  1 Capsule Oral DAILY    melatonin tablet 4.5 mg  4.5 mg Oral QHS    midodrine (PROAMATINE) tablet 2.5 mg  2.5 mg Oral Q12H    sodium chloride (NS) flush 5-40 mL  5-40 mL IntraVENous Q8H    sodium chloride (NS) flush 5-40 mL  5-40 mL IntraVENous PRN    acetaminophen (TYLENOL) tablet 650 mg  650 mg Oral Q6H PRN    Or    acetaminophen (TYLENOL) suppository 650 mg  650 mg Rectal Q6H PRN    polyethylene glycol (MIRALAX) packet 17 g  17 g Oral DAILY PRN    ondansetron (ZOFRAN ODT) tablet 4 mg  4 mg Oral Q8H PRN    Or    ondansetron (ZOFRAN) injection 4 mg  4 mg IntraVENous Q6H PRN     ______________________________________________________________________  EXPECTED LENGTH OF STAY: 3d 19h  ACTUAL LENGTH OF STAY:          10                 Kayleen Ford MD

## 2022-03-19 NOTE — PROGRESS NOTES
Bedside and Verbal shift change report given to TAMMY Hammond (oncoming nurse) by Wendy Mack (offgoing nurse). Report included the following information SBAR, Kardex, ED Summary, Procedure Summary, Intake/Output, MAR, Recent Results and Cardiac Rhythm NSR.

## 2022-03-19 NOTE — PROGRESS NOTES
Bedside and Verbal shift change report given to Shanell Krueger RN (oncoming nurse) by Enma Walker RN (offgoing nurse).  Report included the following information SBAR, Kardex, Intake/Output, MAR, Accordion, Recent Results and Cardiac Rhythm SB.

## 2022-03-19 NOTE — PROGRESS NOTES
TEREZA:  RUR: 19%    Disposition:  SNF    Chart reviewed. Patient is medically ready for d/c.  CM called patient's daughter Arline Hawkins [phone]). CM reviewed names of facilities that have approved patient for admission. Daughter declined acceptance at [address] [place] [address], [facility] and [facility]. Daughter is considering [facility] however will check back to confirm with this CM. CM continuing to follow. Rupinder Pop, [address]    10:05am  Daughter calls back and has accepted bed at [facility]. [facility] CM awaiting return call from [facility] re bed availability for today or Sunday. 10:35a  [facility] [phone]) returned call to this CM. Patient can be admitted to the facility on Jonathan 3/20/22 room [address]A[facility]. Patient will need a Rapid Covid TEST  CM arranging BLS transport for 2pm  3/20/22. CM please call [facility] on Sunday to confirm d/c at [phone]. CM continuing to follow. Rupinder Pop, [address]    10:58a  Perfect serve message sent to Dr. Nguyễn Arechiga re above update. Daughter notified. Transport packet will be placed on hard chart.

## 2022-03-20 VITALS
WEIGHT: 109.2 LBS | HEART RATE: 55 BPM | BODY MASS INDEX: 18.64 KG/M2 | HEIGHT: 64 IN | TEMPERATURE: 97.9 F | SYSTOLIC BLOOD PRESSURE: 151 MMHG | DIASTOLIC BLOOD PRESSURE: 49 MMHG | RESPIRATION RATE: 16 BRPM | OXYGEN SATURATION: 98 %

## 2022-03-20 PROCEDURE — 94760 N-INVAS EAR/PLS OXIMETRY 1: CPT

## 2022-03-20 PROCEDURE — 74011000250 HC RX REV CODE- 250: Performed by: INTERNAL MEDICINE

## 2022-03-20 PROCEDURE — 74011250637 HC RX REV CODE- 250/637: Performed by: INTERNAL MEDICINE

## 2022-03-20 PROCEDURE — 74011250637 HC RX REV CODE- 250/637: Performed by: HOSPITALIST

## 2022-03-20 RX ORDER — CARVEDILOL 6.25 MG/1
3.12 TABLET ORAL 2 TIMES DAILY WITH MEALS
Status: DISCONTINUED | OUTPATIENT
Start: 2022-03-20 | End: 2022-03-20 | Stop reason: HOSPADM

## 2022-03-20 RX ORDER — LOPERAMIDE HYDROCHLORIDE 2 MG/1
2 CAPSULE ORAL 2 TIMES DAILY
Qty: 20 CAPSULE | Refills: 0 | Status: SHIPPED
Start: 2022-03-20 | End: 2022-03-30

## 2022-03-20 RX ORDER — CARVEDILOL 3.12 MG/1
3.12 TABLET ORAL 2 TIMES DAILY WITH MEALS
Qty: 60 TABLET | Refills: 0 | Status: SHIPPED
Start: 2022-03-20

## 2022-03-20 RX ADMIN — DILTIAZEM HYDROCHLORIDE 120 MG: 120 CAPSULE, COATED, EXTENDED RELEASE ORAL at 08:49

## 2022-03-20 RX ADMIN — LOPERAMIDE HYDROCHLORIDE 2 MG: 2 CAPSULE ORAL at 08:50

## 2022-03-20 RX ADMIN — DIAPER RASH SKIN PROTECTENT: at 06:00

## 2022-03-20 RX ADMIN — CITALOPRAM HYDROBROMIDE 20 MG: 20 TABLET ORAL at 08:51

## 2022-03-20 RX ADMIN — Medication 1 CAPSULE: at 08:50

## 2022-03-20 RX ADMIN — SODIUM CHLORIDE, PRESERVATIVE FREE 10 ML: 5 INJECTION INTRAVENOUS at 06:00

## 2022-03-20 RX ADMIN — DIAPER RASH SKIN PROTECTENT: at 13:25

## 2022-03-20 RX ADMIN — Medication 1000 UNITS: at 08:50

## 2022-03-20 RX ADMIN — CARVEDILOL 3.12 MG: 6.25 TABLET, FILM COATED ORAL at 08:49

## 2022-03-20 RX ADMIN — DIPHENOXYLATE HYDROCHLORIDE AND ATROPINE SULFATE 2 TABLET: 2.5; .025 TABLET ORAL at 13:06

## 2022-03-20 RX ADMIN — ASPIRIN 81 MG CHEWABLE TABLET 81 MG: 81 TABLET CHEWABLE at 08:50

## 2022-03-20 RX ADMIN — APIXABAN 2.5 MG: 2.5 TABLET, FILM COATED ORAL at 08:51

## 2022-03-20 RX ADMIN — DIPHENOXYLATE HYDROCHLORIDE AND ATROPINE SULFATE 2 TABLET: 2.5; .025 TABLET ORAL at 08:50

## 2022-03-20 NOTE — PROGRESS NOTES
Problem: Falls - Risk of  Goal: *Absence of Falls  Description: Document Sana Cruz Fall Risk and appropriate interventions in the flowsheet. Outcome: Progressing Towards Goal  Note: Fall Risk Interventions:  Mobility Interventions: Bed/chair exit alarm,Communicate number of staff needed for ambulation/transfer,Patient to call before getting OOB         Medication Interventions: Bed/chair exit alarm,Patient to call before getting OOB    Elimination Interventions: Bed/chair exit alarm,Call light in reach,Toileting schedule/hourly rounds    History of Falls Interventions: Bed/chair exit alarm,Door open when patient unattended,Room close to nurse's station         Problem: Patient Education: Go to Patient Education Activity  Goal: Patient/Family Education  Outcome: Progressing Towards Goal     Problem: Pressure Injury - Risk of  Goal: *Prevention of pressure injury  Description: Document Brian Scale and appropriate interventions in the flowsheet.   Outcome: Progressing Towards Goal  Note: Pressure Injury Interventions:  Sensory Interventions: Assess changes in LOC,Assess need for specialty bed,Minimize linen layers,Monitor skin under medical devices,Pressure redistribution bed/mattress (bed type)    Moisture Interventions: Absorbent underpads,Apply protective barrier, creams and emollients,Assess need for specialty bed,Check for incontinence Q2 hours and as needed,Maintain skin hydration (lotion/cream),Minimize layers    Activity Interventions: Pressure redistribution bed/mattress(bed type)    Mobility Interventions: Assess need for specialty bed,Pressure redistribution bed/mattress (bed type),HOB 30 degrees or less    Nutrition Interventions: Document food/fluid/supplement intake    Friction and Shear Interventions: Apply protective barrier, creams and emollients,Minimize layers,HOB 30 degrees or less                Problem: Patient Education: Go to Patient Education Activity  Goal: Patient/Family Education  Outcome: Progressing Towards Goal     Problem: Falls - Risk of  Goal: *Absence of Falls  Description: Document Ricardo Avitia Fall Risk and appropriate interventions in the flowsheet. Outcome: Progressing Towards Goal  Note: Fall Risk Interventions:  Mobility Interventions: Bed/chair exit alarm,Communicate number of staff needed for ambulation/transfer,Patient to call before getting OOB         Medication Interventions: Bed/chair exit alarm,Patient to call before getting OOB    Elimination Interventions: Bed/chair exit alarm,Call light in reach,Toileting schedule/hourly rounds    History of Falls Interventions: Bed/chair exit alarm,Door open when patient unattended,Room close to nurse's station         Problem: Patient Education: Go to Patient Education Activity  Goal: Patient/Family Education  Outcome: Progressing Towards Goal     Problem: Impaired Skin Integrity/Pressure Injury Treatment  Goal: *Improvement of Existing Pressure Injury  Outcome: Progressing Towards Goal  Goal: *Prevention of pressure injury  Description: Document Brian Scale and appropriate interventions in the flowsheet.   Outcome: Progressing Towards Goal  Note: Pressure Injury Interventions:  Sensory Interventions: Assess changes in LOC,Assess need for specialty bed,Minimize linen layers,Monitor skin under medical devices,Pressure redistribution bed/mattress (bed type)    Moisture Interventions: Absorbent underpads,Apply protective barrier, creams and emollients,Assess need for specialty bed,Check for incontinence Q2 hours and as needed,Maintain skin hydration (lotion/cream),Minimize layers    Activity Interventions: Pressure redistribution bed/mattress(bed type)    Mobility Interventions: Assess need for specialty bed,Pressure redistribution bed/mattress (bed type),HOB 30 degrees or less    Nutrition Interventions: Document food/fluid/supplement intake    Friction and Shear Interventions: Apply protective barrier, creams and emollients,Minimize layers,HOB 30 degrees or less                Problem: Patient Education: Go to Patient Education Activity  Goal: Patient/Family Education  Outcome: Progressing Towards Goal     Problem: Patient Education: Go to Patient Education Activity  Goal: Patient/Family Education  Outcome: Progressing Towards Goal     Problem: General Medical Care Plan  Goal: *Vital signs within specified parameters  Outcome: Progressing Towards Goal  Goal: *Labs within defined limits  Outcome: Progressing Towards Goal  Goal: *Absence of infection signs and symptoms  Outcome: Progressing Towards Goal  Goal: *Optimal pain control at patient's stated goal  Outcome: Progressing Towards Goal  Goal: *Skin integrity maintained  Outcome: Progressing Towards Goal  Goal: *Fluid volume balance  Outcome: Progressing Towards Goal  Goal: *Optimize nutritional status  Outcome: Progressing Towards Goal  Goal: *Anxiety reduced or absent  Outcome: Progressing Towards Goal  Goal: *Progressive mobility and function (eg: ADL's)  Outcome: Progressing Towards Goal     Problem: Patient Education: Go to Patient Education Activity  Goal: Patient/Family Education  Outcome: Progressing Towards Goal

## 2022-03-20 NOTE — DISCHARGE SUMMARY
Discharge Summary     Patient:  Saúl Hamilton       MRN: [id number]       YOB: 1933       Age: 80 y.o. Date of admission:  3/9/2022    Date of discharge:  3/20/2022    Primary care provider: Dr. Good Champagne MD    Admitting provider:  Germaine Russell MD    Discharging provider:  Lorraine Montano MD - [phone]  If unavailable, call [phone] and ask the  to page the triage hospitalist.    Consultations  · ED CONSULT TO SENIOR SERVICES CASE MANAGEMENT    Procedures  · * No surgery found *    Discharge destination: SNF  The patient is stable for discharge. Admission diagnosis  · Acute delirium [R41.0]    Current Discharge Medication List      START taking these medications    Details   loperamide (IMODIUM) 2 mg capsule Take 1 Capsule by mouth two (2) times a day for 10 days. Qty: 20 Capsule, Refills: 0  Start date: 3/20/2022, End date: 3/30/2022         CONTINUE these medications which have CHANGED    Details   carvediloL (COREG) 3.125 mg tablet Take 1 Tablet by mouth two (2) times daily (with meals). HOLD FOR BP <130/65  Qty: 60 Tablet, Refills: 0  Start date: 3/20/2022         CONTINUE these medications which have NOT CHANGED    Details   potassium chloride (KLOR-CON M10) 10 mEq tablet Take 20 mEq by mouth daily. dilTIAZem ER (Cartia XT) 120 mg capsule Take 1 Capsule by mouth daily. HOLD FOR BP <130/65  Qty: 30 Capsule, Refills: 0      L.acid,para-B. bifidum-S.therm (RISAQUAD) 8 billion cell cap cap Take 1 Capsule by mouth daily. Qty: 15 Capsule, Refills: 0      magnesium chloride (MAG DELAY) 64 mg delayed release tablet Take 64 mg by mouth two (2) times a day. colestipoL (COLESTID) 5 gram packet Take 5 g by mouth two (2) times a day. diphenoxylate-atropine (LomotiL) 2.5-0.025 mg per tablet Take 2 Tablets by mouth four (4) times daily.       acetaminophen (TYLENOL) 650 mg TbER Take 650 mg by mouth two (2) times daily as needed for Pain. Indications: fever, pain      citalopram (CeleXA) 20 mg tablet Take 20 mg by mouth daily. therapeutic multivitamin-minerals (THERAGRAN-M) tablet Take 1 Tablet by mouth daily. apixaban (ELIQUIS) 2.5 mg tablet Take 1 Tablet by mouth two (2) times a day. Qty: 30 Tablet, Refills: 3      aspirin 81 mg chewable tablet Take 81 mg by mouth daily. melatonin 5 mg tablet Take 5 mg by mouth nightly. cholecalciferol (Vitamin D3) (1000 Units /25 mcg) tablet Take 1,000 Units by mouth daily. Hydrocolloid Dressing 4 X 5 \" bndg by Apply Externally route. food supplemt, lactose-reduced (Ensure Plus) 0.05-1.5 gram-kcal/mL liqd Take  by mouth three (3) times daily. Arginine-Glutamine-Calcium HMB ([place]) 7-7-1.5 gram pwpk Take  by mouth two (2) times a day. promethazine (PHENERGAN) 25 mg tablet Take 25 mg by mouth every six (6) hours as needed for Nausea. nystatin (MYCOSTATIN) topical cream Apply 1 g to affected area two (2) times daily as needed for Skin Irritation. STOP taking these medications       midodrine (PROAMATINE) 2.5 mg tablet Comments:   Reason for Stopping:         potassium chloride SA (MICRO-K) 10 mEq capsule Comments:   Reason for Stopping:         oxyCODONE-acetaminophen (Percocet) 2.5-325 mg per tablet Comments:   Reason for Stopping:         mesalamine ER (APRISO) 0.375 gram 24 hour capsule Comments:   Reason for Stopping: Follow-up Information     Follow up With Specialties Details Why Contact Info    Your PCP    Discharge follow up            Final discharge diagnoses and brief hospital course  Please also refer to the admission H&P for details on the presenting problem.       59-year-old woman with a past medical history significant for chronic atrial fibrillation on Eliquis for anticoagulation, hypertension, congestive heart failure, chronic kidney disease, who was in her usual state of health until the day of presentation at the emergency room when it was reported that the patient developed change in mental status at the assisted living facility where the patient resides.  The change in mental status is in the form of agitation and outburst of anger.  It was also reported that the patient has blood in her urine.  Because of this abnormality and change in mental status, the patient was sent to the emergency room at [facility] for further evaluation.  When the patient arrived at the emergency room, her urinalysis was consistent with urinary tract infection.  She was subsequently referred to the hospitalist service for evaluation for admission. Kathleen Segura was last admitted to this hospital from 02/06/2022 to 02/10/2022.  The patient was admitted for evaluation and treatment of septic shock secondary to infected sacral wound and urinary tract infection. Acute metabolic encephalopathy secondary to UTI:  -Urine culture with Justin Sargent  -s/p ceftriaxone x 3 days     Paroxysmal atrial fibrillation:  -Continue home Eliquis, diltiazem ER, carvedilol  -coreg reduced due to bradycardia with HR in 50s     Hypertension:  -Continue home carvedilol, diltiazem     Hypokalemia: Replete as needed  Hypomagnesemia - replace as needed, 6gm today and recheck      Anemia of chronic disease: Stable, transfuse for hemoglobin less than 7      Pressure injury of sacral region, stage 3 POA  -Wound care consulted,   -Due to constant stools, wound care dressing is becoming soiled frequently. Wound cleansed well with saline, placed Puracol Plus Ag placed in wound, then Opticel rope, covered with 4x4 gauze.  Skin prep to periwound, piece of ostomy ring placed to distal periwound to help prevent stool from leaking under transparent drape, then wound covered with wound vac transparent drape.       Hypocalcemia: Stable, repleted, monitor      Chronic diarrhea - continue home lomotil, nutrition consult today        FOLLOW-UP CARE RECOMMENDATIONS:  FOLLOW-UP TESTS RECOMMENDED:   · NONE    ONGOING TREATMENT PLAN: as above     PENDING TEST RESULTS:  At the time of discharge the following test results are still pending: none. Please review these results as they become available. Specific symptoms to watch for: chest pain, shortness of breath, fever, chills, nausea, vomiting, diarrhea, change in mentation, falling, weakness, bleeding. DIET:  Regular Diet    ACTIVITY:  Activity as tolerated    WOUND CARE:   Stage 3 pressure injury coccyx   Due to constant stools, wound care dressing is becoming soiled frequently. Wound cleansed well with saline, placed Puracol Plus Ag placed in wound, then Opticel rope, covered with 4x4 gauze. Skin prep to periwound, piece of ostomy ring placed to distal periwound to help prevent stool from leaking under transparent drape, then wound covered with wound vac transparent drape. EQUIPMENT needed:  NONE    INCIDENTAL FINDINGS:  NONE    GOALS OF CARE:    Eventual return to home/independent/assisted living   X  Long term SNF      Hospice     No rehospitalization     Patient condition at discharge:   Functional status    Poor    X  Deconditioned      Independent   Cognition  X  Lucid     Forgetful (some sensescence)     Dementia   Catheters/lines (plus indication)    Pearson     PICC      PEG    X     Code status    Full code    X  DNR        Physical examination at discharge  Visit Vitals  BP (!) 151/49   Pulse (!) 55   Temp 97.9 °F (36.6 °C)   Resp 16   Ht 5' 4\" (1.626 m)   Wt 49.5 kg (109 lb 3.2 oz)   SpO2 100%   BMI 18.74 kg/m²     Awake and alert  Lung clear  CVS: RRR  Abd: soft NT ND   Ext; no edema  Back: stage 3 pressure injury, dressing in place    Pertinent imaging studies:        No results for input(s): WBC, HGB, HCT, PLT, HGBEXT, HCTEXT, PLTEXT in the last 72 hours. No results for input(s): NA, K, CL, CO2, BUN, CREA, GLU, CA, MG, PHOS, URICA in the last 72 hours.   No results for input(s): AP, TBIL, TP, ALB, GLOB, GGT, AML, LPSE in the last 72 hours. No lab exists for component: SGOT, GPT, AMYP, HLPSE  No results for input(s): INR, PTP, APTT, INREXT in the last 72 hours. No results for input(s): FE, TIBC, PSAT, FERR in the last 72 hours. No results for input(s): PH, PCO2, PO2 in the last 72 hours. No results for input(s): CPK, CKMB in the last 72 hours. No lab exists for component: TROPONINI  No components found for: [place]    Chronic Diagnoses:    Problem List as of 3/20/2022 Date Reviewed: 3/9/2022          Codes Class Noted - Resolved    * (Principal) Acute delirium ICD-10-CM: R41.0  ICD-9-CM: 780.09  3/9/2022 - Present        Severe protein-calorie malnutrition ([facility] ICD-10-CM: E43  ICD-9-CM: 262  2/8/2022 - Present        UTI (urinary tract infection) ICD-10-CM: N39.0  ICD-9-CM: 599.0  2/6/2022 - Present        Sepsis ([facility] ICD-10-CM: A41.9  ICD-9-CM: 038.9, 995.91  2/6/2022 - Present        Diarrhea ICD-10-CM: R19.7  ICD-9-CM: 787.91  1/19/2022 - Present        Dehydration ICD-10-CM: E86.0  ICD-9-CM: 276.51  1/19/2022 - Present        Stage III pressure ulcer of sacral region [facility]) ICD-10-CM: Z70.403  ICD-9-CM: 707.03, 707.23  1/6/2022 - Present        Lethargy ICD-10-CM: R53.83  ICD-9-CM: 780.79  Unknown - Present        Feeding difficulties ICD-10-CM: R63.30  ICD-9-CM: 226. 3  Unknown - Present        Goals of care, counseling/discussion ICD-10-CM: Z71.89  ICD-9-CM: V65.49  Unknown - Present        Palliative care encounter ICD-10-CM: Z51.5  ICD-9-CM: V66.7  Unknown - Present        Small bowel ischemia ([facility] ICD-10-CM: K55.9  ICD-9-CM: 557.9  11/16/2021 - Present              Time spent on discharge related activities today greater than 30 minutes. Signed:  Gilma Mejía MD                 Hospitalist, Internal Medicine      Cc:  Other, MD Helene

## 2022-03-20 NOTE — PROGRESS NOTES
TRANSITION OF CARE  RUR--19%  Disposition--To [facility]. Transport--BLS with [facility]. Patient's primary family contact: daughter Jennifer Loomis    CM follow up. CM spoke with [facility] , who confirmed that patient is accepted and that bed is available today ([address]. . Nurse Report to be called to [phone]. CM confirmed that referral was sent to  [address] [facility]) (Phone [phone]) for BLS transport, and referral was accepted  for a  2:00PM . CM completed PCS and placed it on chart. CM gave verbal update to staff nurse. Staff nurse to give update to patient's daughter. Transition of Care Plan to SNF/Rehab    SNF/Rehab Transition:  Patient has been accepted to [facility] and meets criteria for admission. Patient will transported by [facility] and expected to leave at 2:00PM.    Communication to Patient/Family:  Met with patient and (identified care giver) and they are agreeable to the transition plan. Communication to SNF/Rehab:  Bedside RN has been notified to update the transition plan to the facility and call report (phone number [phone]). Discharge information has been updated on the AVS.       Nursing Please include all hard scripts for controlled substances, med rec and dc summary, and AVS in packet.      Reviewed and confirmed with facility can manage the patient care needs for the following:     Reviewed and confirmed with facility they can manage the patient care needs for the following:     [facility] with (X) only those applicable:    Medication:  []  Medications will be available at the facility  []  IV Antibiotics   []  Controlled Substance - hard copy to be sent with patient   []  Weekly Labs   Documents:  [x] Hard RX  [x] MAR  [x] Kardex  [x] AVS  []Transfer Summary  []Discharge   Equipment:  []  CPAP/BiPAP  []  Wound Vacuum  []  Pearson or Urinary Device  []  PICC/Central Line  []  Nebulizer  []  Ventilator Treatment:  []Isolation (for MRSA, VRE, etc.)  []Surgical Drain Management  []Tracheostomy Care  []Dressing Changes  []Dialysis with transportation and chair time. []PEG Care  []Oxygen  []Daily Weights for Heart Failure   Dietary:  []Any diet limitations  []Tube Feedings   []Total Parenteral Management (TPN)   Eligible for Medicaid Long Term Services and Supports  Yes:  [] Eligible for medical assistance or will become eligible within 180 days and UAI completed. [] Provider/Patient and/or support system has requested screening. [] UAI copy provided to patient or responsible party,   [] UAI unavailable at discharge will send once processed to SNF provider. [] UAI unavailable at discharged mailed to patient  No:   [] Private pay and is not financially eligible for Medicaid within the next 180 days. [] Reside out-of-state.   [] A residents of a state owned/operated facility that is licensed  by [place] [address] [employer] or [facility]  [] Enrollment in [facility] hospice services  [] [address] [place] [address]  [x] Patient /Family declines to have screening completed or provide financial information for screening     Financial Resources:  Medicaid    [] Initiated and application pending   [] Full coverage     Advanced Care Plan:  []Surrogate Decision Maker of Care  []POA  []Communicated Code Status  (DDNR\", \"Full\")    Other     Financial Resources:  Medicaid    [] Initiated and application pending   [] Full coverage     Advanced Care Plan:  []Surrogate Decision Maker of Care  []POA  []Communicated Code Status (DDNR\", \"Full\")    Other

## 2022-03-20 NOTE — PROGRESS NOTES
2151: BP was checked prior to administering 2100 dose of midodrine. /51. NP notified and orders were received to hold dose. 2240: TRANSFER - OUT REPORT:    Verbal report given to TAMMY Ugarte (name) on Evetta Aschoff  being transferred to [facility] (unit) for routine progression of care       Report consisted of patients Situation, Background, Assessment and   Recommendations(SBAR). Information from the following report(s) SBAR, Kardex, Intake/Output, MAR and Cardiac Rhythm NSR was reviewed with the receiving nurse. Lines:   Peripheral IV 03/15/22 Anterior; Left Forearm (Active)   Site Assessment Clean, dry, & intact 03/19/22 1600   Phlebitis Assessment 0 03/19/22 1600   Infiltration Assessment 0 03/19/22 1600   Dressing Status Clean, dry, & intact 03/19/22 1600   Dressing Type Tape;Transparent 03/19/22 1600   Hub Color/Line Status Blue;Capped 03/19/22 1600   Action Taken Open ports on tubing capped 03/19/22 1600   Alcohol Cap Used Yes 03/19/22 1600        Opportunity for questions and clarification was provided.       Patient transported with:   Registered Nurse  Tech

## 2022-03-20 NOTE — PROGRESS NOTES
Bedside and Verbal shift change report given to FIDEL Lr (oncoming nurse) by [employer]. Hemanth Calderon (offgoing nurse). Report included the following information SBAR, Kardex, ED Summary, Intake/Output, MAR, Accordion and Recent Results. 1255- Report called to nurse at [facility]. 1420- [facility] here to  patient. Paperwork given.  All questions answered End stage  Actively dying End stage  Actively dying  Now comfort measures only

## 2022-03-20 NOTE — PROGRESS NOTES
2245: TRANSFER - IN REPORT:    Verbal report received from Eric Shirley RN (name) on Daniele Garcia  being received from [facility] (unit) for routine progression of care      Report consisted of patients Situation, Background, Assessment and   Recommendations(SBAR). Information from the following report(s) SBAR, Kardex, ED Summary, Intake/Output, MAR and Recent Results was reviewed with the receiving nurse. Opportunity for questions and clarification was provided. Assessment completed upon patients arrival to unit and care assumed. 2300: Patient arrived to unit via hospital bed. Assessment completed and care assumed. Bed alarm placed. Patient educated on call bell, patient placed in room close to nurse's station, and patient door left opened when unattended.  Patient clean and resting comfortably at this time

## 2022-09-15 NOTE — PROGRESS NOTES
Comprehensive Nutrition Assessment    Type and Reason for Visit: Initial,Positive nutrition screen    Nutrition Recommendations/Plan:      1. Continue with Low fat/Low Chol/NAVNEET diet order    2. Continue Ensure Enlive (high calorie, high protein) TID + snacks    3. RD will order Banatrol TID to help with diarrhea. Nutrition Assessment:    81 yo female admitted for acute delirium. PMhx: chronic Afib, HTN, CHF, CKD, ischemic colon s/p colectomy and small bowel resection 11/2021. Pt now has chronic diarrhea. Takes lomotil at home, ordered here as well. States she needs to [facility] lmbang" to try and gain weight but every time she eats she has to go to the bathroom. Reports crackers before meals helps with her diarrhea, brought her several packets of saltine crackers. Reiterated the need for increased calorie intake, pt has been in nursing facilities so she has limited control over her food. States the place she is at now will let her have more and provides Ensure shakes which she enjoys, will order. Reports good appetite. Intakes % of meals today, states she did not like all the lunch food and was starving at time of my visit, eating peanut butter crackers. Gluten allergy noted in chart. Pt states she does not know where that came from but she denies having any food allergies and does not avoid gluten. Had gluten allergy removed from her chart. Weight hx in EMR indicates weight loss of 29% over last 3 months, which is significant for time frame, pt confirms this weight loss. 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 Labs: LFT's elevated    3/15:  Consult received for very frequent watery diarrhea. WOCN notes unable to place an external fecal pouch to contain stool due to perianal skin being wet. Will order Banatrol TID.        Malnutrition Assessment:  Malnutrition Status:  Severe malnutrition    Context:  Acute illness     Findings of the 6 clinical characteristics of malnutrition:   Energy Intake:  No significant decrease in energy intake  Weight Loss:  7.00 - Greater than 7.5% over 3 months     Body Fat Loss:  7 - Moderate body fat loss, Orbital   Muscle Mass Loss:  7 - Moderate muscle mass loss, Temples (temporalis),Clavicles (pectoralis & deltoids)  Fluid Accumulation:  No significant fluid accumulation,     Strength:  Not performed         Nutritionally Significant Medications: Lomotil, Probiotic, MgSu, Kcl, Vit D3    Estimated Daily Nutrient Needs:  Energy (kcal): 2279-2937 (35-40 gm/kg); Weight Used for Energy Requirements: Current  Protein (g): 78-83 (1.5-1.6 gm/kg);  Weight Used for Protein Requirements: Current  Fluid (ml/day): 1359-0014; Method Used for Fluid Requirements: 1 ml/kcal    Nutrition Related Findings:       BM: loose 3/10  Edema: none  Wounds:  Stage III,Unstageable (sacrum)       Current Nutrition Therapies:   Diet: Low Fat/Low chol/NAVNEET  Supplements: none currently ordered  Additional Caloric Sources: none    Meal intake:   Patient Vitals for the past 168 hrs:   % Diet Eaten   03/15/22 0956 76 - 100%   03/14/22 1722 51 - 75%   03/14/22 1317 26 - 50%   03/14/22 0924 26 - 50%   03/12/22 1644 76 - 100%   03/11/22 1230 26 - 50%   03/11/22 1013 51 - 75%   03/10/22 1047 76 - 100%     Supplement Intake:   Patient Vitals for the past 168 hrs:   Supplement intake %   03/14/22 0924 1 - 25%   03/11/22 1013 1 - 25%       Anthropometric Measures:  · Height:  5' 4\" (162.6 cm)  · Current Body Wt:  51.9 kg (114 lb 6.7 oz)   · Admission Body Wt:       · Usual Body Wt:        · Ideal Body Wt:  120:  95.3 %   · Adjusted Body Weight:   ; Weight Adjustment for:     · Adjusted BMI:       · BMI Categories:  Underweight (BMI less than 22) age over 72     Wt Readings from Last 10 Encounters:   03/14/22 49.2 kg (108 lb 7.5 oz)   02/06/22 52.5 kg (115 lb 11.9 oz)   12/07/21 73.5 kg (162 lb 0.6 oz)       Nutrition Diagnosis:   · Increased nutrient needs related to altered GI structure as evidenced by diarrhea,GI abnormality,weight loss      Nutrition Interventions:   Food and/or Nutrient Delivery: Continue current diet,Start oral nutrition supplement  Nutrition Education and Counseling: No recommendations at this time  Coordination of Nutrition Care: Continue to monitor while inpatient    Goals:  PO intakes > 75% meals + snacks + 2-3 ONS each day within next 5- 7 days       Nutrition Monitoring and Evaluation:   Behavioral-Environmental Outcomes: None identified  Food/Nutrient Intake Outcomes: Food and nutrient intake,Supplement intake  Physical Signs/Symptoms Outcomes: Biochemical data,GI status,Weight    Discharge Planning:    Continue current diet,Continue oral nutrition supplement     Aguilar Ma RD  Contact via [employer] Chronic renal insufficiency Elevated serum creatinine

## 2023-03-29 ENCOUNTER — HOSPITAL ENCOUNTER (INPATIENT)
Age: 88
LOS: 2 days | Discharge: HOME HEALTH | End: 2023-03-31
Attending: STUDENT IN AN ORGANIZED HEALTH CARE EDUCATION/TRAINING PROGRAM | Admitting: INTERNAL MEDICINE
Payer: MEDICARE

## 2023-03-29 ENCOUNTER — APPOINTMENT (OUTPATIENT)
Dept: GENERAL RADIOLOGY | Age: 88
End: 2023-03-29
Attending: STUDENT IN AN ORGANIZED HEALTH CARE EDUCATION/TRAINING PROGRAM
Payer: MEDICARE

## 2023-03-29 ENCOUNTER — APPOINTMENT (OUTPATIENT)
Dept: CT IMAGING | Age: 88
End: 2023-03-29
Attending: STUDENT IN AN ORGANIZED HEALTH CARE EDUCATION/TRAINING PROGRAM
Payer: MEDICARE

## 2023-03-29 ENCOUNTER — APPOINTMENT (OUTPATIENT)
Dept: MRI IMAGING | Age: 88
End: 2023-03-29
Attending: INTERNAL MEDICINE
Payer: MEDICARE

## 2023-03-29 DIAGNOSIS — H53.9 VISUAL DISTURBANCE: Primary | ICD-10-CM

## 2023-03-29 DIAGNOSIS — G45.9 TIA (TRANSIENT ISCHEMIC ATTACK): ICD-10-CM

## 2023-03-29 LAB
ALBUMIN SERPL-MCNC: 3 G/DL (ref 3.5–5)
ALBUMIN/GLOB SERPL: 0.7 (ref 1.1–2.2)
ALP SERPL-CCNC: 106 U/L (ref 45–117)
ALT SERPL-CCNC: 19 U/L (ref 12–78)
ANION GAP SERPL CALC-SCNC: 1 MMOL/L (ref 5–15)
AST SERPL-CCNC: 20 U/L (ref 15–37)
BASOPHILS # BLD: 0 K/UL (ref 0–0.1)
BASOPHILS NFR BLD: 1 % (ref 0–1)
BILIRUB SERPL-MCNC: 0.2 MG/DL (ref 0.2–1)
BUN SERPL-MCNC: 15 MG/DL (ref 6–20)
BUN/CREAT SERPL: 15 (ref 12–20)
CALCIUM SERPL-MCNC: 8.7 MG/DL (ref 8.5–10.1)
CHLORIDE SERPL-SCNC: 108 MMOL/L (ref 97–108)
CHOLEST SERPL-MCNC: 136 MG/DL
CO2 SERPL-SCNC: 28 MMOL/L (ref 21–32)
COMMENT, HOLDF: NORMAL
CREAT SERPL-MCNC: 1 MG/DL (ref 0.55–1.02)
DIFFERENTIAL METHOD BLD: NORMAL
EOSINOPHIL # BLD: 0.1 K/UL (ref 0–0.4)
EOSINOPHIL NFR BLD: 3 % (ref 0–7)
ERYTHROCYTE [DISTWIDTH] IN BLOOD BY AUTOMATED COUNT: 12.5 % (ref 11.5–14.5)
EST. AVERAGE GLUCOSE BLD GHB EST-MCNC: 97 MG/DL
GLOBULIN SER CALC-MCNC: 4.1 G/DL (ref 2–4)
GLUCOSE SERPL-MCNC: 103 MG/DL (ref 65–100)
HBA1C MFR BLD: 5 % (ref 4–5.6)
HCT VFR BLD AUTO: 37 % (ref 35–47)
HDLC SERPL-MCNC: 49 MG/DL
HDLC SERPL: 2.8 (ref 0–5)
HGB BLD-MCNC: 11.7 G/DL (ref 11.5–16)
IMM GRANULOCYTES # BLD AUTO: 0 K/UL (ref 0–0.04)
IMM GRANULOCYTES NFR BLD AUTO: 0 % (ref 0–0.5)
INR PPP: 1.2 (ref 0.9–1.1)
LDLC SERPL CALC-MCNC: 55.8 MG/DL (ref 0–100)
LYMPHOCYTES # BLD: 1.3 K/UL (ref 0.8–3.5)
LYMPHOCYTES NFR BLD: 29 % (ref 12–49)
MAGNESIUM SERPL-MCNC: 1 MG/DL (ref 1.6–2.4)
MCH RBC QN AUTO: 30.7 PG (ref 26–34)
MCHC RBC AUTO-ENTMCNC: 31.6 G/DL (ref 30–36.5)
MCV RBC AUTO: 97.1 FL (ref 80–99)
MONOCYTES # BLD: 0.3 K/UL (ref 0–1)
MONOCYTES NFR BLD: 6 % (ref 5–13)
NEUTS SEG # BLD: 2.7 K/UL (ref 1.8–8)
NEUTS SEG NFR BLD: 61 % (ref 32–75)
NRBC # BLD: 0 K/UL (ref 0–0.01)
NRBC BLD-RTO: 0 PER 100 WBC
PLATELET # BLD AUTO: 184 K/UL (ref 150–400)
PMV BLD AUTO: 11.1 FL (ref 8.9–12.9)
POTASSIUM SERPL-SCNC: 3.8 MMOL/L (ref 3.5–5.1)
PROT SERPL-MCNC: 7.1 G/DL (ref 6.4–8.2)
PROTHROMBIN TIME: 12.2 SEC (ref 9–11.1)
RBC # BLD AUTO: 3.81 M/UL (ref 3.8–5.2)
SAMPLES BEING HELD,HOLD: NORMAL
SODIUM SERPL-SCNC: 137 MMOL/L (ref 136–145)
TRIGL SERPL-MCNC: 156 MG/DL (ref ?–150)
TROPONIN I SERPL HS-MCNC: 9 NG/L (ref 0–37)
TSH SERPL DL<=0.05 MIU/L-ACNC: 0.21 UIU/ML (ref 0.36–3.74)
VLDLC SERPL CALC-MCNC: 31.2 MG/DL
WBC # BLD AUTO: 4.4 K/UL (ref 3.6–11)

## 2023-03-29 PROCEDURE — 74011250636 HC RX REV CODE- 250/636: Performed by: INTERNAL MEDICINE

## 2023-03-29 PROCEDURE — 4A03X5D MEASUREMENT OF ARTERIAL FLOW, INTRACRANIAL, EXTERNAL APPROACH: ICD-10-PCS | Performed by: INTERNAL MEDICINE

## 2023-03-29 PROCEDURE — 93005 ELECTROCARDIOGRAM TRACING: CPT

## 2023-03-29 PROCEDURE — 70496 CT ANGIOGRAPHY HEAD: CPT

## 2023-03-29 PROCEDURE — 36415 COLL VENOUS BLD VENIPUNCTURE: CPT

## 2023-03-29 PROCEDURE — 70551 MRI BRAIN STEM W/O DYE: CPT

## 2023-03-29 PROCEDURE — 74011250637 HC RX REV CODE- 250/637: Performed by: INTERNAL MEDICINE

## 2023-03-29 PROCEDURE — 80061 LIPID PANEL: CPT

## 2023-03-29 PROCEDURE — 83735 ASSAY OF MAGNESIUM: CPT

## 2023-03-29 PROCEDURE — 70450 CT HEAD/BRAIN W/O DYE: CPT

## 2023-03-29 PROCEDURE — 84443 ASSAY THYROID STIM HORMONE: CPT

## 2023-03-29 PROCEDURE — 74011000636 HC RX REV CODE- 636: Performed by: STUDENT IN AN ORGANIZED HEALTH CARE EDUCATION/TRAINING PROGRAM

## 2023-03-29 PROCEDURE — APPNB30 APP NON BILLABLE TIME 0-30 MINS

## 2023-03-29 PROCEDURE — 85025 COMPLETE CBC W/AUTO DIFF WBC: CPT

## 2023-03-29 PROCEDURE — 85610 PROTHROMBIN TIME: CPT

## 2023-03-29 PROCEDURE — 94762 N-INVAS EAR/PLS OXIMTRY CONT: CPT

## 2023-03-29 PROCEDURE — 0042T CT CODE NEURO PERF W CBF: CPT

## 2023-03-29 PROCEDURE — 80053 COMPREHEN METABOLIC PANEL: CPT

## 2023-03-29 PROCEDURE — 71045 X-RAY EXAM CHEST 1 VIEW: CPT

## 2023-03-29 PROCEDURE — 83036 HEMOGLOBIN GLYCOSYLATED A1C: CPT

## 2023-03-29 PROCEDURE — 74011000636 HC RX REV CODE- 636

## 2023-03-29 PROCEDURE — 65270000046 HC RM TELEMETRY

## 2023-03-29 PROCEDURE — 99285 EMERGENCY DEPT VISIT HI MDM: CPT

## 2023-03-29 PROCEDURE — 84484 ASSAY OF TROPONIN QUANT: CPT

## 2023-03-29 RX ORDER — CARVEDILOL 6.25 MG/1
6.25 TABLET ORAL 2 TIMES DAILY WITH MEALS
COMMUNITY
End: 2023-03-31

## 2023-03-29 RX ORDER — CARVEDILOL 3.12 MG/1
6.25 TABLET ORAL 2 TIMES DAILY WITH MEALS
Status: DISCONTINUED | OUTPATIENT
Start: 2023-03-29 | End: 2023-03-29

## 2023-03-29 RX ORDER — HYDRALAZINE HYDROCHLORIDE 20 MG/ML
10 INJECTION INTRAMUSCULAR; INTRAVENOUS
Status: DISCONTINUED | OUTPATIENT
Start: 2023-03-29 | End: 2023-03-31 | Stop reason: HOSPADM

## 2023-03-29 RX ORDER — ASCORBIC ACID 500 MG
500 TABLET ORAL DAILY
COMMUNITY

## 2023-03-29 RX ORDER — ATORVASTATIN CALCIUM 40 MG/1
40 TABLET, FILM COATED ORAL DAILY
Status: DISCONTINUED | OUTPATIENT
Start: 2023-03-29 | End: 2023-03-31 | Stop reason: HOSPADM

## 2023-03-29 RX ORDER — HYDROCORTISONE 1 %
CREAM (GRAM) TOPICAL 2 TIMES DAILY
COMMUNITY

## 2023-03-29 RX ORDER — CARVEDILOL 3.12 MG/1
3.12 TABLET ORAL 2 TIMES DAILY WITH MEALS
Status: DISCONTINUED | OUTPATIENT
Start: 2023-03-29 | End: 2023-03-31 | Stop reason: HOSPADM

## 2023-03-29 RX ORDER — ACETAMINOPHEN 650 MG/1
650 SUPPOSITORY RECTAL
Status: DISCONTINUED | OUTPATIENT
Start: 2023-03-29 | End: 2023-03-31 | Stop reason: HOSPADM

## 2023-03-29 RX ORDER — CITALOPRAM 20 MG/1
20 TABLET, FILM COATED ORAL DAILY
Status: DISCONTINUED | OUTPATIENT
Start: 2023-03-30 | End: 2023-03-31 | Stop reason: HOSPADM

## 2023-03-29 RX ORDER — GUAIFENESIN 100 MG/5ML
81 LIQUID (ML) ORAL DAILY
Status: DISCONTINUED | OUTPATIENT
Start: 2023-03-29 | End: 2023-03-31 | Stop reason: HOSPADM

## 2023-03-29 RX ORDER — LANOLIN ALCOHOL/MO/W.PET/CERES
5 CREAM (GRAM) TOPICAL
Status: DISCONTINUED | OUTPATIENT
Start: 2023-03-29 | End: 2023-03-31 | Stop reason: HOSPADM

## 2023-03-29 RX ORDER — DILTIAZEM HYDROCHLORIDE 120 MG/1
120 CAPSULE, COATED, EXTENDED RELEASE ORAL DAILY
Status: DISCONTINUED | OUTPATIENT
Start: 2023-03-30 | End: 2023-03-31 | Stop reason: HOSPADM

## 2023-03-29 RX ORDER — LOPERAMIDE HYDROCHLORIDE 2 MG/1
2 CAPSULE ORAL 2 TIMES DAILY
COMMUNITY

## 2023-03-29 RX ORDER — ACETAMINOPHEN 325 MG/1
650 TABLET ORAL
Status: DISCONTINUED | OUTPATIENT
Start: 2023-03-29 | End: 2023-03-31 | Stop reason: HOSPADM

## 2023-03-29 RX ORDER — CARVEDILOL 3.12 MG/1
3.12 TABLET ORAL 2 TIMES DAILY WITH MEALS
Status: DISCONTINUED | OUTPATIENT
Start: 2023-03-29 | End: 2023-03-29

## 2023-03-29 RX ADMIN — HYDRALAZINE HYDROCHLORIDE 10 MG: 20 INJECTION INTRAMUSCULAR; INTRAVENOUS at 18:29

## 2023-03-29 RX ADMIN — ASPIRIN 81 MG CHEWABLE TABLET 81 MG: 81 TABLET CHEWABLE at 17:20

## 2023-03-29 RX ADMIN — Medication 4.5 MG: at 22:06

## 2023-03-29 RX ADMIN — IOPAMIDOL 40 ML: 755 INJECTION, SOLUTION INTRAVENOUS at 14:18

## 2023-03-29 RX ADMIN — ATORVASTATIN CALCIUM 40 MG: 40 TABLET, FILM COATED ORAL at 17:19

## 2023-03-29 RX ADMIN — IOPAMIDOL 80 ML: 755 INJECTION, SOLUTION INTRAVENOUS at 14:24

## 2023-03-29 RX ADMIN — CARVEDILOL 3.12 MG: 3.12 TABLET, FILM COATED ORAL at 17:19

## 2023-03-29 RX ADMIN — APIXABAN 2.5 MG: 2.5 TABLET, FILM COATED ORAL at 18:29

## 2023-03-29 NOTE — PROGRESS NOTES
Neurocritical Care Code Stroke Documentation      Symptoms:  Blurry vision, fatigue   Baseline mRS:      Last Known Well: Bedtime last night 3/28/23   Medical hx: Past Medical History:   Diagnosis Date    Atrial fibrillation (HCC)     Atrial flutter (HCC)     Edema     Hypertension     Hypokalemia     Hypothyroidism     Insomnia     Major depressive disorder     Pain     UTI (urinary tract infection)     Vitamin D deficiency       Anticoagulation:  apixaban   VAN:  Negative   NIHSS:   1a-LOC:0    1b-Month/Age:0    1c-Open/Close Hand:0    2-Best Gaze:0    3-Visual Fields:1    4-Facial Palsy:0    5a-Left Arm:0    5b-Right Arm:0    6a-Left Le    6b-Right Le    7-Limb Ataxia:0    8-Sensory:0    9-Best Language:0    10-Dysarthria:0    11-Extinction/Inattention:0  TOTAL SCORE:1   Imaging:   CT - No acute findings, negative for hemorrhage    CTA - Basilar artery stenosis, negative for  LVO    CTP - No perfusion abnormality   Plan:   TNK Candidate: NO. LVO negative, outside TNK window    Mechanical thrombectomy Candidate: NO     *Perform dysphagia screening prior to any PO intake*    Discussed with: Dr. Bandar Root, Teleneurology    Arrival time: [facility] upon arrival 5317  Time spent: 25 minutes.      Rae Johnson, [address] [facility]  Neurocritical Care Nurse Practitioner  [phone]

## 2023-03-29 NOTE — PROGRESS NOTES
Admission Medication Reconciliation:    Information obtained from: PTA medication list updated with information provided by [facility] dated 3/29/23. Summary:     Medications added: ascorbic acid, hydrocortisone cream, loperamide, zinc oxide   Medications deleted: diphenoxylate-atropine, probiotic, nystatin, magnesium chloride   Doses changed: carvedilol 6.25 mg BID (vs 3.125 mg BID)    Inpatient orders have been reviewed and no changes are needed. Significant PMH/Disease States:   Past Medical History:   Diagnosis Date    Atrial fibrillation (HCC)     Atrial flutter (HCC)     Edema     Hypertension     Hypokalemia     Hypothyroidism     Insomnia     Major depressive disorder     Pain     UTI (urinary tract infection)     Vitamin D deficiency        Allergies:  Codeine and Sulfa (sulfonamide antibiotics)    Prior to Admission Medications:   Prior to Admission Medications   Prescriptions Last Dose Informant Patient Reported? Taking? Menthol-Zinc Oxide 0.44-20.6 % pste   Yes Yes   Sig: by Apply Externally route every other day. (Apply to open area on buttocks)   acetaminophen (TYLENOL) 325 mg tablet   Yes Yes   Sig: Take 650 mg by mouth two (2) times daily as needed for Pain. Indications: fever, pain   apixaban (ELIQUIS) 2.5 mg tablet 3/29/2023 at am  No Yes   Sig: Take 1 Tablet by mouth two (2) times a day. ascorbic acid, vitamin C, (VITAMIN C) 500 mg tablet 3/29/2023 at am  Yes Yes   Sig: Take 500 mg by mouth daily. aspirin delayed-release 81 mg tablet 3/29/2023  Yes Yes   Sig: Take 81 mg by mouth daily. carvediloL (Coreg) 6.25 mg tablet 3/29/2023 at am  Yes Yes   Sig: Take 6.25 mg by mouth two (2) times daily (with meals). cholecalciferol (VITAMIN D3) (1000 Units /25 mcg) tablet 3/29/2023 at am  Yes Yes   Sig: Take 1,000 Units by mouth daily. citalopram (CELEXA) 20 mg tablet 3/29/2023 at am  Yes Yes   Sig: Take 20 mg by mouth daily.    colestipoL (COLESTID) 1 gram tablet 3/29/2023 at am  Yes Yes   Sig: Take 5 g by mouth two (2) times a day. dilTIAZem ER (Cartia XT) 120 mg capsule 3/29/2023 at am  No Yes   Sig: Take 1 Capsule by mouth daily. HOLD FOR BP <130/65   hydrocortisone (CORTAID) 1 % topical cream 3/29/2023 at am  Yes Yes   Sig: Apply  to affected area two (2) times a day. (Apply to affected areas of the face x 14 days; last day 4/6/23). loperamide (IMODIUM) 2 mg capsule 3/29/2023 at am  Yes Yes   Sig: Take 2 mg by mouth two (2) times a day. melatonin 5 mg tablet 3/28/2023 at hs  Yes Yes   Sig: Take 5 mg by mouth nightly. potassium bicarb-citric acid (Effer-K) 20 mEq tablet 3/29/2023 at am  Yes Yes   Sig: Take 20 mEq by mouth daily. promethazine (PHENERGAN) 25 mg tablet   Yes Yes   Sig: Take 25 mg by mouth every six (6) hours as needed for Nausea. therapeutic multivitamin-minerals (THERAGRAN-M) tablet 3/29/2023 at am  Yes Yes   Sig: Take 1 Tablet by mouth daily. Facility-Administered Medications: None     Thank you for allowing me to participate in the care of this patient. Please contact the pharmacy at  or the medication reconciliation pharmacist at  with any questions. Arleen Stanley, Pharm. D., BCPS, BCPPS

## 2023-03-29 NOTE — ED NOTES
Assumed pt care. Pt denied blurred vision. Reported chronic numbness on bilateral feet. Pt able to have more sensation from the knee up. Bilateral DP pulse. Feet is cool, skin color looks appropriate for pt ethnicity. Dr Anju Spring at bedside, noticed elevated BP with the plan to allow permissive hypertension and complete MRI.

## 2023-03-29 NOTE — ED PROVIDER NOTES
22-year-old female presents initially as a level 1 neuro alert. Downgraded to level 2 neuro alert after additional history obtained. Patient states that when she woke up this morning, she had diffusely blurry vision and a tingling sensation on her right arm. Patient states that she felt normal when she went to bed last night at 8 AM which is her last known well. Patient takes Eliquis for A-fib. NIHSS 1 on arrival, one point for paresthesia. Patient reports blurred vision but does not have a true visual field deficit. Discussed with neurology who evaluated the patient and recommend admission and further evaluation for TIA. The history is provided by the patient and the EMS personnel. Past Medical History:   Diagnosis Date    Atrial fibrillation (HCC)     Atrial flutter (HCC)     Edema     Hypertension     Hypokalemia     Hypothyroidism     Insomnia     Major depressive disorder     Pain     UTI (urinary tract infection)     Vitamin D deficiency        Past Surgical History:   Procedure Laterality Date    HX APPENDECTOMY      approx age 21    HX GI  12/01/2021    bowel resection    HX ORTHOPAEDIC  05/01/2021    R hip          No family history on file.     Social History     Socioeconomic History    Marital status:      Spouse name: Not on file    Number of children: Not on file    Years of education: Not on file    Highest education level: Not on file   Occupational History    Not on file   Tobacco Use    Smoking status: Never    Smokeless tobacco: Never   Vaping Use    Vaping Use: Never used   Substance and Sexual Activity    Alcohol use: Not Currently     Alcohol/week: 3.0 standard drinks     Types: 3 Glasses of wine per week    Drug use: Never    Sexual activity: Not on file   Other Topics Concern     Service Not Asked    Blood Transfusions Not Asked    Caffeine Concern Not Asked    Occupational Exposure Not Asked    Hobby Hazards Not Asked    Sleep Concern Not Asked    Stress Concern Not Asked    Weight Concern Not Asked    Special Diet Not Asked    Back Care Not Asked    Exercise Not Asked    Bike Helmet Not Asked    Seat Belt Not Asked    Self-Exams Not Asked   Social History Narrative    Not on file     Social Determinants of Health     Financial Resource Strain: Not on file   Food Insecurity: Not on file   Transportation Needs: Not on file   Physical Activity: Not on file   Stress: Not on file   Social Connections: Not on file   Intimate Partner Violence: Not on file   Housing Stability: Not on file         ALLERGIES: Codeine and Sulfa (sulfonamide antibiotics)    Review of Systems   Constitutional:  Negative for fever. HENT:  Negative for congestion and sore throat. Eyes:  Positive for visual disturbance. Respiratory:  Negative for cough and shortness of breath. Cardiovascular:  Negative for chest pain. Gastrointestinal:  Negative for abdominal pain, diarrhea and vomiting. Genitourinary:  Negative for dysuria. Skin:  Negative for rash. Neurological:  Positive for numbness. Negative for syncope and headaches. Psychiatric/Behavioral:  Negative for confusion. Vitals:    03/29/23 1829 03/29/23 1842 03/29/23 1912 03/29/23 1914   BP: (!) 220/82 (!) 164/73  (!) 147/58   Pulse: (!) 53 (!) 58  (!) 58   Resp: 16 16  16   Temp: 98 °F (36.7 °C) 98 °F (36.7 °C)     SpO2: 98% 99%     Weight:   50.8 kg (112 lb)    Height:   5' 2\" (1.575 m)             Physical Exam  Vitals and nursing note reviewed. Constitutional:       General: She is not in acute distress. Appearance: Normal appearance. HENT:      Head: Normocephalic and atraumatic. Nose: No congestion. Mouth/Throat:      Mouth: Mucous membranes are moist.   Eyes:      Conjunctiva/sclera: Conjunctivae normal.   Cardiovascular:      Rate and Rhythm: Normal rate and regular rhythm. Pulses: Normal pulses. Pulmonary:      Effort: Pulmonary effort is normal.      Breath sounds: Normal breath sounds. Abdominal:      Palpations: Abdomen is soft. Tenderness: There is no abdominal tenderness. Musculoskeletal:         General: Normal range of motion. Skin:     General: Skin is warm and dry. Neurological:      Mental Status: She is alert and oriented to person, place, and time. Sensory: Sensory deficit present. Comments: Paresthesia R arm but sensation intact to light touch, moving all extremities with 5/5 strength, no visual field deficit but reports diffusely blurred vision   Psychiatric:         Mood and Affect: Mood normal.         Behavior: Behavior normal.        Medical Decision Making  72-year-old female with transient symptoms of blurred vision, right arm paresthesia. Patient's blood pressure was markedly elevated during this time. Discussed with neurology who recommended further work-up for possible TIA. Patient takes Eliquis for A-fib. Discussed with radiologist who read the patient's CTA and reports considerable narrowing in the basilar system. No acute LVO. Admitted to hospitalist for further care. Patient agrees with plan of care. Amount and/or Complexity of Data Reviewed  Labs: ordered. Radiology: ordered. ECG/medicine tests: ordered. Risk  Prescription drug management. Decision regarding hospitalization. ED Course as of 03/29/23 2105   Wed Mar 29, 2023   1503 Sinus bradycardia with a rate of 55 no ST or T wave abnormalities to suggest ischemia or infarct. Normal intervals, normal axis. [DA]      ED Course User Index  [DA] Harmony Rodriguez MD       Procedures    I personally reviewed and independently interpreted EKG, labs and imaging results. EKG Interpretation EKG performed at 1427 shows rate of 55, QTc 440, sinus bradycardia, occasional PACs, normal axis, no STEMI.     LABORATORY TESTS:  Admission on 03/29/2023   Component Date Value Ref Range Status    WBC 03/29/2023 4.4  3.6 - 11.0 K/uL Final    RBC 03/29/2023 3.81  3.80 - 5.20 M/uL Final    HGB 03/29/2023 11.7  11.5 - 16.0 g/dL Final    HCT 03/29/2023 37.0  35.0 - 47.0 % Final    MCV 03/29/2023 97.1  80.0 - 99.0 FL Final    MCH 03/29/2023 30.7  26.0 - 34.0 PG Final    MCHC 03/29/2023 31.6  30.0 - 36.5 g/dL Final    RDW 03/29/2023 12.5  11.5 - 14.5 % Final    PLATELET 06/90/5887 111  150 - 400 K/uL Final    MPV 03/29/2023 11.1  8.9 - 12.9 FL Final    NRBC 03/29/2023 0.0  0  WBC Final    ABSOLUTE NRBC 03/29/2023 0.00  0.00 - 0.01 K/uL Final    NEUTROPHILS 03/29/2023 61  32 - 75 % Final    LYMPHOCYTES 03/29/2023 29  12 - 49 % Final    MONOCYTES 03/29/2023 6  5 - 13 % Final    EOSINOPHILS 03/29/2023 3  0 - 7 % Final    BASOPHILS 03/29/2023 1  0 - 1 % Final    IMMATURE GRANULOCYTES 03/29/2023 0  0.0 - 0.5 % Final    ABS. NEUTROPHILS 03/29/2023 2.7  1.8 - 8.0 K/UL Final    ABS. LYMPHOCYTES 03/29/2023 1.3  0.8 - 3.5 K/UL Final    ABS. MONOCYTES 03/29/2023 0.3  0.0 - 1.0 K/UL Final    ABS. EOSINOPHILS 03/29/2023 0.1  0.0 - 0.4 K/UL Final    ABS. BASOPHILS 03/29/2023 0.0  0.0 - 0.1 K/UL Final    ABS. IMM. GRANS. 03/29/2023 0.0  0.00 - 0.04 K/UL Final    DF 03/29/2023 AUTOMATED    Final    Sodium 03/29/2023 137  136 - 145 mmol/L Final    Potassium 03/29/2023 3.8  3.5 - 5.1 mmol/L Final    Chloride 03/29/2023 108  97 - 108 mmol/L Final    CO2 03/29/2023 28  21 - 32 mmol/L Final    Anion gap 03/29/2023 1 (A)  5 - 15 mmol/L Final    Glucose 03/29/2023 103 (A)  65 - 100 mg/dL Final    BUN 03/29/2023 15  6 - 20 MG/DL Final    Creatinine 03/29/2023 1.00  0.55 - 1.02 MG/DL Final    BUN/Creatinine ratio 03/29/2023 15  12 - 20   Final    eGFR 03/29/2023 54 (A)  >60 ml/min/1.73m2 Final    Comment:      Pediatric calculator link: O[employer]io.at. org/professionals/kdoqi/gfr_calculatorped       These results are not intended for use in patients <25years of age. eGFR results are calculated without a race factor using  the 2021 CKD-EPI equation.  Careful clinical correlation is recommended, particularly when comparing to results calculated using previous equations. The CKD-EPI equation is less accurate in patients with extremes of muscle mass, extra-renal metabolism of creatinine, excessive creatine ingestion, or following therapy that affects renal tubular secretion. Calcium 03/29/2023 8.7  8.5 - 10.1 MG/DL Final    Bilirubin, total 03/29/2023 0.2  0.2 - 1.0 MG/DL Final    ALT (SGPT) 03/29/2023 19  12 - 78 U/L Final    AST (SGOT) 03/29/2023 20  15 - 37 U/L Final    Alk. phosphatase 03/29/2023 106  45 - 117 U/L Final    Protein, total 03/29/2023 7.1  6.4 - 8.2 g/dL Final    Albumin 03/29/2023 3.0 (A)  3.5 - 5.0 g/dL Final    Globulin 03/29/2023 4.1 (A)  2.0 - 4.0 g/dL Final    A-G Ratio 03/29/2023 0.7 (A)  1.1 - 2.2   Final    INR 03/29/2023 1.2 (A)  0.9 - 1.1   Final    A single therapeutic range for Vit K antagonists may not be optimal for all indications - see June, 2008 issue of Chest, American College of Chest Physicians Evidence-Based Clinical Practice Guidelines, 8th Edition. Prothrombin time 03/29/2023 12.2 (A)  9.0 - 11.1 sec Final    Troponin-High Sensitivity 03/29/2023 9  0 - 37 ng/L Final    Comment: A HS troponin value change of (+ or -) 50% or more below the 99th percentile, in a 1/2/3 hr interval represents a significant change. Clinical correlation is recommended. A HS troponin value change of (+ or -) 20% or above the 99th percentile, in a 1/2/3 hr interval represents a significant change. Clinical correlation is recommended. 99th percentile: Women 0-37 ng/L Men 0-57 ng/L  Patients taking more than 20 mg/day of biotin may have falsely negative results and should not use this test.  Method used is Siemens [employer]ia [employer] XPT      SAMPLES BEING HELD 03/29/2023 1RED   Final    COMMENT 03/29/2023 Add-on orders for these samples will be processed based on acceptable specimen integrity and analyte stability, which may vary by analyte.     Final    Cholesterol, total 03/29/2023 136  <200 MG/DL Final Triglyceride 03/29/2023 156 (A)  <150 MG/DL Final    Based on NCEP-ATP III:  Triglycerides <150 mg/dL  is considered normal, 150-199 mg/dL  borderline high,  200-499 mg/dL high and  greater than or equal to 500 mg/dL very high. HDL Cholesterol 03/29/2023 49  MG/DL Final    Based on NCEP ATP III, HDL Cholesterol <40 mg/dL is considered low and >60 mg/dL is elevated. LDL, calculated 03/29/2023 55.8  0 - 100 MG/DL Final    Comment: Based on the NCEP-ATP: LDL-C concentrations are considered  optimal <100 mg/dL,  near optimal/above Normal 100-129 mg/dL  Borderline High: 130-159, High: 160-189 mg/dL  Very High: Greater than or equal to 190 mg/dL      VLDL, calculated 03/29/2023 31.2  MG/DL Final    CHOL/HDL Ratio 03/29/2023 2.8  0.0 - 5.0   Final    Hemoglobin A1c 03/29/2023 5.0  4.0 - 5.6 % Final    Comment: NEW METHOD  PLEASE NOTE NEW REFERENCE RANGE  (NOTE)  HbA1C Interpretive Ranges  <5.7              Normal  5.7 - 6.4         Consider Prediabetes  >6.5              Consider Diabetes      Est. average glucose 03/29/2023 97  mg/dL Final    TSH 03/29/2023 0.21 (A)  0.36 - 3.74 uIU/mL Final    Comment:      Due to TSH heterogeneity, both structurally and degree of glycosylation, monoclonal antibodies used in the TSH assay may not accurately quantitate TSH. Therefore, this result should be correlated with clinical findings as well as with other assessments of thyroid function, e.g., free T4, free T3. Magnesium 03/29/2023 1.0 (A)  1.6 - 2.4 mg/dL Final       IMAGING RESULTS:  XR CHEST PORT   Final Result   No acute cardiopulmonary process. CTA CODE NEURO HEAD AND NECK W CONT   Final Result   CTA Head:   1. 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 2. No evidence of large vessel occlusion. Additional atherosclerotic disease as   above. CTA Neck:   1. 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    2. No other flow-limiting stenosis. 3. Multinodular thyroid gland. CT Brain Perfusion:   1. No acute abnormality. The findings were called to ER on 3/29/2023 2:45 PM by Jimmy Lemon MD.  789         CT CODE NEURO PERF W CBF   Final Result   CTA Head:   1. 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 2. No evidence of large vessel occlusion. Additional atherosclerotic disease as   above. CTA Neck:   1. 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 2. No other flow-limiting stenosis. 3. Multinodular thyroid gland. CT Brain Perfusion:   1. No acute abnormality. The findings were called to ER on 3/29/2023 2:45 PM by Jimmy Lemon MD.  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    No evidence of acute infarct or hemorrhage by noncontrast CT.            MRI BRAIN WO CONT    (Results Pending)       MEDICATIONS GIVEN:  Medications   acetaminophen (TYLENOL) tablet 650 mg (has no administration in time range)     Or   acetaminophen (TYLENOL) solution 650 mg (has no administration in time range)     Or   acetaminophen (TYLENOL) suppository 650 mg (has no administration in time range)   aspirin chewable tablet 81 mg (81 mg Oral Given 3/29/23 1720)   apixaban (ELIQUIS) tablet 2.5 mg (2.5 mg Oral Given 3/29/23 1829)   citalopram (CELEXA) tablet 20 mg (has no administration in time range)   dilTIAZem ER (CARDIZEM CD) capsule 120 mg (has no administration in time range)   melatonin tablet 4.5 mg (has no administration in time range)   atorvastatin (LIPITOR) tablet 40 mg (40 mg Oral Given 3/29/23 1719)   carvediloL (COREG) tablet 3.125 mg (3.125 mg Oral Given 3/29/23 1719)   hydrALAZINE (APRESOLINE) 20 mg/mL injection 10 mg (10 mg IntraVENous Given 3/29/23 1829)   iopamidoL (ISOVUE-370) 370 mg iodine /mL (76 %) injection 100 mL (80 mL IntraVENous Given 3/29/23 1424) iopamidoL (ISOVUE-370) 370 mg iodine /mL (76 %) injection 50 mL (40 mL IntraVENous Given 3/29/23 1660)       IMPRESSION:  1. Visual disturbance    2. TIA (transient ischemic attack)        PLAN:  - Admit to hospitalist    Karina Ward MD      Perfect Serve Consult for Admission  3:37 PM    ED Room Number: 661/01  Patient Name and age:  Shahnaz Reed 80 y.o.  female  Working Diagnosis:   1. Visual disturbance    2.  TIA (transient ischemic attack)

## 2023-03-29 NOTE — ED TRIAGE NOTES
TRIAGE NOTE:  Patient arrives by EMS from [facility]  with c/o htn, decreased sensation to right arm, and blurred vision. Patient on eliquis for afib.    LKW 10 AM.

## 2023-03-29 NOTE — ED NOTES
Pt in no distress. BP has improved since meds were given. MRI screening completed. Pt was changed and repositioned.

## 2023-03-29 NOTE — H&P
History and Physical    Date of Service:  3/29/2023  Primary Care Provider: Helene Powers MD  Source of information: The patient and Chart review    Chief Complaint: Blurry vision      History of Presenting Illness:   Shannan Diez is a 80 y.o. female with PMH of  Afib on Eliquis, CHFpEF, HTN presented to ED from  [facility] for evaluation of blurry vision. Patient is a poor historian. Pt c/w diarrhea for 2 weeks but says that it is chronic since she had colectomy in 12/2021. Pt denied any pain. She c/w dizziness and numbness in both feet. In ED, code stroke called for blurry vision and right arm numbness. CT head and CTA head- negative. Hospitalist consulted for admission. Spoke with a nurse from [facility] - pt had elevated BP in 200's this morning, pt c/w blurry vision and headache. She had an episode of vomiting. EMS called. She does have chronic diarrhea. REVIEW OF SYSTEMS:  Review of systems not obtained due to patient factors. Past Medical History:   Diagnosis Date    Atrial fibrillation (HCC)     Atrial flutter (HCC)     Edema     Hypertension     Hypokalemia     Hypothyroidism     Insomnia     Major depressive disorder     Pain     UTI (urinary tract infection)     Vitamin D deficiency       Past Surgical History:   Procedure Laterality Date    HX APPENDECTOMY      approx age 21    HX GI  12/01/2021    bowel resection    HX ORTHOPAEDIC  05/01/2021    R hip      Prior to Admission medications    Medication Sig Start Date End Date Taking? Authorizing Provider   carvediloL (COREG) 3.125 mg tablet Take 1 Tablet by mouth two (2) times daily (with meals). HOLD FOR BP <130/65 3/20/22   Nena Nunez MD   potassium chloride (KLOR-CON M10) 10 mEq tablet Take 20 mEq by mouth daily. Provider, Historical   food supplemt, lactose-reduced (Ensure Plus) 0.05-1.5 gram-kcal/mL liqd Take  by mouth three (3) times daily.     Provider, Historical   Arginine-Glutamine-Calcium HMB ([place]) 7-7-1.5 gram pwpk Take  by mouth two (2) times a day. Provider, Historical   dilTIAZem ER (Cartia XT) 120 mg capsule Take 1 Capsule by mouth daily. HOLD FOR BP <130/65 2/10/22   Breanna Wan MD   L.acid,para-B. bifidum-S.therm (RISAQUAD) 8 billion cell cap cap Take 1 Capsule by mouth daily. 2/10/22   Breanna Wan MD   magnesium chloride (MAG DELAY) 64 mg delayed release tablet Take 64 mg by mouth two (2) times a day. Provider, Historical   colestipoL (COLESTID) 5 gram packet Take 5 g by mouth two (2) times a day. Provider, Historical   diphenoxylate-atropine (LomotiL) 2.5-0.025 mg per tablet Take 2 Tablets by mouth four (4) times daily. Provider, Historical   promethazine (PHENERGAN) 25 mg tablet Take 25 mg by mouth every six (6) hours as needed for Nausea. Provider, Historical   acetaminophen (TYLENOL) 650 mg TbER Take 650 mg by mouth two (2) times daily as needed for Pain. Indications: fever, pain    Provider, Historical   nystatin (MYCOSTATIN) topical cream Apply 1 g to affected area two (2) times daily as needed for Skin Irritation. Provider, Historical   citalopram (CeleXA) 20 mg tablet Take 20 mg by mouth daily. Provider, Historical   therapeutic multivitamin-minerals (THERAGRAN-M) tablet Take 1 Tablet by mouth daily. Provider, Historical   apixaban (ELIQUIS) 2.5 mg tablet Take 1 Tablet by mouth two (2) times a day. 12/6/21   Abdoulaye Coto MD   aspirin 81 mg chewable tablet Take 81 mg by mouth daily. Helene Powers MD   melatonin 5 mg tablet Take 5 mg by mouth nightly. Helene Powers MD   cholecalciferol (Vitamin D3) (1000 Units /25 mcg) tablet Take 1,000 Units by mouth daily. Helene Powers MD   Hydrocolloid Dressing 4 X 5 \" bndg by Apply Externally route. Helene Powers MD     Allergies   Allergen Reactions    Codeine Unknown (comments)    Sulfa (Sulfonamide Antibiotics) Unknown (comments)      No family history on file.   Unable to obtain due to patient's factors   Social History: reports that she has never smoked. She has never used smokeless tobacco. She reports that she does not currently use alcohol after a past usage of about 3.0 standard drinks per week. She reports that she does not use drugs. Social Determinants of Health     Tobacco Use: Not on file   Alcohol Use: Not on file   Financial Resource Strain: Not on file   Food Insecurity: Not on file   Transportation Needs: Not on file   Physical Activity: Not on file   Stress: Not on file   Social Connections: Not on file   Intimate Partner Violence: Not on file   Depression: Not on file   Housing Stability: Not on file        Medications were reconciled to the best of my ability given all available resources at the time of admission. Route is PO if not otherwise noted. Family and social history were personally reviewed, all pertinent and relevant details are outlined as above.     Objective:   Visit Vitals  BP (!) 167/91   Pulse (!) 52   Temp 97.7 °F (36.5 °C)   Resp 11   Wt 55.5 kg (122 lb 5.7 oz)   SpO2 98%   BMI 21.00 kg/m²      O2 Device: None (Room air)    PHYSICAL EXAM:   General: Alert x oriented x 1-2, awake, no acute distress,   HEENT: PEERL, EOMI, moist mucus membranes  Neck: Supple, no JVD, no meningeal signs  Chest: Clear to auscultation bilaterally   CVS: RRR, S1 S2 heard, no murmurs/rubs/gallops  Abd: Soft, non-tender, non-distended, +bowel sounds   Ext: No clubbing, no cyanosis, no edema  Neuro/Psych: Pleasant mood and affect, CN 2-12 grossly intact, sensory grossly within normal limit, Strength 5/5 in all extremities  Cap refill: Brisk, less than 3 seconds  Pulses: 2+, symmetric in all extremities  Skin: Warm, dry, without rashes or lesions    Data Review:   I have independently reviewed and interpreted patient's lab and all other diagnostic data    Abnormal Labs Reviewed   METABOLIC PANEL, COMPREHENSIVE - Abnormal; Notable for the following components:       Result Value    Anion gap 1 (*)     Glucose 103 (*) eGFR 54 (*)     Albumin 3.0 (*)     Globulin 4.1 (*)     A-G Ratio 0.7 (*)     All other components within normal limits   PROTHROMBIN TIME + INR - Abnormal; Notable for the following components:    INR 1.2 (*)     Prothrombin time 12.2 (*)     All other components within normal limits       All Micro Results       None            IMAGING:   XR CHEST PORT   Final Result   No acute cardiopulmonary process. CTA CODE NEURO HEAD AND NECK W CONT   Final Result   CTA Head:   1. 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 2. No evidence of large vessel occlusion. Additional atherosclerotic disease as   above. CTA Neck:   1. 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 2. No other flow-limiting stenosis. 3. Multinodular thyroid gland. CT Brain Perfusion:   1. No acute abnormality. The findings were called to ER on 3/29/2023 2:45 PM by Axel Garcia MD.  789         CT CODE NEURO PERF W CBF   Final Result   CTA Head:   1. 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 2. No evidence of large vessel occlusion. Additional atherosclerotic disease as   above. CTA Neck:   1. 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 2. No other flow-limiting stenosis. 3. Multinodular thyroid gland. CT Brain Perfusion:   1. No acute abnormality. The findings were called to ER on 3/29/2023 2:45 PM by Axel Garcia MD.  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    No evidence of acute infarct or hemorrhage by noncontrast CT.            MRI BRAIN WO CONT    (Results Pending)        ECG/ECHO:    Results for orders placed or performed during the hospital encounter of 03/09/22   EKG, 12 LEAD, INITIAL   Result Value Ref Range    Ventricular Rate 77 BPM    Atrial Rate 77 BPM P-R Interval 150 ms    QRS Duration 70 ms    Q-T Interval 408 ms    QTC Calculation (Bezet) 461 ms    Calculated P Axis 81 degrees    Calculated R Axis -2 degrees    Calculated T Axis 66 degrees    Diagnosis       Sinus rhythm with premature atrial complexes  Nonspecific ST abnormality    When compared with ECG of 06-FEB-2022 10:48,  Sinus rhythm has replaced Atrial fibrillation  The heart rate has decreased  Confirmed by Gio Mckeon M.D., Mariya Mccormack ([id number]) on 3/10/2022 11:19:58 PM            Notes reviewed from all clinical/nonclinical/nursing services involved in patient's clinical care. Care coordination discussions were held with appropriate clinical/nonclinical/ nursing providers based on care coordination needs. Assessment:   Given the patient's current clinical presentation, there is a high level of concern for decompensation if discharged from the emergency department. Complex decision making was performed, which includes reviewing the patient's available past medical records, laboratory results, and imaging studies. Active Problems:    TIA (transient ischemic attack) (3/29/2023)        Plan:     Blurry vision - CVA vs TIA   - admit to neuro   - CT head: Sequela of chronic microvascular angiopathy and cerebral atrophy. 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 No evidence of large vessel occlusion. 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 - MRI brain and Echo ordered   - LDL 55, A1c ordered   - Neurology consulted   - c/w aspirin, started on lipitor   - SLP/PT/OT    HTN uncontrolled   - BP in 200's   - started on home meds coreg, cardizem   - will allow permissive HTN     Afib - rate controlled on coreg.  Eliquis for [facility]  CHFpEF - appears compensated   Depression - celexa   Chronic diarrhea- supportive care     DIET: DIET NPO   ISOLATION PRECAUTIONS: There are currently no Active Isolations  CODE STATUS: DNR   DVT PROPHYLAXIS: Eliquis  FUNCTIONAL STATUS PRIOR TO HOSPITALIZATION: Ambulatory and capable of self-care but relies on assistive devices (rolling walker/cane). Ambulatory status/function: Ambulates with assistance:  Walker   EARLY MOBILITY ASSESSMENT: Recommend an assessment from physical therapy and/or occupational therapy  ANTICIPATED DISCHARGE: 24-48 hours. ANTICIPATED DISPOSITION: Home with Home Healthcare  EMERGENCY CONTACT/SURROGATE DECISION MAKER: Daughter Miko Gee. Spoke with daughter and updated the pan- CVA work up.     CRITICAL CARE WAS PERFORMED FOR THIS ENCOUNTER: NO.      Signed By: Rebecca Richards MD     March 29, 2023

## 2023-03-30 ENCOUNTER — APPOINTMENT (OUTPATIENT)
Dept: NON INVASIVE DIAGNOSTICS | Age: 88
End: 2023-03-30
Attending: INTERNAL MEDICINE
Payer: MEDICARE

## 2023-03-30 LAB
ATRIAL RATE: 55 BPM
CALCULATED P AXIS, ECG09: 81 DEGREES
CALCULATED R AXIS, ECG10: 8 DEGREES
CALCULATED T AXIS, ECG11: 57 DEGREES
DIAGNOSIS, 93000: NORMAL
ECHO LV FRACTIONAL SHORTENING: 29 % (ref 28–44)
ECHO LV INTERNAL DIMENSION DIASTOLE INDEX: 3.02 CM/M2
ECHO LV INTERNAL DIMENSION DIASTOLIC: 4.5 CM (ref 3.9–5.3)
ECHO LV INTERNAL DIMENSION SYSTOLIC INDEX: 2.15 CM/M2
ECHO LV INTERNAL DIMENSION SYSTOLIC: 3.2 CM
ECHO LV IVSD: 1.2 CM (ref 0.6–0.9)
ECHO LV MASS 2D: 198.1 G (ref 67–162)
ECHO LV MASS INDEX 2D: 133 G/M2 (ref 43–95)
ECHO LV POSTERIOR WALL DIASTOLIC: 1.2 CM (ref 0.6–0.9)
ECHO LV RELATIVE WALL THICKNESS RATIO: 0.53
P-R INTERVAL, ECG05: 172 MS
Q-T INTERVAL, ECG07: 460 MS
QRS DURATION, ECG06: 86 MS
QTC CALCULATION (BEZET), ECG08: 440 MS
VENTRICULAR RATE, ECG03: 55 BPM

## 2023-03-30 PROCEDURE — 97530 THERAPEUTIC ACTIVITIES: CPT

## 2023-03-30 PROCEDURE — 74011250637 HC RX REV CODE- 250/637: Performed by: INTERNAL MEDICINE

## 2023-03-30 PROCEDURE — 93308 TTE F-UP OR LMTD: CPT

## 2023-03-30 PROCEDURE — 51798 US URINE CAPACITY MEASURE: CPT

## 2023-03-30 PROCEDURE — 97165 OT EVAL LOW COMPLEX 30 MIN: CPT

## 2023-03-30 PROCEDURE — 99222 1ST HOSP IP/OBS MODERATE 55: CPT | Performed by: PSYCHIATRY & NEUROLOGY

## 2023-03-30 PROCEDURE — 65270000046 HC RM TELEMETRY

## 2023-03-30 PROCEDURE — 97535 SELF CARE MNGMENT TRAINING: CPT

## 2023-03-30 PROCEDURE — 92610 EVALUATE SWALLOWING FUNCTION: CPT | Performed by: SPEECH-LANGUAGE PATHOLOGIST

## 2023-03-30 PROCEDURE — 74011250637 HC RX REV CODE- 250/637: Performed by: STUDENT IN AN ORGANIZED HEALTH CARE EDUCATION/TRAINING PROGRAM

## 2023-03-30 PROCEDURE — 97161 PT EVAL LOW COMPLEX 20 MIN: CPT

## 2023-03-30 RX ORDER — LOPERAMIDE HYDROCHLORIDE 2 MG/1
2 CAPSULE ORAL 2 TIMES DAILY
Status: DISCONTINUED | OUTPATIENT
Start: 2023-03-30 | End: 2023-03-30

## 2023-03-30 RX ORDER — LOPERAMIDE HYDROCHLORIDE 2 MG/1
2 CAPSULE ORAL 2 TIMES DAILY
Status: DISCONTINUED | OUTPATIENT
Start: 2023-03-30 | End: 2023-03-31 | Stop reason: HOSPADM

## 2023-03-30 RX ORDER — MONTELUKAST SODIUM 4 MG/1
5 TABLET, CHEWABLE ORAL 2 TIMES DAILY
Status: DISCONTINUED | OUTPATIENT
Start: 2023-03-30 | End: 2023-03-31 | Stop reason: HOSPADM

## 2023-03-30 RX ADMIN — DILTIAZEM HYDROCHLORIDE 120 MG: 120 CAPSULE, EXTENDED RELEASE ORAL at 10:22

## 2023-03-30 RX ADMIN — LOPERAMIDE HYDROCHLORIDE 2 MG: 2 CAPSULE ORAL at 13:00

## 2023-03-30 RX ADMIN — CARVEDILOL 3.12 MG: 3.12 TABLET, FILM COATED ORAL at 08:00

## 2023-03-30 RX ADMIN — CARVEDILOL 3.12 MG: 3.12 TABLET, FILM COATED ORAL at 17:18

## 2023-03-30 RX ADMIN — COLESTIPOL HYDROCHLORIDE 5 G: 1 TABLET, FILM COATED ORAL at 17:20

## 2023-03-30 RX ADMIN — ATORVASTATIN CALCIUM 40 MG: 40 TABLET, FILM COATED ORAL at 10:22

## 2023-03-30 RX ADMIN — Medication 4.5 MG: at 21:02

## 2023-03-30 RX ADMIN — ASPIRIN 81 MG CHEWABLE TABLET 81 MG: 81 TABLET CHEWABLE at 10:22

## 2023-03-30 RX ADMIN — APIXABAN 2.5 MG: 2.5 TABLET, FILM COATED ORAL at 17:19

## 2023-03-30 RX ADMIN — CITALOPRAM HYDROBROMIDE 20 MG: 20 TABLET ORAL at 10:22

## 2023-03-30 RX ADMIN — APIXABAN 2.5 MG: 2.5 TABLET, FILM COATED ORAL at 10:21

## 2023-03-30 NOTE — ED NOTES
TRANSFER - OUT REPORT:    Verbal report given to Kaity(name) on Swathi Headings  being transferred to NSTU(unit) for routine progression of care       Report consisted of patients Situation, Background, Assessment and   Recommendations(SBAR). Information from the following report(s) SBAR, Kardex, ED Summary, Intake/Output, MAR, and Cardiac Rhythm Normal Sinus  was reviewed with the receiving nurse. Lines:   Peripheral IV 03/29/23 Left Antecubital (Active)       Peripheral IV 03/29/23 Right Antecubital (Active)        Opportunity for questions and clarification was provided.       Patient transported with:   [employer] Complex Repair And Tissue Cultured Epidermal Autograft Text: The defect edges were debeveled with a #15 scalpel blade.  The primary defect was closed partially with a complex linear closure.  Given the location of the defect, shape of the defect and the proximity to free margins an tissue cultured epidermal autograft was deemed most appropriate to repair the remaining defect.  The graft was trimmed to fit the size of the remaining defect.  The graft was then placed in the primary defect, oriented appropriately, and sutured into place.

## 2023-03-30 NOTE — PROGRESS NOTES
SPEECH PATHOLOGY BEDSIDE SWALLOW EVALUATION/DISCHARGE  Patient: Carlos A Peng (28 y.o. female)  Date: 3/30/2023  Primary Diagnosis: TIA (transient ischemic attack) [G45.9]       Precautions: fall       ASSESSMENT :  Based on the objective data described below, the patient presents with no oral or pharyngeal dysphagia with swallow function consistent with FEES completed 11/22 which revealed some backflow from the esophagus that resulted in aspiration with thin liquids that cleared spontaneously. Patient reports she has not seen GI since last admission but that she does feel like food comes back up at times. Reports she cannot find a doctor she likes or a \"real doctor\" so she hasn't gone for follow up. She tolerated purees, solids and thin liquids including successive sips without any s/s of aspiration or complaints of difficulty. Oropharyngeal swallow remains functional for age. Skilled acute therapy provided by a speech-language pathologist is not indicated at this time. PLAN :  Recommendations:  --recommend regular diet. No further SLP needs at this time. Would consider GI consult for patient reports of vomiting/regurgitations. Discharge Recommendations: None     SUBJECTIVE:   Patient stated my doctor retired and I haven't been able to find a real doctor since.  When asked if she'd follow up with GI as she complains of some vomiting/regurgitations after she eats    OBJECTIVE:     Past Medical History:   Diagnosis Date    Atrial fibrillation ([address]     Atrial flutter ([address]     Edema     Hypertension     Hypokalemia     Hypothyroidism     Insomnia     Major depressive disorder     Pain     UTI (urinary tract infection)     Vitamin D deficiency      Past Surgical History:   Procedure Laterality Date    HX APPENDECTOMY      approx age 21    HX GI  12/01/2021    bowel resection    HX ORTHOPAEDIC  05/01/2021    R hip      Prior Level of Function/Home Situation:   Home Situation  Patient Expects to be Discharged to[de-identified] Group home  Diet prior to admission: regular  Current Diet:  NPO   Cognitive and Communication Status:  Neurologic State: Alert, Eyes open spontaneously  Orientation Level: Oriented X4, Other (Comment) (forgetful)  Cognition: Follows commands           Oral Assessment:  Oral Assessment  Labial: No impairment  Dentition: Natural  Oral Hygiene: moist mucosa  Lingual: No impairment  Mandible: No impairment  P.O. Trials:  Patient Position: upright in bed  Vocal quality prior to P.O.: No impairment  Consistency Presented: Thin liquid; Solid;Puree  How Presented: Self-fed/presented;SLP-fed/presented;Straw;Spoon; Successive swallows     Bolus Acceptance: No impairment  Bolus Formation/Control: No impairment     Propulsion: No impairment  Oral Residue: None  Initiation of Swallow: No impairment  Laryngeal Elevation: Functional  Aspiration Signs/Symptoms: None  Pharyngeal Phase Characteristics: No impairment, issues, or problems              Oral Phase Severity: No impairment  Pharyngeal Phase Severity : No impairment  NOMS:   The NOMS functional outcome measure was used to quantify this patient's level of swallowing impairment. Based on the NOMS, the patient was determined to be at level 7 for swallow function     NOMS Swallowing Levels:  Level 1 (CN): NPO  Level 2 (CM): NPO but takes consistency in therapy  Level 3 (CL): Takes less than 50% of nutrition p.o. and continues with nonoral feedings; and/or safe with mod cues; and/or max diet restriction  Level 4 (CK): Safe swallow but needs mod cues; and/or mod diet restriction; and/or still requires some nonoral feeding/supplements  Level 5 (CJ): Safe swallow with min diet restriction; and/or needs min cues  Level 6 (CI): Independent with p.o.; rare cues; usually self cues; may need to avoid some foods or needs extra time  Level 7 ([address] [place] [address]): Independent for all p.o.  GARDENIA. (2003).  National Outcomes Measurement System (NOMS): Adult Speech-Language Pathology User's Guide.       Pain:  Pain Scale 1: Numeric (0 - 10)  Pain Intensity 1: 0     After treatment:   Patient left in no apparent distress in bed, Call bell within reach, Nursing notified, and Caregiver / family present    COMMUNICATION/EDUCATION:   Patient was educated regarding her functional swallow as this relates to her diagnosis of CVA workup. She demonstrated Good understanding as evidenced by verbalization of understanding. The patient's plan of care including recommendations, planned interventions, and recommended diet changes were discussed with: Registered nurse. Thank you for this referral.  Delia Marion M.CD.  CCC-SLP   Time Calculation: 15 mins

## 2023-03-30 NOTE — CONSULTS
[address]    Name:  Deanna rAt  MR#:  [id number]  :  1933  ACCOUNT #:  [de-identified]  DATE OF SERVICE:  2023      REQUESTING PHYSICIAN:  Debra Lopez MD    REASON FOR REEVALUATION:  Headache and blurry vision. HISTORY OF PRESENT ILLNESS:  The patient is a 70-year-old female with atrial fibrillation, on anticoagulation with Eliquis, congestive heart failure, hypertension, who lives at an assisted living facility. She states that she has had a few falls over the past few years and uses a walker at home. She fell several days ago and has been experiencing some headache related to it. Yesterday, she was having nausea and vomiting which has also been going on for a few days. In addition, she has chronic diarrhea since she had colectomy in 2021. She could not focus and read what was written on the board and the staff got concerned and she was sent into the hospital for further evaluation. She feels much better today and denies any further headache, changes in vision, problems with swallowing, or any new focal motor or sensory deficits. PAST MEDICAL HISTORY:  As mentioned above. HOME MEDICATIONS:  1.  Carvedilol. 2.  Lomotil as needed. 3.  Phenergan as needed. 4.  Escitalopram.  5.  Eliquis. 6.  Aspirin 81 mg.  7.  Melatonin. 8.  Vitamin D. ALLERGIES:  CODEINE AND SULFA DRUGS. SOCIAL HISTORY:  Lives at an assisted living. Denies any smoking. May use two or three alcoholic beverages per week. PHYSICAL EXAMINATION:  GENERAL:  The patient is alert, fully oriented, able to answer all questions and follows commands. VITAL SIGNS:  Blood pressure 158/59, temperature 97.7, pulse is 73. HEART:  Regular rate and rhythm. CHEST:  Clear. ABDOMEN:  Soft, nontender. Positive bowel sounds. EXTREMITIES:  No edema. NEUROLOGIC:  Speech is clear. Comprehension is normal.  Pupils are equal, round, and reactive. Extraocular movements are full. Face is symmetric. Tongue is midline. Hearing is baseline. Muscle tone and bulk normal.  She is able to raise both arms against gravity and moves both legs well. DTRs hypoactive and symmetric. Toes are downgoing. Sensation is intact. Gait exam is deferred. LABORATORY DATA:  CBC is unremarkable. Chemistry:  Sodium 137, potassium 3.8, BUN 15, creatinine 1.0, magnesium 1.0. LFTs:  Triglycerides 156, HDL 49, LDL 55.8. CTA of the head and neck showed age-indeterminate occlusion of the right vertebral artery at its origin with reconstitution distally. 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 MRI scan of the brain shows chronic microvascular ischemic changes without any acute infarction. ASSESSMENT AND PLAN:  A 60-year-old female with atrial fibrillation, on anticoagulation, hypertension, congestive heart failure, who states that she has been having nausea and vomiting for a few days. Then she developed some blurring of vision and headache yesterday, which she attributes to her recent fall. She was admitted for stroke workup. MRI scan of the brain is negative. Currently, she is fairly alert, oriented, and back to her baseline. My suspicion for a cerebrovascular event causing her headache, nausea, and vomiting remains low. She is currently on apixaban and low-dose aspirin and recommend continuing those for secondary stroke prophylaxis. We may initiate discharge disposition. Of note, the patient does not wish to return back to her current assisted living and wants to explore alternative options. Please call with any questions. Thank you for this consultation.       Junie Murillo MD      [id number]  D:  03/30/2023 13:43  T:  03/30/2023 14:51  JOB #:  [id number]

## 2023-03-30 NOTE — PROGRESS NOTES
Problem: Pressure Injury - Risk of  Goal: *Prevention of pressure injury  Description: Document Brian Scale and appropriate interventions in the flowsheet. Outcome: Progressing Towards Goal  Note: Pressure Injury Interventions:  Sensory Interventions: Assess changes in LOC, Avoid rigorous massage over bony prominences, Check visual cues for pain, Float heels, Keep linens dry and wrinkle-free, Turn and reposition approx. every two hours (pillows and wedges if needed), Pad between skin to skin, Minimize linen layers    Moisture Interventions: Absorbent underpads, Apply protective barrier, creams and emollients, Internal/External urinary devices, Limit adult briefs, Maintain skin hydration (lotion/cream), Check for incontinence Q2 hours and as needed    Activity Interventions: Increase time out of bed, Pressure redistribution bed/mattress(bed type), PT/OT evaluation, Assess need for specialty bed    Mobility Interventions: Assess need for specialty bed, Float heels, Pressure redistribution bed/mattress (bed type), PT/OT evaluation, Turn and reposition approx.  every two hours(pillow and wedges)    Nutrition Interventions: Document food/fluid/supplement intake, Offer support with meals,snacks and hydration

## 2023-03-30 NOTE — PROGRESS NOTES
Problem: Mobility Impaired (Adult and Pediatric)  Goal: *Acute Goals and Plan of Care (Insert Text)  Description: FUNCTIONAL STATUS PRIOR TO ADMISSION: Pt is from [facility]. Staff assist with mobility and ADLs. Pt poor historian and unable to articulate how much she was mobilizing at home and how much assistance she required. HOME SUPPORT PRIOR TO ADMISSION: Staff from [facility] assist.  Daughter also involved in her care. Physical Therapy Goals  Initiated 3/30/2023  1. Patient will move from supine to sit and sit to supine , scoot up and down, and roll side to side in bed with supervision/set-up within 7 day(s). 2.  Patient will transfer from bed to chair and chair to bed with CGA using the least restrictive device within 7 day(s). 3.  Patient will perform sit to stand with CGA within 7 day(s). 4.  Patient will ambulate with Polo for 15 feet with the least restrictive device within 7 day(s). Outcome: Not Met  PHYSICAL THERAPY EVALUATION  Patient: Bright Myers (89 y.o. female)  Date: 3/30/2023  Primary Diagnosis: TIA (transient ischemic attack) [G45.9]       Precautions: Fall    ASSESSMENT  Based on the objective data described below, the patient presents with generalized weakness, impaired balance, decreased activity tolerance, confusion, and overall decline in functional mobility s/p admission for TIA - imaging negative at the time of this evaluation. Pt is from [facility]. Staff assist with mobility and ADLs. Pt poor historian and unable to articulate how much she was mobilizing at home and how much assistance she required. This date, she transferred supine<>sit with Polo and sit<>stand with min-modA x2 using RW. She required constant assistance for static standing balance for ~1 minute. Pt took a few sidesteps along EOB with Polo x2 and use of RW with narrow ANGUS. Returned to bed and left in chair position in bed with all needs met. Recommending return to [facility] with [place] PT upon discharge. Current Level of Function Impacting Discharge (mobility/balance): min-modA x2 for sit<>stand transfers    Functional Outcome Measure: The patient scored 3/56 on the Galvan outcome measure which is indicative of high fall risk. Other factors to consider for discharge: from [facility], fall risk     Patient will benefit from skilled therapy intervention to address the above noted impairments. PLAN :  Recommendations and Planned Interventions: bed mobility training, transfer training, gait training, therapeutic exercises, neuromuscular re-education, patient and family training/education, and therapeutic activities      Frequency/Duration: Patient will be followed by physical therapy:  3 times a week to address goals. Recommendation for discharge: (in order for the patient to meet his/her long term goals)  Return to [facility] with Physical therapy at least 2 days/week in the home     This discharge recommendation:  Has not yet been discussed the attending provider and/or case management    IF patient discharges home will need the following DME: patient owns DME required for discharge         SUBJECTIVE:   Patient stated You are so kind and so professional.  Thank you for helping me.     OBJECTIVE DATA SUMMARY:   HISTORY:    Past Medical History:   Diagnosis Date    Atrial fibrillation (HCC)     Atrial flutter (HCC)     Edema     Hypertension     Hypokalemia     Hypothyroidism     Insomnia     Major depressive disorder     Pain     UTI (urinary tract infection)     Vitamin D deficiency      Past Surgical History:   Procedure Laterality Date    HX APPENDECTOMY      approx age 21    HX GI  12/01/2021    bowel resection    HX ORTHOPAEDIC  05/01/2021    R hip      Home Situation  Support Systems: Assisted Living  Patient Expects to be Discharged to[de-identified] Assisted Living    EXAMINATION/PRESENTATION/DECISION MAKING:   Critical Behavior:  Neurologic State: Alert  Orientation Level: Appropriate for age  Cognition: [de-identified] commands, Memory loss  Safety/Judgement: Awareness of environment  Hearing: Auditory  Auditory Impairment: Hard of hearing, bilateral    Range Of Motion:  AROM: Generally decreased, functional           PROM: Generally decreased, functional           Strength:    Strength: Generally decreased, functional                    Tone & Sensation:   Tone: Normal              Sensation: Intact               Coordination:  Coordination: Generally decreased, functional  Vision:   Acuity: Within Defined Limits  Functional Mobility:  Bed Mobility:  Rolling: Minimum assistance  Supine to Sit: Minimum assistance  Sit to Supine: Minimum assistance  Scooting: Minimum assistance    Transfers:  Sit to Stand: Minimum assistance; Moderate assistance;Assist x2  Stand to Sit: Minimum assistance; Moderate assistance;Assist x2    Balance:   Sitting: Impaired  Sitting - Static: Good (unsupported)  Sitting - Dynamic: Good (unsupported)  Standing: Impaired; With support  Standing - Static: Poor;Constant support  Standing - Dynamic : Poor;Constant support    Functional Measure:  Galvan Balance Test:    Sitting to Standin  Standing Unsupported: 0  Sitting with Back Unsupported: 3  Standing to Sittin  Transfers: 0  Standing Unsupported with Eyes Closed: 0  Standing Unsupported with Feet Together: 0  Reach Forward with Outstretched Arm: 0   Object: 0  Turn to Look Over Shoulders: 0  Turn 360 Degrees: 0  Alternate Foot on Step/Stool: 0  Standing Unsupported One Foot in Front: 0  Stand on One Le  Total: 3/56         56=Maximum possible score;   0-20=High fall risk  21-40=Moderate fall risk   41-56=Low fall risk     Activity Tolerance:   Poor    After treatment patient left in no apparent distress:   Supine in bed, Call bell within reach, Bed / chair alarm activated, and Side rails x 3    COMMUNICATION/EDUCATION:   The patients plan of care was discussed with: Occupational therapist and Registered nurse.      Fall prevention education was provided and the patient/caregiver indicated understanding., Patient/family have participated as able in goal setting and plan of care. , and Patient/family agree to work toward stated goals and plan of care.     Thank you for this referral.  Elly Cannon, PT, DPT   Time Calculation: 23 mins

## 2023-03-30 NOTE — CONSULTS
Consult dictated. 59-year-old female with atrial fibrillation on anticoagulation, congestive heart failure, hypertension who is a poor historian. States that she had been having nausea and vomiting for a few days, developed some blurring of vision and headache which she attributes to her recent fall. Admitted for stroke work-up. MRI brain is negative. Currently she is fairly alert, oriented and back to her baseline. No change to her overall management for secondary stroke prophylaxis. Initiate discharge disposition. Patient states that she does not wish to return back to her current assisted living.    Janee Rollins MD

## 2023-03-30 NOTE — PROGRESS NOTES
Problem: Pressure Injury - Risk of  Goal: *Prevention of pressure injury  Description: Document Brian Scale and appropriate interventions in the flowsheet. Outcome: Progressing Towards Goal  Note: Pressure Injury Interventions:  Sensory Interventions: Assess changes in LOC, Assess need for specialty bed, Keep linens dry and wrinkle-free, Maintain/enhance activity level, Turn and reposition approx. every two hours (pillows and wedges if needed)    Moisture Interventions: Absorbent underpads, Apply protective barrier, creams and emollients, Internal/External urinary devices, Limit adult briefs, Maintain skin hydration (lotion/cream), Check for incontinence Q2 hours and as needed    Activity Interventions: Increase time out of bed, Pressure redistribution bed/mattress(bed type), PT/OT evaluation    Mobility Interventions: HOB 30 degrees or less, Pressure redistribution bed/mattress (bed type), PT/OT evaluation, Turn and reposition approx. every two hours(pillow and wedges)    Nutrition Interventions: Document food/fluid/supplement intake                     Problem: Patient Education: Go to Patient Education Activity  Goal: Patient/Family Education  Outcome: Progressing Towards Goal     Problem: Falls - Risk of  Goal: *Absence of Falls  Description: Document Su Carson Fall Risk and appropriate interventions in the flowsheet.   Outcome: Progressing Towards Goal  Note: Fall Risk Interventions:  Mobility Interventions: Bed/chair exit alarm, Patient to call before getting OOB, Strengthening exercises (ROM-active/passive), Utilize walker, cane, or other assistive device         Medication Interventions: Bed/chair exit alarm, Evaluate medications/consider consulting pharmacy, Patient to call before getting OOB, Teach patient to arise slowly    Elimination Interventions: Bed/chair exit alarm, Call light in reach, Patient to call for help with toileting needs, Toileting schedule/hourly rounds              Problem: Patient Education: Go to Patient Education Activity  Goal: Patient/Family Education  Outcome: Progressing Towards Goal     Problem: Falls - Risk of  Goal: *Absence of Falls  Description: Document Johnsonzier Scheuermann Fall Risk and appropriate interventions in the flowsheet.   Outcome: Progressing Towards Goal  Note: Fall Risk Interventions:  Mobility Interventions: Bed/chair exit alarm, Patient to call before getting OOB, Strengthening exercises (ROM-active/passive), Utilize walker, cane, or other assistive device         Medication Interventions: Bed/chair exit alarm, Evaluate medications/consider consulting pharmacy, Patient to call before getting OOB, Teach patient to arise slowly    Elimination Interventions: Bed/chair exit alarm, Call light in reach, Patient to call for help with toileting needs, Toileting schedule/hourly rounds              Problem: Patient Education: Go to Patient Education Activity  Goal: Patient/Family Education  Outcome: Progressing Towards Goal     Problem: Discharge Planning  Goal: *Discharge to safe environment  Outcome: Progressing Towards Goal     Problem: Patient Education: Go to Patient Education Activity  Goal: Patient/Family Education  Outcome: Progressing Towards Goal     Problem: Patient Education: Go to Patient Education Activity  Goal: Patient/Family Education  Outcome: Progressing Towards Goal

## 2023-03-30 NOTE — PROGRESS NOTES
[facility] Adult  Hospitalist Group                                                                                          Hospitalist Progress Note  Makayla Ceballos MD  Answering service: [phone] from in house phone        Date of Service:  3/30/2023  NAME:  Dena Levy  :  3/13/1933  MRN:  [id number]       Admission Summary:   Dena Levy is a 80 y.o. female with PMH of  Afib on Eliquis, CHFpEF, HTN presented to ED from  [facility] for evaluation of blurry vision. Patient is a poor historian. Pt c/w diarrhea for 2 weeks but says that it is chronic since she had colectomy in 2021. Pt denied any pain. She c/w dizziness and numbness in both feet. In ED, code stroke called for blurry vision and right arm numbness. CT head and CTA head- negative. Hospitalist consulted for admission. Spoke with a nurse from [facility] - pt had elevated BP in 200's this morning, pt c/w blurry vision and headache. She had an episode of vomiting. EMS called. She does have chronic diarrhea. Interval history / Subjective:   Patient seen and examined earlier this morning by me for follow-up of blurry vision and weakness. Patient denies any blurry vision or weakness at the time my exam.  She reports chronic diarrhea and just that she has been \"feeling sick\" lately. Patient is a poor historian and most of her complaints seem to be related to chronic conditions and other factors outside of her control. Assessment & Plan:     Blurry vision - CVA vs TIA   - admit to neuro   - CT head: Sequela of chronic microvascular angiopathy and cerebral atrophy. 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 No evidence of large vessel occlusion. 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    -MRI of the brain is negative for acute stroke  -Echocardiogram looks fine  - c/w aspirin, started on lipitor   - SLP/PT/OT. -Appreciate neurology recommendations, no further changes to management  -Recommendations are to return to assisted living with home health for PT and OT 2 days a week     HTN uncontrolled   - started on home meds coreg, cardizem   - will allow permissive HTN   -Blood pressure slightly elevated today    Likely undiagnosed dementia  -Continue to monitor     Afib - rate controlled on coreg. Eliquis for [facility]    CHFpEF - appears compensated     Depression - celexa     Chronic diarrhea- supportive care   -Home Imodium and colestipol  Recommend outpatient follow-up    History of right colectomy with extended small bowel resection on 11/15/2021 for bowel ischemia  -Patient has had chronic diarrhea since the surgery     Code status: DNR  Prophylaxis: Eliquis  Care Plan discussed with: Patient, nurse, family  Anticipated Disposition: 24 hours. Back to [facility] with home health. I discussed with the patient's daughter. The patient's daughter says that this patient was likely has dementia and a lot of O2 sats is unreliable. We discussed the patient's symptoms and her chronic conditions. Family in agreement with likely discharge tomorrow back to assisted living. Inpatient  Cardiac monitoring: Telemetry     Hospital Problems  Date Reviewed: 3/9/2022            Codes Class Noted POA    TIA (transient ischemic attack) ICD-10-CM: G45.9  ICD-9-CM: 435.9  3/29/2023 Unknown           Social Determinants of Health     Tobacco Use: Not on file   Alcohol Use: Not on file   Financial Resource Strain: Not on file   Food Insecurity: Not on file   Transportation Needs: Not on file   Physical Activity: Not on file   Stress: Not on file   Social Connections: Not on file   Intimate Partner Violence: Not on file   Depression: Not on file   Housing Stability: Not on file       Review of Systems:   A comprehensive review of systems was negative except for that written in the HPI.        Vital Signs: Last 24hrs VS reviewed since prior progress note. Most recent are:  Visit Vitals  BP (!) 151/52 (BP 1 Location: Left upper arm, BP Patient Position: Semi fowlers)   Pulse 68   Temp 98.4 °F (36.9 °C)   Resp 16   Ht 5' 2\" (1.575 m)   Wt 50.8 kg (111 lb 15.9 oz)   SpO2 98%   BMI 20.48 kg/m²         Intake/Output Summary (Last 24 hours) at 3/30/2023 1640  Last data filed at 3/30/2023 0601  Gross per 24 hour   Intake --   Output 375 ml   Net -375 ml        Physical Examination:     I had a face to face encounter with this patient and independently examined them on 3/30/2023 as outlined below:          General : alert x 3, awake, no acute distress,   HEENT: PEERL, EOMI, moist mucus membrane  Neck: supple, no JVD, no meningeal signs  Chest: Clear to auscultation bilaterally   CVS: S1 S2 heard, Capillary refill less than 2 seconds  Abd: soft/ non tender, non distended, BS physiological,   Ext: no clubbing, no cyanosis, no edema, brisk 2+ DP pulses  Neuro/Psych: pleasant mood and affect, CN 2-12 grossly intact, sensory grossly within normal limit, Strength 5/5 in all extremities  Skin: warm     Data Review:    Review and/or order of clinical lab test  Review and/or order of tests in the radiology section of CPT  Review and/or order of tests in the medicine section of CPT      I have personally and independently reviewed all pertinent labs, diagnostic studies, imaging, and have provided independent interpretation of the same. Labs:     Recent Labs     03/29/23  1409   WBC 4.4   HGB 11.7   HCT 37.0        Recent Labs     03/29/23  1409      K 3.8      CO2 28   BUN 15   CREA 1.00   *   CA 8.7   MG 1.0*     Recent Labs     03/29/23  1409   ALT 19      TBILI 0.2   TP 7.1   ALB 3.0*   GLOB 4.1*     Recent Labs     03/29/23  1409   INR 1.2*   PTP 12.2*      No results for input(s): FE, TIBC, PSAT, FERR in the last 72 hours.    Lab Results   Component Value Date/Time    Folate 29.1 (H) 03/11/2022 04:18 AM      No results for input(s): PH, PCO2, PO2 in the last 72 hours. No results for input(s): CPK, CKNDX, TROIQ in the last 72 hours. No lab exists for component: CPKMB  Lab Results   Component Value Date/Time    Cholesterol, total 136 03/29/2023 02:09 PM    HDL Cholesterol 49 03/29/2023 02:09 PM    LDL, calculated 55.8 03/29/2023 02:09 PM    Triglyceride 156 (H) 03/29/2023 02:09 PM    CHOL/HDL Ratio 2.8 03/29/2023 02:09 PM     Lab Results   Component Value Date/Time    Glucose (POC) 75 02/06/2022 11:08 PM    Glucose (POC) 78 02/06/2022 01:07 PM    Glucose (POC) 102 12/07/2021 06:45 AM    Glucose (POC) 105 12/02/2021 05:39 AM    Glucose (POC) 161 (H) 12/01/2021 11:27 PM     Lab Results   Component Value Date/Time    Color YELLOW/STRAW 03/09/2022 05:26 PM    Appearance TURBID (A) 03/09/2022 05:26 PM    Specific gravity 1.020 03/09/2022 05:26 PM    Specific gravity 1.015 02/06/2022 11:33 AM    pH (UA) 5.0 03/09/2022 05:26 PM    Protein 30 (A) 03/09/2022 05:26 PM    Glucose Negative 03/09/2022 05:26 PM    Ketone Negative 03/09/2022 05:26 PM    Bilirubin Negative 03/09/2022 05:26 PM    Urobilinogen 0.2 03/09/2022 05:26 PM    Nitrites Negative 03/09/2022 05:26 PM    Leukocyte Esterase LARGE (A) 03/09/2022 05:26 PM    Epithelial cells FEW 03/09/2022 05:26 PM    Bacteria 2+ (A) 03/09/2022 05:26 PM    WBC >100 (H) 03/09/2022 05:26 PM    RBC 0-5 03/09/2022 05:26 PM       Notes reviewed from all clinical/nonclinical/nursing services involved in patient's clinical care. Care coordination discussions were held with appropriate clinical/nonclinical/ nursing providers based on care coordination needs. Patients current active Medications were reviewed, considered, added and adjusted based on the clinical condition today. Home Medications were reconciled to the best of my ability given all available resources at the time of admission. Route is PO if not otherwise noted.       Admission Status:[id number]:::1}      Medications Reviewed:     Current Facility-Administered Medications   Medication Dose Route Frequency    loperamide (IMODIUM) capsule 2 mg  2 mg Oral BID    colestipoL (COLESTID) tablet 5 g  5 g Oral BID    acetaminophen (TYLENOL) tablet 650 mg  650 mg Oral Q4H PRN    Or    acetaminophen (TYLENOL) solution 650 mg  650 mg Per NG tube Q4H PRN    Or    acetaminophen (TYLENOL) suppository 650 mg  650 mg Rectal Q4H PRN    aspirin chewable tablet 81 mg  81 mg Oral DAILY    apixaban (ELIQUIS) tablet 2.5 mg  2.5 mg Oral BID    citalopram (CELEXA) tablet 20 mg  20 mg Oral DAILY    dilTIAZem ER (CARDIZEM CD) capsule 120 mg  120 mg Oral DAILY    melatonin tablet 4.5 mg  4.5 mg Oral QHS    atorvastatin (LIPITOR) tablet 40 mg  40 mg Oral DAILY    carvediloL (COREG) tablet 3.125 mg  3.125 mg Oral BID WITH MEALS    hydrALAZINE (APRESOLINE) 20 mg/mL injection 10 mg  10 mg IntraVENous Q6H PRN     ______________________________________________________________________  EXPECTED LENGTH OF STAY: - - -  ACTUAL LENGTH OF STAY:          Isaiah [address] Adali Grullon MD

## 2023-03-30 NOTE — PROGRESS NOTES
Problem: Self Care Deficits Care Plan (Adult)  Goal: *Acute Goals and Plan of Care (Insert Text)  Description: FUNCTIONAL STATUS PRIOR TO ADMISSION: Patient is questionable historian. Report receiving up to max A for ADLs and assist for transfers from staff at [facility]. Reports being able to engage in UB ADLs and seated grooming tasks with little to no assistance. Reports transferring to a wheelchair that is broken. HOME SUPPORT: Patient is resident at [facility]. Occupational Therapy Goals  Initiated 3/30/2023  1. Patient will perform upper body dressing with moderate assistance  within 7 day(s). 2.  Patient will perform lower body dressing with maximal assistance within 7 day(s). 3.  Patient will perform grooming with supervision/set-up within 7 day(s). 4.  Patient will perform toilet transfers with moderate assistance  within 7 day(s). 5.  Patient will perform all aspects of toileting with moderate assistance  within 7 day(s). 6.  Patient will participate in upper extremity therapeutic exercise/activities with supervision/set-up for 5 minutes within 7 day(s). 7.  Patient will utilize energy conservation techniques during functional activities with verbal cues within 7 day(s). Outcome: Progressing Towards Goal   OCCUPATIONAL THERAPY EVALUATION  Patient: Cori Yost (79 y.o. female)  Date: 3/30/2023  Primary Diagnosis: TIA (transient ischemic attack) [G45.9]       Precautions:   Fall    ASSESSMENT  Based on the objective data described below, the patient presents with memory loss, decreased endurance, and generalized weakness following admission for blurry vision and RUE numbness/ CVA workup. Per chart MRI negative for CVA. Session limited by significant loose bowel incontinence, requiring up to total A to manage toileting hygiene and dressing tasks. Patient is questionable historian throughout evaluation, however agreeable and pleasant.  Patient able to tolerate standing to allow for hygiene tasks to be complete, brief side stepping completed before returned to supine in bed. At conclusion of session patient engaged in grooming tasks limited by BUE tremors requiring up to min A. All needs met at conclusion of session, patient remained in modified chair position. Recommend return to [facility] at [place]. Current Level of Function Impacting Discharge (ADLs/self-care):   Feeding: Setup    Oral Facial Hygiene/Grooming: Minimum assistance    Bathing: Maximum assistance    Upper Body Dressing: Moderate assistance    Lower Body Dressing: Maximum assistance    Toileting: Total assistance       Functional Outcome Measure: The patient scored 13/24 on the [facility] outcome measure which is indicative of higher odds of discharging home with home health or need of SNF/IPR. Other factors to consider for discharge: lives at [facility], has supportive daughter     Patient will benefit from skilled therapy intervention to address the above noted impairments. PLAN :  Recommendations and Planned Interventions: self care training, functional mobility training, therapeutic exercise, balance training, therapeutic activities, endurance activities, neuromuscular re-education, patient education, home safety training, and family training/education    Frequency/Duration: Patient will be followed by occupational therapy 3 times a week to address goals. Recommendation for discharge: (in order for the patient to meet his/her long term goals)  Return to [facility]    This discharge recommendation:  Has been made in collaboration with the attending provider and/or case management    IF patient discharges home will need the following DME: patient owns DME/facility provides DME       SUBJECTIVE:   Patient stated You all are so friendly.     OBJECTIVE DATA SUMMARY:   HISTORY:   Past Medical History:   Diagnosis Date    Atrial fibrillation ([address]     Atrial flutter (HCC)     Edema     Hypertension     Hypokalemia     Hypothyroidism Insomnia     Major depressive disorder     Pain     UTI (urinary tract infection)     Vitamin D deficiency      Past Surgical History:   Procedure Laterality Date    HX APPENDECTOMY      approx age 21    HX GI  12/01/2021    bowel resection    HX ORTHOPAEDIC  05/01/2021    R hip        Expanded or extensive additional review of patient history:     Home Situation  Support Systems: Assisted Living  Patient Expects to be Discharged to[de-identified] Assisted Living    Hand dominance: Right    EXAMINATION OF PERFORMANCE DEFICITS:  Cognitive/Behavioral Status:  Neurologic State: Alert  Orientation Level: Appropriate for age  Cognition: Follows commands;Memory loss  Perception: Appears intact  Perseveration: No perseveration noted  Safety/Judgement: Awareness of environment      Hearing: Auditory  Auditory Impairment: Hard of hearing, bilateral    Vision/Perceptual:                           Acuity: Within Defined Limits         Range of Motion:    AROM: Generally decreased, functional  PROM: Generally decreased, functional                      Strength:    Strength: Generally decreased, functional                Coordination:  Coordination: Generally decreased, functional  Fine Motor Skills-Upper: Left Intact; Right Intact (BUE tremors)    Gross Motor Skills-Upper: Left Intact; Right Intact    Tone & Sensation:    Tone: Normal  Sensation: Intact                      Balance:  Sitting: Impaired  Sitting - Static: Good (unsupported)  Sitting - Dynamic: Good (unsupported)  Standing: Impaired  Standing - Static: Fair;Constant support  Standing - Dynamic : Fair;Constant support    Functional Mobility and Transfers for ADLs:  Bed Mobility:  Rolling: Minimum assistance  Supine to Sit: Minimum assistance  Sit to Supine: Minimum assistance  Scooting: Minimum assistance    Transfers:  Sit to Stand: Minimum assistance;Assist x2  Stand to Sit: Minimum assistance;Assist x2  Assistive Device : Walker, rolling    ADL Assessment:  Feeding: Setup    Oral Facial Hygiene/Grooming: Minimum assistance    Bathing: Maximum assistance    Upper Body Dressing: Moderate assistance    Lower Body Dressing: Maximum assistance    Toileting: Total assistance                  ADL Intervention and task modifications:       Grooming  Grooming Assistance: Minimum assistance  Position Performed: Seated edge of bed (& bed in chair position)  Washing Face: Set-up; Supervision  Brushing Teeth: Minimum assistance       Lower Body Dressing Assistance  Socks: Maximum assistance    Toileting  Toileting Assistance: Total assistance(dependent)  Bowel Hygiene: Total assistance (dependent)  Clothing Management: Total assistance (dependent)    Cognitive Retraining  Safety/Judgement: Awareness of environment      Functional Measure:  [facility] AM-PAC®      Daily Activity Inpatient Short Form (6-Clicks) Version 2  How much HELP from another person do you currently need. .. (If the patient hasn't done an activity recently, how much help from another person do you think they would need if they tried?) Total A Lot A Little None   1. Putting on and taking off regular lower body clothing? []   1 [x]   2 []   3 []   4   2. Bathing (including washing, rinsing, drying)? []   1 [x]   2 []   3 []   4   3. Toileting, which includes using toilet, bedpan, or urinal? [x]   1 []   2 []   3 []   4   4. Putting on and taking off regular upper body clothing? []   1 [x]   2 []   3 []   4   5. Taking care of personal grooming such as brushing teeth? []   1 []   2 [x]   3 []   4   6. Eating meals? []   1 []   2 [x]   3 []   4     Raw Score: 13/24                            Cutoff score ?191,2,3 had higher odds of discharging home with home health or need of SNF/IPR    1. Milana Flynn Aus. Validity of the AM-PAC 6-Clicks Inpatient Daily Activity and Basic Mobility Short Forms.  Physical Therapy Mar 2014, 94 (0) 248-538; DOI: 10.2522/ptj.23869349  2. Belkis Yee. Association of AM-PAC \"6-Clicks\" Basic Mobility and Daily Activity Scores With Discharge Destination. Phys Ther. 2021 Apr 4;101(4):yeik485. doi: 10.1093/ptj/iggh505. PMID: 71170404. V Tho Patel, Jenna D, Rafael Chow, Rea K, Chamraine S. Activity Measure for Post-Acute Care \"6-Clicks\" Basic Mobility Scores Predict Discharge Destination After Acute Care Hospitalization in Select Patient Groups: A Retrospective, Observational Study. Arch Rehabil Res Clin Transl. 2022 Jul 16;4(3):630425. doi: 10.1016/j.arrct. 5940.620189. PMID: 95830722; PMCID: [id number]. 4. Prema Ball Ni P. AM-PAC Short Forms Manual 4.0. Revised 2/2020. Occupational Therapy Evaluation Charge Determination   History Examination Decision-Making   LOW Complexity : Brief history review  LOW Complexity : 1-3 performance deficits relating to physical, cognitive , or psychosocial skils that result in activity limitations and / or participation restrictions  LOW Complexity : No comorbidities that affect functional and no verbal or physical assistance needed to complete eval tasks       Based on the above components, the patient evaluation is determined to be of the following complexity level: LOW   Pain Rating:  Pt did not endorse pain during session     Activity Tolerance:   Fair and requires rest breaks    After treatment patient left in no apparent distress:    Supine in bed, Call bell within reach, Bed / chair alarm activated, Side rails x 3, and bed in modified chair position    COMMUNICATION/EDUCATION:   The patients plan of care was discussed with: Physical therapist, Occupational therapist, and Registered nurse. Patient/family have participated as able in goal setting and plan of care. This patients plan of care is appropriate for delegation to [facility].     Thank you for this referral.  Sarah Ramirez, OT  Time Calculation: 23 mins

## 2023-03-30 NOTE — ROUTINE PROCESS
Bedside and Verbal shift change report given to Jorge Alberto Herr (oncoming nurse) by Israel Geiger (offgoing nurse). Report included the following information SBAR, Kardex, Intake/Output, Recent Results, Cardiac Rhythm sb, and Quality Measures.

## 2023-03-30 NOTE — PROGRESS NOTES
Transition of Care Plan  RUR- Low  11%  DISPOSITION: Return to [facility] ([address] [facility], [address]) - must be back by 11am tomorrow if stable  [address] [place] [address] Buzz Youssef [phone]; U#[phone]  F/U with PCP/Specialist    Transport: [facility] 1000 - 3/31    Reason for Admission:  stroke work up                     RUR Score:          11%           Plan for utilizing home health:      hx of HH at the [address] [place] [address] - not current, PT/OT evals pending    PCP: First and Last name:  Other, MD Dr. Cleo Narayan Short     Name of Practice: [address] [place] [address]   Are you a current patient: Yes/No:    Approximate date of last visit:    Can you participate in a virtual visit with your PCP:                     Current Advanced Directive/Advance Care Plan: DNR      Healthcare Decision Maker:   Click here to complete [address] including selection of the Healthcare Decision Maker Relationship (ie \"Primary\")             Primary Decision Maker: Jose John - Daughter - [phone]    Secondary Decision Maker: Debera Dakins - Son - [phone]    Supplemental (Other) Decision Maker: Rona Connors - Son - [phone]                  Transition of Care Plan:                      CM met with patient, daughter, Roney Foots and son in law at bedside to introduce self and role. Living situation: lives at the [facility]   ADLs: needs assist with bathing, dressing, transferring from [facility] (Is WC baseline)   DME: WC baseline, RW, shower chair, grab bars  Previous IPR/SNF: [facility], [address]   Previous home health: through [address] [place] [address]  Demographics: confirmed, Medicare A&B and [address]: [address] point of contact:  Rafael Milan #[phone]    CM spoke with [address] [place] [address] Buzz Youssef [phone]; who confirmed patient may return, but will need to be back by 11am on Friday. Dr. Stone Balloon informed. CM to fax clinicals later today to Q#[phone].     CM spoke with patient's daughter, Latonya Castaneda. They prefer BLS transport at discharge. Family verbalized understanding that insurance will be billed for trip, however CM is not able to guarantee 100% coverage for trip. PCS form to be copied and placed on patient's hard chart. CM to follow patient progress and assist as recommended with TEREZA plan. 1550: Case discussed with Dr. Sav Norton, plan for DC tomorrow morning. [facility] requested for 1000. Care Management Interventions  PCP Verified by CM: Yes  Palliative Care Criteria Met (RRAT>21 & CHF Dx)?: No  Mode of Transport at Discharge: BLS  Transition of Care Consult (CM Consult): Discharge Planning  MyChart Signup: No  Discharge Durable Medical Equipment: No  Health Maintenance Reviewed: Yes  Physical Therapy Consult: Yes  Occupational Therapy Consult: Yes  Speech Therapy Consult: Yes  Support Systems: Assisted Living  Confirm Follow Up Transport: Other (see comment) (BLS)  The Plan for Transition of Care is Related to the Following Treatment Goals : TALIA  The Patient and/or Patient Representative was Provided with a Choice of Provider and Agrees with the Discharge Plan?: Yes  Name of the Patient Representative Who was Provided with a Choice of Provider and Agrees with the Discharge Plan: patient  Freedom of Choice List was Provided with Basic Dialogue that Supports the Patient's Individualized Plan of Care/Goals, Treatment Preferences and Shares the Quality Data Associated with the Providers?: Yes  Discharge Location  Patient [facility]s to be Dis[facility].

## 2023-03-31 VITALS
SYSTOLIC BLOOD PRESSURE: 136 MMHG | RESPIRATION RATE: 13 BRPM | HEART RATE: 61 BPM | DIASTOLIC BLOOD PRESSURE: 118 MMHG | OXYGEN SATURATION: 95 % | TEMPERATURE: 97.4 F | BODY MASS INDEX: 20.61 KG/M2 | WEIGHT: 111.99 LBS | HEIGHT: 62 IN

## 2023-03-31 PROBLEM — G45.9 TIA (TRANSIENT ISCHEMIC ATTACK): Status: RESOLVED | Noted: 2023-03-29 | Resolved: 2023-03-31

## 2023-03-31 LAB
ANION GAP SERPL CALC-SCNC: 6 MMOL/L (ref 5–15)
BASOPHILS # BLD: 0 K/UL (ref 0–0.1)
BASOPHILS NFR BLD: 1 % (ref 0–1)
BUN SERPL-MCNC: 30 MG/DL (ref 6–20)
BUN/CREAT SERPL: 25 (ref 12–20)
CALCIUM SERPL-MCNC: 7.9 MG/DL (ref 8.5–10.1)
CHLORIDE SERPL-SCNC: 107 MMOL/L (ref 97–108)
CO2 SERPL-SCNC: 26 MMOL/L (ref 21–32)
CREAT SERPL-MCNC: 1.18 MG/DL (ref 0.55–1.02)
DIFFERENTIAL METHOD BLD: NORMAL
EOSINOPHIL # BLD: 0.1 K/UL (ref 0–0.4)
EOSINOPHIL NFR BLD: 2 % (ref 0–7)
ERYTHROCYTE [DISTWIDTH] IN BLOOD BY AUTOMATED COUNT: 12.7 % (ref 11.5–14.5)
GLUCOSE SERPL-MCNC: 91 MG/DL (ref 65–100)
HCT VFR BLD AUTO: 36.1 % (ref 35–47)
HGB BLD-MCNC: 11.8 G/DL (ref 11.5–16)
IMM GRANULOCYTES # BLD AUTO: 0 K/UL (ref 0–0.04)
IMM GRANULOCYTES NFR BLD AUTO: 0 % (ref 0–0.5)
LYMPHOCYTES # BLD: 2.2 K/UL (ref 0.8–3.5)
LYMPHOCYTES NFR BLD: 41 % (ref 12–49)
MCH RBC QN AUTO: 31 PG (ref 26–34)
MCHC RBC AUTO-ENTMCNC: 32.7 G/DL (ref 30–36.5)
MCV RBC AUTO: 94.8 FL (ref 80–99)
MONOCYTES # BLD: 0.5 K/UL (ref 0–1)
MONOCYTES NFR BLD: 9 % (ref 5–13)
NEUTS SEG # BLD: 2.6 K/UL (ref 1.8–8)
NEUTS SEG NFR BLD: 47 % (ref 32–75)
NRBC # BLD: 0 K/UL (ref 0–0.01)
NRBC BLD-RTO: 0 PER 100 WBC
PLATELET # BLD AUTO: 182 K/UL (ref 150–400)
PMV BLD AUTO: 10.9 FL (ref 8.9–12.9)
POTASSIUM SERPL-SCNC: 4 MMOL/L (ref 3.5–5.1)
RBC # BLD AUTO: 3.81 M/UL (ref 3.8–5.2)
SODIUM SERPL-SCNC: 139 MMOL/L (ref 136–145)
WBC # BLD AUTO: 5.5 K/UL (ref 3.6–11)

## 2023-03-31 PROCEDURE — 85025 COMPLETE CBC W/AUTO DIFF WBC: CPT

## 2023-03-31 PROCEDURE — 74011250637 HC RX REV CODE- 250/637: Performed by: STUDENT IN AN ORGANIZED HEALTH CARE EDUCATION/TRAINING PROGRAM

## 2023-03-31 PROCEDURE — 80048 BASIC METABOLIC PNL TOTAL CA: CPT

## 2023-03-31 PROCEDURE — 74011250637 HC RX REV CODE- 250/637: Performed by: INTERNAL MEDICINE

## 2023-03-31 PROCEDURE — 36415 COLL VENOUS BLD VENIPUNCTURE: CPT

## 2023-03-31 RX ORDER — CARVEDILOL 3.12 MG/1
3.12 TABLET ORAL 2 TIMES DAILY WITH MEALS
Qty: 60 TABLET | Refills: 0 | Status: SHIPPED
Start: 2023-03-31

## 2023-03-31 RX ORDER — ATORVASTATIN CALCIUM 40 MG/1
40 TABLET, FILM COATED ORAL DAILY
Qty: 30 TABLET | Refills: 0 | Status: SHIPPED
Start: 2023-03-31

## 2023-03-31 RX ORDER — SODIUM CHLORIDE 9 MG/ML
75 INJECTION, SOLUTION INTRAVENOUS CONTINUOUS
Status: DISCONTINUED | OUTPATIENT
Start: 2023-03-31 | End: 2023-03-31 | Stop reason: HOSPADM

## 2023-03-31 RX ADMIN — DILTIAZEM HYDROCHLORIDE 120 MG: 120 CAPSULE, EXTENDED RELEASE ORAL at 09:56

## 2023-03-31 RX ADMIN — CARVEDILOL 3.12 MG: 3.12 TABLET, FILM COATED ORAL at 09:56

## 2023-03-31 RX ADMIN — COLESTIPOL HYDROCHLORIDE 5 G: 1 TABLET, FILM COATED ORAL at 09:55

## 2023-03-31 RX ADMIN — APIXABAN 2.5 MG: 2.5 TABLET, FILM COATED ORAL at 09:56

## 2023-03-31 RX ADMIN — ASPIRIN 81 MG CHEWABLE TABLET 81 MG: 81 TABLET CHEWABLE at 09:56

## 2023-03-31 RX ADMIN — ATORVASTATIN CALCIUM 40 MG: 40 TABLET, FILM COATED ORAL at 09:56

## 2023-03-31 RX ADMIN — LOPERAMIDE HYDROCHLORIDE 2 MG: 2 CAPSULE ORAL at 09:56

## 2023-03-31 RX ADMIN — CITALOPRAM HYDROBROMIDE 20 MG: 20 TABLET ORAL at 09:56

## 2023-03-31 NOTE — DISCHARGE SUMMARY
Physician Discharge Summary     Patient ID:    Shahnaz Reed  [id number]  11 yo  3/13/1933    Admit date: 3/29/2023    Shahnaz Reed is a 80 y.o. female with PMH of  Afib on Eliquis, CHFpEF, HTN presented to ED from  [facility] for evaluation of blurry vision. Patient is a poor historian. Pt c/w diarrhea for 2 weeks but says that it is chronic since she had colectomy in 12/2021. Pt denied any pain. She c/w dizziness and numbness in both feet. In ED, code stroke called for blurry vision and right arm numbness. CT head and CTA head- negative. Hospitalist consulted for admission. Spoke with a nurse from [facility] - pt had elevated BP in 200's this morning, pt c/w blurry vision and headache. She had an episode of vomiting. EMS called. She does have chronic diarrhea. Blurry vision - CVA vs TIA   - admit to neuro   - CT head: Sequela of chronic microvascular angiopathy and cerebral atrophy. 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 No evidence of large vessel occlusion. 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 - MRI brain and Echo ordered   - LDL 55, A1c ordered   - Neurology consulted   - c/w aspirin, started on lipitor   - SLP/PT/OT     HTN uncontrolled   - BP in 200's   - started on home meds coreg, cardizem   - will allow permissive HTN      Afib - rate controlled on coreg. Eliquis for [facility]  CHFpEF - appears compensated   Depression - celexa   Chronic diarrhea- supportive care     Discharge date and time: 3/31/2023      DISCHARGE CONDITION: Stable        Hospital Diagnoses and Treatment Rendered:       Patient's bp was controlled. MRI and CTs were negative for acute stroke. Neurology saw the patient. Her BP remained a little bit above goal, but her HR was borderline, so I did not want to keep adding medications in an inpatient setting.  Recommend follow up with PCP for adjustment of BP meds. 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    Afib  Eliquis, diltiazem, and coreg    CHFpEF, unknown EF, NYHA class I-II  Stable    Depression  Celexa    Chronic diarrhea  Home meds    H/O right colectomy and small bowel resection for bowel ischemia    Chronic Diagnoses:    Problem List as of 3/31/2023 Date Reviewed: 3/9/2022            Codes Class Noted - Resolved    Acute delirium ICD-10-CM: R41.0  ICD-9-CM: 780.09  3/9/2022 - Present        Severe protein-calorie malnutrition ([facility] ICD-10-CM: E43  ICD-9-CM: 262  2/8/2022 - Present        UTI (urinary tract infection) ICD-10-CM: N39.0  ICD-9-CM: 599.0  2/6/2022 - Present        Sepsis ([facility] ICD-10-CM: A41.9  ICD-9-CM: 038.9, 995.91  2/6/2022 - Present        Diarrhea ICD-10-CM: R19.7  ICD-9-CM: 787.91  1/19/2022 - Present        Dehydration ICD-10-CM: E86.0  ICD-9-CM: 276.51  1/19/2022 - Present        Stage III pressure ulcer of sacral region [facility]) ICD-10-CM: X79.781  ICD-9-CM: 707.03, 707.23  1/6/2022 - Present        Lethargy ICD-10-CM: R53.83  ICD-9-CM: 780.79  Unknown - Present        Feeding difficulties ICD-10-CM: R63.30  ICD-9-CM: 602. 3  Unknown - Present        Goals of care, counseling/discussion ICD-10-CM: Z71.89  ICD-9-CM: V65.49  Unknown - Present        Palliative care encounter ICD-10-CM: Z51.5  ICD-9-CM: V66.7  Unknown - Present        Small bowel ischemia ([facility] ICD-10-CM: K55.9  ICD-9-CM: 557.9  11/16/2021 - Present        RESOLVED: TIA (transient ischemic attack) ICD-10-CM: G45.9  ICD-9-CM: 435.9  3/29/2023 - 3/31/2023           Discharge Medications:       Stable  Current Discharge Medication List        START taking these medications    Details   atorvastatin (LIPITOR) 40 mg tablet Take 1 Tablet by mouth daily.   Qty: 30 Tablet, Refills: 0  Start date: 3/31/2023           CONTINUE these medications which have CHANGED    Details   carvediloL (COREG) 3.125 mg tablet Take 1 Tablet by mouth two (2) times daily (with meals). HOLD FOR BP <130/65 and heart rate less than 60. Qty: 60 Tablet, Refills: 0  Start date: 3/31/2023           CONTINUE these medications which have NOT CHANGED    Details   hydrocortisone (CORTAID) 1 % topical cream Apply  to affected area two (2) times a day. (Apply to affected areas of the face x 14 days; last day 4/6/23). loperamide (IMODIUM) 2 mg capsule Take 2 mg by mouth two (2) times a day. ascorbic acid, vitamin C, (VITAMIN C) 500 mg tablet Take 500 mg by mouth daily. potassium bicarb-citric acid (Effer-K) 20 mEq tablet Take 20 mEq by mouth daily. Menthol-Zinc Oxide 0.44-20.6 % pste by Apply Externally route every other day. (Apply to open area on buttocks)      dilTIAZem ER (Cartia XT) 120 mg capsule Take 1 Capsule by mouth daily. HOLD FOR BP <130/65  Qty: 30 Capsule, Refills: 0      colestipoL (COLESTID) 1 gram tablet Take 5 g by mouth two (2) times a day. promethazine (PHENERGAN) 25 mg tablet Take 25 mg by mouth every six (6) hours as needed for Nausea. acetaminophen (TYLENOL) 325 mg tablet Take 650 mg by mouth two (2) times daily as needed for Pain. Indications: fever, pain      citalopram (CELEXA) 20 mg tablet Take 20 mg by mouth daily. therapeutic multivitamin-minerals (THERAGRAN-M) tablet Take 1 Tablet by mouth daily. apixaban (ELIQUIS) 2.5 mg tablet Take 1 Tablet by mouth two (2) times a day. Qty: 30 Tablet, Refills: 3      aspirin delayed-release 81 mg tablet Take 81 mg by mouth daily. melatonin 5 mg tablet Take 5 mg by mouth nightly. cholecalciferol (VITAMIN D3) (1000 Units /25 mcg) tablet Take 1,000 Units by mouth daily. Follow up Care:    1. Other, MD Helene in 1-2 weeks.   Please call to set up an appointment shortly after discharge. Diet:  Regular Diet    Disposition:  TALIA with HH. Advanced Directive:   FULL    DNR X     Discharge Exam:  General : alert x 3, awake, no acute distress,   HEENT: PEERL, EOMI, moist mucus membrane  Neck: supple, no JVD, no meningeal signs  Chest: Clear to auscultation bilaterally   CVS: S1 S2 heard, Capillary refill less than 2 seconds  Abd: soft/ non tender, non distended, BS physiological,   Ext: no clubbing, no cyanosis, no edema, brisk 2+ DP pulses  Neuro/Psych: pleasant mood and affect, CN 2-12 grossly intact, sensory grossly within normal limit, Strength 5/5 in all extremities  Skin: warm     CONSULTATIONS: Neurology    Significant Diagnostic Studies:   3/29/2023: BUN 15 MG/DL (Ref range: 6 - 20 MG/DL); Calcium 8.7 MG/DL (Ref range: 8.5 - 10.1 MG/DL); CO2 28 mmol/L (Ref range: 21 - 32 mmol/L); Creatinine 1.00 MG/DL (Ref range: 0.55 - 1.02 MG/DL); Glucose 103 mg/dL (H; Ref range: 65 - 100 mg/dL); HCT 37.0 % (Ref range: 35.0 - 47.0 %); HGB 11.7 g/dL (Ref range: 11.5 - 16.0 g/dL); Potassium 3.8 mmol/L (Ref range: 3.5 - 5.1 mmol/L); Sodium 137 mmol/L (Ref range: 136 - 145 mmol/L)  Recent Labs     03/31/23  0147 03/29/23  1409   WBC 5.5 4.4   HGB 11.8 11.7   HCT 36.1 37.0    184     Recent Labs     03/31/23  0147 03/29/23  1409    137   K 4.0 3.8    108   CO2 26 28   BUN 30* 15   CREA 1.18* 1.00   GLU 91 103*   CA 7.9* 8.7   MG  --  1.0*     Recent Labs     03/29/23  1409      TP 7.1   ALB 3.0*   GLOB 4.1*     Recent Labs     03/29/23  1409   INR 1.2*   PTP 12.2*      No results for input(s): FE, TIBC, PSAT, FERR in the last 72 hours. No results for input(s): PH, PCO2, PO2 in the last 72 hours. No results for input(s): CPK, CKMB in the last 72 hours. No lab exists for component: TROPONINI  No components found for: [place]      Greater than 30 minutes spent on discharge.     Signed:  Naheed Hidalgo MD  3/31/2023  8:30 AM

## 2023-03-31 NOTE — ROUTINE PROCESS
Bedside and Verbal shift change report given to Amina Velez (oncoming nurse) by Desiree Mo (offgoing nurse). Report included the following information SBAR, Kardex, Intake/Output, Recent Results, Cardiac Rhythm SB,SR, and Quality Measures.

## 2023-03-31 NOTE — PROGRESS NOTES
Physician Progress Note      PATIENT:               Brian Lau  CSN #:                  [id number]  :                       3/13/1933  ADMIT DATE:       3/29/2023 1:51 PM  [address] DATE:        3/31/2023 11:00 AM  RESPONDING  PROVIDER #:        Molli Bernheim MD          QUERY TEXT:    Pt admitted with blurry vision, headache, nausea/vomiting, diarrhea, and right arm numbness. D/C summary noted blurry vision - CVA vs TIA vs HTN. MRI was noted to be negative for acute infarct. Neurology noted suspicion for a cerebrovascular event causing her headache, nausea and vomiting remains low. Pt was noted to have uncontrolled HTN to 220/82 and received IV hydralazine. 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 Pt was additionally noted to have Afib on Eliquis, diltiazem and carvedilol. Pt has chronic diarrhea with h/o colectomy. If possible, please document if you are evaluating and/or treating any of the following: The medical record reflects the following:    Risk Factors: 80 y.o. 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 MRI and CTs were negative for acute stroke. Neurology saw the patient. \" & \"CT head: Sequela of chronic microvascular angiopathy and cerebral atrophy. 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 No evidence of large vessel occlusion.  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 \"    -- Neurology documented: \"80year-old female with atrial fibrillation, on anticoagulation, hypertension, congestive heart failure, who states that she has been having nausea and vomiting for a few days. Then she developed some blurring of vision and headache yesterday, which she attributes to her recent fall. She was admitted for stroke workup. MRI scan of the brain is negative. Currently, she is fairly alert, oriented, and back to her baseline. My suspicion for a cerebrovascular event causing her headache, nausea, and vomiting remains low. She is currently on apixaban and low-dose aspirin and recommend continuing those for secondary stroke prophylaxis. \"    -- BP = 200/57, 211/76, 195/65 at admission, up to 220/82 -- MRI 3/29 = Moderate chronic microvascular ischemic disease. No acute infarct. -- CT head 3/29 = Sequela of chronic microvascular angiopathy and cerebral atrophy. No evidence of acute infarct or hemorrhage by noncontrast CT. Treatment: Neurology consult, IV hydralazine, PO diltiazem, carvedilol, ASA, Lipitor, imaging studies, BP monitoring, cardiac monitoring, Neurology unit admission & monitoring    Thank-you,  Nissa Mack RN, [facility]  Clinical   Elie Phipps. [email or web address]  [phone]  You can also contact me via [facility].   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 -- Other - I will add my own diagnosis  -- Disagree - Not applicable / Not valid  -- Disagree - Clinically unable to determine / Unknown  -- Refer to Clinical Documentation Reviewer    PROVIDER RESPONSE TEXT:    Presenting symptoms of blurry vision, headache, nausea/vomiting and right arm numbness were due to uncontrolled hypertension and TIA. Query created by: Jefry Yepez on 3/31/2023 10:45 AM      QUERY TEXT:    Pt admitted with blurry vision, headache, nausea/vomiting, diarrhea, and right arm numbness. D/C summary noted uncontrolled hypertension. Pt's BP was noted to be as high as 220/82 during admission. Pt received IV hydralazine, PO diltiazem and carvedilol. If possible, please document if you are evaluating and/or treating any of the following: The medical record reflects the following:    Risk Factors: 80 y.o. 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 [email or web address]  [phone]  You can also contact me via [address] [place] [address]. Hypertensive Urgency: Defined as SBP of >180 OR DBP >120 w/o associated organ damage. S/s may or may not be present, but can include severe headache, SOB, epistaxis, severe anxiety. Tx: adjustment of oral BP medication; IV meds not usually required. Hypertensive Emergency: SBP of >180 OR DBP >120 w/ associated organ damage (CVA, MI, acute CHF, CURT, encephalopathy, pulmonary edema, and unstable angina). Documentation should include specific organ affected. Requires immediate treatment, usually with IV meds.   Hypertensive Crisis, unspecified: SBP of > 180 OR DBP > 120 and includes damage to blood vessels, including inflammation, leakage of fluid or blood and can cause stroke, headache, heart failure and eclampsia. Source: [employer] and Quick Reference Guide  Options provided:  -- Hypertensive crisis  -- Hypertensive emergency  -- Other - I will add my own diagnosis  -- Disagree - Not applicable / Not valid  -- Disagree - Clinically unable to determine / Unknown  -- Refer to Clinical Documentation Reviewer    PROVIDER RESPONSE TEXT:    This patient was in hypertensive crisis. Query created by: Betito Caceres on 3/31/2023 10:51 AM      QUERY TEXT:    Pt was noted to have atrial fibrillation and is maintained on Eliquis, diltiazem and carvedilol. If possible, please document if you are evaluating and/or treating any of the following: The medical record reflects the following:    Risk Factors: 80 y.o. female with PMH of  Afib on Eliquis, CHFpEF, HTN    Clinical Indicators: D/C summary documented: \"Afib - rate controlled on coreg. Eliquis for AC -  CHFpEF - appears compensated\"    Treatment: Eliquis, PO diltiazem, carvedilol, cardiac monitoring    Thank-you,  Raeann Liu RN, [facility]  Clinical   Savi Barakat. [email or web address]  [phone]  You can also contact me via [address] [place] [address]. Options provided:  -- Secondary hypercoagulable state in a patient with atrial fibrillation  -- Other - I will add my own diagnosis  -- Disagree - Not applicable / Not valid  -- Disagree - Clinically unable to determine / Unknown  -- Refer to Clinical Documentation Reviewer    PROVIDER RESPONSE TEXT:    Provider disagreed with this query.     Query created by: Betito Caceres on 3/31/2023 10:57 AM      Electronically signed by:  Philip Roldan MD 3/31/2023 2:19 PM

## 2023-03-31 NOTE — PROGRESS NOTES
Transition of Care Plan  RUR- Low  13%  DISPOSITION: Return to [facility] ([address] [facility], [address] [facility]) - must be back by 11am tomorrow if stable  [address] Jair Gonzalez [phone]; [phone]  F/U with PCP/Specialist    Transport: [facility] 1000 - 3/31    Emergency contact: Rigoberto Ceja - Daughter - [phone]    CM barriers to discharge: None    DC order noted. [facility] is scheduled for 1000 to transport patient back to [address] [place] [address]. DC Summary and home health orders faxed to [phone]. CM attempted x2 to reach patient's daughter, Obdulio Ghosh regarding discharge. Message left, phone going to . CM attempted to reach patient's other contacts on file, not working #'s. Medicare pt has received, reviewed, and signed 2nd IM letter informing them of their right to appeal the discharge. Signed copy has been placed on pt bedside chart. 936: CM received call from patient's daughter, Obdulio Ghosh, who is in agreement with discharge this morning. Josue NixmpfFIDEL PrestonS.W.

## 2023-05-19 RX ORDER — CARVEDILOL 3.12 MG/1
3.12 TABLET ORAL 2 TIMES DAILY WITH MEALS
COMMUNITY
Start: 2023-03-31

## 2023-05-19 RX ORDER — ATORVASTATIN CALCIUM 40 MG/1
40 TABLET, FILM COATED ORAL DAILY
COMMUNITY
Start: 2023-03-31

## 2023-05-19 RX ORDER — PROMETHAZINE HYDROCHLORIDE 25 MG/1
25 TABLET ORAL EVERY 6 HOURS PRN
COMMUNITY

## 2023-05-19 RX ORDER — ASPIRIN 81 MG/1
81 TABLET ORAL DAILY
COMMUNITY

## 2023-05-19 RX ORDER — ACETAMINOPHEN 325 MG/1
650 TABLET ORAL 2 TIMES DAILY PRN
COMMUNITY

## 2023-05-19 RX ORDER — MONTELUKAST SODIUM 4 MG/1
5 TABLET, CHEWABLE ORAL 2 TIMES DAILY
COMMUNITY

## 2023-05-19 RX ORDER — CITALOPRAM 20 MG/1
20 TABLET ORAL DAILY
COMMUNITY

## 2023-05-19 RX ORDER — ASCORBIC ACID 500 MG
500 TABLET ORAL DAILY
COMMUNITY

## 2023-05-19 RX ORDER — DIAPER,BRIEF,INFANT-TODD,DISP
EACH MISCELLANEOUS 2 TIMES DAILY
COMMUNITY

## 2023-05-19 RX ORDER — LOPERAMIDE HYDROCHLORIDE 2 MG/1
2 CAPSULE ORAL 2 TIMES DAILY
COMMUNITY

## 2023-05-19 RX ORDER — DILTIAZEM HYDROCHLORIDE 120 MG/1
120 CAPSULE, EXTENDED RELEASE ORAL DAILY
COMMUNITY
Start: 2022-02-10

## 2023-05-19 RX ORDER — CHOLECALCIFEROL (VITAMIN D3) 125 MCG
5 CAPSULE ORAL NIGHTLY
COMMUNITY

## 2023-11-01 ENCOUNTER — APPOINTMENT (OUTPATIENT)
Facility: HOSPITAL | Age: 88
End: 2023-11-01
Attending: EMERGENCY MEDICINE
Payer: MEDICARE

## 2023-11-01 ENCOUNTER — HOSPITAL ENCOUNTER (EMERGENCY)
Facility: HOSPITAL | Age: 88
Discharge: HOME | End: 2023-11-01
Attending: EMERGENCY MEDICINE
Payer: MEDICARE

## 2023-11-01 VITALS
SYSTOLIC BLOOD PRESSURE: 189 MMHG | RESPIRATION RATE: 17 BRPM | HEIGHT: 62 IN | DIASTOLIC BLOOD PRESSURE: 68 MMHG | TEMPERATURE: 97.6 F | WEIGHT: 130.73 LBS | HEART RATE: 54 BPM | OXYGEN SATURATION: 97 % | BODY MASS INDEX: 24.06 KG/M2

## 2023-11-01 DIAGNOSIS — S09.8XXA BLUNT HEAD INJURY, INITIAL ENCOUNTER: ICD-10-CM
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
## 2023-11-01 PROCEDURE — 72125 CT NECK SPINE W/O DYE: CPT

## 2023-11-01 PROCEDURE — 99284 EMERGENCY DEPT VISIT MOD MDM: CPT

## 2023-11-01 PROCEDURE — 70450 CT HEAD/BRAIN W/O DYE: CPT

## 2023-11-01 RX ORDER — QUETIAPINE FUMARATE 50 MG/1
50 TABLET, EXTENDED RELEASE ORAL NIGHTLY
COMMUNITY

## 2023-11-01 RX ORDER — HYDRALAZINE HYDROCHLORIDE 25 MG/1
25 TABLET, FILM COATED ORAL 3 TIMES DAILY
COMMUNITY

## 2023-11-01 ASSESSMENT — PAIN SCALES - GENERAL
PAINLEVEL_OUTOF10: 0

## 2023-11-01 ASSESSMENT — ENCOUNTER SYMPTOMS
DIARRHEA: 0
VOMITING: 0
BLOOD IN STOOL: 0
ABDOMINAL DISTENTION: 0
ABDOMINAL PAIN: 0
COUGH: 0
ANAL BLEEDING: 0
NAUSEA: 0
SHORTNESS OF BREATH: 0

## 2023-11-01 NOTE — ED NOTES
Pt resting in NAD, VSS, denies needs at this time. Pt updated on results and POC, verbalizes knowledge.       Genaro Moreno RN  11/01/23 [id number]

## 2023-11-02 NOTE — ED NOTES
Patient is discharged home. Alert, oriented, and ambulatory. Patient is in no distress. Patient verbalizes understanding.       Denver Warren RN  11/01/23 [id number]

## 2023-11-02 NOTE — ED NOTES
H2H present for pt WC transport back to [address] [facility], [address] [place] [address]  11/01/23 [id number]

## 2024-10-17 ENCOUNTER — OFFICE VISIT (OUTPATIENT)
Age: 89
End: 2024-10-17
Payer: MEDICARE

## 2024-10-17 VITALS
SYSTOLIC BLOOD PRESSURE: 92 MMHG | DIASTOLIC BLOOD PRESSURE: 62 MMHG | WEIGHT: 133 LBS | HEIGHT: 62 IN | BODY MASS INDEX: 24.48 KG/M2

## 2024-10-17 DIAGNOSIS — I49.5 SSS (SICK SINUS SYNDROME) (HCC): ICD-10-CM

## 2024-10-17 DIAGNOSIS — I48.91 ATRIAL FIBRILLATION, UNSPECIFIED TYPE (HCC): Primary | ICD-10-CM

## 2024-10-17 DIAGNOSIS — R29.6 FALLS: ICD-10-CM

## 2024-10-17 PROCEDURE — 1123F ACP DISCUSS/DSCN MKR DOCD: CPT | Performed by: SPECIALIST

## 2024-10-17 PROCEDURE — 99204 OFFICE O/P NEW MOD 45 MIN: CPT | Performed by: SPECIALIST

## 2024-10-17 PROCEDURE — G8420 CALC BMI NORM PARAMETERS: HCPCS | Performed by: SPECIALIST

## 2024-10-17 PROCEDURE — G8427 DOCREV CUR MEDS BY ELIG CLIN: HCPCS | Performed by: SPECIALIST

## 2024-10-17 PROCEDURE — 1090F PRES/ABSN URINE INCON ASSESS: CPT | Performed by: SPECIALIST

## 2024-10-17 PROCEDURE — 1036F TOBACCO NON-USER: CPT | Performed by: SPECIALIST

## 2024-10-17 PROCEDURE — G8484 FLU IMMUNIZE NO ADMIN: HCPCS | Performed by: SPECIALIST

## 2024-10-17 RX ORDER — FAMOTIDINE 20 MG/1
20 TABLET, FILM COATED ORAL NIGHTLY PRN
COMMUNITY

## 2024-10-17 RX ORDER — ALBUTEROL SULFATE 90 UG/1
2 INHALANT RESPIRATORY (INHALATION) EVERY 6 HOURS PRN
COMMUNITY

## 2024-10-17 RX ORDER — FUROSEMIDE 20 MG
20 TABLET ORAL 2 TIMES DAILY
COMMUNITY

## 2024-10-17 RX ORDER — OXYCODONE AND ACETAMINOPHEN 2.5; 325 MG/1; MG/1
1 TABLET ORAL EVERY 4 HOURS PRN
COMMUNITY

## 2024-10-17 RX ORDER — FERROUS GLUCONATE 324(38)MG
324 TABLET ORAL
COMMUNITY

## 2024-10-17 RX ORDER — METOPROLOL SUCCINATE 50 MG/1
50 TABLET, EXTENDED RELEASE ORAL DAILY
COMMUNITY

## 2024-10-17 ASSESSMENT — PATIENT HEALTH QUESTIONNAIRE - PHQ9
2. FEELING DOWN, DEPRESSED OR HOPELESS: NOT AT ALL
SUM OF ALL RESPONSES TO PHQ QUESTIONS 1-9: 0
1. LITTLE INTEREST OR PLEASURE IN DOING THINGS: NOT AT ALL
SUM OF ALL RESPONSES TO PHQ QUESTIONS 1-9: 0
SUM OF ALL RESPONSES TO PHQ9 QUESTIONS 1 & 2: 0

## 2024-10-17 NOTE — PROGRESS NOTES
HISTORY OF PRESENT ILLNESS  Jolynn Fong is a 89 y.o. female   She is sent from a nursing home and is seen in wheelchair.  She is somewhat of a difficult historian but has no complaints and does not know why she is here.  She was noted to have atrial fibrillation about 3 years ago in the hospital.  Other subsequent EKGs showed sinus bradycardia with atrial ectopy suggesting sick sinus syndrome.  She does tell me that she falls a lot and has been doing this for at least a year.  She is on metoprolol and apparently takes 50 mg a day of the long-acting variety.  She is on aspirin only for anticoagulation.  Her heart rate to my exam today is 88 bpm and slightly irregular.  She appears somewhat cachectic.  Atrial Fibrillation    Foot Swelling          Specialty Problems          Cardiology Problems    Small bowel ischemia (HCC)          Current Outpatient Medications   Medication Instructions    acetaminophen (TYLENOL) 650 mg, Oral, 2 TIMES DAILY PRN    albuterol sulfate HFA (VENTOLIN HFA) 108 (90 Base) MCG/ACT inhaler 2 puffs, Inhalation, EVERY 6 HOURS PRN    ascorbic acid (VITAMIN C) 500 mg, Oral, DAILY    aspirin 81 mg, Oral, DAILY    atorvastatin (LIPITOR) 40 mg, Oral, DAILY    citalopram (CELEXA) 40 mg, Oral, DAILY    colestipol (COLESTID) 5 g, Oral, 2 TIMES DAILY    famotidine (PEPCID) 20 mg, Oral, NIGHTLY PRN    ferrous gluconate (FERGON) 324 mg, Oral, DAILY WITH BREAKFAST    furosemide (LASIX) 20 mg, Oral, 2 TIMES DAILY    hydrocortisone 1 % cream Topical, 2 TIMES DAILY    loperamide (IMODIUM) 2 mg, Oral, 2 TIMES DAILY    Magnesium 400 MG CAPS Oral    melatonin 5 mg, NIGHTLY    metoprolol succinate (TOPROL XL) 50 mg, Oral, DAILY    oxyCODONE-acetaminophen (PERCOCET) 2.5-325 MG per tablet 1 tablet, Oral, EVERY 4 HOURS PRN    potassium bicarb-citric acid (EFFER-K) 20 MEQ TBEF effervescent tablet 20 mEq, Oral, DAILY    promethazine (PHENERGAN) 25 mg, Oral, EVERY 6 HOURS PRN    QUEtiapine (SEROQUEL XR) 50 mg,

## 2024-11-07 NOTE — PROGRESS NOTES
1. Have you been to the ER, urgent care clinic since your last visit? Hospitalized since your last visit? No    2. Have you seen or consulted any other health care providers outside of the [address] [place] [address] since your last visit? Include any pap smears or colon screening.  No [FreeTextEntry1] : Pap deferred until 2026 per ASCCP guidelines.   Prescription renewal for Premarin .625 MG given.  Will switch from Estrace cream to Vagifem. We discussed that vaginal dryness and painful sex is secondary to vaginal atrophy, which is common in perimenopause and menopause, this is referred to as genitourinary syndrome of menopause.  We discussed menopausal changes in the vagina and bladder secondary to the significant reduction in estrogen. I explained that unlike the other symptoms of menopause that typically resolve over time, genitourinary syndrome of menopause typically worsens if left untreated.   We discussed the use of over-the-counter vaginal lubricants for intercourse, including the use of coconut oil both internally and externally.   We also discussed the benefits of over-the-counter vaginal moisturizers. We discussed the fact that these are recommended to be used regularly 2-3 times a week for maximum benefit.   We also discussed the benefits and roles of local menopausal hormone therapy via vaginal estrogen. She is aware that there is a minimal to insignificant absorption into the bloodstream with vaginal estrogen. This is regarded as very safe by the FDA. We even discussed that ACOG recommends it as a treatment option even in women with a history of breast cancer.   We discussed the different formulations of vaginal estrogen therapy including estrogen cream, vaginal estrogen tablets, and a vaginal estrogen ring. Prescription was sent to the pharmacy for vagifem. She is aware that for the first 2 weeks, she needs to insert the tablet vaginally nightly, and then after the first 2 weeks it is 2 times per week. She is aware that she may not have a significant improvement in symptoms for 3 months. If there is not significant improvement after 3 months, she was instructed to follow up. She is aware that she can also use both vaginal moisturizers and vaginal estrogen for added improvement. She is aware she would need to alternate the insertion of both, and I recommend against inserting both at the same time.   She is aware that if she has any vaginal bleeding after the first 3 months of initiating treatment, she should return for a uterine evaluation. She is also aware that she may still require lubrication for intercourse. Questions answered.  Prescription for mammogram screening and breast US given. Self-breast exam reviewed.  Prescription for DEXA studies were given secondary to a history of osteopenia.   She will follow up annually and as needed.

## (undated) DEVICE — SUTURE MCRYL SZ 4-0 L27IN ABSRB UD L19MM PS-2 1/2 CIR PRIM Y426H

## (undated) DEVICE — BLADE ELECTRODE: Brand: EDGE

## (undated) DEVICE — YANKAUER,TAPERED BULBOUS TIP,W/O VENT: Brand: MEDLINE

## (undated) DEVICE — Device

## (undated) DEVICE — TROCAR: Brand: KII® OPTICAL ACCESS SYSTEM

## (undated) DEVICE — GLOVE ORANGE PI 7 1/2   MSG9075

## (undated) DEVICE — SUTURE SZ 0 27IN 5/8 CIR UR-6  TAPER PT VIOLET ABSRB VICRYL J603H

## (undated) DEVICE — GARMENT,MEDLINE,DVT,INT,CALF,MED, GEN2: Brand: MEDLINE

## (undated) DEVICE — TOTAL TRAY, DB, 100% SILI FOLEY, 16FR 10: Brand: MEDLINE

## (undated) DEVICE — TROCAR SITE CLOSURE DEVICE: Brand: ENDO CLOSE

## (undated) DEVICE — GENERAL LAPAROSCOPY - SMH: Brand: MEDLINE INDUSTRIES, INC.

## (undated) DEVICE — LAPAROSCOPIC TROCAR SLEEVE/SINGLE USE: Brand: KII® OPTICAL ACCESS SYSTEM

## (undated) DEVICE — NEEDLE HYPO 22GA L1.5IN BLK S STL HUB POLYPR SHLD REG BVL

## (undated) DEVICE — POWER SHELL: Brand: SIGNIA

## (undated) DEVICE — GLOVE SURG SZ 8 L12IN FNGR THK79MIL GRN LTX FREE

## (undated) DEVICE — ARTICULATING RELOAD WITH TRI-STAPLE TECHNOLOGY: Brand: ENDO GIA

## (undated) DEVICE — CURVED, LARGE JAW, OPEN SEALER/DIVIDER NANO-COATED: Brand: LIGASURE IMPACT

## (undated) DEVICE — FILTER SMK EVAC FLO CLR MEGADYNE

## (undated) DEVICE — SUTURE PDS II SZ 4-0 L27IN ABSRB VLT L17MM RB-1 1/2 CIR Z304H

## (undated) DEVICE — DERMABOND SKIN ADH 0.7ML -- DERMABOND ADVANCED 12/BX

## (undated) DEVICE — 40580 - THE PINK PAD - ADVANCED TRENDELENBURG POSITIONING KIT: Brand: 40580 - THE PINK PAD - ADVANCED TRENDELENBURG POSITIONING KIT

## (undated) DEVICE — TROCAR: Brand: KII® SLEEVE

## (undated) DEVICE — SUTURE PDS II SZ 1 L36IN ABSRB VLT L48MM CTX 1/2 CIR Z371T

## (undated) DEVICE — 4-PORT MANIFOLD: Brand: NEPTUNE 2

## (undated) DEVICE — DRAIN SURG 19FR 0.25IN SIL RND W/ TRCR INDIC DOT RADPQ FULL

## (undated) DEVICE — WOUND RETRACTOR AND PROTECTOR: Brand: ALEXIS WOUND PROTECTOR-RETRACTOR

## (undated) DEVICE — RESERVOIR,SUCTION,100CC,SILICONE: Brand: MEDLINE

## (undated) DEVICE — SUTURE PERMAHAND SZ 3-0 L18IN NONABSORBABLE BLK L26MM SH C013D

## (undated) DEVICE — SUTURE PERMAHAND SZ 2-0 L18IN NONABSORBABLE BLK L26MM SH C012D

## (undated) DEVICE — YANKAUER,POOLE TIP,STERILE,50/CS: Brand: MEDLINE